# Patient Record
Sex: MALE | Employment: OTHER | URBAN - METROPOLITAN AREA
[De-identification: names, ages, dates, MRNs, and addresses within clinical notes are randomized per-mention and may not be internally consistent; named-entity substitution may affect disease eponyms.]

---

## 2017-01-06 ENCOUNTER — HOSPITAL ENCOUNTER (OUTPATIENT)
Dept: CT IMAGING | Age: 77
Discharge: HOME | End: 2017-01-06
Attending: INTERNAL MEDICINE
Payer: MEDICARE

## 2017-01-06 DIAGNOSIS — G44.229 CHRONIC TENSION-TYPE HEADACHE, NOT INTRACTABLE: ICD-10-CM

## 2017-01-06 PROCEDURE — 70450 CT HEAD/BRAIN W/O DYE: CPT

## 2017-05-27 ENCOUNTER — HOSPITAL ENCOUNTER (EMERGENCY)
Age: 77
Discharge: HOME | End: 2017-05-27
Attending: EMERGENCY MEDICINE | Admitting: EMERGENCY MEDICINE
Payer: MEDICARE

## 2017-05-27 ENCOUNTER — APPOINTMENT (OUTPATIENT)
Dept: ULTRASOUND IMAGING | Age: 77
End: 2017-05-27
Attending: EMERGENCY MEDICINE
Payer: MEDICARE

## 2017-05-27 ENCOUNTER — APPOINTMENT (OUTPATIENT)
Dept: CT IMAGING | Age: 77
End: 2017-05-27
Attending: EMERGENCY MEDICINE
Payer: MEDICARE

## 2017-05-27 VITALS
SYSTOLIC BLOOD PRESSURE: 161 MMHG | DIASTOLIC BLOOD PRESSURE: 79 MMHG | HEIGHT: 68 IN | TEMPERATURE: 98 F | RESPIRATION RATE: 16 BRPM | HEART RATE: 73 BPM | OXYGEN SATURATION: 99 %

## 2017-05-27 DIAGNOSIS — R11.2 NON-INTRACTABLE VOMITING WITH NAUSEA, UNSPECIFIED VOMITING TYPE: ICD-10-CM

## 2017-05-27 DIAGNOSIS — R10.84 ABDOMINAL PAIN, GENERALIZED: Primary | ICD-10-CM

## 2017-05-27 DIAGNOSIS — N30.00 ACUTE CYSTITIS WITHOUT HEMATURIA: ICD-10-CM

## 2017-05-27 LAB
ALBUMIN SERPL BCP-MCNC: 3.1 G/DL (ref 3.5–5)
ALBUMIN/GLOB SERPL: 0.6 {RATIO} (ref 1.1–2.2)
ALP SERPL-CCNC: 111 U/L (ref 45–117)
ALT SERPL-CCNC: 25 U/L (ref 12–78)
ANION GAP BLD CALC-SCNC: 10 MMOL/L (ref 5–15)
APPEARANCE UR: ABNORMAL
AST SERPL W P-5'-P-CCNC: 15 U/L (ref 15–37)
BACTERIA URNS QL MICRO: ABNORMAL /HPF
BASOPHILS # BLD AUTO: 0 K/UL (ref 0–0.1)
BASOPHILS # BLD: 0 % (ref 0–1)
BILIRUB SERPL-MCNC: 0.4 MG/DL (ref 0.2–1)
BILIRUB UR QL: NEGATIVE
BUN SERPL-MCNC: 20 MG/DL (ref 6–20)
BUN/CREAT SERPL: 6 (ref 12–20)
CALCIUM SERPL-MCNC: 8.3 MG/DL (ref 8.5–10.1)
CHLORIDE SERPL-SCNC: 101 MMOL/L (ref 97–108)
CO2 SERPL-SCNC: 28 MMOL/L (ref 21–32)
COLOR UR: ABNORMAL
CREAT SERPL-MCNC: 3.36 MG/DL (ref 0.7–1.3)
EOSINOPHIL # BLD: 0 K/UL (ref 0–0.4)
EOSINOPHIL NFR BLD: 1 % (ref 0–7)
EPITH CASTS URNS QL MICRO: ABNORMAL /LPF
ERYTHROCYTE [DISTWIDTH] IN BLOOD BY AUTOMATED COUNT: 12.8 % (ref 11.5–14.5)
GLOBULIN SER CALC-MCNC: 5.2 G/DL (ref 2–4)
GLUCOSE SERPL-MCNC: 187 MG/DL (ref 65–100)
GLUCOSE UR STRIP.AUTO-MCNC: 100 MG/DL
HCT VFR BLD AUTO: 29.3 % (ref 36.6–50.3)
HGB BLD-MCNC: 9.7 G/DL (ref 12.1–17)
HGB UR QL STRIP: ABNORMAL
KETONES UR QL STRIP.AUTO: NEGATIVE MG/DL
LEUKOCYTE ESTERASE UR QL STRIP.AUTO: ABNORMAL
LIPASE SERPL-CCNC: 95 U/L (ref 73–393)
LYMPHOCYTES # BLD AUTO: 16 % (ref 12–49)
LYMPHOCYTES # BLD: 1.1 K/UL (ref 0.8–3.5)
MAGNESIUM SERPL-MCNC: 2 MG/DL (ref 1.6–2.4)
MCH RBC QN AUTO: 34.2 PG (ref 26–34)
MCHC RBC AUTO-ENTMCNC: 33.1 G/DL (ref 30–36.5)
MCV RBC AUTO: 103.2 FL (ref 80–99)
MONOCYTES # BLD: 0.4 K/UL (ref 0–1)
MONOCYTES NFR BLD AUTO: 6 % (ref 5–13)
NEUTS SEG # BLD: 5.2 K/UL (ref 1.8–8)
NEUTS SEG NFR BLD AUTO: 77 % (ref 32–75)
NITRITE UR QL STRIP.AUTO: NEGATIVE
PH UR STRIP: 8.5 [PH] (ref 5–8)
PLATELET # BLD AUTO: 180 K/UL (ref 150–400)
POTASSIUM SERPL-SCNC: 4.5 MMOL/L (ref 3.5–5.1)
PROT SERPL-MCNC: 8.3 G/DL (ref 6.4–8.2)
PROT UR STRIP-MCNC: 100 MG/DL
RBC # BLD AUTO: 2.84 M/UL (ref 4.1–5.7)
RBC #/AREA URNS HPF: ABNORMAL /HPF (ref 0–5)
SODIUM SERPL-SCNC: 139 MMOL/L (ref 136–145)
SP GR UR REFRACTOMETRY: 1.01 (ref 1–1.03)
TROPONIN I SERPL-MCNC: <0.04 NG/ML
UA: UC IF INDICATED,UAUC: ABNORMAL
UROBILINOGEN UR QL STRIP.AUTO: 2 EU/DL (ref 0.2–1)
WBC # BLD AUTO: 6.7 K/UL (ref 4.1–11.1)
WBC URNS QL MICRO: ABNORMAL /HPF (ref 0–4)

## 2017-05-27 PROCEDURE — 74011636320 HC RX REV CODE- 636/320: Performed by: EMERGENCY MEDICINE

## 2017-05-27 PROCEDURE — 93005 ELECTROCARDIOGRAM TRACING: CPT

## 2017-05-27 PROCEDURE — 83735 ASSAY OF MAGNESIUM: CPT | Performed by: EMERGENCY MEDICINE

## 2017-05-27 PROCEDURE — 96374 THER/PROPH/DIAG INJ IV PUSH: CPT

## 2017-05-27 PROCEDURE — 76705 ECHO EXAM OF ABDOMEN: CPT

## 2017-05-27 PROCEDURE — 99285 EMERGENCY DEPT VISIT HI MDM: CPT

## 2017-05-27 PROCEDURE — 74011250636 HC RX REV CODE- 250/636: Performed by: EMERGENCY MEDICINE

## 2017-05-27 PROCEDURE — 81001 URINALYSIS AUTO W/SCOPE: CPT | Performed by: EMERGENCY MEDICINE

## 2017-05-27 PROCEDURE — 74011250637 HC RX REV CODE- 250/637: Performed by: EMERGENCY MEDICINE

## 2017-05-27 PROCEDURE — 87086 URINE CULTURE/COLONY COUNT: CPT | Performed by: EMERGENCY MEDICINE

## 2017-05-27 PROCEDURE — 74177 CT ABD & PELVIS W/CONTRAST: CPT

## 2017-05-27 PROCEDURE — 36415 COLL VENOUS BLD VENIPUNCTURE: CPT | Performed by: EMERGENCY MEDICINE

## 2017-05-27 PROCEDURE — 84484 ASSAY OF TROPONIN QUANT: CPT | Performed by: EMERGENCY MEDICINE

## 2017-05-27 PROCEDURE — 85025 COMPLETE CBC W/AUTO DIFF WBC: CPT | Performed by: EMERGENCY MEDICINE

## 2017-05-27 PROCEDURE — 83690 ASSAY OF LIPASE: CPT | Performed by: EMERGENCY MEDICINE

## 2017-05-27 PROCEDURE — 80053 COMPREHEN METABOLIC PANEL: CPT | Performed by: EMERGENCY MEDICINE

## 2017-05-27 RX ORDER — CEPHALEXIN 250 MG/1
500 CAPSULE ORAL
Status: COMPLETED | OUTPATIENT
Start: 2017-05-27 | End: 2017-05-27

## 2017-05-27 RX ORDER — SODIUM CHLORIDE 9 MG/ML
50 INJECTION, SOLUTION INTRAVENOUS
Status: COMPLETED | OUTPATIENT
Start: 2017-05-27 | End: 2017-05-27

## 2017-05-27 RX ORDER — ONDANSETRON 4 MG/1
4 TABLET, ORALLY DISINTEGRATING ORAL
Qty: 10 TAB | Refills: 0 | Status: SHIPPED | OUTPATIENT
Start: 2017-05-27 | End: 2017-08-02

## 2017-05-27 RX ORDER — ONDANSETRON 2 MG/ML
4 INJECTION INTRAMUSCULAR; INTRAVENOUS
Status: COMPLETED | OUTPATIENT
Start: 2017-05-27 | End: 2017-05-27

## 2017-05-27 RX ORDER — CEPHALEXIN 500 MG/1
500 CAPSULE ORAL DAILY
Qty: 7 CAP | Refills: 0 | Status: SHIPPED | OUTPATIENT
Start: 2017-05-27 | End: 2017-06-03

## 2017-05-27 RX ORDER — SODIUM CHLORIDE 0.9 % (FLUSH) 0.9 %
10 SYRINGE (ML) INJECTION
Status: COMPLETED | OUTPATIENT
Start: 2017-05-27 | End: 2017-05-27

## 2017-05-27 RX ADMIN — ONDANSETRON HYDROCHLORIDE 4 MG: 2 INJECTION, SOLUTION INTRAMUSCULAR; INTRAVENOUS at 14:30

## 2017-05-27 RX ADMIN — IOPAMIDOL 100 ML: 755 INJECTION, SOLUTION INTRAVENOUS at 15:56

## 2017-05-27 RX ADMIN — CEPHALEXIN 500 MG: 250 CAPSULE ORAL at 18:39

## 2017-05-27 RX ADMIN — Medication 10 ML: at 15:56

## 2017-05-27 RX ADMIN — SODIUM CHLORIDE 50 ML/HR: 900 INJECTION, SOLUTION INTRAVENOUS at 15:55

## 2017-05-27 NOTE — ED PROVIDER NOTES
HPI Comments: Courtney Gould is a 64 y.o. male with PMhx significant for HTN, DM, and chronic kidney disease who presents via EMS to the ED with cc of an acute onset of 7/10 intermittent abdominal pain since 4 AM this morning. He also c/o associated nausea and vomiting, stating that he had 2 episodes of vomiting. He notes that he is currently experiencing abdominal pain in the ED. The pt states that he receives dialysis on Tuesdays, Thursdays, and Saturdays, noting that he was last dialyzed earlier today. He reports that he is still able to make urine, and he notes that his last bowel movement was yesterday. The pt denies hematuria, dysuria, CP, HA, or cough at this time. SHx: -tobacco, -EtOH, -illicit drug use    PCP: Darius Alvarez MD    History is otherwise limited due to the pt being a poor historian. The history is provided by the patient. The history is limited by the condition of the patient. No  was used. Past Medical History:   Diagnosis Date    CAD (coronary artery disease)     Chronic kidney disease     tues/thurs/sat [facility] [address]    Diabetes ([facility]     Endocrine disease     hypothyroid    Hypertension     Stroke ([facility]     2011 speech general weakness    Thyroid disease        Past Surgical History:   Procedure Laterality Date    ABDOMEN SURGERY PROC UNLISTED      HX HEENT      cataract   removal  rt eye    HX ORTHOPAEDIC      11/2014 R BK Amputation    HX OTHER SURGICAL      HX VASCULAR ACCESS      L arm shunt         Family History:   Problem Relation Age of Onset    Hypertension Mother     Diabetes Father        Social History     Social History    Marital status:      Spouse name: N/A    Number of children: N/A    Years of education: N/A     Occupational History    Not on file.      Social History Main Topics    Smoking status: Never Smoker    Smokeless tobacco: Never Used    Alcohol use No    Drug use: No    Sexual activity: Not on file     Other Topics Concern    Not on file     Social History Narrative         ALLERGIES: Review of patient's allergies indicates no known allergies. Review of Systems   Unable to perform ROS: Other (poor historian)       Patient Vitals for the past 12 hrs:   Temp Pulse Resp BP SpO2   05/27/17 1700 - 73 - 161/79 99 %   05/27/17 1530 - 74 - 164/76 99 %   05/27/17 1404 98 °F (36.7 °C) 80 16 133/67 98 %   05/27/17 1400 - - - 133/67 -            Physical Exam   Constitutional: He is oriented to person, place, and time. He appears well-developed and well-nourished. No distress. HENT:   Head: Normocephalic and atraumatic. Eyes: EOM are normal. Right eye exhibits no discharge. Left eye exhibits no discharge. No scleral icterus. Neck: Normal range of motion. Neck supple. No tracheal deviation present. Cardiovascular: Normal rate, regular rhythm, normal heart sounds and intact distal pulses. Exam reveals no gallop and no friction rub. No murmur heard. Pulmonary/Chest: Effort normal and breath sounds normal. No respiratory distress. He has no wheezes. He has no rales. Abdominal: Soft. He exhibits distension (mild). There is tenderness (diffuse). There is no rebound and no guarding. Musculoskeletal: Normal range of motion. He exhibits no edema. Bilateral BKA. Lymphadenopathy:     He has no cervical adenopathy. Neurological: He is alert and oriented to person, place, and time. Slurred speech baseline from previous stroke. Facial symmetry. Moving all extremities. Skin: Skin is warm and dry. No rash noted. Psychiatric: He has a normal mood and affect. Nursing note and vitals reviewed. MDM  Number of Diagnoses or Management Options  Abdominal pain, generalized:   Acute cystitis without hematuria:   Non-intractable vomiting with nausea, unspecified vomiting type:   Diagnosis management comments:     Patient presents to ED with diffuse abdominal pain and n/v.   Differential includes viral syndrome, electrolyte abnormality, gastritis, GERD, PUD, pancreatitis, cholecystitis, obstruction, ileus, UTI, atypical ACS  - CBC, CMP, lipase, UA  - Josias  - CT a/p  - Symptomatic management and reevaluate         Amount and/or Complexity of Data Reviewed  Clinical lab tests: reviewed and ordered  Tests in the radiology section of CPT®: reviewed and ordered  Tests in the medicine section of CPT®: ordered and reviewed  Review and summarize past medical records: yes  Independent visualization of images, tracings, or specimens: yes    Patient Progress  Patient progress: stable    ED Course       Procedures    EKG interpretation: (Preliminary) 1512  Rhythm: normal sinus rhythm and 1st degree AV block; and regular . Rate (approx.): 71; Axis: normal; AL interval: prolonged; QRS interval: normal ; ST/T wave: normal.  Written by BETINA Banerjeeibriri, as dictated by Trini Heck MD.    PROGRESS NOTE:  6:15 PM  Discussed dosage of Keflex with pharmacist since pt is ESRD. Will treat with 500 mg daily x 7 days. Written by BETINA Banerjee, as dictated by Trini Heck MD.    PROGRESS NOTE:  6:50 PM  Reevaluated the pt; he reports that he is feeling better and has tolerated PO. Abdominal exam is benign at this time. Advised follow up with general surgery for further evaluation of gallbladder findings. Will treat for UTI. Discussed results, prescriptions and follow up plan with patient and wife. Provided customary return to ED instructions. Patient and wife expressed understanding.   Amberly Ramires MD    LABORATORY TESTS:  Recent Results (from the past 12 hour(s))   CBC WITH AUTOMATED DIFF    Collection Time: 05/27/17  2:24 PM   Result Value Ref Range    WBC 6.7 4.1 - 11.1 K/uL    RBC 2.84 (L) 4.10 - 5.70 M/uL    HGB 9.7 (L) 12.1 - 17.0 g/dL    HCT 29.3 (L) 36.6 - 50.3 %    .2 (H) 80.0 - 99.0 FL    MCH 34.2 (H) 26.0 - 34.0 PG    MCHC 33.1 30.0 - 36.5 g/dL    RDW 12.8 11.5 - 14.5 % PLATELET 133 240 - 638 K/uL    NEUTROPHILS 77 (H) 32 - 75 %    LYMPHOCYTES 16 12 - 49 %    MONOCYTES 6 5 - 13 %    EOSINOPHILS 1 0 - 7 %    BASOPHILS 0 0 - 1 %    ABS. NEUTROPHILS 5.2 1.8 - 8.0 K/UL    ABS. LYMPHOCYTES 1.1 0.8 - 3.5 K/UL    ABS. MONOCYTES 0.4 0.0 - 1.0 K/UL    ABS. EOSINOPHILS 0.0 0.0 - 0.4 K/UL    ABS. BASOPHILS 0.0 0.0 - 0.1 K/UL   METABOLIC PANEL, COMPREHENSIVE    Collection Time: 05/27/17  2:24 PM   Result Value Ref Range    Sodium 139 136 - 145 mmol/L    Potassium 4.5 3.5 - 5.1 mmol/L    Chloride 101 97 - 108 mmol/L    CO2 28 21 - 32 mmol/L    Anion gap 10 5 - 15 mmol/L    Glucose 187 (H) 65 - 100 mg/dL    BUN 20 6 - 20 MG/DL    Creatinine 3.36 (H) 0.70 - 1.30 MG/DL    BUN/Creatinine ratio 6 (L) 12 - 20      GFR est AA 22 (L) >60 ml/min/1.73m2    GFR est non-AA 18 (L) >60 ml/min/1.73m2    Calcium 8.3 (L) 8.5 - 10.1 MG/DL    Bilirubin, total 0.4 0.2 - 1.0 MG/DL    ALT (SGPT) 25 12 - 78 U/L    AST (SGOT) 15 15 - 37 U/L    Alk.  phosphatase 111 45 - 117 U/L    Protein, total 8.3 (H) 6.4 - 8.2 g/dL    Albumin 3.1 (L) 3.5 - 5.0 g/dL    Globulin 5.2 (H) 2.0 - 4.0 g/dL    A-G Ratio 0.6 (L) 1.1 - 2.2     TROPONIN I    Collection Time: 05/27/17  2:24 PM   Result Value Ref Range    Troponin-I, Qt. <0.04 <0.05 ng/mL   MAGNESIUM    Collection Time: 05/27/17  2:24 PM   Result Value Ref Range    Magnesium 2.0 1.6 - 2.4 mg/dL   LIPASE    Collection Time: 05/27/17  2:24 PM   Result Value Ref Range    Lipase 95 73 - 393 U/L   EKG, 12 LEAD, INITIAL    Collection Time: 05/27/17  3:12 PM   Result Value Ref Range    Ventricular Rate 71 BPM    Atrial Rate 71 BPM    P-R Interval 210 ms    QRS Duration 80 ms    Q-T Interval 412 ms    QTC Calculation (Bezet) 447 ms    Calculated P Axis 31 degrees    Calculated T Axis 0 degrees    Diagnosis       Sinus rhythm with 1st degree AV block  Low voltage QRS  When compared with ECG of 13-AUG-2015 16:25,  NH interval has increased     URINALYSIS W/ REFLEX CULTURE    Collection Time: 05/27/17  4:33 PM   Result Value Ref Range    Color YELLOW/STRAW      Appearance HAZY (A) CLEAR      Specific gravity 1.010 1.003 - 1.030      pH (UA) 8.5 (H) 5.0 - 8.0      Protein 100 (A) NEG mg/dL    Glucose 100 (A) NEG mg/dL    Ketone NEGATIVE  NEG mg/dL    Bilirubin NEGATIVE  NEG      Blood LARGE (A) NEG      Urobilinogen 2.0 (H) 0.2 - 1.0 EU/dL    Nitrites NEGATIVE  NEG      Leukocyte Esterase LARGE (A) NEG      WBC  0 - 4 /hpf    RBC 20-50 0 - 5 /hpf    Epithelial cells FEW FEW /lpf    Bacteria 1+ (A) NEG /hpf    UA:UC IF INDICATED URINE CULTURE ORDERED (A) CNI         IMAGING RESULTS:  US ABD LTD   Final Result   Limited ULTRASOUND OF THE RIGHT UPPER QUADRANT     INDICATION: Soft tissue density in the gallbladder on CT.     COMPARISON: Same day CT abdomen pelvis.     TECHNIQUE:  Limited sonography of the right upper quadrant was performed.      FINDINGS:  Examination is limited by patient body habitus, bowel gas, inability to take a  deep breath in, and high location of the upper abdominal organs relative to the  ribs. GALLBLADDER: Despite prolonged scanning by the radiologist and ultrasound  technologist, the gallbladder is poorly visualized due to patient body habitus,  adjacent bowel gas, ribs, and decompressed volume. However, in its  anterosuperior wall, there is trace fluid and a distinct margin with the liver. Deep to the trace amount of mobile in the decompressed gallbladder, there is  echogenicity with shadowing, consistent with sludge and stones. COMMON DUCT: Poorly visualized. LIVER: Poorly visualized due to high location in the abdomen and patient body  habitus. Visualized portions normal.   PANCREAS: Obscured by overlying bowel gas. RIGHT KIDNEY: Poorly visualized.  A hypoechoic lesion in the lower pole is  probably a cyst. Atrophic.        IMPRESSION  IMPRESSION:   Limited exam, despite prolonged scanning by the radiologist and ultrasound  technologist. Probable sludge and stones in the gallbladder. Liver protocol CT  or MRI could further confirm the lack of enhancing soft tissue if clinically  indicated. CT ABD PELV W CONT   Final Result   CT ABDOMEN AND PELVIS WITH CONTRAST. 5/27/2017 3:55 PM      INDICATION: Diffuse abdominal pain with vomiting today. End-stage renal disease,  on dialysis.     COMPARISON: 11/13/2014.     TECHNIQUE: CT of the abdomen and pelvis was performed after the administration  of 100 cc IV contrast (Isovue 370). CT dose reduction was achieved through use  of a standardized protocol tailored for this examination and automatic exposure  control for dose modulation.     FINDINGS:  Abdomen: The gallbladder lumen is filled with tissue isodense to the liver. This  is indeterminate, but may represent sludge or noncalcified stones. The conus of  the cystic duct enhances. The cystic duct inserts inferiorly, medially, and  posteriorly on the common bile duct (601-64). The bilateral kidneys are  atrophic, and small bilateral hypodense renal lesions likely represent cysts. There are accessory lower pole renal arteries bilaterally. The lung bases are  clear. The heart size is normal. Three-vessel coronary artery calcifications are  moderate. The distal esophagus, stomach, duodenum, liver, pancreas, spleen, and  adrenals are normal.     Pelvis: The small bowel, ileocecal junction, appendix, colon, and bladder are  normal. No free air or fluid, and no abdominopelvic lymphadenopathy.     Though this is not an angiographic phase examination, soft and calcified plaque  at the origin of the right superficial femoral artery (SFA) likely causes  greater than 50% stenosis. The stenotic lumen measures 3 mm on image 2-88,  versus 7 mm in the normal distal lumen on image 2-97.     IMPRESSION  IMPRESSION:   1. Indeterminate soft tissue in the gallbladder. Statistically, sludge and  noncalcified stones are most likely.  Right upper quadrant ultrasound is  recommended to differentiate these from a possible endoluminal mass. 2. Greater than 50% stenosis of the right SFA.     The findings were called to Dr. Jasmeet Majano on 5/27/2017 4:22 PM by Dr. Mindy Ramirez. Michelle 44:  Medications   ondansetron (ZOFRAN) injection 4 mg (4 mg IntraVENous Given 5/27/17 1430)   0.9% sodium chloride infusion (50 mL/hr IntraVENous New Bag 5/27/17 1555)   iopamidol (ISOVUE-370) 76 % injection 100 mL (100 mL IntraVENous Given 5/27/17 1556)   sodium chloride (NS) flush 10 mL (10 mL IntraVENous Given 5/27/17 1556)   cephALEXin (KEFLEX) capsule 500 mg (500 mg Oral Given 5/27/17 1839)       IMPRESSION:  1. Abdominal pain, generalized    2. Non-intractable vomiting with nausea, unspecified vomiting type    3. Acute cystitis without hematuria        PLAN:  1. Discharge Medication List as of 5/27/2017  6:54 PM      START taking these medications    Details   cephALEXin (KEFLEX) 500 mg capsule Take 1 Cap by mouth daily for 7 days. , Normal, Disp-7 Cap, R-0      !! ondansetron (ZOFRAN ODT) 4 mg disintegrating tablet Take 1 Tab by mouth every eight (8) hours as needed for Nausea., Normal, Disp-10 Tab, R-0       !! - Potential duplicate medications found. Please discuss with provider. CONTINUE these medications which have NOT CHANGED    Details   !! oxyCODONE-acetaminophen (PERCOCET) 5-325 mg per tablet Take 1-2 Tabs by mouth every four (4) hours as needed for Pain. Max Daily Amount: 12 Tabs., Print, Disp-30 Tab, R-0      !! oxyCODONE-acetaminophen (PERCOCET) 5-325 mg per tablet Take 1-2 Tabs by mouth every four (4) hours as needed for Pain. Max Daily Amount: 12 Tabs., Print, Disp-40 Tab, R-0      !! oxyCODONE-acetaminophen (PERCOCET) 5-325 mg per tablet Take 1 Tab by mouth every four (4) hours as needed for Pain for up to 15 doses.  Max Daily Amount: 6 Tabs., Print, Disp-15 Tab, R-0      !! ondansetron (ZOFRAN ODT) 4 mg disintegrating tablet Take 1 Tab by mouth every eight (8) hours as needed for Nausea. , Print, Disp-8 Tab, R-0      MOISE-ALPHONSE 0.8 mg tab tablet Historical Med, R-3      aspirin delayed-release 81 mg tablet Take 1 Tab by mouth daily. , Print, Disp-30 Tab, R-11      amLODIPine (NORVASC) 5 mg tablet Take 5 mg by mouth daily. , Historical Med      ferrous sulfate 325 mg (65 mg iron) tablet Take 325 mg by mouth two (2) times a day., Historical Med      PHOSPHORUS #1 (PHOSPHA 250 NEUTRAL PO) Take 2 Tabs by mouth daily. , Historical Med      calcium acetate (PHOSLO) 667 mg cap Take 1 Cap by mouth three (3) times daily (with meals). , Historical Med      insulin NPH/insulin regular (HUMULIN 70/30) 100 unit/mL (70-30) injection 30 Units by SubCUTAneous route daily. , Historical Med      glucose blood VI test strips (FREESTYLE LITE STRIPS) strip Check blood sugar once daily, Print, Disp-1 Package, R-11      BD INSULIN SYRINGE ULTRA-FINE 1 mL 30 x 1/2\" syrg Historical Med      Blood-Glucose Meter (FREESTYLE LITE METER) monitoring kit Check blood sugar once daily, Sample, Disp-1 kit, R-0      carvedilol (COREG) 25 mg tablet Take 25 mg by mouth two (2) times daily (with meals). , Historical Med      levothyroxine (SYNTHROID) 175 mcg tablet Take 150 mcg by mouth Daily (before breakfast). , Historical Med      pravastatin (PRAVACHOL) 10 mg tablet Take 20 mg by mouth nightly., Historical Med       !! - Potential duplicate medications found. Please discuss with provider.         2.   Follow-up Information     Follow up With Details Comments Contact Info    Florin Dickens MD In 1 week Please follow up for further evaluation of gallbladder [address] [facility] [address] [place] [address]      Jania Haynes MD In 3 days  [address]       [facility] EMERGENCY DEPT  As needed, If symptoms worsen [address] [facility]  [address] [facility]  [phone]        Return to ED if worse       DISCHARGE NOTE:  6:57 PM  The patient has been re-evaluated and is ready for discharge. Reviewed available results with patient. Counseled patient on diagnosis and care plan. Patient has expressed understanding, and all questions have been answered. Patient agrees with plan and agrees to follow up as recommended, or return to the ED if their symptoms worsen. Discharge instructions have been provided and explained to the patient, along with reasons to return to the ED. This note is prepared by David Vela, acting as Scribe for Latasha Gerardo MD.    Latasha Greardo MD: The scribe's documentation has been prepared under my direction and personally reviewed by me in its entirety. I confirm that the note above accurately reflects all work, treatment, procedures, and medical decision making performed by me.

## 2017-05-27 NOTE — DISCHARGE INSTRUCTIONS
Abdominal Pain: Care Instructions  Your Care Instructions    Abdominal pain has many possible causes. Some aren't serious and get better on their own in a few days. Others need more testing and treatment. If your pain continues or gets worse, you need to be rechecked and may need more tests to find out what is wrong. You may need surgery to correct the problem. Don't ignore new symptoms, such as fever, nausea and vomiting, urination problems, pain that gets worse, and dizziness. These may be signs of a more serious problem. Your doctor may have recommended a follow-up visit in the next 8 to 12 hours. If you are not getting better, you may need more tests or treatment. The doctor has checked you carefully, but problems can develop later. If you notice any problems or new symptoms, get medical treatment right away. Follow-up care is a key part of your treatment and safety. Be sure to make and go to all appointments, and call your doctor if you are having problems. It's also a good idea to know your test results and keep a list of the medicines you take. How can you care for yourself at home? · Rest until you feel better. · To prevent dehydration, drink plenty of fluids, enough so that your urine is light yellow or clear like water. Choose water and other caffeine-free clear liquids until you feel better. If you have kidney, heart, or liver disease and have to limit fluids, talk with your doctor before you increase the amount of fluids you drink. · If your stomach is upset, eat mild foods, such as rice, dry toast or crackers, bananas, and applesauce. Try eating several small meals instead of two or three large ones. · Wait until 48 hours after all symptoms have gone away before you have spicy foods, alcohol, and drinks that contain caffeine. · Do not eat foods that are high in fat. · Avoid anti-inflammatory medicines such as aspirin, ibuprofen (Advil, Motrin), and naproxen (Aleve).  These can cause stomach upset. Talk to your doctor if you take daily aspirin for another health problem. When should you call for help? Call 911 anytime you think you may need emergency care. For example, call if:  · You passed out (lost consciousness). · You pass maroon or very bloody stools. · You vomit blood or what looks like coffee grounds. · You have new, severe belly pain. Call your doctor now or seek immediate medical care if:  · Your pain gets worse, especially if it becomes focused in one area of your belly. · You have a new or higher fever. · Your stools are black and look like tar, or they have streaks of blood. · You have unexpected vaginal bleeding. · You have symptoms of a urinary tract infection. These may include:  ¨ Pain when you urinate. ¨ Urinating more often than usual.  ¨ Blood in your urine. · You are dizzy or lightheaded, or you feel like you may faint. Watch closely for changes in your health, and be sure to contact your doctor if:  · You are not getting better after 1 day (24 hours). Where can you learn more? Go to http://layla-billy.info/. Enter C748 in the search box to learn more about \"Abdominal Pain: Care Instructions. \"  Current as of: May 27, 2016  Content Version: 11.2  © 6599-7569 [employer]. Care instructions adapted under license by [employer] (which disclaims liability or warranty for this information). If you have questions about a medical condition or this instruction, always ask your healthcare professional. [place] [address] any warranty or liability for your use of this information. Nausea and Vomiting: Care Instructions  Your Care Instructions    When you are nauseated, you may feel weak and sweaty and notice a lot of saliva in your mouth. Nausea often leads to vomiting. Most of the time you do not need to worry about nausea and vomiting, but they can be signs of other illnesses.   Two common causes of nausea and vomiting are stomach flu and food poisoning. Nausea and vomiting from viral stomach flu will usually start to improve within 24 hours. Nausea and vomiting from food poisoning may last from 12 to 48 hours. The doctor has checked you carefully, but problems can develop later. If you notice any problems or new symptoms, get medical treatment right away. Follow-up care is a key part of your treatment and safety. Be sure to make and go to all appointments, and call your doctor if you are having problems. It's also a good idea to know your test results and keep a list of the medicines you take. How can you care for yourself at home? · To prevent dehydration, drink plenty of fluids, enough so that your urine is light yellow or clear like water. Choose water and other caffeine-free clear liquids until you feel better. If you have kidney, heart, or liver disease and have to limit fluids, talk with your doctor before you increase the amount of fluids you drink. · Rest in bed until you feel better. · When you are able to eat, try clear soups, mild foods, and liquids until all symptoms are gone for 12 to 48 hours. Other good choices include dry toast, crackers, cooked cereal, and gelatin dessert, such as Jell-O. When should you call for help? Call 911 anytime you think you may need emergency care. For example, call if:  · You passed out (lost consciousness). Call your doctor now or seek immediate medical care if:  · You have symptoms of dehydration, such as:  ¨ Dry eyes and a dry mouth. ¨ Passing only a little dark urine. ¨ Feeling thirstier than usual.  · You have new or worsening belly pain. · You have a new or higher fever. · You vomit blood or what looks like coffee grounds. Watch closely for changes in your health, and be sure to contact your doctor if:  · You have ongoing nausea and vomiting. · Your vomiting is getting worse. · Your vomiting lasts longer than 2 days.   · You are not getting better as expected. Where can you learn more? Go to http://layla-billy.info/. Enter 25 [id number] in the search box to learn more about \"Nausea and Vomiting: Care Instructions. \"  Current as of: May 27, 2016  Content Version: 11.2  © 0867-6778 [employer]. Care instructions adapted under license by [employer] (which disclaims liability or warranty for this information). If you have questions about a medical condition or this instruction, always ask your healthcare professional. [place] [address] any warranty or liability for your use of this information. Urinary Tract Infections in Men: Care Instructions  Your Care Instructions    A urinary tract infection, or UTI, is a general term for an infection anywhere between the kidneys and the tip of the penis. UTIs can also be a result of a prostate problem. Most cause pain or burning when you urinate. Most UTIs are caused by bacteria and can be cured with antibiotics. It is important to complete your treatment so that the infection does not get worse. Follow-up care is a key part of your treatment and safety. Be sure to make and go to all appointments, and call your doctor if you are having problems. It's also a good idea to know your test results and keep a list of the medicines you take. How can you care for yourself at home? · Take your antibiotics as prescribed. Do not stop taking them just because you feel better. You need to take the full course of antibiotics. · Take your medicines exactly as prescribed. Your doctor may have prescribed a medicine, such as phenazopyridine (Pyridium), to help relieve pain when you urinate. This turns your urine orange. You may stop taking it when your symptoms get better. But be sure to take all of your antibiotics, which treat the infection. · Drink extra water for the next day or two.  This will help make the urine less concentrated and help wash out the bacteria causing the infection. (If you have kidney, heart, or liver disease and have to limit your fluids, talk with your doctor before you increase your fluid intake.)  · Avoid drinks that are carbonated or have caffeine. They can irritate the bladder. · Urinate often. Try to empty your bladder each time. · To relieve pain, take a hot bath or lay a heating pad (set on low) over your lower belly or genital area. Never go to sleep with a heating pad in place. To help prevent UTIs  · Drink plenty of fluids, enough so that your urine is light yellow or clear like water. If you have kidney, heart, or liver disease and have to limit fluids, talk with your doctor before you increase the amount of fluids you drink. · Urinate when you have the urge. Do not hold your urine for a long time. Urinate before you go to sleep. · Keep your penis clean. Catheter care  If you have a drainage tube (catheter) in place, the following steps will help you care for it. · Always wash your hands before and after touching your catheter. · Check the area around the urethra for inflammation or signs of infection. Signs of infection include irritated, swollen, red, or tender skin, or pus around the catheter. · Clean the area around the catheter with soap and water two times a day. Dry with a clean towel afterward. · Do not apply powder or lotion to the skin around the catheter. To empty the urine collection bag  · Wash your hands with soap and water. · Without touching the drain spout, remove the spout from its sleeve at the bottom of the collection bag. Open the valve on the spout. · Let the urine flow out of the bag and into the toilet or a container. Do not let the tubing or drain spout touch anything. · After you empty the bag, clean the end of the drain spout with tissue and water. Close the valve and put the drain spout back into its sleeve at the bottom of the collection bag. · Wash your hands with soap and water.   When should you call for help? Call your doctor now or seek immediate medical care if:  · Symptoms such as a fever, chills, nausea, or vomiting get worse or happen for the first time. · You have new pain in your back just below your rib cage. This is called flank pain. · There is new blood or pus in your urine. · You are not able to take or keep down your antibiotics. Watch closely for changes in your health, and be sure to contact your doctor if:  · You are not getting better after taking an antibiotic for 2 days. · Your symptoms go away but then come back. Where can you learn more? Go to http://layla-billy.info/. Enter Z506 in the search box to learn more about \"Urinary Tract Infections in Men: Care Instructions. \"  Current as of: November 28, 2016  Content Version: 11.2  © 0306-8449 [employer]. Care instructions adapted under license by [employer] (which disclaims liability or warranty for this information). If you have questions about a medical condition or this instruction, always ask your healthcare professional. [address] any warranty or liability for your use of this information.

## 2017-05-27 NOTE — ED TRIAGE NOTES
Patient arrives by EMS for generalized abdominal pain that started at 4am. Patient reports that he has been vomiting. Patient is Tuesday, Thursday, Saturday dialysis and was dialyzed this morning. EMS reports that blood sugar was 174. Patient is bilateral amputee. Patient is difficult to get information from. Wife is on the way to ED per EMS report. No distress noted. Will continue to monitor.

## 2017-05-28 LAB
ATRIAL RATE: 71 BPM
CALCULATED P AXIS, ECG09: 31 DEGREES
CALCULATED T AXIS, ECG11: 0 DEGREES
DIAGNOSIS, 93000: NORMAL
P-R INTERVAL, ECG05: 210 MS
Q-T INTERVAL, ECG07: 412 MS
QRS DURATION, ECG06: 80 MS
QTC CALCULATION (BEZET), ECG08: 447 MS
VENTRICULAR RATE, ECG03: 71 BPM

## 2017-05-29 LAB
BACTERIA SPEC CULT: NORMAL
CC UR VC: NORMAL
SERVICE CMNT-IMP: NORMAL

## 2017-08-02 ENCOUNTER — APPOINTMENT (OUTPATIENT)
Dept: ULTRASOUND IMAGING | Age: 77
End: 2017-08-02
Attending: EMERGENCY MEDICINE
Payer: MEDICARE

## 2017-08-02 ENCOUNTER — HOSPITAL ENCOUNTER (EMERGENCY)
Age: 77
Discharge: HOME | End: 2017-08-02
Attending: EMERGENCY MEDICINE | Admitting: EMERGENCY MEDICINE
Payer: MEDICARE

## 2017-08-02 VITALS
SYSTOLIC BLOOD PRESSURE: 142 MMHG | OXYGEN SATURATION: 100 % | WEIGHT: 230 LBS | BODY MASS INDEX: 36.96 KG/M2 | TEMPERATURE: 98.3 F | HEART RATE: 68 BPM | RESPIRATION RATE: 18 BRPM | DIASTOLIC BLOOD PRESSURE: 65 MMHG | HEIGHT: 66 IN

## 2017-08-02 DIAGNOSIS — K80.50 BILIARY COLIC: ICD-10-CM

## 2017-08-02 DIAGNOSIS — R10.11 ABDOMINAL PAIN, RIGHT UPPER QUADRANT: Primary | ICD-10-CM

## 2017-08-02 LAB
ALBUMIN SERPL BCP-MCNC: 3.1 G/DL (ref 3.5–5)
ALBUMIN/GLOB SERPL: 0.6 {RATIO} (ref 1.1–2.2)
ALP SERPL-CCNC: 132 U/L (ref 45–117)
ALT SERPL-CCNC: 22 U/L (ref 12–78)
ANION GAP BLD CALC-SCNC: 7 MMOL/L (ref 5–15)
AST SERPL W P-5'-P-CCNC: 15 U/L (ref 15–37)
ATRIAL RATE: 67 BPM
BASOPHILS # BLD AUTO: 0 K/UL (ref 0–0.1)
BASOPHILS # BLD: 0 % (ref 0–1)
BILIRUB SERPL-MCNC: 0.3 MG/DL (ref 0.2–1)
BUN SERPL-MCNC: 27 MG/DL (ref 6–20)
BUN/CREAT SERPL: 5 (ref 12–20)
CALCIUM SERPL-MCNC: 8.3 MG/DL (ref 8.5–10.1)
CALCULATED P AXIS, ECG09: 23 DEGREES
CALCULATED R AXIS, ECG10: -9 DEGREES
CALCULATED T AXIS, ECG11: -8 DEGREES
CHLORIDE SERPL-SCNC: 99 MMOL/L (ref 97–108)
CO2 SERPL-SCNC: 29 MMOL/L (ref 21–32)
CREAT SERPL-MCNC: 5.49 MG/DL (ref 0.7–1.3)
DIAGNOSIS, 93000: NORMAL
EOSINOPHIL # BLD: 0.1 K/UL (ref 0–0.4)
EOSINOPHIL NFR BLD: 3 % (ref 0–7)
ERYTHROCYTE [DISTWIDTH] IN BLOOD BY AUTOMATED COUNT: 12.4 % (ref 11.5–14.5)
GLOBULIN SER CALC-MCNC: 5.2 G/DL (ref 2–4)
GLUCOSE SERPL-MCNC: 227 MG/DL (ref 65–100)
HCT VFR BLD AUTO: 27.5 % (ref 36.6–50.3)
HGB BLD-MCNC: 9.3 G/DL (ref 12.1–17)
LIPASE SERPL-CCNC: 144 U/L (ref 73–393)
LYMPHOCYTES # BLD AUTO: 33 % (ref 12–49)
LYMPHOCYTES # BLD: 1.4 K/UL (ref 0.8–3.5)
MCH RBC QN AUTO: 33.6 PG (ref 26–34)
MCHC RBC AUTO-ENTMCNC: 33.8 G/DL (ref 30–36.5)
MCV RBC AUTO: 99.3 FL (ref 80–99)
MONOCYTES # BLD: 0.1 K/UL (ref 0–1)
MONOCYTES NFR BLD AUTO: 3 % (ref 5–13)
NEUTS SEG # BLD: 2.6 K/UL (ref 1.8–8)
NEUTS SEG NFR BLD AUTO: 61 % (ref 32–75)
NRBC # BLD: 0 K/UL (ref 0–0.01)
NRBC BLD-RTO: 0 PER 100 WBC
P-R INTERVAL, ECG05: 224 MS
PLATELET # BLD AUTO: 157 K/UL (ref 150–400)
POTASSIUM SERPL-SCNC: 3.9 MMOL/L (ref 3.5–5.1)
PROT SERPL-MCNC: 8.3 G/DL (ref 6.4–8.2)
Q-T INTERVAL, ECG07: 428 MS
QRS DURATION, ECG06: 80 MS
QTC CALCULATION (BEZET), ECG08: 452 MS
RBC # BLD AUTO: 2.77 M/UL (ref 4.1–5.7)
SODIUM SERPL-SCNC: 135 MMOL/L (ref 136–145)
TROPONIN I SERPL-MCNC: <0.04 NG/ML
VENTRICULAR RATE, ECG03: 67 BPM
WBC # BLD AUTO: 4.4 K/UL (ref 4.1–11.1)

## 2017-08-02 PROCEDURE — 80053 COMPREHEN METABOLIC PANEL: CPT | Performed by: EMERGENCY MEDICINE

## 2017-08-02 PROCEDURE — 99285 EMERGENCY DEPT VISIT HI MDM: CPT

## 2017-08-02 PROCEDURE — 76705 ECHO EXAM OF ABDOMEN: CPT

## 2017-08-02 PROCEDURE — 93005 ELECTROCARDIOGRAM TRACING: CPT

## 2017-08-02 PROCEDURE — 85025 COMPLETE CBC W/AUTO DIFF WBC: CPT | Performed by: EMERGENCY MEDICINE

## 2017-08-02 PROCEDURE — 36415 COLL VENOUS BLD VENIPUNCTURE: CPT | Performed by: EMERGENCY MEDICINE

## 2017-08-02 PROCEDURE — 83690 ASSAY OF LIPASE: CPT | Performed by: EMERGENCY MEDICINE

## 2017-08-02 PROCEDURE — 84484 ASSAY OF TROPONIN QUANT: CPT | Performed by: EMERGENCY MEDICINE

## 2017-08-02 RX ORDER — HYDROCODONE BITARTRATE AND ACETAMINOPHEN 5; 325 MG/1; MG/1
1 TABLET ORAL
Qty: 15 TAB | Refills: 0 | Status: SHIPPED | OUTPATIENT
Start: 2017-08-02 | End: 2019-04-12

## 2017-08-02 RX ORDER — ONDANSETRON 4 MG/1
4 TABLET, ORALLY DISINTEGRATING ORAL
Qty: 10 TAB | Refills: 0 | Status: SHIPPED | OUTPATIENT
Start: 2017-08-02

## 2017-08-02 NOTE — ED NOTES
Patient returned from [address] [place] [address],[address]. Awaiting results. Denies any needs.

## 2017-08-02 NOTE — ED NOTES
Patient reported he is unable to void at this time for UA. Only voids twice a day due to dialysis and has already voided once today.

## 2017-08-02 NOTE — ED NOTES
Discharge instructions reviewed with pt and given by Dr. Guanaco Chester. IV discontinued with catheter intact. Taken off of monitor. Pt escorted out of ED in wheelchair by ED nurse without incident. No other complaints voiced at this time. Adult female drivers to assist pt home.

## 2017-08-02 NOTE — ED PROVIDER NOTES
HPI Comments: Charlotte Gore is a 76 y.o. male with hx of DM, CKD, HTN, CAD, and stroke who presents via EMS to [address] [facility] ED with CC of nausea and vomiting today. Per daughter, pt has hx of gallstones, and was supposed to follow up with General Surgery (7/17/17) for cholecystectomy, but did not go to his appointment. She states pt has c/o right flank and RUQ pain x \"several months. \" Pt reports last BM yesterday. Per daughter, pt receives dialysis Tuesday/Thursday/Saturday, and was last dialyzed yesterday (8/1/17). Pt specifically denies difficulty urinating, constipation, CP, fever, and chills. PCP: Sharon Waters MD  General Surgery: Ratna Garcia MD    There are no other complaints, changes, or physical findings at this time. The history is provided by the patient, a relative and a parent. Past Medical History:   Diagnosis Date    CAD (coronary artery disease)     Chronic kidney disease     tues/thurs/sat [facility] [address]    Diabetes ([facility] [address]     Endocrine disease     hypothyroid    Gastrointestinal disorder     gallstones    Hypertension     Stroke ([facility] [address]     2011 speech general weakness    Thyroid disease        Past Surgical History:   Procedure Laterality Date    ABDOMEN SURGERY PROC UNLISTED      HX HEENT      cataract   removal  rt eye    HX ORTHOPAEDIC      11/2014 R BK Amputation    HX OTHER SURGICAL      HX VASCULAR ACCESS      L arm shunt         Family History:   Problem Relation Age of Onset    Hypertension Mother     Diabetes Father        Social History     Social History    Marital status:      Spouse name: N/A    Number of children: N/A    Years of education: N/A     Occupational History    Not on file.      Social History Main Topics    Smoking status: Never Smoker    Smokeless tobacco: Never Used    Alcohol use No    Drug use: No    Sexual activity: Not on file     Other Topics Concern    Not on file     Social History Narrative         ALLERGIES: Review of patient's allergies indicates no known allergies. Review of Systems   Constitutional: Negative. Negative for chills and fever. HENT: Negative. Negative for congestion, rhinorrhea and sinus pressure. Eyes: Negative. Negative for discharge and redness. Respiratory: Negative. Negative for chest tightness and shortness of breath. Cardiovascular: Negative. Negative for chest pain. Gastrointestinal: Positive for abdominal pain (RUQ), nausea and vomiting. Negative for blood in stool and constipation. Endocrine: Negative. Genitourinary: Positive for flank pain (right). Negative for difficulty urinating and hematuria. Musculoskeletal: Negative for back pain. Skin: Negative. Negative for rash. Neurological: Negative. Negative for dizziness, seizures, weakness, numbness and headaches. Hematological: Negative. All other systems reviewed and are negative. Patient Vitals for the past 12 hrs:   Temp Pulse Resp BP SpO2   08/02/17 1421 - - - 139/63 100 %   08/02/17 1245 - - - 146/60 99 %   08/02/17 1115 - - - 139/62 99 %   08/02/17 1113 98.3 °F (36.8 °C) 68 18 139/57 100 %            Physical Exam   Constitutional: He is oriented to person, place, and time. He appears well-developed and well-nourished. No distress. HENT:   Head: Normocephalic and atraumatic. Nose: Nose normal.   Mouth/Throat: No oropharyngeal exudate. Eyes: Conjunctivae and EOM are normal. Pupils are equal, round, and reactive to light. No scleral icterus. Neck: Normal range of motion. Neck supple. No JVD present. No thyromegaly present. Cardiovascular: Normal rate, regular rhythm, normal heart sounds, intact distal pulses and normal pulses. PMI is not displaced. Exam reveals no gallop and no friction rub. No murmur heard. Pulmonary/Chest: Effort normal and breath sounds normal. No stridor. No respiratory distress. He has no decreased breath sounds. He has no wheezes. He has no rhonchi. He has no rales.  He exhibits no tenderness. Abdominal: Soft. Normal aorta and bowel sounds are normal. He exhibits no distension, no abdominal bruit and no mass. There is no hepatosplenomegaly. There is tenderness in the right upper quadrant and right lower quadrant. There is no rigidity, no rebound, no guarding, no CVA tenderness, no tenderness at McBurney's point and negative Lizama's sign. No hernia. Neurological: He is alert and oriented to person, place, and time. He has normal reflexes. He displays no atrophy and no tremor. No cranial nerve deficit or sensory deficit. He exhibits normal muscle tone. He displays no seizure activity. Coordination normal. GCS eye subscore is 4. GCS verbal subscore is 5. GCS motor subscore is 6. Reflex Scores:       Patellar reflexes are 2+ on the right side and 2+ on the left side. Skin: Skin is warm. No rash noted. He is not diaphoretic. No erythema. Nursing note and vitals reviewed. MDM  Number of Diagnoses or Management Options  Abdominal pain, right upper quadrant:   Biliary colic:   Diagnosis management comments: DDx: biliary colic, hepatitis, pancreatitis, hernia, AAA, coronary syndrome, poor medical compliance    Impression/Plan: dialysis pt with recurrent right sided ABD pain, diagnosed with biliary disease and planned elective gall bladder surgery. Pt did not show for that appointment however. Will check labs, discuss case with pt's General Surgeon. Amount and/or Complexity of Data Reviewed  Clinical lab tests: ordered and reviewed  Tests in the medicine section of CPT®: ordered and reviewed  Obtain history from someone other than the patient: yes (Family;  Records)  Review and summarize past medical records: yes  Independent visualization of images, tracings, or specimens: yes    Risk of Complications, Morbidity, and/or Mortality  Presenting problems: moderate  Diagnostic procedures: moderate  Management options: moderate    Patient Progress  Patient progress: stable    ED Course       Procedures     EKG interpretation: 11:59  Rhythm: normal sinus rhythm and 1st degree AV block. Rate (approx.): 67; Axis: normal; ME interval: prolonged; QRS interval: normal ; ST/T wave: normal.  Written by Jasbir Matthews ED Scribe, as dictated by Brandi Villalobos MD    Consult Note:  12:20 PM  Brandi Villalobos MD spoke with Serafin Coleman MD  Specialty: General Surgery  Discussed pts hx, disposition, and available diagnostic and imaging results. Reviewed care plans. Consultant agrees with plans as outlined. He requests call back when labs result. 2:48 PM  Pt re-evaluated, currently without complaint, aware waiting on Dr. Waldo Miller. 3:07 PM  Spoke with Dr. Waldo Miller. Pt probably has chronic cholecystitis, no emergent need for surgery at this time; in fact, pt declining surgery. Pt stable for discharge, follow up in office, no antibiotics. LABORATORY TESTS:  Recent Results (from the past 12 hour(s))   METABOLIC PANEL, COMPREHENSIVE    Collection Time: 08/02/17 11:23 AM   Result Value Ref Range    Sodium 135 (L) 136 - 145 mmol/L    Potassium 3.9 3.5 - 5.1 mmol/L    Chloride 99 97 - 108 mmol/L    CO2 29 21 - 32 mmol/L    Anion gap 7 5 - 15 mmol/L    Glucose 227 (H) 65 - 100 mg/dL    BUN 27 (H) 6 - 20 MG/DL    Creatinine 5.49 (H) 0.70 - 1.30 MG/DL    BUN/Creatinine ratio 5 (L) 12 - 20      GFR est AA 12 (L) >60 ml/min/1.73m2    GFR est non-AA 10 (L) >60 ml/min/1.73m2    Calcium 8.3 (L) 8.5 - 10.1 MG/DL    Bilirubin, total 0.3 0.2 - 1.0 MG/DL    ALT (SGPT) 22 12 - 78 U/L    AST (SGOT) 15 15 - 37 U/L    Alk.  phosphatase 132 (H) 45 - 117 U/L    Protein, total 8.3 (H) 6.4 - 8.2 g/dL    Albumin 3.1 (L) 3.5 - 5.0 g/dL    Globulin 5.2 (H) 2.0 - 4.0 g/dL    A-G Ratio 0.6 (L) 1.1 - 2.2     CBC WITH AUTOMATED DIFF    Collection Time: 08/02/17 11:23 AM   Result Value Ref Range    WBC 4.4 4.1 - 11.1 K/uL    RBC 2.77 (L) 4.10 - 5.70 M/uL    HGB 9.3 (L) 12.1 - 17.0 g/dL    HCT 27.5 (L) 36.6 - 50.3 %    MCV 99.3 (H) 80.0 - 99.0 FL    MCH 33.6 26.0 - 34.0 PG    MCHC 33.8 30.0 - 36.5 g/dL    RDW 12.4 11.5 - 14.5 %    PLATELET 371 111 - 499 K/uL    NEUTROPHILS 61 32 - 75 %    LYMPHOCYTES 33 12 - 49 %    MONOCYTES 3 (L) 5 - 13 %    EOSINOPHILS 3 0 - 7 %    BASOPHILS 0 0 - 1 %    ABS. NEUTROPHILS 2.6 1.8 - 8.0 K/UL    ABS. LYMPHOCYTES 1.4 0.8 - 3.5 K/UL    ABS. MONOCYTES 0.1 0.0 - 1.0 K/UL    ABS. EOSINOPHILS 0.1 0.0 - 0.4 K/UL    ABS. BASOPHILS 0.0 0.0 - 0.1 K/UL   NUCLEATED RBC    Collection Time: 08/02/17 11:23 AM   Result Value Ref Range    NRBC 0.0 0  WBC    ABSOLUTE NRBC 0.00 0.00 - 0.01 K/uL   TROPONIN I    Collection Time: 08/02/17 11:23 AM   Result Value Ref Range    Troponin-I, Qt. <0.04 <0.05 ng/mL   LIPASE    Collection Time: 08/02/17 11:23 AM   Result Value Ref Range    Lipase 144 73 - 393 U/L   EKG, 12 LEAD, INITIAL    Collection Time: 08/02/17 11:59 AM   Result Value Ref Range    Ventricular Rate 67 BPM    Atrial Rate 67 BPM    P-R Interval 224 ms    QRS Duration 80 ms    Q-T Interval 428 ms    QTC Calculation (Bezet) 452 ms    Calculated P Axis 23 degrees    Calculated R Axis -9 degrees    Calculated T Axis -8 degrees    Diagnosis       Sinus rhythm with 1st degree AV block  Otherwise normal ECG  When compared with ECG of 27-MAY-2017 15:12,  No significant change was found         IMAGING RESULTS:  US ABD LTD   Final Result   INDICATION:  Abdominal pain and vomiting      EXAMINATION:  ULTRASOUND ABDOMEN, limited     COMPARISON:  5/27/2017     FINDINGS:      Routine ultrasound images of the abdomen were obtained.      The liver is without focal abnormality seen. The common bile duct is normal  measuring 8 mm in diameter. The gallbladder is poorly visualized. The main  portal vein is patent with hepatopedal flow.     The pancreas is obscured by bowel gas.      The right kidney measures 7.4 cm in length. Cortical thinning and atrophy. Small  hypoechoic lesions likely cysts.  No hydronephrosis     IMPRESSION:      The gallbladder is poorly visualized and incompletely evaluated.     Small right renal cysts. IMPRESSION:  1. Abdominal pain, right upper quadrant    2. Biliary colic        PLAN:  1. Current Discharge Medication List      START taking these medications    Details   HYDROcodone-acetaminophen (NORCO) 5-325 mg per tablet Take 1 Tab by mouth every four (4) hours as needed for Pain. Max Daily Amount: 6 Tabs. Qty: 15 Tab, Refills: 0         CONTINUE these medications which have CHANGED    Details   ondansetron (ZOFRAN ODT) 4 mg disintegrating tablet Take 1 Tab by mouth every eight (8) hours as needed for Nausea. Qty: 10 Tab, Refills: 0         STOP taking these medications       oxyCODONE-acetaminophen (PERCOCET) 5-325 mg per tablet Comments:   Reason for Stopping:         oxyCODONE-acetaminophen (PERCOCET) 5-325 mg per tablet Comments:   Reason for Stopping:         oxyCODONE-acetaminophen (PERCOCET) 5-325 mg per tablet Comments:   Reason for Stoppin.   Follow-up Information     Follow up With Details Comments Contact Info    Simba Cristina MD Schedule an appointment as soon as possible for a visit in 1 day   [facility] [address] [facility] [address]  [phone]      [facility] EMERGENCY DEPT  If symptoms worsen  [facility]  [address] [facility]  [phone]        Return to ED if worse     DISCHARGE NOTE:  3:08 PM  The patient is ready for discharge. The patients signs, symptoms, diagnosis, and instructions for discharge have been discussed and the pt has conveyed their understanding. The patient is to follow up as recommended or return to the ER should their symptoms worsen. Plan has been discussed and patient has conveyed their agreement.         This note is prepared by Harmony Wong, acting as Scribe for Miguel Polk MD.    Miguel Polk MD: The scribe's documentation has been prepared under my direction and personally reviewed by me in its entirety. I confirm that the note above accurately reflects all work, treatment, procedures, and medical decision making performed by me.

## 2017-08-02 NOTE — DISCHARGE INSTRUCTIONS
Abdominal Pain: Care Instructions  Your Care Instructions    Abdominal pain has many possible causes. Some aren't serious and get better on their own in a few days. Others need more testing and treatment. If your pain continues or gets worse, you need to be rechecked and may need more tests to find out what is wrong. You may need surgery to correct the problem. Don't ignore new symptoms, such as fever, nausea and vomiting, urination problems, pain that gets worse, and dizziness. These may be signs of a more serious problem. Your doctor may have recommended a follow-up visit in the next 8 to 12 hours. If you are not getting better, you may need more tests or treatment. The doctor has checked you carefully, but problems can develop later. If you notice any problems or new symptoms, get medical treatment right away. Follow-up care is a key part of your treatment and safety. Be sure to make and go to all appointments, and call your doctor if you are having problems. It's also a good idea to know your test results and keep a list of the medicines you take. How can you care for yourself at home? · Rest until you feel better. · To prevent dehydration, drink plenty of fluids, enough so that your urine is light yellow or clear like water. Choose water and other caffeine-free clear liquids until you feel better. If you have kidney, heart, or liver disease and have to limit fluids, talk with your doctor before you increase the amount of fluids you drink. · If your stomach is upset, eat mild foods, such as rice, dry toast or crackers, bananas, and applesauce. Try eating several small meals instead of two or three large ones. · Wait until 48 hours after all symptoms have gone away before you have spicy foods, alcohol, and drinks that contain caffeine. · Do not eat foods that are high in fat. · Avoid anti-inflammatory medicines such as aspirin, ibuprofen (Advil, Motrin), and naproxen (Aleve).  These can cause stomach upset. Talk to your doctor if you take daily aspirin for another health problem. When should you call for help? Call 911 anytime you think you may need emergency care. For example, call if:  · You passed out (lost consciousness). · You pass maroon or very bloody stools. · You vomit blood or what looks like coffee grounds. · You have new, severe belly pain. Call your doctor now or seek immediate medical care if:  · Your pain gets worse, especially if it becomes focused in one area of your belly. · You have a new or higher fever. · Your stools are black and look like tar, or they have streaks of blood. · You have unexpected vaginal bleeding. · You have symptoms of a urinary tract infection. These may include:  ¨ Pain when you urinate. ¨ Urinating more often than usual.  ¨ Blood in your urine. · You are dizzy or lightheaded, or you feel like you may faint. Watch closely for changes in your health, and be sure to contact your doctor if:  · You are not getting better after 1 day (24 hours). Where can you learn more? Go to http://layla-billy.info/. Enter I363 in the search box to learn more about \"Abdominal Pain: Care Instructions. \"  Current as of: March 20, 2017  Content Version: 11.3  © 6490-2491 [employer]. Care instructions adapted under license by [employer] (which disclaims liability or warranty for this information). If you have questions about a medical condition or this instruction, always ask your healthcare professional. [place] [address] any warranty or liability for your use of this information. [employer] Activation    Thank you for requesting access to [employer]. Please follow the instructions below to securely access and download your online medical record. [employer] allows you to send messages to your doctor, view your test results, renew your prescriptions, schedule appointments, and more. How Do I Sign Up? 1.  In your internet browser, go to [email or web address]  2. Click on the First Time User? Click Here link in the Sign In box. You will be redirect to the New Member Sign Up page. 3. Enter your [employer] Access Code exactly as it appears below. You will not need to use this code after youve completed the sign-up process. If you do not sign up before the expiration date, you must request a new code. [employer] Access Code: [email or web address]  Expires: 2017  2:24 PM (This is the date your [employer] access code will )    4. Enter the last four digits of your Social Security Number (xxxx) and Date of Birth (mm/dd/yyyy) as indicated and click Submit. You will be taken to the next sign-up page. 5. Create a [employer] ID. This will be your [employer] login ID and cannot be changed, so think of one that is secure and easy to remember. 6. Create a [employer] password. You can change your password at any time. 7. Enter your Password Reset Question and Answer. This can be used at a later time if you forget your password. 8. Enter your e-mail address. You will receive e-mail notification when new information is available in [address] 9. Click Sign Up. You can now view and download portions of your medical record. 10. Click the Download Summary menu link to download a portable copy of your medical information. Additional Information    If you have questions, please visit the Frequently Asked Questions section of the [employer] website at [email or web address] Remember, [employer] is NOT to be used for urgent needs. For medical emergencies, dial 911.

## 2017-08-12 ENCOUNTER — APPOINTMENT (OUTPATIENT)
Dept: GENERAL RADIOLOGY | Age: 77
End: 2017-08-12
Attending: EMERGENCY MEDICINE
Payer: MEDICARE

## 2017-08-12 ENCOUNTER — HOSPITAL ENCOUNTER (EMERGENCY)
Age: 77
Discharge: HOME | End: 2017-08-12
Attending: EMERGENCY MEDICINE
Payer: MEDICARE

## 2017-08-12 ENCOUNTER — APPOINTMENT (OUTPATIENT)
Dept: CT IMAGING | Age: 77
End: 2017-08-12
Attending: EMERGENCY MEDICINE
Payer: MEDICARE

## 2017-08-12 VITALS
WEIGHT: 230 LBS | SYSTOLIC BLOOD PRESSURE: 142 MMHG | RESPIRATION RATE: 17 BRPM | OXYGEN SATURATION: 100 % | DIASTOLIC BLOOD PRESSURE: 61 MMHG | BODY MASS INDEX: 36.96 KG/M2 | TEMPERATURE: 98.1 F | HEART RATE: 78 BPM | HEIGHT: 66 IN

## 2017-08-12 DIAGNOSIS — N18.6 ESRD ON HEMODIALYSIS (HCC): ICD-10-CM

## 2017-08-12 DIAGNOSIS — R53.1 WEAKNESS: ICD-10-CM

## 2017-08-12 DIAGNOSIS — R55 NEAR SYNCOPE: Primary | ICD-10-CM

## 2017-08-12 DIAGNOSIS — Z99.2 ESRD ON HEMODIALYSIS (HCC): ICD-10-CM

## 2017-08-12 LAB
ALBUMIN SERPL BCP-MCNC: 3.2 G/DL (ref 3.5–5)
ALBUMIN/GLOB SERPL: 0.6 {RATIO} (ref 1.1–2.2)
ALP SERPL-CCNC: 137 U/L (ref 45–117)
ALT SERPL-CCNC: 17 U/L (ref 12–78)
ANION GAP BLD CALC-SCNC: 6 MMOL/L (ref 5–15)
AST SERPL W P-5'-P-CCNC: 14 U/L (ref 15–37)
BASOPHILS # BLD AUTO: 0 K/UL (ref 0–0.1)
BASOPHILS # BLD: 0 % (ref 0–1)
BILIRUB SERPL-MCNC: 0.5 MG/DL (ref 0.2–1)
BUN SERPL-MCNC: 17 MG/DL (ref 6–20)
BUN/CREAT SERPL: 4 (ref 12–20)
CALCIUM SERPL-MCNC: 8.3 MG/DL (ref 8.5–10.1)
CHLORIDE SERPL-SCNC: 94 MMOL/L (ref 97–108)
CK MB CFR SERPL CALC: NORMAL % (ref 0–2.5)
CK MB SERPL-MCNC: <1 NG/ML (ref 5–25)
CK SERPL-CCNC: 109 U/L (ref 39–308)
CO2 SERPL-SCNC: 31 MMOL/L (ref 21–32)
CREAT SERPL-MCNC: 4.01 MG/DL (ref 0.7–1.3)
EOSINOPHIL # BLD: 0.1 K/UL (ref 0–0.4)
EOSINOPHIL NFR BLD: 3 % (ref 0–7)
ERYTHROCYTE [DISTWIDTH] IN BLOOD BY AUTOMATED COUNT: 13.1 % (ref 11.5–14.5)
GLOBULIN SER CALC-MCNC: 5.1 G/DL (ref 2–4)
GLUCOSE BLD STRIP.AUTO-MCNC: 271 MG/DL (ref 65–100)
GLUCOSE SERPL-MCNC: 265 MG/DL (ref 65–100)
HCT VFR BLD AUTO: 29.3 % (ref 36.6–50.3)
HGB BLD-MCNC: 10.2 G/DL (ref 12.1–17)
LYMPHOCYTES # BLD AUTO: 22 % (ref 12–49)
LYMPHOCYTES # BLD: 1 K/UL (ref 0.8–3.5)
MAGNESIUM SERPL-MCNC: 1.8 MG/DL (ref 1.6–2.4)
MCH RBC QN AUTO: 34.3 PG (ref 26–34)
MCHC RBC AUTO-ENTMCNC: 34.8 G/DL (ref 30–36.5)
MCV RBC AUTO: 98.7 FL (ref 80–99)
MONOCYTES # BLD: 0.4 K/UL (ref 0–1)
MONOCYTES NFR BLD AUTO: 9 % (ref 5–13)
NEUTS SEG # BLD: 3 K/UL (ref 1.8–8)
NEUTS SEG NFR BLD AUTO: 66 % (ref 32–75)
PLATELET # BLD AUTO: 151 K/UL (ref 150–400)
POTASSIUM SERPL-SCNC: 3.6 MMOL/L (ref 3.5–5.1)
PROT SERPL-MCNC: 8.3 G/DL (ref 6.4–8.2)
RBC # BLD AUTO: 2.97 M/UL (ref 4.1–5.7)
SERVICE CMNT-IMP: ABNORMAL
SODIUM SERPL-SCNC: 131 MMOL/L (ref 136–145)
TROPONIN I SERPL-MCNC: <0.04 NG/ML
WBC # BLD AUTO: 4.5 K/UL (ref 4.1–11.1)

## 2017-08-12 PROCEDURE — 93005 ELECTROCARDIOGRAM TRACING: CPT

## 2017-08-12 PROCEDURE — 70450 CT HEAD/BRAIN W/O DYE: CPT

## 2017-08-12 PROCEDURE — 71010 XR CHEST PORT: CPT

## 2017-08-12 PROCEDURE — 84484 ASSAY OF TROPONIN QUANT: CPT | Performed by: EMERGENCY MEDICINE

## 2017-08-12 PROCEDURE — 36415 COLL VENOUS BLD VENIPUNCTURE: CPT | Performed by: EMERGENCY MEDICINE

## 2017-08-12 PROCEDURE — 82550 ASSAY OF CK (CPK): CPT | Performed by: EMERGENCY MEDICINE

## 2017-08-12 PROCEDURE — 82962 GLUCOSE BLOOD TEST: CPT

## 2017-08-12 PROCEDURE — 99284 EMERGENCY DEPT VISIT MOD MDM: CPT

## 2017-08-12 PROCEDURE — 83735 ASSAY OF MAGNESIUM: CPT | Performed by: EMERGENCY MEDICINE

## 2017-08-12 PROCEDURE — 85025 COMPLETE CBC W/AUTO DIFF WBC: CPT | Performed by: EMERGENCY MEDICINE

## 2017-08-12 PROCEDURE — 99285 EMERGENCY DEPT VISIT HI MDM: CPT

## 2017-08-12 PROCEDURE — 72131 CT LUMBAR SPINE W/O DYE: CPT

## 2017-08-12 PROCEDURE — 80053 COMPREHEN METABOLIC PANEL: CPT | Performed by: EMERGENCY MEDICINE

## 2017-08-12 NOTE — ED NOTES
Patient reported that he still feels weak and is unable to lift his legs all the way off bed like he normally would. His wife agrees and is concerned about him being discharged. Dr, Lennox Alisa in to re-assess.

## 2017-08-12 NOTE — ED NOTES
Received patient from home via EMS. Patient presents to ED with c/o generalized weakness and SOB. He denies CP, abdominal, pain, nausea, vomiting. Per his wife, he was eating and became \"sick\" while sitting on the toilet moving his bowels. Patient vomited x 1 and became diaphoretic. She stated that his eyes rolled back in his head but he did not pass out or fall. Patient alert and oriented x 3-4 as per his baseline. He is able to recall the event. He did have dialysis this AM and is in NAD. Placed on cardiac monitor showing NSR. Patient stated he sometimes makes urine and wife stated she is unsure because he wears a depends. Call bell in reach and family at bedside. Will continue to monitor.

## 2017-08-12 NOTE — ED PROVIDER NOTES
HPI Comments: Karmen Diamond, 76 y.o. male, with PMHx of HTN, CAD, DM, CKD, stroke, gallstones presents via EMS to [facility] ED with cc of generalized weakness since today afternoon. Patient states that he ate lunch around 1 pm and took a nap. He states that when he woke up, he felt weak while walking to the bathroom. Pt's wife told EMS that the patient \"looked like he was going to pass out\". Patient denies any LOC. Pt also reports a persistent cough. Patient is on dialysis and cannot recall the name of his nephrologist. He does produce urine. He denies fever, N/V/D, CP, abdominal pain. PCP: Cornel Wolfe MD    Social history significant for: - Tobacco, - EtOH, - Illicit Drug Use    There are no other complaints, changes, or physical findings at this time. The history is provided by the patient. No  was used. Past Medical History:   Diagnosis Date    CAD (coronary artery disease)     Chronic kidney disease     tues/thurs/sat [facility] [address]    [facility] [address]     Endocrine disease     hypothyroid    Gastrointestinal disorder     gallstones    Hypertension     Stroke ([facility] [address]     2011 speech general weakness    Thyroid disease        Past Surgical History:   Procedure Laterality Date    ABDOMEN SURGERY PROC UNLISTED      HX HEENT      cataract   removal  rt eye    HX ORTHOPAEDIC      11/2014 R BK Amputation and L    HX OTHER SURGICAL      HX VASCULAR ACCESS      L arm shunt         Family History:   Problem Relation Age of Onset    Hypertension Mother     Diabetes Father        Social History     Social History    Marital status:      Spouse name: N/A    Number of children: N/A    Years of education: N/A     Occupational History    Not on file.      Social History Main Topics    Smoking status: Never Smoker    Smokeless tobacco: Never Used    Alcohol use No    Drug use: No    Sexual activity: Not on file     Other Topics Concern    Not on file     Social History Narrative         ALLERGIES: Review of patient's allergies indicates no known allergies. Review of Systems   Constitutional: Negative for appetite change, chills, fatigue and fever. HENT: Negative. Negative for congestion, rhinorrhea, sinus pressure and sore throat. Eyes: Negative. Respiratory: Positive for cough. Negative for choking, chest tightness, shortness of breath and wheezing. Cardiovascular: Negative. Negative for chest pain, palpitations and leg swelling. Gastrointestinal: Negative for abdominal pain, constipation, diarrhea, nausea and vomiting. Endocrine: Negative. Genitourinary: Negative. Negative for difficulty urinating, dysuria, flank pain and urgency. Musculoskeletal: Negative. Skin: Negative. Neurological: Positive for weakness. Negative for dizziness, speech difficulty, light-headedness, numbness and headaches. Psychiatric/Behavioral: Negative. All other systems reviewed and are negative. Patient Vitals for the past 12 hrs:   Temp Pulse Resp BP SpO2   08/12/17 2200 - 78 17 142/61 100 %   08/12/17 2037 - 75 9 - 100 %   08/12/17 2036 - - - 140/56 -   08/12/17 1832 - 78 18 141/51 100 %   08/12/17 1730 - 73 14 144/55 98 %   08/12/17 1656 98.1 °F (36.7 °C) 74 18 140/54 100 %       Physical Exam   Constitutional: He is oriented to person, place, and time. He appears well-developed and well-nourished. No distress (NAD). HENT:   Head: Normocephalic and atraumatic. Mouth/Throat: Oropharynx is clear and moist. No oropharyngeal exudate. Eyes: Conjunctivae and EOM are normal. Pupils are equal, round, and reactive to light. Neck: Normal range of motion. Neck supple. No JVD present. No tracheal deviation present. Cardiovascular: Normal rate, regular rhythm, normal heart sounds and intact distal pulses. No murmur heard. Pulmonary/Chest: Effort normal and breath sounds normal. No stridor. No respiratory distress. He has no wheezes. He has no rales. He exhibits no tenderness. Abdominal: Soft. He exhibits no distension. There is no tenderness. There is no rebound and no guarding. Musculoskeletal: He exhibits no edema or tenderness. Phillip BKA, dialysis graft LUE       Neurological: He is alert and oriented to person, place, and time. No cranial nerve deficit. No focal motor or sensory deficits    Skin: Skin is warm and dry. He is not diaphoretic. Psychiatric: He has a normal mood and affect. His behavior is normal.   Nursing note and vitals reviewed.        MDM  Number of Diagnoses or Management Options  Diagnosis management comments: DDx: dehydration, hypoglycemia, UTI, PNA, metabolic abnormality       Amount and/or Complexity of Data Reviewed  Clinical lab tests: ordered and reviewed  Tests in the radiology section of CPT®: ordered and reviewed  Tests in the medicine section of CPT®: ordered and reviewed  Review and summarize past medical records: yes  Independent visualization of images, tracings, or specimens: yes    Patient Progress  Patient progress: stable    ED Course       Procedures     EKG- Sinus, 1st degree AV block, PVC's, normal axis/pr/qrs, no acute ST-T wave changes, Juanita Pew, DO      LABORATORY TESTS:  Recent Results (from the past 12 hour(s))   EKG, 12 LEAD, INITIAL    Collection Time: 08/12/17  4:47 PM   Result Value Ref Range    Ventricular Rate 74 BPM    Atrial Rate 74 BPM    P-R Interval 212 ms    QRS Duration 82 ms    Q-T Interval 414 ms    QTC Calculation (Bezet) 459 ms    Calculated P Axis 34 degrees    Calculated R Axis 5 degrees    Calculated T Axis -17 degrees    Diagnosis       Sinus rhythm with 1st degree AV block with occasional premature ventricular   complexes  Otherwise normal ECG  When compared with ECG of 02-AUG-2017 11:59,  premature ventricular complexes are now present     GLUCOSE, POC    Collection Time: 08/12/17  5:08 PM   Result Value Ref Range    Glucose (POC) 271 (H) 65 - 100 mg/dL    Performed by Akiko Sorto CBC WITH AUTOMATED DIFF    Collection Time: 08/12/17  5:12 PM   Result Value Ref Range    WBC 4.5 4.1 - 11.1 K/uL    RBC 2.97 (L) 4.10 - 5.70 M/uL    HGB 10.2 (L) 12.1 - 17.0 g/dL    HCT 29.3 (L) 36.6 - 50.3 %    MCV 98.7 80.0 - 99.0 FL    MCH 34.3 (H) 26.0 - 34.0 PG    MCHC 34.8 30.0 - 36.5 g/dL    RDW 13.1 11.5 - 14.5 %    PLATELET 674 151 - 690 K/uL    NEUTROPHILS 66 32 - 75 %    LYMPHOCYTES 22 12 - 49 %    MONOCYTES 9 5 - 13 %    EOSINOPHILS 3 0 - 7 %    BASOPHILS 0 0 - 1 %    ABS. NEUTROPHILS 3.0 1.8 - 8.0 K/UL    ABS. LYMPHOCYTES 1.0 0.8 - 3.5 K/UL    ABS. MONOCYTES 0.4 0.0 - 1.0 K/UL    ABS. EOSINOPHILS 0.1 0.0 - 0.4 K/UL    ABS. BASOPHILS 0.0 0.0 - 0.1 K/UL   CK W/ CKMB & INDEX    Collection Time: 08/12/17  5:12 PM   Result Value Ref Range     39 - 308 U/L    CK - MB <1.0 <3.6 NG/ML    CK-MB Index Cannot be calculated 0 - 2.5     MAGNESIUM    Collection Time: 08/12/17  5:12 PM   Result Value Ref Range    Magnesium 1.8 1.6 - 2.4 mg/dL   METABOLIC PANEL, COMPREHENSIVE    Collection Time: 08/12/17  5:12 PM   Result Value Ref Range    Sodium 131 (L) 136 - 145 mmol/L    Potassium 3.6 3.5 - 5.1 mmol/L    Chloride 94 (L) 97 - 108 mmol/L    CO2 31 21 - 32 mmol/L    Anion gap 6 5 - 15 mmol/L    Glucose 265 (H) 65 - 100 mg/dL    BUN 17 6 - 20 MG/DL    Creatinine 4.01 (H) 0.70 - 1.30 MG/DL    BUN/Creatinine ratio 4 (L) 12 - 20      GFR est AA 18 (L) >60 ml/min/1.73m2    GFR est non-AA 15 (L) >60 ml/min/1.73m2    Calcium 8.3 (L) 8.5 - 10.1 MG/DL    Bilirubin, total 0.5 0.2 - 1.0 MG/DL    ALT (SGPT) 17 12 - 78 U/L    AST (SGOT) 14 (L) 15 - 37 U/L    Alk.  phosphatase 137 (H) 45 - 117 U/L    Protein, total 8.3 (H) 6.4 - 8.2 g/dL    Albumin 3.2 (L) 3.5 - 5.0 g/dL    Globulin 5.1 (H) 2.0 - 4.0 g/dL    A-G Ratio 0.6 (L) 1.1 - 2.2     TROPONIN I    Collection Time: 08/12/17  5:12 PM   Result Value Ref Range    Troponin-I, Qt. <0.04 <0.05 ng/mL       IMAGING RESULTS:  CT SPINE LUMB WO CONT   Final Result   EXAM:  CT SPINE LUMB WO CONT   INDICATION: Unable to lift legs. COMPARISON: None. No plain radiographs.     TECHNIQUE: Multislice helical CT of the lumbar spine was performed. Sagittal and  coronal reformations were generated. CT dose reduction was achieved through use  of a standardized protocol tailored for this examination and automatic exposure  control for dose modulation.     FINDINGS:  The alignment of the lumbar spine is normal. The vertebral body heights and disc  spaces are well-preserved. The aorta is atherosclerotic.     The findings at each level are as follows:      T12-L1: No abnormality. There is no spinal canal or neural foraminal stenosis. L1-L2: No abnormality. There is no spinal canal or neural foraminal stenosis. L2-L3: No abnormality. There is no spinal canal or neural foraminal stenosis. L3-L4: No abnormality. There is no spinal canal or neural foraminal stenosis. L4-L5: No abnormality. There is no spinal canal or neural foraminal stenosis. L5-S1: No abnormality. There is no spinal canal or neural foraminal stenosis.     IMPRESSION  IMPRESSION: Normal lumbar spine. CT HEAD WO CONT   Final Result   EXAM:  CT HEAD WITHOUT CONTRAST  INDICATION: Weakness with syncope. COMPARISON: 1/6/2017. CONTRAST: None.     TECHNIQUE: Unenhanced CT of the head was performed using 5 mm images. Brain and  bone windows were generated. Sagittal and coronal reformations were generated. CT dose reduction was achieved through use of a standardized protocol tailored  for this examination and automatic exposure control for dose modulation. CT dose  reduction was achieved through use of a standardized protocol tailored for this  examination and automatic exposure control for dose modulation.     FINDINGS:  The ventricles and sulci are enlarged in a generalized fashion consistent with  volume loss.   There is atherosclerotic calcification of the carotid and  vertebral arteries and there is confluent decreased attenuation in the  periventricular white matter which is nonspecific but consistent with small  vessel disease. There is no intracranial hemorrhage. There is no extra-axial  collection, mass, mass effect or midline shift. The basilar cisterns are open. No acute infarct is identified. The bone windows demonstrate no abnormalities. The visualized portions of the paranasal sinuses and mastoid air cells are  clear.     IMPRESSION  IMPRESSION: No acute intracranial abnormality. Atherosclerosis with  microvascular disease and age-related volume loss unchanged. XR CHEST PORT   Final Result   EXAM:  XR CHEST PORT. INDICATION: Generalized weakness. COMPARISON: 11/13/2014.     FINDINGS:   A portable AP radiograph of the chest was obtained at 1746 hours. Lines and tubes: The patient is on a cardiac monitor. Lungs: The lungs are clear. Pleura: There is no pneumothorax or pleural effusion. Mediastinum: The cardiac and mediastinal contours and pulmonary vascularity are  normal.  Bones and soft tissues: The bones and soft tissues are grossly within normal  limits. There are metallic stents in the left axilla.     IMPRESSION  IMPRESSION: No acute abnormality. MEDICATIONS GIVEN:  Medications - No data to display    IMPRESSION:  1. Near syncope    2. Weakness    3. ESRD on hemodialysis ([facility] [address]      Pt ESRD, biggest complaint is he feels like he can not move his legs, CT of head and L-sipine to r/o other causes, shortly there after pt stated he felt better and was not having any weakness, pt dc home with family, Yessi Salazar,       PLAN:  1. Discharge     Follow-up Information     Follow up With Details Comments [address], [place]  As needed [address]           Return to ED if worse     Discharge Note:  10:07 PM  The pt is ready for discharge. The pt's signs, symptoms, diagnosis, and discharge instructions have been discussed and pt has conveyed their understanding.  The pt is to follow up as recommended or return to ER should their symptoms worsen. Plan has been discussed and pt is in agreement. This note is prepared by Neal Emanuel, acting as a Scribe for Gaudencio Choi, [address] [place] [address] Jayne Hebert DO: The scribe's documentation has been prepared under my direction and personally reviewed by me in its entirety. I confirm that the notes above accurately reflects all work, treatment, procedures, and medical decision making performed by me.

## 2017-08-13 LAB
ATRIAL RATE: 74 BPM
CALCULATED P AXIS, ECG09: 34 DEGREES
CALCULATED R AXIS, ECG10: 5 DEGREES
CALCULATED T AXIS, ECG11: -17 DEGREES
DIAGNOSIS, 93000: NORMAL
P-R INTERVAL, ECG05: 212 MS
Q-T INTERVAL, ECG07: 414 MS
QRS DURATION, ECG06: 82 MS
QTC CALCULATION (BEZET), ECG08: 459 MS
VENTRICULAR RATE, ECG03: 74 BPM

## 2017-08-13 NOTE — ED NOTES
Discharge instructions given to and reviewed with patient and family by Dr. Marlin Echeverria. All verbalized their understanding.

## 2017-08-13 NOTE — ED NOTES
Patient discharged home with wheelchair assist to vehicle. 2 assist required to get into wheelchair and vehicle. Patient able to pull himself up into vehicle.

## 2017-08-13 NOTE — ED NOTES
CT completed. Patient in NAD and provided with diet gingerale and fredrick crackers. He is able to MONSIVAIS.

## 2017-09-13 ENCOUNTER — HOSPITAL ENCOUNTER (EMERGENCY)
Age: 77
Discharge: HOME | End: 2017-09-13
Attending: EMERGENCY MEDICINE
Payer: MEDICARE

## 2017-09-13 ENCOUNTER — APPOINTMENT (OUTPATIENT)
Dept: CT IMAGING | Age: 77
End: 2017-09-13
Attending: EMERGENCY MEDICINE
Payer: MEDICARE

## 2017-09-13 ENCOUNTER — APPOINTMENT (OUTPATIENT)
Dept: GENERAL RADIOLOGY | Age: 77
End: 2017-09-13
Attending: EMERGENCY MEDICINE
Payer: MEDICARE

## 2017-09-13 VITALS
OXYGEN SATURATION: 100 % | SYSTOLIC BLOOD PRESSURE: 131 MMHG | WEIGHT: 220 LBS | BODY MASS INDEX: 35.36 KG/M2 | RESPIRATION RATE: 14 BRPM | HEART RATE: 66 BPM | DIASTOLIC BLOOD PRESSURE: 68 MMHG | HEIGHT: 66 IN

## 2017-09-13 DIAGNOSIS — R53.81 MALAISE AND FATIGUE: ICD-10-CM

## 2017-09-13 DIAGNOSIS — R53.83 MALAISE AND FATIGUE: ICD-10-CM

## 2017-09-13 DIAGNOSIS — R40.0 DROWSINESS: ICD-10-CM

## 2017-09-13 DIAGNOSIS — E16.2 HYPOGLYCEMIA: Primary | ICD-10-CM

## 2017-09-13 DIAGNOSIS — K59.03 DRUG-INDUCED CONSTIPATION: ICD-10-CM

## 2017-09-13 LAB
ALBUMIN SERPL-MCNC: 3.4 G/DL (ref 3.5–5)
ALBUMIN/GLOB SERPL: 0.6 {RATIO} (ref 1.1–2.2)
ALP SERPL-CCNC: 118 U/L (ref 45–117)
ALT SERPL-CCNC: 19 U/L (ref 12–78)
ANION GAP SERPL CALC-SCNC: 10 MMOL/L (ref 5–15)
AST SERPL-CCNC: 16 U/L (ref 15–37)
BASOPHILS # BLD: 0 K/UL (ref 0–0.1)
BASOPHILS NFR BLD: 0 % (ref 0–1)
BILIRUB SERPL-MCNC: 0.4 MG/DL (ref 0.2–1)
BUN SERPL-MCNC: 35 MG/DL (ref 6–20)
BUN/CREAT SERPL: 5 (ref 12–20)
CALCIUM SERPL-MCNC: 8.3 MG/DL (ref 8.5–10.1)
CHLORIDE SERPL-SCNC: 95 MMOL/L (ref 97–108)
CO2 SERPL-SCNC: 26 MMOL/L (ref 21–32)
CREAT SERPL-MCNC: 6.84 MG/DL (ref 0.7–1.3)
EOSINOPHIL # BLD: 0.1 K/UL (ref 0–0.4)
EOSINOPHIL NFR BLD: 2 % (ref 0–7)
ERYTHROCYTE [DISTWIDTH] IN BLOOD BY AUTOMATED COUNT: 13.8 % (ref 11.5–14.5)
GLOBULIN SER CALC-MCNC: 5.7 G/DL (ref 2–4)
GLUCOSE BLD STRIP.AUTO-MCNC: 155 MG/DL (ref 65–100)
GLUCOSE BLD STRIP.AUTO-MCNC: 170 MG/DL (ref 65–100)
GLUCOSE BLD STRIP.AUTO-MCNC: 33 MG/DL (ref 65–100)
GLUCOSE BLD STRIP.AUTO-MCNC: 34 MG/DL (ref 65–100)
GLUCOSE SERPL-MCNC: 46 MG/DL (ref 65–100)
HCT VFR BLD AUTO: 32.5 % (ref 36.6–50.3)
HGB BLD-MCNC: 11.2 G/DL (ref 12.1–17)
LIPASE SERPL-CCNC: 153 U/L (ref 73–393)
LYMPHOCYTES # BLD: 1.5 K/UL (ref 0.8–3.5)
LYMPHOCYTES NFR BLD: 25 % (ref 12–49)
MCH RBC QN AUTO: 33.1 PG (ref 26–34)
MCHC RBC AUTO-ENTMCNC: 34.5 G/DL (ref 30–36.5)
MCV RBC AUTO: 96.2 FL (ref 80–99)
MONOCYTES # BLD: 0.6 K/UL (ref 0–1)
MONOCYTES NFR BLD: 9 % (ref 5–13)
NEUTS SEG # BLD: 3.8 K/UL (ref 1.8–8)
NEUTS SEG NFR BLD: 64 % (ref 32–75)
PLATELET # BLD AUTO: 210 K/UL (ref 150–400)
POTASSIUM SERPL-SCNC: 3.4 MMOL/L (ref 3.5–5.1)
PROT SERPL-MCNC: 9.1 G/DL (ref 6.4–8.2)
RBC # BLD AUTO: 3.38 M/UL (ref 4.1–5.7)
SERVICE CMNT-IMP: ABNORMAL
SODIUM SERPL-SCNC: 131 MMOL/L (ref 136–145)
WBC # BLD AUTO: 5.9 K/UL (ref 4.1–11.1)

## 2017-09-13 PROCEDURE — 85025 COMPLETE CBC W/AUTO DIFF WBC: CPT | Performed by: EMERGENCY MEDICINE

## 2017-09-13 PROCEDURE — 36415 COLL VENOUS BLD VENIPUNCTURE: CPT | Performed by: EMERGENCY MEDICINE

## 2017-09-13 PROCEDURE — 96374 THER/PROPH/DIAG INJ IV PUSH: CPT

## 2017-09-13 PROCEDURE — 80053 COMPREHEN METABOLIC PANEL: CPT | Performed by: EMERGENCY MEDICINE

## 2017-09-13 PROCEDURE — 74011250636 HC RX REV CODE- 250/636: Performed by: EMERGENCY MEDICINE

## 2017-09-13 PROCEDURE — 96375 TX/PRO/DX INJ NEW DRUG ADDON: CPT

## 2017-09-13 PROCEDURE — 99285 EMERGENCY DEPT VISIT HI MDM: CPT

## 2017-09-13 PROCEDURE — 74022 RADEX COMPL AQT ABD SERIES: CPT

## 2017-09-13 PROCEDURE — 83690 ASSAY OF LIPASE: CPT | Performed by: EMERGENCY MEDICINE

## 2017-09-13 PROCEDURE — 82962 GLUCOSE BLOOD TEST: CPT

## 2017-09-13 RX ORDER — NALOXONE HYDROCHLORIDE 1 MG/ML
1 INJECTION INTRAMUSCULAR; INTRAVENOUS; SUBCUTANEOUS
Status: COMPLETED | OUTPATIENT
Start: 2017-09-13 | End: 2017-09-13

## 2017-09-13 RX ORDER — ONDANSETRON 2 MG/ML
4 INJECTION INTRAMUSCULAR; INTRAVENOUS
Status: COMPLETED | OUTPATIENT
Start: 2017-09-13 | End: 2017-09-13

## 2017-09-13 RX ORDER — DEXTROSE 50 % IN WATER (D50W) INTRAVENOUS SYRINGE
Status: DISCONTINUED
Start: 2017-09-13 | End: 2017-09-13 | Stop reason: HOSPADM

## 2017-09-13 RX ORDER — POLYETHYLENE GLYCOL 3350 17 G/17G
17 POWDER, FOR SOLUTION ORAL 2 TIMES DAILY
Qty: 119 G | Refills: 0 | Status: SHIPPED | OUTPATIENT
Start: 2017-09-13 | End: 2017-09-27

## 2017-09-13 RX ADMIN — ONDANSETRON HYDROCHLORIDE 4 MG: 2 INJECTION, SOLUTION INTRAVENOUS at 13:54

## 2017-09-13 RX ADMIN — NALOXONE HYDROCHLORIDE 1 MG: 1 INJECTION PARENTERAL at 13:55

## 2017-09-13 NOTE — ED PROVIDER NOTES
HPI Comments: Tosin Chan is a 76 y.o. male with PMhx significant for HTN, CAD, DM, CKD, CVA, thyroid disease, endocrine disease, and bilateral BKAs, who presents via EMS to the ED [facility] ED with cc of generalized weakness, nausea, and vomiting x 1 day. Pt reports he feels some RUQ pain and has been constipated for 6 days. He confirms he takes pain medication but does not know its name; per chart, Dr. Arsenio Gonzales prescribed him Norco ~1 month ago. He states he went to dialysis yesterday and is on a Tuesday/Thursday/Saturday schedule. Pt also reports he has cockroaches at home and found 2 on his body today, so he needed help disrobing to examine his body and clothing for presence of cockroaches. Pt specifically denies other abdominal pain. Social Hx: - Tobacco, - EtOH, - Illicit Drugs    PCP: Maria Isabel Dye MD    There are no other complaints, changes or physical findings at this time. The history is provided by the patient. No  was used. Past Medical History:   Diagnosis Date    CAD (coronary artery disease)     Chronic kidney disease     tues/thurs/sat [facility] [address]    Diabetes ([facility] [address]     Endocrine disease     hypothyroid    Gastrointestinal disorder     gallstones    Hypertension     Stroke ([facility] [address]     2011 speech general weakness    Thyroid disease        Past Surgical History:   Procedure Laterality Date    ABDOMEN SURGERY PROC UNLISTED      HX HEENT      cataract   removal  rt eye    HX ORTHOPAEDIC      11/2014 R BK Amputation and L    HX OTHER SURGICAL      HX VASCULAR ACCESS      L arm shunt    VASCULAR SURGERY PROCEDURE UNLIST      Fistula R arm         Family History:   Problem Relation Age of Onset    Hypertension Mother     Diabetes Father        Social History     Social History    Marital status:      Spouse name: N/A    Number of children: N/A    Years of education: N/A     Occupational History    Not on file.      Social History Main Topics  Smoking status: Never Smoker    Smokeless tobacco: Never Used    Alcohol use No    Drug use: No    Sexual activity: Not on file     Other Topics Concern    Not on file     Social History Narrative         ALLERGIES: Review of patient's allergies indicates no known allergies. Review of Systems   Constitutional: Negative for chills and fever. Respiratory: Negative for cough and shortness of breath. Cardiovascular: Negative for chest pain. Gastrointestinal: Positive for abdominal pain (RUQ), constipation, nausea and vomiting. Negative for diarrhea. Neurological: Positive for weakness (generalized). Negative for numbness. All other systems reviewed and are negative. Patient Vitals for the past 12 hrs:   Pulse Resp BP SpO2   09/13/17 1700 66 14 131/68 100 %   09/13/17 1645 69 14 130/79 96 %   09/13/17 1630 65 15 105/63 97 %   09/13/17 1615 61 17 112/58 99 %   09/13/17 1600 60 8 104/56 95 %   09/13/17 1545 - - 114/59 -   09/13/17 1407 69 14 156/72 97 %            Physical Exam   Constitutional: He is oriented to person, place, and time. He appears well-developed and well-nourished. HENT:   Head: Normocephalic and atraumatic. Eyes: Conjunctivae and EOM are normal.   Pupils 3 mms, but reactive BL. Neck: Normal range of motion. Neck supple. Cardiovascular: Normal rate and regular rhythm. Pulmonary/Chest: Effort normal and breath sounds normal. No respiratory distress. Abdominal: Soft. He exhibits no distension. There is no tenderness. No abdominal tenderness; however, rubbing his epigastric region   Musculoskeletal: Normal range of motion. BL BKAs   Neurological: He is alert and oriented to person, place, and time. Skin: Skin is warm and dry. Psychiatric: He has a normal mood and affect. Drowsy, slurring speech   Nursing note and vitals reviewed.        MDM  Number of Diagnoses or Management Options  Drowsiness:   Drug-induced constipation:   Hypoglycemia:   Malaise and fatigue:   Diagnosis management comments:   Patient presenting with generalized fatigue. DDx: infection, anemia, electrolyte anomoly (hypo or hyperkalemia, hypomagnesemia), hypothyroid, dehydration, depression, CA, ACS. Will obtain EKG, UA, labwork for any urgent/emergent pathology. Amount and/or Complexity of Data Reviewed  Clinical lab tests: ordered and reviewed  Tests in the radiology section of CPT®: ordered and reviewed  Review and summarize past medical records: yes  Independent visualization of images, tracings, or specimens: yes    Risk of Complications, Morbidity, and/or Mortality  Presenting problems: high  Diagnostic procedures: high  Management options: high    Critical Care  Total time providing critical care: 30-74 minutes    ED Course       Procedures    PROGRESS NOTE:  3:19 PM  Room made aware that hemoglobin came back at 30. Pt also more sleepy and clammy. Will give D50 and closely monitor sugars. PROGRESS NOTE:  4:24 PM  Ptsglucose improved to 170, however he is still very sleepy. Arousable to voice but dozes off shortly thereafter. Family states that pt has been sleeping a lot at home and has never had a sleep study. No new medications. Will get a head CT and, if normal, will have him follow up with his PCP about a sleep study. Also encouraged good bowel regimen and Miralax for constipation. PROGRESS NOTE:  4:46 PM  Bladder scan showed pt only had 58 mLs so will not straight cath him. PROGRESS NOTE:  5:10 PM  Pt is a lot more awake now and eating a meal. Told him his CT was ok. Pt appears to be doing well, is joking, and his wife says his speech is always slurred. Discontinued head CT.     CRITICAL CARE NOTE :  3:35 PM  IMPENDING DETERIORATION -Metabolic  ASSOCIATED RISK FACTORS - Hypotension and Shock, Metabolic Changes  MANAGEMENT- Bedside Assessment and Supervision of Care  INTERPRETATION -  ECG, Blood Pressure and Cardiac Output Measures   INTERVENTIONS - hemodynamic mngmt and Metobolic interventions  CASE REVIEW - Nursing and Family  TREATMENT RESPONSE -Improved  PERFORMED BY - Self  NOTES   :  I have spent 60 minutes of critical care time involved in lab review, consultations with specialist, family decision- making, bedside attention and documentation. During this entire length of time I was immediately available to the patient. LABORATORY TESTS:  Recent Results (from the past 12 hour(s))   CBC WITH AUTOMATED DIFF    Collection Time: 09/13/17  2:53 PM   Result Value Ref Range    WBC 5.9 4.1 - 11.1 K/uL    RBC 3.38 (L) 4.10 - 5.70 M/uL    HGB 11.2 (L) 12.1 - 17.0 g/dL    HCT 32.5 (L) 36.6 - 50.3 %    MCV 96.2 80.0 - 99.0 FL    MCH 33.1 26.0 - 34.0 PG    MCHC 34.5 30.0 - 36.5 g/dL    RDW 13.8 11.5 - 14.5 %    PLATELET 208 649 - 272 K/uL    NEUTROPHILS 64 32 - 75 %    LYMPHOCYTES 25 12 - 49 %    MONOCYTES 9 5 - 13 %    EOSINOPHILS 2 0 - 7 %    BASOPHILS 0 0 - 1 %    ABS. NEUTROPHILS 3.8 1.8 - 8.0 K/UL    ABS. LYMPHOCYTES 1.5 0.8 - 3.5 K/UL    ABS. MONOCYTES 0.6 0.0 - 1.0 K/UL    ABS. EOSINOPHILS 0.1 0.0 - 0.4 K/UL    ABS. BASOPHILS 0.0 0.0 - 0.1 K/UL   METABOLIC PANEL, COMPREHENSIVE    Collection Time: 09/13/17  2:53 PM   Result Value Ref Range    Sodium 131 (L) 136 - 145 mmol/L    Potassium 3.4 (L) 3.5 - 5.1 mmol/L    Chloride 95 (L) 97 - 108 mmol/L    CO2 26 21 - 32 mmol/L    Anion gap 10 5 - 15 mmol/L    Glucose 46 (LL) 65 - 100 mg/dL    BUN 35 (H) 6 - 20 MG/DL    Creatinine 6.84 (H) 0.70 - 1.30 MG/DL    BUN/Creatinine ratio 5 (L) 12 - 20      GFR est AA 10 (L) >60 ml/min/1.73m2    GFR est non-AA 8 (L) >60 ml/min/1.73m2    Calcium 8.3 (L) 8.5 - 10.1 MG/DL    Bilirubin, total 0.4 0.2 - 1.0 MG/DL    ALT (SGPT) 19 12 - 78 U/L    AST (SGOT) 16 15 - 37 U/L    Alk.  phosphatase 118 (H) 45 - 117 U/L    Protein, total 9.1 (H) 6.4 - 8.2 g/dL    Albumin 3.4 (L) 3.5 - 5.0 g/dL    Globulin 5.7 (H) 2.0 - 4.0 g/dL    A-G Ratio 0.6 (L) 1.1 - 2.2     LIPASE    Collection Time: 09/13/17  2:53 PM   Result Value Ref Range    Lipase 153 73 - 393 U/L   GLUCOSE, POC    Collection Time: 09/13/17  3:15 PM   Result Value Ref Range    Glucose (POC) 33 (LL) 65 - 100 mg/dL    Performed by [facility] [address] [address] POC    Collection Time: 09/13/17  3:18 PM   Result Value Ref Range    Glucose (POC) 34 (LL) 65 - 100 mg/dL    Performed by [facility] [employer] [address] POC    Collection Time: 09/13/17  3:41 PM   Result Value Ref Range    Glucose (POC) 170 (H) 65 - 100 mg/dL    Performed by [facility] [employer] [address] POC    Collection Time: 09/13/17  5:06 PM   Result Value Ref Range    Glucose (POC) 155 (H) 65 - 100 mg/dL    Performed by [address] [place] [address] Road:  Study Result      EXAM:  XR ABD ACUTE W 1 V CHEST     INDICATION:  Nausea vomiting abdominal pain, constipation     COMPARISON: CT 5/27/2017.     FINDINGS: The upright chest radiograph demonstrates clear lungs and normal  cardiac and mediastinal contours. There is no pleural effusion or free air under  the diaphragm. A stent is seen in the left axilla.     Supine and upright views of the abdomen demonstrate a nonobstructive bowel gas  pattern. A mild to moderate amount of stool is seen throughout the colon. There  is no free intraperitoneal air. Atherosclerotic calcification is seen in the  left renal hilum. . The bones are within normal limits.     IMPRESSION  IMPRESSION: No evidence of acute process.          MEDICATIONS GIVEN:  Medications   dextrose (D50W) 50% injection syrg (not administered)   ondansetron (ZOFRAN) injection 4 mg (4 mg IntraVENous Given 9/13/17 5044)   naloxone (NARCAN) injection 1 mg (1 mg IntraVENous Given 9/13/17 5275)       IMPRESSION:  1. Hypoglycemia    2. Malaise and fatigue    3. Drug-induced constipation    4. Drowsiness        PLAN:  1.    Discharge Medication List as of 9/13/2017  5:03 PM      CONTINUE these medications which have NOT CHANGED    Details   HYDROcodone-acetaminophen (NORCO) 5-325 mg per tablet Take 1 Tab by mouth every four (4) hours as needed for Pain. Max Daily Amount: 6 Tabs., Print, Disp-15 Tab, R-0      ondansetron (ZOFRAN ODT) 4 mg disintegrating tablet Take 1 Tab by mouth every eight (8) hours as needed for Nausea. , Print, Disp-10 Tab, R-0      MOISE-ALPHONSE 0.8 mg tab tablet Historical Med, R-3      aspirin delayed-release 81 mg tablet Take 1 Tab by mouth daily. , Print, Disp-30 Tab, R-11      amLODIPine (NORVASC) 5 mg tablet Take 5 mg by mouth daily. , Historical Med      ferrous sulfate 325 mg (65 mg iron) tablet Take 325 mg by mouth two (2) times a day., Historical Med      PHOSPHORUS #1 (PHOSPHA 250 NEUTRAL PO) Take 2 Tabs by mouth daily. , Historical Med      calcium acetate (PHOSLO) 667 mg cap Take 1 Cap by mouth three (3) times daily (with meals). , Historical Med      insulin NPH/insulin regular (HUMULIN 70/30) 100 unit/mL (70-30) injection 30 Units by SubCUTAneous route daily. , Historical Med      glucose blood VI test strips (FREESTYLE LITE STRIPS) strip Check blood sugar once daily, Print, Disp-1 Package, R-11      BD INSULIN SYRINGE ULTRA-FINE 1 mL 30 x 1/2\" syrg Historical Med      Blood-Glucose Meter (FREESTYLE LITE METER) monitoring kit Check blood sugar once daily, Sample, Disp-1 kit, R-0      carvedilol (COREG) 25 mg tablet Take 25 mg by mouth two (2) times daily (with meals). , Historical Med      levothyroxine (SYNTHROID) 175 mcg tablet Take 150 mcg by mouth Daily (before breakfast). , Historical Med      pravastatin (PRAVACHOL) 10 mg tablet Take 20 mg by mouth nightly., Historical Med           2. Follow-up Information     Follow up With Details Comments [address], [place] Schedule an appointment as soon as possible for a visit About [address]           Return to ED if worse     DISCHARGE NOTE  5:05 PM  The patient has been re-evaluated and is ready for discharge. Reviewed available results with patient.  Counseled patient on diagnosis and care plan. Patient has expressed understanding, and all questions have been answered. Patient agrees with plan and agrees to follow up as recommended, or return to the ED if their symptoms worsen. Discharge instructions have been provided and explained to the patient, along with reasons to return to the ED. Attestation Note:  This note is prepared by Martin Atkins, acting as Scribe for Karen Dotson MD.    Karen Dotson MD: The scribe's documentation has been prepared under my direction and personally reviewed by me in its entirety. I confirm that the note above accurately reflects all work, treatment, procedures, and medical decision making performed by me.

## 2017-09-13 NOTE — DISCHARGE INSTRUCTIONS
Make appointment about your visit and sleep study. You were seen for decreased bowel movements and abdominal pain, most likely due to constipation. For the constipation, drink plenty of fluids, use Colace stool softener with Dulcolax or Miralax twice a day laxative until bowel movement occurs. If still no bowel movement, can buy Magnesium Citrate over the counter for a whole bowel cleanse. Maintain a high fiber diet by eating green vegetables and fruit and drink 6-8 large glasses of water daily to avoid constipation in the future. Metamucil, 1 tablespoon once or twice daily can be used to keep bowels regular if needed. Timing elimination to occur after meals, or after a hot drink, can also improve the situation long term. Call if symptoms persist or worsen.

## 2017-09-27 ENCOUNTER — HOSPITAL ENCOUNTER (EMERGENCY)
Age: 77
Discharge: HOME | End: 2017-09-27
Attending: EMERGENCY MEDICINE
Payer: MEDICARE

## 2017-09-27 ENCOUNTER — APPOINTMENT (OUTPATIENT)
Dept: GENERAL RADIOLOGY | Age: 77
End: 2017-09-27
Attending: EMERGENCY MEDICINE
Payer: MEDICARE

## 2017-09-27 VITALS
TEMPERATURE: 98.5 F | OXYGEN SATURATION: 98 % | HEART RATE: 85 BPM | RESPIRATION RATE: 18 BRPM | SYSTOLIC BLOOD PRESSURE: 137 MMHG | DIASTOLIC BLOOD PRESSURE: 60 MMHG

## 2017-09-27 DIAGNOSIS — R10.84 ABDOMINAL PAIN, GENERALIZED: Primary | ICD-10-CM

## 2017-09-27 DIAGNOSIS — N30.00 ACUTE CYSTITIS WITHOUT HEMATURIA: ICD-10-CM

## 2017-09-27 DIAGNOSIS — R11.2 NON-INTRACTABLE VOMITING WITH NAUSEA, UNSPECIFIED VOMITING TYPE: ICD-10-CM

## 2017-09-27 DIAGNOSIS — K59.00 CONSTIPATION, UNSPECIFIED CONSTIPATION TYPE: ICD-10-CM

## 2017-09-27 LAB
ALBUMIN SERPL-MCNC: 3.2 G/DL (ref 3.5–5)
ALBUMIN/GLOB SERPL: 0.6 {RATIO} (ref 1.1–2.2)
ALP SERPL-CCNC: 116 U/L (ref 45–117)
ALT SERPL-CCNC: 16 U/L (ref 12–78)
ANION GAP SERPL CALC-SCNC: 10 MMOL/L (ref 5–15)
APPEARANCE UR: ABNORMAL
AST SERPL-CCNC: 18 U/L (ref 15–37)
ATRIAL RATE: 73 BPM
BACTERIA URNS QL MICRO: ABNORMAL /HPF
BASOPHILS # BLD: 0 K/UL (ref 0–0.1)
BASOPHILS NFR BLD: 0 % (ref 0–1)
BILIRUB SERPL-MCNC: 0.5 MG/DL (ref 0.2–1)
BILIRUB UR QL CFM: POSITIVE
BUN SERPL-MCNC: 31 MG/DL (ref 6–20)
BUN/CREAT SERPL: 5 (ref 12–20)
CALCIUM SERPL-MCNC: 8 MG/DL (ref 8.5–10.1)
CALCULATED P AXIS, ECG09: 24 DEGREES
CALCULATED R AXIS, ECG10: -13 DEGREES
CALCULATED T AXIS, ECG11: -7 DEGREES
CHLORIDE SERPL-SCNC: 94 MMOL/L (ref 97–108)
CO2 SERPL-SCNC: 26 MMOL/L (ref 21–32)
COLOR UR: ABNORMAL
CREAT SERPL-MCNC: 6.87 MG/DL (ref 0.7–1.3)
DIAGNOSIS, 93000: NORMAL
DIFFERENTIAL METHOD BLD: ABNORMAL
EOSINOPHIL # BLD: 0.1 K/UL (ref 0–0.4)
EOSINOPHIL NFR BLD: 1 % (ref 0–7)
EPITH CASTS URNS QL MICRO: ABNORMAL /LPF
ERYTHROCYTE [DISTWIDTH] IN BLOOD BY AUTOMATED COUNT: 13.5 % (ref 11.5–14.5)
GLOBULIN SER CALC-MCNC: 5.3 G/DL (ref 2–4)
GLUCOSE SERPL-MCNC: 335 MG/DL (ref 65–100)
GLUCOSE UR STRIP.AUTO-MCNC: 250 MG/DL
HCT VFR BLD AUTO: 31.8 % (ref 36.6–50.3)
HGB BLD-MCNC: 11.3 G/DL (ref 12.1–17)
HGB UR QL STRIP: ABNORMAL
HYALINE CASTS URNS QL MICRO: ABNORMAL /LPF (ref 0–5)
KETONES UR QL STRIP.AUTO: ABNORMAL MG/DL
LACTATE SERPL-SCNC: 2 MMOL/L (ref 0.4–2)
LEUKOCYTE ESTERASE UR QL STRIP.AUTO: ABNORMAL
LIPASE SERPL-CCNC: 151 U/L (ref 73–393)
LYMPHOCYTES # BLD: 0.8 K/UL (ref 0.8–3.5)
LYMPHOCYTES NFR BLD: 10 % (ref 12–49)
MCH RBC QN AUTO: 34.7 PG (ref 26–34)
MCHC RBC AUTO-ENTMCNC: 35.5 G/DL (ref 30–36.5)
MCV RBC AUTO: 97.5 FL (ref 80–99)
MONOCYTES # BLD: 0.5 K/UL (ref 0–1)
MONOCYTES NFR BLD: 6 % (ref 5–13)
NEUTS SEG # BLD: 6.6 K/UL (ref 1.8–8)
NEUTS SEG NFR BLD: 83 % (ref 32–75)
NITRITE UR QL STRIP.AUTO: NEGATIVE
P-R INTERVAL, ECG05: 212 MS
PH UR STRIP: 5.5 [PH] (ref 5–8)
PLATELET # BLD AUTO: 202 K/UL (ref 150–400)
POTASSIUM SERPL-SCNC: 4 MMOL/L (ref 3.5–5.1)
PROT SERPL-MCNC: 8.5 G/DL (ref 6.4–8.2)
PROT UR STRIP-MCNC: 100 MG/DL
Q-T INTERVAL, ECG07: 400 MS
QRS DURATION, ECG06: 82 MS
QTC CALCULATION (BEZET), ECG08: 440 MS
RBC # BLD AUTO: 3.26 M/UL (ref 4.1–5.7)
RBC #/AREA URNS HPF: >100 /HPF (ref 0–5)
RBC MORPH BLD: ABNORMAL
SODIUM SERPL-SCNC: 130 MMOL/L (ref 136–145)
SP GR UR REFRACTOMETRY: 1.02 (ref 1–1.03)
TROPONIN I SERPL-MCNC: <0.04 NG/ML
UA: UC IF INDICATED,UAUC: ABNORMAL
UROBILINOGEN UR QL STRIP.AUTO: 1 EU/DL (ref 0.2–1)
VENTRICULAR RATE, ECG03: 73 BPM
WBC # BLD AUTO: 8 K/UL (ref 4.1–11.1)
WBC URNS QL MICRO: >100 /HPF (ref 0–4)
YEAST URNS QL MICRO: PRESENT

## 2017-09-27 PROCEDURE — 99284 EMERGENCY DEPT VISIT MOD MDM: CPT

## 2017-09-27 PROCEDURE — 80053 COMPREHEN METABOLIC PANEL: CPT | Performed by: EMERGENCY MEDICINE

## 2017-09-27 PROCEDURE — 74022 RADEX COMPL AQT ABD SERIES: CPT

## 2017-09-27 PROCEDURE — 84484 ASSAY OF TROPONIN QUANT: CPT | Performed by: EMERGENCY MEDICINE

## 2017-09-27 PROCEDURE — 87086 URINE CULTURE/COLONY COUNT: CPT | Performed by: EMERGENCY MEDICINE

## 2017-09-27 PROCEDURE — 74011250636 HC RX REV CODE- 250/636: Performed by: EMERGENCY MEDICINE

## 2017-09-27 PROCEDURE — 85025 COMPLETE CBC W/AUTO DIFF WBC: CPT | Performed by: EMERGENCY MEDICINE

## 2017-09-27 PROCEDURE — 81001 URINALYSIS AUTO W/SCOPE: CPT | Performed by: EMERGENCY MEDICINE

## 2017-09-27 PROCEDURE — 74011250637 HC RX REV CODE- 250/637: Performed by: EMERGENCY MEDICINE

## 2017-09-27 PROCEDURE — 36415 COLL VENOUS BLD VENIPUNCTURE: CPT | Performed by: EMERGENCY MEDICINE

## 2017-09-27 PROCEDURE — 96374 THER/PROPH/DIAG INJ IV PUSH: CPT

## 2017-09-27 PROCEDURE — 93005 ELECTROCARDIOGRAM TRACING: CPT

## 2017-09-27 PROCEDURE — 83690 ASSAY OF LIPASE: CPT | Performed by: EMERGENCY MEDICINE

## 2017-09-27 PROCEDURE — 83605 ASSAY OF LACTIC ACID: CPT | Performed by: EMERGENCY MEDICINE

## 2017-09-27 RX ORDER — POLYETHYLENE GLYCOL 3350 17 G/17G
17 POWDER, FOR SOLUTION ORAL DAILY
Qty: 238 G | Refills: 0 | Status: SHIPPED | OUTPATIENT
Start: 2017-09-27

## 2017-09-27 RX ORDER — FLUCONAZOLE 100 MG/1
100 TABLET ORAL DAILY
Status: DISCONTINUED | OUTPATIENT
Start: 2017-09-27 | End: 2017-09-27 | Stop reason: HOSPADM

## 2017-09-27 RX ORDER — ONDANSETRON 2 MG/ML
4 INJECTION INTRAMUSCULAR; INTRAVENOUS
Status: COMPLETED | OUTPATIENT
Start: 2017-09-27 | End: 2017-09-27

## 2017-09-27 RX ORDER — CEPHALEXIN 500 MG/1
500 CAPSULE ORAL DAILY
Qty: 5 CAP | Refills: 0 | Status: SHIPPED | OUTPATIENT
Start: 2017-09-27 | End: 2017-10-02

## 2017-09-27 RX ORDER — FLUCONAZOLE 100 MG/1
100 TABLET ORAL DAILY
Status: DISCONTINUED | OUTPATIENT
Start: 2017-09-28 | End: 2017-09-27

## 2017-09-27 RX ADMIN — ONDANSETRON 4 MG: 2 INJECTION INTRAMUSCULAR; INTRAVENOUS at 10:22

## 2017-09-27 RX ADMIN — FLUCONAZOLE 100 MG: 100 TABLET ORAL at 14:06

## 2017-09-27 NOTE — ED NOTES
MD Goddard reviewed discharge instructions with the patient. The patient verbalized understanding. Patient discharged home with transportation provided. Patient taken to vehicle via wheel chair. No further complaints noted.

## 2017-09-27 NOTE — ED NOTES
Patient provided with diet ginger ale for PO challenge as ordered by MD. Patient denies n/v. Patient requesting meal tray. MD Parth Dunbar made aware and states patient can have meal tray. Dietary called.

## 2017-09-27 NOTE — ED PROVIDER NOTES
HPI Comments: Charlotte Hurley is a 76 y.o. male with PMhx significant for HTN, CAD, hypothyroidism, DM, CKD on dialysis Tuesday/Thursday/Saturday, stroke with right sided weakness, and bilateral BKA who presents via EMS to the ED with cc of 3-4 episodes of dark green, non-bloody emesis, nausea, subjective fever, and chills beginning ~0400. Pt also reports persistent constipation x 4-5 days, noting he is still passing gas per rectum. Per record review, pt was seen at [address] [facility] ED on 9/13/2017 for malaise, fatigue, and drug-induced constipation. He denies currently taking any medications or at-home remedies for his constipation. Of note, pt is a poor historian and c/o of vague cough, but is unable to specify how long he has been coughing for and if it is productive. Wife notes pt's speech is slow and stuttered at baseline. He denies tobacco, EtOH, and illicit drug use. Pt specifically denies abdominal pain, chest pain, dysuria, and hematuria. PCP: Aubree Garcia MD    There are no other complaints, changes or physical findings at this time. The history is provided by the patient and the spouse.         Past Medical History:   Diagnosis Date    CAD (coronary artery disease)     Chronic kidney disease     tues/thurs/sat TREE [address]    Diabetes ([facility] [address]     Endocrine disease     hypothyroid    Gastrointestinal disorder     gallstones    Hypertension     Stroke ([facility] [address]     2011 speech general weakness    Thyroid disease        Past Surgical History:   Procedure Laterality Date    ABDOMEN SURGERY PROC UNLISTED      HX HEENT      cataract   removal  rt eye    HX ORTHOPAEDIC      11/2014 R BK Amputation and L    HX OTHER SURGICAL      HX VASCULAR ACCESS      L arm shunt    VASCULAR SURGERY PROCEDURE UNLIST      Fistula R arm         Family History:   Problem Relation Age of Onset    Hypertension Mother     Diabetes Father        Social History     Social History    Marital status:      Spouse name: N/A    Number of children: N/A    Years of education: N/A     Occupational History    Not on file. Social History Main Topics    Smoking status: Never Smoker    Smokeless tobacco: Never Used    Alcohol use No    Drug use: No    Sexual activity: Not on file     Other Topics Concern    Not on file     Social History Narrative         ALLERGIES: Review of patient's allergies indicates no known allergies. Review of Systems   Constitutional: Positive for chills and fever (subjective). Negative for fatigue. HENT: Negative for congestion, rhinorrhea and sore throat. Eyes: Negative for pain, discharge and visual disturbance. Respiratory: Positive for cough. Negative for chest tightness, shortness of breath and wheezing. Cardiovascular: Negative for chest pain, palpitations and leg swelling. Gastrointestinal: Positive for constipation, nausea and vomiting. Negative for abdominal pain and diarrhea. Genitourinary: Negative for dysuria, frequency and hematuria. Musculoskeletal: Negative for arthralgias, back pain and myalgias. Skin: Negative for rash. Neurological: Positive for speech difficulty (baseline) and weakness (right sided-baseline). Negative for dizziness, light-headedness and headaches. Psychiatric/Behavioral: Negative. Patient Vitals for the past 12 hrs:   Temp Pulse Resp BP SpO2   09/27/17 1407 - - - 137/60 98 %   09/27/17 1313 - - - 151/64 99 %   09/27/17 0900 98.5 °F (36.9 °C) 85 18 153/69 99 %       Physical Exam   Constitutional: He is oriented to person, place, and time. He appears well-developed and well-nourished. No distress. HENT:   Head: Normocephalic and atraumatic. Eyes: EOM are normal. Right eye exhibits no discharge. Left eye exhibits no discharge. No scleral icterus. Neck: Normal range of motion. Neck supple. No tracheal deviation present. Cardiovascular: Normal rate, regular rhythm, normal heart sounds and intact distal pulses.   Exam reveals no gallop and no friction rub. No murmur heard. Pulmonary/Chest: Effort normal and breath sounds normal. No respiratory distress. He has no wheezes. He has no rales. Abdominal: Soft. He exhibits no distension. There is tenderness (generalized). There is no rebound and no guarding. Musculoskeletal: Normal range of motion. He exhibits no edema. Bilateral BKA   Lymphadenopathy:     He has no cervical adenopathy. Neurological: He is alert and oriented to person, place, and time. Chronic right sided weakness from prior stroke  Facial symmetry  Moving LUE and LLE equally   Skin: Skin is warm and dry. No rash noted. Psychiatric: He has a normal mood and affect. Nursing note and vitals reviewed. MDM  Number of Diagnoses or Management Options  Abdominal pain, generalized:   Acute cystitis without hematuria:   Constipation, unspecified constipation type:   Non-intractable vomiting with nausea, unspecified vomiting type:   Diagnosis management comments:     Differential includes constipation, obstruction, ileus, viral syndrome, dehydration, electrolyte abnormality, cholecystitis, choledocholithiasis, pancreatitis, UTI, ACS. Will check labs, AAS. Will provide symptomatic treatment and reevaluate. Amount and/or Complexity of Data Reviewed  Clinical lab tests: reviewed and ordered  Tests in the radiology section of CPT®: ordered and reviewed  Tests in the medicine section of CPT®: ordered and reviewed  Obtain history from someone other than the patient: yes (Spouse)  Review and summarize past medical records: yes  Independent visualization of images, tracings, or specimens: yes    Patient Progress  Patient progress: stable    ED Course       Procedures    EKG interpretation: (Preliminary)  1115  Rhythm: 1st degree AV block; and regular . Rate (approx.): 73;  Axis: normal; NH interval: prolonged; QRS interval: normal ; ST/T wave: normal  Written by Claudia Mims ED scribe, as dictated by Sanju Piedra Chapincito Ferrera MD    PROGRESS NOTE:  1:32 PM  Pt reports he is feeling better. He tolerated a full meal without vomiting. Pt states he is passing flatus. Anticipate discharge. Will treat with stool softener and have patient follow up with PCP. Discussed results, prescriptions and follow up plan with patient. Provided customary return to ED instructions. Patient expressed understanding. Lauryn Mobley MD    PROGRESS NOTE:  1:42 PM  Discussed abx dosing with pharmacy. Will treat with Keflex 500 mg daily x 5 days and one time dose of Diflucan 100mg. If urine culture grows yeast, pt will need longer course of Diflucan. Written by BETINA Mishra, as dictated by Es Smith MD.    LABORATORY TESTS:  Recent Results (from the past 12 hour(s))   CBC WITH AUTOMATED DIFF    Collection Time: 09/27/17 10:08 AM   Result Value Ref Range    WBC 8.0 4.1 - 11.1 K/uL    RBC 3.26 (L) 4.10 - 5.70 M/uL    HGB 11.3 (L) 12.1 - 17.0 g/dL    HCT 31.8 (L) 36.6 - 50.3 %    MCV 97.5 80.0 - 99.0 FL    MCH 34.7 (H) 26.0 - 34.0 PG    MCHC 35.5 30.0 - 36.5 g/dL    RDW 13.5 11.5 - 14.5 %    PLATELET 989 195 - 247 K/uL    NEUTROPHILS 83 (H) 32 - 75 %    LYMPHOCYTES 10 (L) 12 - 49 %    MONOCYTES 6 5 - 13 %    EOSINOPHILS 1 0 - 7 %    BASOPHILS 0 0 - 1 %    ABS. NEUTROPHILS 6.6 1.8 - 8.0 K/UL    ABS. LYMPHOCYTES 0.8 0.8 - 3.5 K/UL    ABS. MONOCYTES 0.5 0.0 - 1.0 K/UL    ABS. EOSINOPHILS 0.1 0.0 - 0.4 K/UL    ABS.  BASOPHILS 0.0 0.0 - 0.1 K/UL    RBC COMMENTS NORMOCYTIC, NORMOCHROMIC      DF SMEAR SCANNED     METABOLIC PANEL, COMPREHENSIVE    Collection Time: 09/27/17 10:08 AM   Result Value Ref Range    Sodium 130 (L) 136 - 145 mmol/L    Potassium 4.0 3.5 - 5.1 mmol/L    Chloride 94 (L) 97 - 108 mmol/L    CO2 26 21 - 32 mmol/L    Anion gap 10 5 - 15 mmol/L    Glucose 335 (H) 65 - 100 mg/dL    BUN 31 (H) 6 - 20 MG/DL    Creatinine 6.87 (H) 0.70 - 1.30 MG/DL    BUN/Creatinine ratio 5 (L) 12 - 20      GFR est AA 9 (L) >60 ml/min/1.73m2    GFR est non-AA 8 (L) >60 ml/min/1.73m2    Calcium 8.0 (L) 8.5 - 10.1 MG/DL    Bilirubin, total 0.5 0.2 - 1.0 MG/DL    ALT (SGPT) 16 12 - 78 U/L    AST (SGOT) 18 15 - 37 U/L    Alk.  phosphatase 116 45 - 117 U/L    Protein, total 8.5 (H) 6.4 - 8.2 g/dL    Albumin 3.2 (L) 3.5 - 5.0 g/dL    Globulin 5.3 (H) 2.0 - 4.0 g/dL    A-G Ratio 0.6 (L) 1.1 - 2.2     LIPASE    Collection Time: 09/27/17 10:08 AM   Result Value Ref Range    Lipase 151 73 - 393 U/L   TROPONIN I    Collection Time: 09/27/17 10:08 AM   Result Value Ref Range    Troponin-I, Qt. <0.04 <0.05 ng/mL   LACTIC ACID    Collection Time: 09/27/17 10:16 AM   Result Value Ref Range    Lactic acid 2.0 0.4 - 2.0 MMOL/L   EKG, 12 LEAD, INITIAL    Collection Time: 09/27/17 11:15 AM   Result Value Ref Range    Ventricular Rate 73 BPM    Atrial Rate 73 BPM    P-R Interval 212 ms    QRS Duration 82 ms    Q-T Interval 400 ms    QTC Calculation (Bezet) 440 ms    Calculated P Axis 24 degrees    Calculated R Axis -13 degrees    Calculated T Axis -7 degrees    Diagnosis       Sinus rhythm with 1st degree AV block  Otherwise normal ECG  When compared with ECG of 12-AUG-2017 16:47,  premature ventricular complexes are no longer present     URINALYSIS W/ REFLEX CULTURE    Collection Time: 09/27/17 12:21 PM   Result Value Ref Range    Color DARK YELLOW      Appearance TURBID (A) CLEAR      Specific gravity 1.018 1.003 - 1.030      pH (UA) 5.5 5.0 - 8.0      Protein 100 (A) NEG mg/dL    Glucose 250 (A) NEG mg/dL    Ketone TRACE (A) NEG mg/dL    Blood LARGE (A) NEG      Urobilinogen 1.0 0.2 - 1.0 EU/dL    Nitrites NEGATIVE  NEG      Leukocyte Esterase LARGE (A) NEG      WBC >100 (H) 0 - 4 /hpf    RBC >100 (H) 0 - 5 /hpf    Epithelial cells MODERATE (A) FEW /lpf    Bacteria 3+ (A) NEG /hpf    UA:UC IF INDICATED URINE CULTURE ORDERED (A) CNI      Hyaline cast 5-10 0 - 5 /lpf    Yeast PRESENT (A) NEG     BILIRUBIN, CONFIRM    Collection Time: 09/27/17 12:21 PM Result Value Ref Range    Bilirubin UA, confirm POSITIVE (A) NEG         IMAGING RESULTS:  XR ABD ACUTE W 1 V CHEST   Final Result   INDICATION:  Abdominal pain with nausea, vomiting, constipation.     COMPARISON:  9/13/2017.     FINDINGS:  4 supine and right lateral decubitus views of the abdomen were  obtained. The bowel gas pattern is nonobstructive. No free intraperitoneal air is appreciated.       An upright view of the chest was obtained. The heart is normal limits of size. The lungs are clear. No consolidation or pulmonary edema is evident. A left subclavian vascular stent is noted     IMPRESSION  IMPRESSION:     Nonobstructive bowel gas pattern.      No pneumonia or CHF. MEDICATIONS GIVEN:  Medications   fluconazole (DIFLUCAN) tablet 100 mg (100 mg Oral Given 9/27/17 1406)   ondansetron (ZOFRAN) injection 4 mg (4 mg IntraVENous Given 9/27/17 1022)       IMPRESSION:  1. Abdominal pain, generalized    2. Non-intractable vomiting with nausea, unspecified vomiting type    3. Constipation, unspecified constipation type    4. Acute cystitis without hematuria        PLAN:  1. Discharge home  Current Discharge Medication List      START taking these medications    Details   cephALEXin (KEFLEX) 500 mg capsule Take 1 Cap by mouth daily for 5 days. Qty: 5 Cap, Refills: 0         CONTINUE these medications which have CHANGED    Details   polyethylene glycol (MIRALAX) 17 gram/dose powder Take 17 g by mouth daily. Qty: 238 g, Refills: 0           2. Follow-up Information     Follow up With Details Comments [address], [place] In 2 days  [address]       [facility] EMERGENCY DEPT  As needed, If symptoms worsen [facility] [address]  [phone]        Return to ED if worse     DISCHARGE NOTE:  1:49 PM  The patient is ready for discharge.  The patients signs, symptoms, diagnosis, and instructions for discharge have been discussed and the pt has conveyed their understanding. The patient is to follow up as recommended with PCP or return to the ER should their symptoms worsen. Plan has been discussed and patient has conveyed their agreement. This note is prepared by Madi Luna, acting as Scribe for Albina Savage MD.    Albina Savage MD: The scribe's documentation has been prepared under my direction and personally reviewed by me in its entirety. I confirm that the note above accurately reflects all work, treatment, procedures, and medical decision making performed by me.

## 2017-09-27 NOTE — ED TRIAGE NOTES
Pt. Presents to ED via EMS with multiple complaints. Pt. States he has not had a BM in 6 days and started throwing up \"black\" last night. Pt. Also states that he \"just doesn't feel well. \" EMS reports that patient's blood sugar was high (in the 300s) prior to transport and patient was medicated with insulin by family. Pt. Alert and oriented x4. Pt. [place] in [place] bed at this time.

## 2017-09-27 NOTE — DISCHARGE INSTRUCTIONS
Abdominal Pain: Care Instructions  Your Care Instructions    Abdominal pain has many possible causes. Some aren't serious and get better on their own in a few days. Others need more testing and treatment. If your pain continues or gets worse, you need to be rechecked and may need more tests to find out what is wrong. You may need surgery to correct the problem. Don't ignore new symptoms, such as fever, nausea and vomiting, urination problems, pain that gets worse, and dizziness. These may be signs of a more serious problem. Your doctor may have recommended a follow-up visit in the next 8 to 12 hours. If you are not getting better, you may need more tests or treatment. The doctor has checked you carefully, but problems can develop later. If you notice any problems or new symptoms, get medical treatment right away. Follow-up care is a key part of your treatment and safety. Be sure to make and go to all appointments, and call your doctor if you are having problems. It's also a good idea to know your test results and keep a list of the medicines you take. How can you care for yourself at home? · Rest until you feel better. · To prevent dehydration, drink plenty of fluids, enough so that your urine is light yellow or clear like water. Choose water and other caffeine-free clear liquids until you feel better. If you have kidney, heart, or liver disease and have to limit fluids, talk with your doctor before you increase the amount of fluids you drink. · If your stomach is upset, eat mild foods, such as rice, dry toast or crackers, bananas, and applesauce. Try eating several small meals instead of two or three large ones. · Wait until 48 hours after all symptoms have gone away before you have spicy foods, alcohol, and drinks that contain caffeine. · Do not eat foods that are high in fat. · Avoid anti-inflammatory medicines such as aspirin, ibuprofen (Advil, Motrin), and naproxen (Aleve).  These can cause stomach upset. Talk to your doctor if you take daily aspirin for another health problem. When should you call for help? Call 911 anytime you think you may need emergency care. For example, call if:  · You passed out (lost consciousness). · You pass maroon or very bloody stools. · You vomit blood or what looks like coffee grounds. · You have new, severe belly pain. Call your doctor now or seek immediate medical care if:  · Your pain gets worse, especially if it becomes focused in one area of your belly. · You have a new or higher fever. · Your stools are black and look like tar, or they have streaks of blood. · You have unexpected vaginal bleeding. · You have symptoms of a urinary tract infection. These may include:  ¨ Pain when you urinate. ¨ Urinating more often than usual.  ¨ Blood in your urine. · You are dizzy or lightheaded, or you feel like you may faint. Watch closely for changes in your health, and be sure to contact your doctor if:  · You are not getting better after 1 day (24 hours). Where can you learn more? Go to http://laylaFactor 14billy.info/. Enter C948 in the search box to learn more about \"Abdominal Pain: Care Instructions. \"  Current as of: March 20, 2017  Content Version: 11.3  © 3375-0529 [employer]. Care instructions adapted under license by [employer] (which disclaims liability or warranty for this information). If you have questions about a medical condition or this instruction, always ask your healthcare professional. [place] [address] any warranty or liability for your use of this information. Constipation: Care Instructions  Your Care Instructions  Constipation means that you have a hard time passing stools (bowel movements). People pass stools from 3 times a day to once every 3 days. What is normal for you may be different. Constipation may occur with pain in the rectum and cramping.  The pain may get worse when you try to pass stools. Sometimes there are small amounts of bright red blood on toilet paper or the surface of stools. This is because of enlarged veins near the rectum (hemorrhoids). A few changes in your diet and lifestyle may help you avoid ongoing constipation. Your doctor may also prescribe medicine to help loosen your stool. Some medicines can cause constipation. These include pain medicines and antidepressants. Tell your doctor about all the medicines you take. Your doctor may want to make a medicine change to ease your symptoms. Follow-up care is a key part of your treatment and safety. Be sure to make and go to all appointments, and call your doctor if you are having problems. It's also a good idea to know your test results and keep a list of the medicines you take. How can you care for yourself at home? · Drink plenty of fluids, enough so that your urine is light yellow or clear like water. If you have kidney, heart, or liver disease and have to limit fluids, talk with your doctor before you increase the amount of fluids you drink. · Include high-fiber foods in your diet each day. These include fruits, vegetables, beans, and whole grains. · Get at least 30 minutes of exercise on most days of the week. Walking is a good choice. You also may want to do other activities, such as running, swimming, cycling, or playing tennis or team sports. · Take a fiber supplement, such as Citrucel or Metamucil, every day. Read and follow all instructions on the label. · Schedule time each day for a bowel movement. A daily routine may help. Take your time having your bowel movement. · Support your feet with a small step stool when you sit on the toilet. This helps flex your hips and places your pelvis in a squatting position. · Your doctor may recommend an over-the-counter laxative to relieve your constipation. Examples are Milk of Magnesia and MiraLax. Read and follow all instructions on the label.  Do not use laxatives on a long-term basis. When should you call for help? Call your doctor now or seek immediate medical care if:  · You have new or worse belly pain. · You have new or worse nausea or vomiting. · You have blood in your stools. Watch closely for changes in your health, and be sure to contact your doctor if:  · Your constipation is getting worse. · You do not get better as expected. Where can you learn more? Go to http://layla-billy.info/. Enter 21 [id number] in the search box to learn more about \"Constipation: Care Instructions. \"  Current as of: March 20, 2017  Content Version: 11.3  © 8781-4162 [employer]. Care instructions adapted under license by [employer] (which disclaims liability or warranty for this information). If you have questions about a medical condition or this instruction, always ask your healthcare professional. [place] [address] any warranty or liability for your use of this information. Nausea and Vomiting: Care Instructions  Your Care Instructions    When you are nauseated, you may feel weak and sweaty and notice a lot of saliva in your mouth. Nausea often leads to vomiting. Most of the time you do not need to worry about nausea and vomiting, but they can be signs of other illnesses. Two common causes of nausea and vomiting are stomach flu and food poisoning. Nausea and vomiting from viral stomach flu will usually start to improve within 24 hours. Nausea and vomiting from food poisoning may last from 12 to 48 hours. The doctor has checked you carefully, but problems can develop later. If you notice any problems or new symptoms, get medical treatment right away. Follow-up care is a key part of your treatment and safety. Be sure to make and go to all appointments, and call your doctor if you are having problems. It's also a good idea to know your test results and keep a list of the medicines you take.   How can you care for yourself at home? · To prevent dehydration, drink plenty of fluids, enough so that your urine is light yellow or clear like water. Choose water and other caffeine-free clear liquids until you feel better. If you have kidney, heart, or liver disease and have to limit fluids, talk with your doctor before you increase the amount of fluids you drink. · Rest in bed until you feel better. · When you are able to eat, try clear soups, mild foods, and liquids until all symptoms are gone for 12 to 48 hours. Other good choices include dry toast, crackers, cooked cereal, and gelatin dessert, such as Jell-O. When should you call for help? Call 911 anytime you think you may need emergency care. For example, call if:  · You passed out (lost consciousness). Call your doctor now or seek immediate medical care if:  · You have symptoms of dehydration, such as:  ¨ Dry eyes and a dry mouth. ¨ Passing only a little dark urine. ¨ Feeling thirstier than usual.  · You have new or worsening belly pain. · You have a new or higher fever. · You vomit blood or what looks like coffee grounds. Watch closely for changes in your health, and be sure to contact your doctor if:  · You have ongoing nausea and vomiting. · Your vomiting is getting worse. · Your vomiting lasts longer than 2 days. · You are not getting better as expected. Where can you learn more? Go to http://layla-billy.info/. Enter 25 [id number] in the search box to learn more about \"Nausea and Vomiting: Care Instructions. \"  Current as of: March 20, 2017  Content Version: 11.3  © 9942-0774 [employer]. Care instructions adapted under license by [employer] (which disclaims liability or warranty for this information). If you have questions about a medical condition or this instruction, always ask your healthcare professional. [address] any warranty or liability for your use of this information.

## 2017-09-29 LAB
BACTERIA SPEC CULT: NORMAL
CC UR VC: NORMAL
SERVICE CMNT-IMP: NORMAL

## 2017-12-13 ENCOUNTER — HOSPITAL ENCOUNTER (EMERGENCY)
Age: 77
Discharge: HOME | End: 2017-12-13
Attending: EMERGENCY MEDICINE
Payer: MEDICARE

## 2017-12-13 ENCOUNTER — APPOINTMENT (OUTPATIENT)
Dept: CT IMAGING | Age: 77
End: 2017-12-13
Attending: EMERGENCY MEDICINE
Payer: MEDICARE

## 2017-12-13 ENCOUNTER — APPOINTMENT (OUTPATIENT)
Dept: GENERAL RADIOLOGY | Age: 77
End: 2017-12-13
Attending: EMERGENCY MEDICINE
Payer: MEDICARE

## 2017-12-13 VITALS
DIASTOLIC BLOOD PRESSURE: 54 MMHG | OXYGEN SATURATION: 96 % | SYSTOLIC BLOOD PRESSURE: 113 MMHG | TEMPERATURE: 98.4 F | BODY MASS INDEX: 35.23 KG/M2 | RESPIRATION RATE: 16 BRPM | HEART RATE: 77 BPM | WEIGHT: 218.26 LBS

## 2017-12-13 DIAGNOSIS — K52.9 ENTERITIS: Primary | ICD-10-CM

## 2017-12-13 DIAGNOSIS — R53.1 WEAKNESS: ICD-10-CM

## 2017-12-13 LAB
ALBUMIN SERPL-MCNC: 3.1 G/DL (ref 3.5–5)
ALBUMIN/GLOB SERPL: 0.6 {RATIO} (ref 1.1–2.2)
ALP SERPL-CCNC: 93 U/L (ref 45–117)
ALT SERPL-CCNC: 22 U/L (ref 12–78)
ANION GAP SERPL CALC-SCNC: 14 MMOL/L (ref 5–15)
APTT PPP: 27.5 SEC (ref 22.1–32.5)
AST SERPL-CCNC: 22 U/L (ref 15–37)
BASOPHILS # BLD: 0 K/UL (ref 0–0.1)
BASOPHILS NFR BLD: 0 % (ref 0–1)
BILIRUB SERPL-MCNC: 0.6 MG/DL (ref 0.2–1)
BUN SERPL-MCNC: 45 MG/DL (ref 6–20)
BUN/CREAT SERPL: 6 (ref 12–20)
CALCIUM SERPL-MCNC: 7.3 MG/DL (ref 8.5–10.1)
CHLORIDE SERPL-SCNC: 94 MMOL/L (ref 97–108)
CK MB CFR SERPL CALC: 1 % (ref 0–2.5)
CK MB SERPL-MCNC: 3.7 NG/ML (ref 5–25)
CK SERPL-CCNC: 385 U/L (ref 39–308)
CO2 SERPL-SCNC: 22 MMOL/L (ref 21–32)
CREAT SERPL-MCNC: 7.6 MG/DL (ref 0.7–1.3)
EOSINOPHIL # BLD: 0.1 K/UL (ref 0–0.4)
EOSINOPHIL NFR BLD: 1 % (ref 0–7)
ERYTHROCYTE [DISTWIDTH] IN BLOOD BY AUTOMATED COUNT: 13.5 % (ref 11.5–14.5)
GLOBULIN SER CALC-MCNC: 5.2 G/DL (ref 2–4)
GLUCOSE SERPL-MCNC: 309 MG/DL (ref 65–100)
HCT VFR BLD AUTO: 30.5 % (ref 36.6–50.3)
HGB BLD-MCNC: 10.3 G/DL (ref 12.1–17)
INR PPP: 1.3 (ref 0.9–1.1)
LYMPHOCYTES # BLD: 1 K/UL (ref 0.8–3.5)
LYMPHOCYTES NFR BLD: 19 % (ref 12–49)
MAGNESIUM SERPL-MCNC: 1.6 MG/DL (ref 1.6–2.4)
MCH RBC QN AUTO: 34.4 PG (ref 26–34)
MCHC RBC AUTO-ENTMCNC: 33.8 G/DL (ref 30–36.5)
MCV RBC AUTO: 102 FL (ref 80–99)
MONOCYTES # BLD: 0.8 K/UL (ref 0–1)
MONOCYTES NFR BLD: 14 % (ref 5–13)
NEUTS SEG # BLD: 3.5 K/UL (ref 1.8–8)
NEUTS SEG NFR BLD: 66 % (ref 32–75)
PLATELET # BLD AUTO: 165 K/UL (ref 150–400)
POTASSIUM SERPL-SCNC: 4.4 MMOL/L (ref 3.5–5.1)
PROT SERPL-MCNC: 8.3 G/DL (ref 6.4–8.2)
PROTHROMBIN TIME: 13.3 SEC (ref 9–11.1)
RBC # BLD AUTO: 2.99 M/UL (ref 4.1–5.7)
SODIUM SERPL-SCNC: 130 MMOL/L (ref 136–145)
THERAPEUTIC RANGE,PTTT: NORMAL SECS (ref 58–77)
TROPONIN I SERPL-MCNC: <0.04 NG/ML
WBC # BLD AUTO: 5.3 K/UL (ref 4.1–11.1)

## 2017-12-13 PROCEDURE — 85610 PROTHROMBIN TIME: CPT | Performed by: EMERGENCY MEDICINE

## 2017-12-13 PROCEDURE — 99284 EMERGENCY DEPT VISIT MOD MDM: CPT

## 2017-12-13 PROCEDURE — 85025 COMPLETE CBC W/AUTO DIFF WBC: CPT | Performed by: EMERGENCY MEDICINE

## 2017-12-13 PROCEDURE — 80053 COMPREHEN METABOLIC PANEL: CPT | Performed by: EMERGENCY MEDICINE

## 2017-12-13 PROCEDURE — 84484 ASSAY OF TROPONIN QUANT: CPT | Performed by: EMERGENCY MEDICINE

## 2017-12-13 PROCEDURE — 83735 ASSAY OF MAGNESIUM: CPT | Performed by: EMERGENCY MEDICINE

## 2017-12-13 PROCEDURE — 82550 ASSAY OF CK (CPK): CPT | Performed by: EMERGENCY MEDICINE

## 2017-12-13 PROCEDURE — 74176 CT ABD & PELVIS W/O CONTRAST: CPT

## 2017-12-13 PROCEDURE — 36415 COLL VENOUS BLD VENIPUNCTURE: CPT | Performed by: EMERGENCY MEDICINE

## 2017-12-13 PROCEDURE — 74022 RADEX COMPL AQT ABD SERIES: CPT

## 2017-12-13 PROCEDURE — 85730 THROMBOPLASTIN TIME PARTIAL: CPT | Performed by: EMERGENCY MEDICINE

## 2017-12-13 PROCEDURE — 93005 ELECTROCARDIOGRAM TRACING: CPT

## 2017-12-13 NOTE — ED NOTES
Dr. Joann Yanes went over dc instructions with pt and family at this time. Pt to dc home with family via wheelchair.

## 2017-12-13 NOTE — ED NOTES
Bedside shift change report given to [address]  (oncoming nurse) by Jose Billings RN (offgoing nurse). Report included the following information SBAR.

## 2017-12-13 NOTE — ED TRIAGE NOTES
Pt arrived via EMS from home for increased bowel movements x last night Pt with hx of ESRD and states he had scheduled dialysis yesterday. Pt denies any pain at this time. Pt alert oriented x 4 upon arrival. Skin warm dry intact Pt placed on monitor x 2 Updated on plan with pending evaluation by MD     Pt arrives with soiled depends with soft tan colored stool. Pt cleansed and new depends placed. Pt with bilateral BKA Pt prosthetics removed upon arrival Pt belongings placed in belongings bag.

## 2017-12-13 NOTE — ED PROVIDER NOTES
EMERGENCY DEPARTMENT HISTORY AND PHYSICAL EXAM      Date: 12/13/2017  Patient Name: Dagmar Jarvis    History of Presenting Illness     Chief Complaint   Patient presents with    Diarrhea     Pt arrives via EMS from home for increased BM x last night Pt denies any pain     History Provided By: Patient    HPI: Dagmar Jarvis, 76 y.o. male with PMHx significant for depression, seizures, HTN, strokes and PShx for b/l BKA, presents via EMS from his home to the ED with cc of progressively worsening episodes of diarrhea with associated generalized weakness that began last night. He does not know the color of his stool. Pt notes that he vomited once this morning. He was found in his wheelchair by EMS. En route, his BGL was 327 with stable vital signs. Pt notes that he underwent dialysis yesterday with no changes from usual (Tuesday/Thursday/Saturday dialysis pt). Pt was seen at [facility] ED 1 month ago and was diagnosed with a UTI. Pt lives with his wife. His daily medications include Keppra. Pt specifically denies chest pain or dyspnea. PCP: Latasha Smith MD    There are no other complaints, changes, or physical findings at this time. Current Outpatient Prescriptions   Medication Sig Dispense Refill    polyethylene glycol (MIRALAX) 17 gram/dose powder Take 17 g by mouth daily. 238 g 0    HYDROcodone-acetaminophen (NORCO) 5-325 mg per tablet Take 1 Tab by mouth every four (4) hours as needed for Pain. Max Daily Amount: 6 Tabs. 15 Tab 0    ondansetron (ZOFRAN ODT) 4 mg disintegrating tablet Take 1 Tab by mouth every eight (8) hours as needed for Nausea. 10 Tab 0    MOISE-ALPHONSE 0.8 mg tab tablet   3    aspirin delayed-release 81 mg tablet Take 1 Tab by mouth daily. 30 Tab 11    amLODIPine (NORVASC) 5 mg tablet Take 5 mg by mouth daily.  ferrous sulfate 325 mg (65 mg iron) tablet Take 325 mg by mouth two (2) times a day.  PHOSPHORUS #1 (PHOSPHA 250 NEUTRAL PO) Take 2 Tabs by mouth daily.       calcium acetate (PHOSLO) 667 mg cap Take 1 Cap by mouth three (3) times daily (with meals).  insulin NPH/insulin regular (HUMULIN 70/30) 100 unit/mL (70-30) injection 30 Units by SubCUTAneous route daily.  glucose blood VI test strips (FREESTYLE LITE STRIPS) strip Check blood sugar once daily 1 Package 11    BD INSULIN SYRINGE ULTRA-FINE 1 mL 30 x 1/2\" syrg       Blood-Glucose Meter (FREESTYLE LITE METER) monitoring kit Check blood sugar once daily 1 kit 0    carvedilol (COREG) 25 mg tablet Take 25 mg by mouth two (2) times daily (with meals).  levothyroxine (SYNTHROID) 175 mcg tablet Take 150 mcg by mouth Daily (before breakfast).  pravastatin (PRAVACHOL) 10 mg tablet Take 20 mg by mouth nightly. Past History     Past Medical History:  Past Medical History:   Diagnosis Date    CAD (coronary artery disease)     Chronic kidney disease     tues/thurs/sat TREE [address]    Diabetes ([facility]     Endocrine disease     hypothyroid    Gastrointestinal disorder     gallstones    Hypertension     Stroke ([facility]     2011 speech general weakness    Thyroid disease        Past Surgical History:  Past Surgical History:   Procedure Laterality Date    ABDOMEN SURGERY PROC UNLISTED      HX HEENT      cataract   removal  rt eye    HX ORTHOPAEDIC      11/2014 R BK Amputation and L    HX OTHER SURGICAL      HX VASCULAR ACCESS      L arm shunt    VASCULAR SURGERY PROCEDURE UNLIST      Fistula R arm       Family History:  Family History   Problem Relation Age of Onset    Hypertension Mother     Diabetes Father        Social History:  Social History   Substance Use Topics    Smoking status: Never Smoker    Smokeless tobacco: Never Used    Alcohol use No       Allergies:  No Known Allergies    Review of Systems   Review of Systems   Constitutional: Negative for chills and fever. HENT: Negative. Negative for congestion, facial swelling, rhinorrhea, sore throat, trouble swallowing and voice change.     Eyes: Negative. Respiratory: Negative. Negative for apnea, cough, chest tightness, shortness of breath and wheezing.         - dyspnea    Cardiovascular: Negative. Negative for chest pain, palpitations and leg swelling. Gastrointestinal: Positive for diarrhea, nausea and vomiting. Negative for abdominal distention, abdominal pain, blood in stool and constipation. Endocrine: Negative. Negative for cold intolerance, heat intolerance and polyuria. Genitourinary: Negative. Negative for difficulty urinating, dysuria, flank pain, frequency, hematuria and urgency. Musculoskeletal: Negative. Negative for arthralgias, back pain, myalgias, neck pain and neck stiffness. Skin: Negative. Negative for color change and rash. Neurological: Positive for weakness (generalized). Negative for dizziness, syncope, facial asymmetry, speech difficulty, light-headedness, numbness and headaches. Hematological: Negative. Does not bruise/bleed easily. Psychiatric/Behavioral: Negative. Negative for confusion and self-injury. The patient is not nervous/anxious. Physical Exam   Physical Exam   Constitutional: He is oriented to person, place, and time. Vital signs are normal. He appears well-developed and well-nourished. He is cooperative. Non-toxic appearance. No distress. Stable vital signs   Wearing adult diapers    HENT:   Head: Normocephalic and atraumatic. Mouth/Throat: Mucous membranes are dry. No posterior oropharyngeal erythema. Eyes: Conjunctivae and EOM are normal. Pupils are equal, round, and reactive to light. Neck: Normal range of motion. Cardiovascular: Normal rate, regular rhythm, normal heart sounds and intact distal pulses. Exam reveals no gallop and no friction rub. No murmur heard. Pulmonary/Chest: Effort normal and breath sounds normal. No respiratory distress. He has no wheezes. He has no rales. He exhibits no tenderness. Abdominal: Soft.  Bowel sounds are normal. He exhibits distension (mildly). He exhibits no mass. There is no tenderness. There is no rebound and no guarding. No peritoneal signs    Musculoskeletal: Normal range of motion. He exhibits no edema, tenderness or deformity. Neurological: He is alert and oriented to person, place, and time. He displays normal reflexes. No cranial nerve deficit. He exhibits normal muscle tone. Coordination normal.   Baseline dysarthria   Skin: Skin is warm. No rash noted. AV fistula on left arm with palpable thrill and good bruit   Psychiatric: He has a normal mood and affect. Nursing note and vitals reviewed. Diagnostic Study Results     Labs -     Recent Results (from the past 12 hour(s))   CBC WITH AUTOMATED DIFF    Collection Time: 12/13/17  3:28 PM   Result Value Ref Range    WBC 5.3 4.1 - 11.1 K/uL    RBC 2.99 (L) 4.10 - 5.70 M/uL    HGB 10.3 (L) 12.1 - 17.0 g/dL    HCT 30.5 (L) 36.6 - 50.3 %    .0 (H) 80.0 - 99.0 FL    MCH 34.4 (H) 26.0 - 34.0 PG    MCHC 33.8 30.0 - 36.5 g/dL    RDW 13.5 11.5 - 14.5 %    PLATELET 310 893 - 524 K/uL    NEUTROPHILS 66 32 - 75 %    LYMPHOCYTES 19 12 - 49 %    MONOCYTES 14 (H) 5 - 13 %    EOSINOPHILS 1 0 - 7 %    BASOPHILS 0 0 - 1 %    ABS. NEUTROPHILS 3.5 1.8 - 8.0 K/UL    ABS. LYMPHOCYTES 1.0 0.8 - 3.5 K/UL    ABS. MONOCYTES 0.8 0.0 - 1.0 K/UL    ABS. EOSINOPHILS 0.1 0.0 - 0.4 K/UL    ABS.  BASOPHILS 0.0 0.0 - 0.1 K/UL   CK W/ CKMB & INDEX    Collection Time: 12/13/17  3:28 PM   Result Value Ref Range     (H) 39 - 308 U/L    CK - MB 3.7 (H) <3.6 NG/ML    CK-MB Index 1.0 0 - 2.5     METABOLIC PANEL, COMPREHENSIVE    Collection Time: 12/13/17  3:28 PM   Result Value Ref Range    Sodium 130 (L) 136 - 145 mmol/L    Potassium 4.4 3.5 - 5.1 mmol/L    Chloride 94 (L) 97 - 108 mmol/L    CO2 22 21 - 32 mmol/L    Anion gap 14 5 - 15 mmol/L    Glucose 309 (H) 65 - 100 mg/dL    BUN 45 (H) 6 - 20 MG/DL    Creatinine 7.60 (H) 0.70 - 1.30 MG/DL    BUN/Creatinine ratio 6 (L) 12 - 20      GFR est AA 8 (L) >60 ml/min/1.73m2    GFR est non-AA 7 (L) >60 ml/min/1.73m2    Calcium 7.3 (L) 8.5 - 10.1 MG/DL    Bilirubin, total 0.6 0.2 - 1.0 MG/DL    ALT (SGPT) 22 12 - 78 U/L    AST (SGOT) 22 15 - 37 U/L    Alk. phosphatase 93 45 - 117 U/L    Protein, total 8.3 (H) 6.4 - 8.2 g/dL    Albumin 3.1 (L) 3.5 - 5.0 g/dL    Globulin 5.2 (H) 2.0 - 4.0 g/dL    A-G Ratio 0.6 (L) 1.1 - 2.2     MAGNESIUM    Collection Time: 12/13/17  3:28 PM   Result Value Ref Range    Magnesium 1.6 1.6 - 2.4 mg/dL   PROTHROMBIN TIME + INR    Collection Time: 12/13/17  3:28 PM   Result Value Ref Range    INR 1.3 (H) 0.9 - 1.1      Prothrombin time 13.3 (H) 9.0 - 11.1 sec   PTT    Collection Time: 12/13/17  3:28 PM   Result Value Ref Range    aPTT 27.5 22.1 - 32.5 sec    aPTT, therapeutic range     58.0 - 77.0 SECS   TROPONIN I    Collection Time: 12/13/17  3:28 PM   Result Value Ref Range    Troponin-I, Qt. <0.04 <0.05 ng/mL     Radiologic Studies -     CT Results  (Last 48 hours)               12/13/17 1506  CT ABD PELV WO CONT Final result    Impression:  IMPRESSION:       1. Questionable jejunal mural thickening may represent a nonspecific infectious   enteritis. No evidence of colitis. 2. Renal atrophy. No nephrolithiasis or hydronephrosis. Others incidental   findings are detailed above. Narrative:  EXAM:  CT ABD PELV WO CONT       INDICATION: Nausea, vomiting, abdominal pain, diarrhea; clinical differential   diagnosis of obstruction vs colitis. End-stage renal disease on dialysis. No   laboratory values at the time of this interpretation. COMPARISON: CT abdomen/pelvis on 5/27/2017. Acute abdominal series on 9/27/2017. CONTRAST:  None. TECHNIQUE:    Thin axial images were obtained through the abdomen and pelvis. Coronal and   sagittal reconstructions were generated. Oral contrast was not administered.  CT   dose reduction was achieved through use of a standardized protocol tailored for   this examination and automatic exposure control for dose modulation. The absence of intravenous contrast material reduces the sensitivity for   evaluation of the solid parenchymal organs of the abdomen. FINDINGS:    LUNG BASES: No pneumonia. INCIDENTALLY IMAGED HEART AND MEDIASTINUM: Unchanged dilated left atrium. Coronary artery calcific atherosclerosis is partially imaged. No pericardial   effusion. LIVER: Normal size. No evidence of mass. Surface is mildly irregular without   evidence of classic cirrhosis. GALLBLADDER: High-density material in the lumen is unchanged. CBD is not   dilated. SPLEEN: Normal size. PANCREAS: Mild atrophy is unchanged. No inflammation. ADRENALS: Unremarkable. KIDNEYS/URETERS: Bilateral renal atrophy is unchanged. No hydronephrosis. STOMACH: Partial distention with food products. SMALL BOWEL: Questionable mural thickening of the mid jejunum. No obstruction. COLON: No dilatation or wall thickening. APPENDIX: Unremarkable. PERITONEUM: No ascites or pneumoperitoneum. RETROPERITONEUM: No lymphadenopathy or aortic aneurysm. REPRODUCTIVE ORGANS: Prostate gland is not enlarged. URINARY BLADDER: Nondistended, limited evaluation. BONES: No destructive bone lesion. ADDITIONAL COMMENTS: No hernia. Mild diffuse muscle atrophy. Extensive vascular   calcifications. CXR Results  (Last 48 hours)               12/13/17 1519  XR ABD ACUTE W 1 V CHEST Final result    Impression:  IMPRESSION:    Nonobstructive bowel gas pattern without evidence of free air. See recent CT   report. Narrative:  EXAM:  XR ABD ACUTE W 1 V CHEST       INDICATION: Abdominal pain and diarrhea. End-stage renal disease on dialysis. COMPARISON: CT abdomen/pelvis earlier today at 1503 hours. TECHNIQUE: Supine chest and abdomen and right side up lateral decubitus abdomen   radiographs (acute abdominal series). FINDINGS: Mild cardiomegaly is unchanged.  No evidence of pneumonia. There is scattered air within the colon and small bowel. No bowel dilatation to   suggest obstruction. No evidence of free intraperitoneal air. No calcification projected over the kidneys. Bones are osteopenic. Medical Decision Making   I am the first provider for this patient. I reviewed the vital signs, available nursing notes, past medical history, past surgical history, family history and social history. Vital Signs-Reviewed the patient's vital signs. Patient Vitals for the past 12 hrs:   Temp Pulse Resp BP SpO2   12/13/17 1621 98.4 °F (36.9 °C) - - - -   12/13/17 1600 - - - 113/54 96 %   12/13/17 1529 98.4 °F (36.9 °C) 77 16 108/57 95 %   12/13/17 1402 98.4 °F (36.9 °C) - 14 100/41 100 %     Pulse Oximetry Analysis - 96% on RA    Cardiac Monitor:   Rate: 77 bpm  Rhythm: Normal Sinus Rhythm     EKG interpretation: 16:11  Rhythm: sinus rhythm with 1st degree AV block. Rate (approx.): 75; Axis: normal; IL interval: normal; QRS interval: low voltage; ST/T wave: normal.  Written by BETINA Gayle, as dictated by Braxton Weiner MD.    Records Reviewed: Nursing Notes and Old Medical Records    Provider Notes (Medical Decision Making):   DDx: colitis, enteritis, electrolyte disturbance, ACS, renal failure, hyperglycemia, DKA    Pt is a 75 yo M with hx of ESRD on dialysis, DM, prior stroke, presents with generalized weakness and watery diarrhea. He is  afebrile with stable vital signs. exam is non peritoneal. Given above will check EKG, cardiac enzymes, basic labs, acute series, and reassess need for CT imaging. Pt is baseline per neuro exam, no indication for head CT at this time. Addiitonal chart review reveals that pt has not been on recent ABx therefore do not suspect c diff colitis. ED Course:   Initial assessment performed.  The patients presenting problems have been discussed, and they are in agreement with the care plan formulated and outlined with them.  I have encouraged them to ask questions as they arise throughout their visit. 4:25 PM  Updated pt and family on results. Progress Note:  Pt states he feels much better; pain resolved; denies any nausea; no new symptoms; pt able to tolerate PO; pt clinically safe for discharge home with close PCP f/u. At time of discharge, pt had stable vitals and had no questions or concerns, and was very satisfied with overall care. Disposition:  Discharge Note:  4:49 PM  The patient has been re-evaluated and is ready for discharge. Reviewed available results with patient. Counseled patient/parent/guardian on diagnosis and care plan. Patient has expressed understanding, and all questions have been answered. Patient agrees with plan and agrees to follow up as recommended, or return to the ED if their symptoms worsen. Discharge instructions have been provided and explained to the patient, along with reasons to return to the ED. PLAN:  1. Discharge Medication List as of 12/13/2017  4:37 PM        2. Follow-up Information     Follow up With Details Comments [address], [place]   [address] [place] [address]  [phone]      [facility] EMERGENCY DEPT  As needed, If symptoms worsen [address] [place] [address]  [phone]        Return to ED if worse     Diagnosis     Clinical Impression:   1. Enteritis    2. Weakness      Attestations:    Attestation: This note is prepared by Flora Leon, acting as Scribe for MD Brenna Sin MD: The scribe's documentation has been prepared under my direction and personally reviewed by me in its entirety. I confirm that the note above accurately reflects all work, treatment, procedures, and medical decision making performed by me. This note will not be viewable in [address]

## 2017-12-14 LAB
ATRIAL RATE: 75 BPM
CALCULATED P AXIS, ECG09: 36 DEGREES
CALCULATED R AXIS, ECG10: 10 DEGREES
CALCULATED T AXIS, ECG11: -8 DEGREES
DIAGNOSIS, 93000: NORMAL
P-R INTERVAL, ECG05: 220 MS
Q-T INTERVAL, ECG07: 386 MS
QRS DURATION, ECG06: 72 MS
QTC CALCULATION (BEZET), ECG08: 431 MS
VENTRICULAR RATE, ECG03: 75 BPM

## 2019-01-01 ENCOUNTER — APPOINTMENT (OUTPATIENT)
Dept: CT IMAGING | Age: 79
End: 2019-01-01
Attending: EMERGENCY MEDICINE
Payer: MEDICARE

## 2019-01-01 ENCOUNTER — HOSPITAL ENCOUNTER (EMERGENCY)
Age: 79
Discharge: HOME | End: 2019-01-01
Attending: EMERGENCY MEDICINE | Admitting: EMERGENCY MEDICINE
Payer: MEDICARE

## 2019-01-01 VITALS
SYSTOLIC BLOOD PRESSURE: 136 MMHG | TEMPERATURE: 97.8 F | DIASTOLIC BLOOD PRESSURE: 73 MMHG | BODY MASS INDEX: 29.05 KG/M2 | HEART RATE: 67 BPM | WEIGHT: 180 LBS | RESPIRATION RATE: 14 BRPM | OXYGEN SATURATION: 98 %

## 2019-01-01 DIAGNOSIS — D64.9 CHRONIC ANEMIA: ICD-10-CM

## 2019-01-01 DIAGNOSIS — R19.7 ACUTE DIARRHEA: Primary | ICD-10-CM

## 2019-01-01 DIAGNOSIS — N30.90 CYSTITIS: ICD-10-CM

## 2019-01-01 DIAGNOSIS — R73.9 HYPERGLYCEMIA: ICD-10-CM

## 2019-01-01 LAB
ALBUMIN SERPL-MCNC: 3 G/DL (ref 3.5–5)
ALBUMIN/GLOB SERPL: 0.6 {RATIO} (ref 1.1–2.2)
ALP SERPL-CCNC: 96 U/L (ref 45–117)
ALT SERPL-CCNC: 12 U/L (ref 12–78)
ANION GAP SERPL CALC-SCNC: 13 MMOL/L (ref 5–15)
AST SERPL-CCNC: 15 U/L (ref 15–37)
BASOPHILS # BLD: 0 K/UL (ref 0–0.1)
BASOPHILS NFR BLD: 0 % (ref 0–1)
BILIRUB SERPL-MCNC: 0.4 MG/DL (ref 0.2–1)
BUN SERPL-MCNC: 36 MG/DL (ref 6–20)
BUN/CREAT SERPL: 5 (ref 12–20)
CALCIUM SERPL-MCNC: 8.9 MG/DL (ref 8.5–10.1)
CHLORIDE SERPL-SCNC: 98 MMOL/L (ref 97–108)
CO2 SERPL-SCNC: 22 MMOL/L (ref 21–32)
CREAT SERPL-MCNC: 7.88 MG/DL (ref 0.7–1.3)
DIFFERENTIAL METHOD BLD: ABNORMAL
EOSINOPHIL # BLD: 0.1 K/UL (ref 0–0.4)
EOSINOPHIL NFR BLD: 1 % (ref 0–7)
ERYTHROCYTE [DISTWIDTH] IN BLOOD BY AUTOMATED COUNT: 13.1 % (ref 11.5–14.5)
GLOBULIN SER CALC-MCNC: 4.9 G/DL (ref 2–4)
GLUCOSE SERPL-MCNC: 296 MG/DL (ref 65–100)
HCT VFR BLD AUTO: 23.1 % (ref 36.6–50.3)
HGB BLD-MCNC: 8 G/DL (ref 12.1–17)
IMM GRANULOCYTES # BLD: 0 K/UL (ref 0–0.04)
IMM GRANULOCYTES NFR BLD AUTO: 0 % (ref 0–0.5)
LYMPHOCYTES # BLD: 1 K/UL (ref 0.8–3.5)
LYMPHOCYTES NFR BLD: 19 % (ref 12–49)
MAGNESIUM SERPL-MCNC: 1.9 MG/DL (ref 1.6–2.4)
MCH RBC QN AUTO: 36.4 PG (ref 26–34)
MCHC RBC AUTO-ENTMCNC: 34.6 G/DL (ref 30–36.5)
MCV RBC AUTO: 105 FL (ref 80–99)
MONOCYTES # BLD: 0.5 K/UL (ref 0–1)
MONOCYTES NFR BLD: 9 % (ref 5–13)
NEUTS SEG # BLD: 3.5 K/UL (ref 1.8–8)
NEUTS SEG NFR BLD: 69 % (ref 32–75)
NRBC # BLD: 0 K/UL (ref 0–0.01)
NRBC BLD-RTO: 0 PER 100 WBC
PHOSPHATE SERPL-MCNC: 2.5 MG/DL (ref 2.6–4.7)
PLATELET # BLD AUTO: 148 K/UL (ref 150–400)
PMV BLD AUTO: 10 FL (ref 8.9–12.9)
POTASSIUM SERPL-SCNC: 4 MMOL/L (ref 3.5–5.1)
PROT SERPL-MCNC: 7.9 G/DL (ref 6.4–8.2)
RBC # BLD AUTO: 2.2 M/UL (ref 4.1–5.7)
SODIUM SERPL-SCNC: 133 MMOL/L (ref 136–145)
WBC # BLD AUTO: 5 K/UL (ref 4.1–11.1)

## 2019-01-01 PROCEDURE — 83735 ASSAY OF MAGNESIUM: CPT

## 2019-01-01 PROCEDURE — 80053 COMPREHEN METABOLIC PANEL: CPT

## 2019-01-01 PROCEDURE — 84100 ASSAY OF PHOSPHORUS: CPT

## 2019-01-01 PROCEDURE — 87045 FECES CULTURE AEROBIC BACT: CPT

## 2019-01-01 PROCEDURE — 87449 NOS EACH ORGANISM AG IA: CPT

## 2019-01-01 PROCEDURE — 36415 COLL VENOUS BLD VENIPUNCTURE: CPT

## 2019-01-01 PROCEDURE — 85025 COMPLETE CBC W/AUTO DIFF WBC: CPT

## 2019-01-01 PROCEDURE — 96360 HYDRATION IV INFUSION INIT: CPT

## 2019-01-01 PROCEDURE — 99285 EMERGENCY DEPT VISIT HI MDM: CPT

## 2019-01-01 PROCEDURE — 74011250636 HC RX REV CODE- 250/636: Performed by: EMERGENCY MEDICINE

## 2019-01-01 PROCEDURE — 96361 HYDRATE IV INFUSION ADD-ON: CPT

## 2019-01-01 PROCEDURE — 74176 CT ABD & PELVIS W/O CONTRAST: CPT

## 2019-01-01 RX ORDER — CIPROFLOXACIN 500 MG/1
500 TABLET ORAL 2 TIMES DAILY
Qty: 14 TAB | Refills: 0 | Status: SHIPPED | OUTPATIENT
Start: 2019-01-01 | End: 2019-01-08

## 2019-01-01 RX ORDER — SODIUM CHLORIDE 9 MG/ML
150 INJECTION, SOLUTION INTRAVENOUS ONCE
Status: COMPLETED | OUTPATIENT
Start: 2019-01-01 | End: 2019-01-01

## 2019-01-01 RX ADMIN — SODIUM CHLORIDE 150 ML/HR: 900 INJECTION, SOLUTION INTRAVENOUS at 08:06

## 2019-01-01 NOTE — PROGRESS NOTES
CM received a call from MD Drew Simmons and she indicated that Pt goes to [facility] on [address] and [address] [facility]. [address] [place] [id number] Phone: [phone] CM called and left a VM for the  and the main  to see if Pt could come this afternoon to HD or be placed on the schedule for tomorrow for HD. CM will send updates to them via [email or web address] as well to notify them that Pt will be missing HD this morning but wanted to see if Pt could be rescheduled. 12:34 PM  
CM received call from ER MD requesting that CM set up transport for Pt. Holiday/Weekend CM can not set up transportation d/t low staffing. Pt has medicare as primary. RN will need to call [facility] to set up transportation. [facility] Phone Number is [phone]. CM will continue to monitor discharge plan. Windy Huang CM Ext [phone]

## 2019-01-01 NOTE — ED NOTES
After speaking with patient. Patient states he came into the ED because he had multiple bowel movements last night in his sleep. He has a bilateral BKA and states usually, he is able to get to the restroom with help of his wife, but his wife recently had a stroke and is unable to assist him. States he called 9-1-1 because he needed help getting cleaned up. Patient states this happened before in the past, and he was d/c home. Patient denies pain and discomfort.

## 2019-01-01 NOTE — ED PROVIDER NOTES
EMERGENCY DEPARTMENT HISTORY AND PHYSICAL EXAM 
 
 
Date: 1/1/2019 Patient Name: Camila Litten History of Presenting Illness Chief Complaint Patient presents with  
 Incontinence History Provided By: Patient and EMS 
 
HPI: Camila Litten, [de-identified] y.o. male with PMHx significant for end stage renal disease, CAD, and bilateral BKA, presents via EMS to the ED with cc of D and wetting of bed that started overnight. Pt states that he lives with his wife, but she is sick and is unable to assist pt to bathroom. Pt states that he had 4 watery stools. He notes that sxs started after eating fish sandwich last night. Pt has no known sick contacts, travel out of [facility], or missed dialysis apts. He has had no recent hospitalizations or admission to rehab. Pt states he has had no recent abx or antacid use. He denies any modifying factors. He specifically denies CP, SOB, LOC, dizziness, abd pain, fever, or dysuria. There are no other complaints, changes, or physical findings at this time. PCP: Javier Roper MD 
 
No current facility-administered medications on file prior to encounter. Current Outpatient Medications on File Prior to Encounter Medication Sig Dispense Refill  polyethylene glycol (MIRALAX) 17 gram/dose powder Take 17 g by mouth daily. 238 g 0  
 HYDROcodone-acetaminophen (NORCO) 5-325 mg per tablet Take 1 Tab by mouth every four (4) hours as needed for Pain. Max Daily Amount: 6 Tabs. 15 Tab 0  
 ondansetron (ZOFRAN ODT) 4 mg disintegrating tablet Take 1 Tab by mouth every eight (8) hours as needed for Nausea. 10 Tab 0  
 MOISE-ALPHONSE 0.8 mg tab tablet   3  
 aspirin delayed-release 81 mg tablet Take 1 Tab by mouth daily. 30 Tab 11  
 amLODIPine (NORVASC) 5 mg tablet Take 5 mg by mouth daily.  ferrous sulfate 325 mg (65 mg iron) tablet Take 325 mg by mouth two (2) times a day.  PHOSPHORUS #1 (PHOSPHA 250 NEUTRAL PO) Take 2 Tabs by mouth daily.  calcium acetate (PHOSLO) 667 mg cap Take 1 Cap by mouth three (3) times daily (with meals).  insulin NPH/insulin regular (HUMULIN 70/30) 100 unit/mL (70-30) injection 30 Units by SubCUTAneous route daily.  glucose blood VI test strips (FREESTYLE LITE STRIPS) strip Check blood sugar once daily 1 Package 11  
 BD INSULIN SYRINGE ULTRA-FINE 1 mL 30 x 1/2\" syrg  Blood-Glucose Meter (FREESTYLE LITE METER) monitoring kit Check blood sugar once daily 1 kit 0  
 carvedilol (COREG) 25 mg tablet Take 25 mg by mouth two (2) times daily (with meals).  levothyroxine (SYNTHROID) 175 mcg tablet Take 150 mcg by mouth Daily (before breakfast).  pravastatin (PRAVACHOL) 10 mg tablet Take 20 mg by mouth nightly. Past History Past Medical History: 
Past Medical History:  
Diagnosis Date  CAD (coronary artery disease)  Chronic kidney disease   
 tues/thurs/sat TREE [address]  
 Diabetes ([facility] [address]  Endocrine disease   
 hypothyroid  Gastrointestinal disorder   
 gallstones  Hypertension  Stroke ([facility] [address] 2011 speech general weakness  Thyroid disease Past Surgical History: 
Past Surgical History:  
Procedure Laterality Date [address] UNLISTED  HX HEENT    
 cataract   removal  rt eye  HX ORTHOPAEDIC    
 11/2014 R BK Amputation and L  
 HX OTHER SURGICAL    
 HX VASCULAR ACCESS    
 L arm shunt  VASCULAR SURGERY PROCEDURE UNLIST Fistula R arm Family History: 
Family History Problem Relation Age of Onset  Hypertension Mother  Diabetes Father Social History: 
Social History Tobacco Use  Smoking status: Never Smoker  Smokeless tobacco: Never Used Substance Use Topics  Alcohol use: No  
 Drug use: No  
 
 
Allergies: 
No Known Allergies Review of Systems Review of Systems Constitutional: Negative for fever. Respiratory: Negative for shortness of breath. Cardiovascular: Negative for chest pain. Gastrointestinal: Positive for diarrhea. Negative for abdominal pain. Genitourinary: Negative for dysuria. Neurological: Negative for dizziness and syncope. All other systems reviewed and are negative. Physical Exam  
Physical Exam  
Vital signs and nursing notes reviewed CONSTITUTIONAL: Alert, in mild distress; well-developed; well-nourished. HEAD:  Normocephalic, atraumatic EYES: PERRL; EOM's intact. ENTM: Nose: no rhinorrhea; Throat: no erythema or exudate, mucous membranes moist 
Neck:  Supple. trachea is midline. RESP: Chest clear, equal breath sounds. - W/R/R 
CV: S1 and S2 WNL; No murmurs, gallops or rubs. 2+ radial and DP pulses bilaterally. GI: non-distended, hyperactive bowel sounds, abdomen soft and non-tender. No masses or organomegaly. Light brown stool, non-bloody. Light touch sensation intact in perirectal area, strong rectal tone on testing. : No costo-vertebral angle tenderness. BACK:  Non-tender, normal appearance UPPER EXT:  Normal inspection. no joint or soft tissue swelling LOWER EXT: No edema, no calf tenderness. NEURO: Alert and oriented x3, 5/5 strength and light touch sensation intact in bilateral upper and lower extremities. SKIN: No rashes; Warm and dry. Dialysis axis in left upper arm. PSYCH: Normal mood, normal affect Diagnostic Study Results Labs - No results found for this or any previous visit (from the past 12 hour(s)). Radiologic Studies -  
CT ABD PELV WO CONT Final Result IMPRESSION:  
  
1. No CT evidence of colitis or diverticulitis or other abnormal finding to  
correlate with diarrhea. 2. Thickened urinary bladder wall with pericholecystic stranding concerning for  
acute cystitis.   
3. Additional incidental findings as above including radiodense material within  
the gallbladder which may reflect vicarious excretion of previously administered  
contrast.  
  
 
 CT Results  (Last 48 hours) None Medical Decision Making I am the first provider for this patient. I reviewed the vital signs, available nursing notes, past medical history, past surgical history, family history and social history. Vital Signs-Reviewed the patient's vital signs. No data found. Pulse Oximetry Analysis - 98% on RA Cardiac Monitor:  
Rate: 67 bpm 
Rhythm: Normal Sinus Rhythm Records Reviewed: Nursing Notes, Old Medical Records, Ambulance Run Sheet, Previous Radiology Studies and Previous Laboratory Studies Provider Notes (Medical Decision Making):  
Pt is [de-identified] yo male with acute onset diarrhea, created question of urinary incontinence by EMS report, but story is consistent with pt being unable to get up to bathroom in time vs sudden concern for spinal cord compromise. Will check lab work, including electrolytes, given pt is dialysis pt, will check fluids given multiple episodes of D, will likely discharge. ED Course:  
Initial assessment performed. The patients presenting problems have been discussed, and they are in agreement with the care plan formulated and outlined with them. I have encouraged them to ask questions as they arise throughout their visit. PROGRESS NOTE: 
12:14 PM 
Pt re-evaluated and is feeling well, vital signs stable with no additional D while here, plan is for discharge. Case management has called dialysis center and left messages for pt to get dialysis tomorrow. Written by Manoj Pulliam ED Scribe, as dictated by No att. providers found. Critical Care Time:  
0 minutes. Disposition: 
DISCHARGE NOTE: 
12:17 PM 
The patient is ready for discharge. The patients signs, symptoms, diagnosis, and instructions for discharge have been discussed and the pt has conveyed their understanding.  The patient is to follow up as recommended with dialysis or return to the ER should their symptoms worsen. Plan has been discussed and patient has conveyed their agreement. PLAN: 
1. Discharge home. Discharge Medication List as of 1/1/2019 12:19 PM  
  
START taking these medications Details  
ciprofloxacin HCl (CIPRO) 500 mg tablet Take 1 Tab by mouth two (2) times a day for 7 days. , Normal, Disp-14 Tab, R-0  
  
  
CONTINUE these medications which have NOT CHANGED Details  
polyethylene glycol (MIRALAX) 17 gram/dose powder Take 17 g by mouth daily. , Normal, Disp-238 g, R-0  
  
HYDROcodone-acetaminophen (NORCO) 5-325 mg per tablet Take 1 Tab by mouth every four (4) hours as needed for Pain. Max Daily Amount: 6 Tabs., Print, Disp-15 Tab, R-0  
  
ondansetron (ZOFRAN ODT) 4 mg disintegrating tablet Take 1 Tab by mouth every eight (8) hours as needed for Nausea. , Print, Disp-10 Tab, R-0  
  
MOISE-ALPHONSE 0.8 mg tab tablet Historical Med, R-3  
  
aspirin delayed-release 81 mg tablet Take 1 Tab by mouth daily. , Print, Disp-30 Tab, R-11  
  
amLODIPine (NORVASC) 5 mg tablet Take 5 mg by mouth daily. , Historical Med  
  
ferrous sulfate 325 mg (65 mg iron) tablet Take 325 mg by mouth two (2) times a day., Historical Med PHOSPHORUS #1 (PHOSPHA 250 NEUTRAL PO) Take 2 Tabs by mouth daily. , Historical Med  
  
calcium acetate (PHOSLO) 667 mg cap Take 1 Cap by mouth three (3) times daily (with meals). , Historical Med  
  
insulin NPH/insulin regular (HUMULIN 70/30) 100 unit/mL (70-30) injection 30 Units by SubCUTAneous route daily. , Historical Med  
  
glucose blood VI test strips (FREESTYLE LITE STRIPS) strip Check blood sugar once daily, Print, Disp-1 Package, R-11 BD INSULIN SYRINGE ULTRA-FINE 1 mL 30 x 1/2\" syrg Historical Med Blood-Glucose Meter (FREESTYLE LITE METER) monitoring kit Check blood sugar once daily, Sample, Disp-1 kit, R-0  
  
carvedilol (COREG) 25 mg tablet Take 25 mg by mouth two (2) times daily (with meals). , Historical Med  
  
 levothyroxine (SYNTHROID) 175 mcg tablet Take 150 mcg by mouth Daily (before breakfast). , Historical Med  
  
pravastatin (PRAVACHOL) 10 mg tablet Take 20 mg by mouth nightly., Historical Med 2. Follow-up Information Follow up With Specialties Details Why Contact Info [facility]   this is your HD Center. T/Th/S [address] [facility] 
 
[phone] [facility] EMERGENCY DEPT Emergency Medicine  If symptoms worsen including new concerns about dehydration, new chest pain, shortness of breath or fever. [address] 
[phone] Return to ED if worse Diagnosis Clinical Impression: 1. Acute diarrhea 2. Chronic anemia 3. Hyperglycemia 4. Cystitis Attestations: This note is prepared by Abel George, acting as Scribe for Yariel Villagran MD. 
 
Yariel Villagran MD: The scribe's documentation has been prepared under my direction and personally reviewed by me in its entirety. I confirm that the note above accurately reflects all work, treatment, procedures, and medical decision making performed by me.

## 2019-01-01 NOTE — ED NOTES
Patient incontinent of stool at this time. Patient cleaned and repositioned in stretcher. New sheets provided.

## 2019-01-01 NOTE — ED NOTES
Patient presents to ED via EMS from home for bowel and bladder incontinence that began last night. Patient on the monitor x2, call bell within reach. Side rail x2.

## 2019-01-02 LAB
C DIFF GDH STL QL: NEGATIVE
C DIFF TOX A+B STL QL IA: NEGATIVE
INTERPRETATION: NORMAL

## 2019-01-03 LAB
BACTERIA SPEC CULT: NORMAL
C JEJUNI+C COLI AG STL QL: NEGATIVE
E COLI SXT1+2 STL IA: NEGATIVE
SERVICE CMNT-IMP: NORMAL

## 2019-04-08 ENCOUNTER — APPOINTMENT (OUTPATIENT)
Dept: GENERAL RADIOLOGY | Age: 79
DRG: 080 | End: 2019-04-08
Attending: EMERGENCY MEDICINE
Payer: MEDICARE

## 2019-04-08 ENCOUNTER — HOSPITAL ENCOUNTER (INPATIENT)
Age: 79
LOS: 3 days | Discharge: NURSING, REHAB OR LONG-TERM CARE | DRG: 080 | End: 2019-04-12
Attending: EMERGENCY MEDICINE | Admitting: INTERNAL MEDICINE
Payer: MEDICARE

## 2019-04-08 ENCOUNTER — APPOINTMENT (OUTPATIENT)
Dept: CT IMAGING | Age: 79
DRG: 080 | End: 2019-04-08
Attending: EMERGENCY MEDICINE
Payer: MEDICARE

## 2019-04-08 DIAGNOSIS — E16.2 HYPOGLYCEMIA: ICD-10-CM

## 2019-04-08 DIAGNOSIS — E03.9 SEVERE HYPOTHYROIDISM: ICD-10-CM

## 2019-04-08 DIAGNOSIS — R79.89 ELEVATED LFTS: ICD-10-CM

## 2019-04-08 DIAGNOSIS — Z71.89 GOALS OF CARE, COUNSELING/DISCUSSION: ICD-10-CM

## 2019-04-08 DIAGNOSIS — Z71.89 ADVANCED CARE PLANNING/COUNSELING DISCUSSION: ICD-10-CM

## 2019-04-08 DIAGNOSIS — G93.40 ACUTE ENCEPHALOPATHY: Primary | ICD-10-CM

## 2019-04-08 DIAGNOSIS — R54 FRAILTY: ICD-10-CM

## 2019-04-08 DIAGNOSIS — R53.81 DEBILITY: ICD-10-CM

## 2019-04-08 DIAGNOSIS — E03.5 HYPOTHYROID COMA (HCC): ICD-10-CM

## 2019-04-08 LAB
ALBUMIN SERPL-MCNC: 3.2 G/DL (ref 3.5–5)
ALBUMIN/GLOB SERPL: 0.6 {RATIO} (ref 1.1–2.2)
ALP SERPL-CCNC: 89 U/L (ref 45–117)
ALT SERPL-CCNC: 34 U/L (ref 12–78)
ANION GAP SERPL CALC-SCNC: 12 MMOL/L (ref 5–15)
APTT PPP: 25 SEC (ref 22.1–32)
AST SERPL-CCNC: 42 U/L (ref 15–37)
BASOPHILS # BLD: 0 K/UL (ref 0–0.1)
BASOPHILS NFR BLD: 0 % (ref 0–1)
BILIRUB SERPL-MCNC: 0.5 MG/DL (ref 0.2–1)
BUN SERPL-MCNC: 29 MG/DL (ref 6–20)
BUN/CREAT SERPL: 4 (ref 12–20)
CALCIUM SERPL-MCNC: 9 MG/DL (ref 8.5–10.1)
CHLORIDE SERPL-SCNC: 96 MMOL/L (ref 97–108)
CO2 SERPL-SCNC: 25 MMOL/L (ref 21–32)
CREAT SERPL-MCNC: 7.28 MG/DL (ref 0.7–1.3)
DIFFERENTIAL METHOD BLD: ABNORMAL
EOSINOPHIL # BLD: 0 K/UL (ref 0–0.4)
EOSINOPHIL NFR BLD: 1 % (ref 0–7)
ERYTHROCYTE [DISTWIDTH] IN BLOOD BY AUTOMATED COUNT: 14 % (ref 11.5–14.5)
GLOBULIN SER CALC-MCNC: 5 G/DL (ref 2–4)
GLUCOSE BLD STRIP.AUTO-MCNC: 92 MG/DL (ref 65–100)
GLUCOSE SERPL-MCNC: 141 MG/DL (ref 65–100)
HCT VFR BLD AUTO: 31.8 % (ref 36.6–50.3)
HGB BLD-MCNC: 10.6 G/DL (ref 12.1–17)
IMM GRANULOCYTES # BLD AUTO: 0 K/UL (ref 0–0.04)
IMM GRANULOCYTES NFR BLD AUTO: 0 % (ref 0–0.5)
INR PPP: 1.2 (ref 0.9–1.1)
LYMPHOCYTES # BLD: 0.8 K/UL (ref 0.8–3.5)
LYMPHOCYTES NFR BLD: 16 % (ref 12–49)
MAGNESIUM SERPL-MCNC: 2.1 MG/DL (ref 1.6–2.4)
MCH RBC QN AUTO: 35.5 PG (ref 26–34)
MCHC RBC AUTO-ENTMCNC: 33.3 G/DL (ref 30–36.5)
MCV RBC AUTO: 106.4 FL (ref 80–99)
MONOCYTES # BLD: 0.5 K/UL (ref 0–1)
MONOCYTES NFR BLD: 9 % (ref 5–13)
NEUTS SEG # BLD: 3.8 K/UL (ref 1.8–8)
NEUTS SEG NFR BLD: 74 % (ref 32–75)
NRBC # BLD: 0 K/UL (ref 0–0.01)
NRBC BLD-RTO: 0 PER 100 WBC
PLATELET # BLD AUTO: 144 K/UL (ref 150–400)
PMV BLD AUTO: 10.9 FL (ref 8.9–12.9)
POTASSIUM SERPL-SCNC: 3.2 MMOL/L (ref 3.5–5.1)
PROT SERPL-MCNC: 8.2 G/DL (ref 6.4–8.2)
PROTHROMBIN TIME: 12.6 SEC (ref 9–11.1)
RBC # BLD AUTO: 2.99 M/UL (ref 4.1–5.7)
SERVICE CMNT-IMP: NORMAL
SODIUM SERPL-SCNC: 133 MMOL/L (ref 136–145)
THERAPEUTIC RANGE,PTTT: NORMAL SECS (ref 58–77)
TROPONIN I SERPL-MCNC: <0.05 NG/ML
TSH SERPL DL<=0.05 MIU/L-ACNC: >100 UIU/ML (ref 0.36–3.74)
WBC # BLD AUTO: 5.1 K/UL (ref 4.1–11.1)

## 2019-04-08 PROCEDURE — 70450 CT HEAD/BRAIN W/O DYE: CPT

## 2019-04-08 PROCEDURE — 99285 EMERGENCY DEPT VISIT HI MDM: CPT

## 2019-04-08 PROCEDURE — 80053 COMPREHEN METABOLIC PANEL: CPT

## 2019-04-08 PROCEDURE — 84443 ASSAY THYROID STIM HORMONE: CPT

## 2019-04-08 PROCEDURE — 85025 COMPLETE CBC W/AUTO DIFF WBC: CPT

## 2019-04-08 PROCEDURE — 85730 THROMBOPLASTIN TIME PARTIAL: CPT

## 2019-04-08 PROCEDURE — 71045 X-RAY EXAM CHEST 1 VIEW: CPT

## 2019-04-08 PROCEDURE — 93005 ELECTROCARDIOGRAM TRACING: CPT

## 2019-04-08 PROCEDURE — 36415 COLL VENOUS BLD VENIPUNCTURE: CPT

## 2019-04-08 PROCEDURE — 83735 ASSAY OF MAGNESIUM: CPT

## 2019-04-08 PROCEDURE — 82962 GLUCOSE BLOOD TEST: CPT

## 2019-04-08 PROCEDURE — 85610 PROTHROMBIN TIME: CPT

## 2019-04-08 PROCEDURE — 84484 ASSAY OF TROPONIN QUANT: CPT

## 2019-04-09 ENCOUNTER — APPOINTMENT (OUTPATIENT)
Dept: NON INVASIVE DIAGNOSTICS | Age: 79
DRG: 080 | End: 2019-04-09
Attending: INTERNAL MEDICINE
Payer: MEDICARE

## 2019-04-09 ENCOUNTER — TELEPHONE (OUTPATIENT)
Dept: ENDOCRINOLOGY | Age: 79
End: 2019-04-09

## 2019-04-09 ENCOUNTER — APPOINTMENT (OUTPATIENT)
Dept: VASCULAR SURGERY | Age: 79
DRG: 080 | End: 2019-04-09
Attending: INTERNAL MEDICINE
Payer: MEDICARE

## 2019-04-09 PROBLEM — I65.23 BILATERAL CAROTID ARTERY STENOSIS: Status: ACTIVE | Noted: 2019-04-09

## 2019-04-09 PROBLEM — E16.2 HYPOGLYCEMIA: Status: ACTIVE | Noted: 2019-04-09

## 2019-04-09 PROBLEM — E03.9 SEVERE HYPOTHYROIDISM: Status: ACTIVE | Noted: 2019-04-09

## 2019-04-09 LAB
ATRIAL RATE: 62 BPM
CALCULATED P AXIS, ECG09: 36 DEGREES
CALCULATED R AXIS, ECG10: -2 DEGREES
CALCULATED T AXIS, ECG11: 11 DEGREES
DIAGNOSIS, 93000: NORMAL
ECHO AO ROOT DIAM: 3.32 CM
ECHO AV AREA PEAK VELOCITY: 2.4 CM2
ECHO AV AREA/BSA PEAK VELOCITY: 1.2 CM2/M2
ECHO AV CUSP MM: 0 CM
ECHO AV PEAK GRADIENT: 11.6 MMHG
ECHO AV PEAK VELOCITY: 170.04 CM/S
ECHO EST RA PRESSURE: 10 MMHG
ECHO LA MAJOR AXIS: 2.82 CM
ECHO LA TO AORTIC ROOT RATIO: 0.85
ECHO LV INTERNAL DIMENSION DIASTOLIC: 3.81 CM (ref 4.2–5.9)
ECHO LV INTERNAL DIMENSION SYSTOLIC: 3.36 CM
ECHO LV IVSD: 2 CM (ref 0.6–1)
ECHO LV MASS 2D: 270 G (ref 88–224)
ECHO LV MASS INDEX 2D: 141.2 G/M2 (ref 49–115)
ECHO LV POSTERIOR WALL DIASTOLIC: 1.09 CM (ref 0.6–1)
ECHO LV POSTERIOR WALL SYSTOLIC: 1.21 CM
ECHO LVOT DIAM: 2.35 CM
ECHO LVOT PEAK GRADIENT: 3.5 MMHG
ECHO LVOT PEAK VELOCITY: 93.83 CM/S
ECHO LVOT SV: 114.6 ML
ECHO LVOT VTI: 26.35 CM
ECHO MV A VELOCITY: 90.42 CM/S
ECHO MV AREA PHT: 3.8 CM2
ECHO MV AREA VTI: 3.4 CM2
ECHO MV E DECELERATION TIME (DT): 200.6 MS
ECHO MV E VELOCITY: 58.45 CM/S
ECHO MV E/A RATIO: 0.65
ECHO MV MAX VELOCITY: 103.73 CM/S
ECHO MV MEAN GRADIENT: 1.8 MMHG
ECHO MV PEAK GRADIENT: 4.3 MMHG
ECHO MV PRESSURE HALF TIME (PHT): 58.2 MS
ECHO MV REGURGITANT PEAK GRADIENT: 1.5 MMHG
ECHO MV REGURGITANT PEAK VELOCITY: 60.65 CM/S
ECHO MV VTI: 33.3 CM
ECHO PULMONARY ARTERY SYSTOLIC PRESSURE (PASP): 24.2 MMHG
ECHO RIGHT VENTRICULAR SYSTOLIC PRESSURE (RVSP): 24.2 MMHG
ECHO TV REGURGITANT MAX VELOCITY: 188.14 CM/S
ECHO TV REGURGITANT PEAK GRADIENT: 14.2 MMHG
GLUCOSE BLD STRIP.AUTO-MCNC: 160 MG/DL (ref 65–100)
GLUCOSE BLD STRIP.AUTO-MCNC: 179 MG/DL (ref 65–100)
GLUCOSE BLD STRIP.AUTO-MCNC: 204 MG/DL (ref 65–100)
GLUCOSE BLD STRIP.AUTO-MCNC: 232 MG/DL (ref 65–100)
GLUCOSE BLD STRIP.AUTO-MCNC: 260 MG/DL (ref 65–100)
LEFT CCA DIST DIAS: 0 CM/S
LEFT CCA DIST SYS: 50.8 CM/S
LEFT CCA PROX DIAS: 0 CM/S
LEFT CCA PROX SYS: 49.2 CM/S
LEFT ECA DIAS: 0 CM/S
LEFT ECA SYS: 47.2 CM/S
LEFT ICA DIST DIAS: 5.8 CM/S
LEFT ICA DIST SYS: 27.8 CM/S
LEFT ICA MID DIAS: 4.3 CM/S
LEFT ICA MID SYS: 29.5 CM/S
LEFT ICA PROX DIAS: 11 CM/S
LEFT ICA PROX SYS: 64 CM/S
LEFT ICA/CCA SYS: 1.26
LEFT SUBCLAVIAN DIAS: 52.85 CM/S
LEFT SUBCLAVIAN SYS: 148.5 CM/S
LEFT VERTEBRAL DIAS: 0 CM/S
LEFT VERTEBRAL SYS: 43.5 CM/S
P-R INTERVAL, ECG05: 206 MS
Q-T INTERVAL, ECG07: 456 MS
QRS DURATION, ECG06: 86 MS
QTC CALCULATION (BEZET), ECG08: 462 MS
RIGHT CCA DIST DIAS: 0 CM/S
RIGHT CCA DIST SYS: 60.8 CM/S
RIGHT CCA PROX DIAS: 0 CM/S
RIGHT CCA PROX SYS: 41.9 CM/S
RIGHT ECA DIAS: 0 CM/S
RIGHT ECA SYS: 70.4 CM/S
RIGHT ICA DIST DIAS: 5.3 CM/S
RIGHT ICA DIST SYS: 26.7 CM/S
RIGHT ICA MID DIAS: 0 CM/S
RIGHT ICA MID SYS: 25.5 CM/S
RIGHT ICA PROX DIAS: 2.7 CM/S
RIGHT ICA PROX SYS: 59.7 CM/S
RIGHT ICA/CCA SYS: 1
RIGHT SUBCLAVIAN DIAS: 0 CM/S
RIGHT SUBCLAVIAN SYS: 89 CM/S
RIGHT VERTEBRAL DIAS: 9.23 CM/S
RIGHT VERTEBRAL SYS: 38.3 CM/S
SERVICE CMNT-IMP: ABNORMAL
VENTRICULAR RATE, ECG03: 62 BPM

## 2019-04-09 PROCEDURE — 92610 EVALUATE SWALLOWING FUNCTION: CPT

## 2019-04-09 PROCEDURE — 5A1D70Z PERFORMANCE OF URINARY FILTRATION, INTERMITTENT, LESS THAN 6 HOURS PER DAY: ICD-10-PCS | Performed by: INTERNAL MEDICINE

## 2019-04-09 PROCEDURE — 97530 THERAPEUTIC ACTIVITIES: CPT | Performed by: PHYSICAL THERAPIST

## 2019-04-09 PROCEDURE — 74011636637 HC RX REV CODE- 636/637: Performed by: INTERNAL MEDICINE

## 2019-04-09 PROCEDURE — 90935 HEMODIALYSIS ONE EVALUATION: CPT

## 2019-04-09 PROCEDURE — 82962 GLUCOSE BLOOD TEST: CPT

## 2019-04-09 PROCEDURE — 93306 TTE W/DOPPLER COMPLETE: CPT

## 2019-04-09 PROCEDURE — 97161 PT EVAL LOW COMPLEX 20 MIN: CPT | Performed by: PHYSICAL THERAPIST

## 2019-04-09 PROCEDURE — 97165 OT EVAL LOW COMPLEX 30 MIN: CPT | Performed by: OCCUPATIONAL THERAPIST

## 2019-04-09 PROCEDURE — 74011250636 HC RX REV CODE- 250/636: Performed by: INTERNAL MEDICINE

## 2019-04-09 PROCEDURE — P9047 ALBUMIN (HUMAN), 25%, 50ML: HCPCS | Performed by: INTERNAL MEDICINE

## 2019-04-09 PROCEDURE — 65660000000 HC RM CCU STEPDOWN

## 2019-04-09 PROCEDURE — 74011000258 HC RX REV CODE- 258: Performed by: INTERNAL MEDICINE

## 2019-04-09 PROCEDURE — 74011000250 HC RX REV CODE- 250: Performed by: EMERGENCY MEDICINE

## 2019-04-09 PROCEDURE — 96374 THER/PROPH/DIAG INJ IV PUSH: CPT

## 2019-04-09 PROCEDURE — 97535 SELF CARE MNGMENT TRAINING: CPT | Performed by: OCCUPATIONAL THERAPIST

## 2019-04-09 PROCEDURE — 93880 EXTRACRANIAL BILAT STUDY: CPT

## 2019-04-09 RX ORDER — ACETAMINOPHEN 325 MG/1
650 TABLET ORAL
Status: DISCONTINUED | OUTPATIENT
Start: 2019-04-09 | End: 2019-04-12 | Stop reason: HOSPADM

## 2019-04-09 RX ORDER — DEXTROSE MONOHYDRATE AND SODIUM CHLORIDE 5; .9 G/100ML; G/100ML
75 INJECTION, SOLUTION INTRAVENOUS CONTINUOUS
Status: DISCONTINUED | OUTPATIENT
Start: 2019-04-09 | End: 2019-04-09

## 2019-04-09 RX ORDER — HYDROCORTISONE SODIUM SUCCINATE 100 MG/2ML
100 INJECTION, POWDER, FOR SOLUTION INTRAMUSCULAR; INTRAVENOUS EVERY 8 HOURS
Status: DISCONTINUED | OUTPATIENT
Start: 2019-04-09 | End: 2019-04-09

## 2019-04-09 RX ORDER — DEXTROSE MONOHYDRATE AND SODIUM CHLORIDE 5; .9 G/100ML; G/100ML
25 INJECTION, SOLUTION INTRAVENOUS CONTINUOUS
Status: DISCONTINUED | OUTPATIENT
Start: 2019-04-09 | End: 2019-04-09

## 2019-04-09 RX ORDER — POTASSIUM CHLORIDE 7.45 MG/ML
10 INJECTION INTRAVENOUS
Status: COMPLETED | OUTPATIENT
Start: 2019-04-09 | End: 2019-04-09

## 2019-04-09 RX ORDER — HEPARIN SODIUM 5000 [USP'U]/ML
5000 INJECTION, SOLUTION INTRAVENOUS; SUBCUTANEOUS EVERY 12 HOURS
Status: DISCONTINUED | OUTPATIENT
Start: 2019-04-10 | End: 2019-04-12 | Stop reason: HOSPADM

## 2019-04-09 RX ORDER — LEVOTHYROXINE SODIUM 150 UG/1
150 TABLET ORAL
Status: DISCONTINUED | OUTPATIENT
Start: 2019-04-10 | End: 2019-04-12 | Stop reason: HOSPADM

## 2019-04-09 RX ORDER — ACETAMINOPHEN 650 MG/1
650 SUPPOSITORY RECTAL
Status: DISCONTINUED | OUTPATIENT
Start: 2019-04-09 | End: 2019-04-12 | Stop reason: HOSPADM

## 2019-04-09 RX ORDER — HYDROCORTISONE SODIUM SUCCINATE 100 MG/2ML
100 INJECTION, POWDER, FOR SOLUTION INTRAMUSCULAR; INTRAVENOUS EVERY 6 HOURS
Status: DISCONTINUED | OUTPATIENT
Start: 2019-04-09 | End: 2019-04-09 | Stop reason: SDUPTHER

## 2019-04-09 RX ORDER — ENOXAPARIN SODIUM 100 MG/ML
30 INJECTION SUBCUTANEOUS EVERY 24 HOURS
Status: DISCONTINUED | OUTPATIENT
Start: 2019-04-09 | End: 2019-04-09

## 2019-04-09 RX ORDER — DEXTROSE 50 % IN WATER (D50W) INTRAVENOUS SYRINGE
12.5-25 AS NEEDED
Status: DISCONTINUED | OUTPATIENT
Start: 2019-04-09 | End: 2019-04-12 | Stop reason: HOSPADM

## 2019-04-09 RX ORDER — LABETALOL HCL 20 MG/4 ML
20 SYRINGE (ML) INTRAVENOUS
Status: DISCONTINUED | OUTPATIENT
Start: 2019-04-09 | End: 2019-04-12 | Stop reason: HOSPADM

## 2019-04-09 RX ORDER — GUAIFENESIN 100 MG/5ML
81 LIQUID (ML) ORAL DAILY
Status: DISCONTINUED | OUTPATIENT
Start: 2019-04-09 | End: 2019-04-12 | Stop reason: HOSPADM

## 2019-04-09 RX ORDER — HYDROCORTISONE SODIUM SUCCINATE 100 MG/2ML
100 INJECTION, POWDER, FOR SOLUTION INTRAMUSCULAR; INTRAVENOUS EVERY 8 HOURS
Status: DISCONTINUED | OUTPATIENT
Start: 2019-04-10 | End: 2019-04-10

## 2019-04-09 RX ORDER — INSULIN LISPRO 100 [IU]/ML
INJECTION, SOLUTION INTRAVENOUS; SUBCUTANEOUS
Status: DISCONTINUED | OUTPATIENT
Start: 2019-04-09 | End: 2019-04-12 | Stop reason: HOSPADM

## 2019-04-09 RX ORDER — ALBUMIN HUMAN 250 G/1000ML
12.5 SOLUTION INTRAVENOUS
Status: DISCONTINUED | OUTPATIENT
Start: 2019-04-09 | End: 2019-04-12 | Stop reason: HOSPADM

## 2019-04-09 RX ORDER — SODIUM CHLORIDE 0.9 % (FLUSH) 0.9 %
5-40 SYRINGE (ML) INJECTION EVERY 8 HOURS
Status: DISCONTINUED | OUTPATIENT
Start: 2019-04-09 | End: 2019-04-12 | Stop reason: HOSPADM

## 2019-04-09 RX ORDER — SODIUM CHLORIDE 0.9 % (FLUSH) 0.9 %
5-40 SYRINGE (ML) INJECTION AS NEEDED
Status: DISCONTINUED | OUTPATIENT
Start: 2019-04-09 | End: 2019-04-12 | Stop reason: HOSPADM

## 2019-04-09 RX ORDER — MAGNESIUM SULFATE 100 %
4 CRYSTALS MISCELLANEOUS AS NEEDED
Status: DISCONTINUED | OUTPATIENT
Start: 2019-04-09 | End: 2019-04-12 | Stop reason: HOSPADM

## 2019-04-09 RX ADMIN — Medication 10 ML: at 21:49

## 2019-04-09 RX ADMIN — LEVOTHYROXINE SODIUM ANHYDROUS 100 MCG: 100 INJECTION, POWDER, LYOPHILIZED, FOR SOLUTION INTRAVENOUS at 01:47

## 2019-04-09 RX ADMIN — Medication 10 ML: at 06:26

## 2019-04-09 RX ADMIN — ALBUMIN (HUMAN) 12.5 G: 0.25 INJECTION, SOLUTION INTRAVENOUS at 15:51

## 2019-04-09 RX ADMIN — POTASSIUM CHLORIDE 10 MEQ: 10 INJECTION, SOLUTION INTRAVENOUS at 04:20

## 2019-04-09 RX ADMIN — POTASSIUM CHLORIDE 10 MEQ: 10 INJECTION, SOLUTION INTRAVENOUS at 05:20

## 2019-04-09 RX ADMIN — DEXTROSE MONOHYDRATE AND SODIUM CHLORIDE 25 ML/HR: 5; .9 INJECTION, SOLUTION INTRAVENOUS at 04:20

## 2019-04-09 RX ADMIN — HYDROCORTISONE SODIUM SUCCINATE 100 MG: 100 INJECTION, POWDER, FOR SOLUTION INTRAMUSCULAR; INTRAVENOUS at 13:57

## 2019-04-09 RX ADMIN — ENOXAPARIN SODIUM 30 MG: 30 INJECTION SUBCUTANEOUS at 08:57

## 2019-04-09 RX ADMIN — INSULIN LISPRO 2 UNITS: 100 INJECTION, SOLUTION INTRAVENOUS; SUBCUTANEOUS at 08:56

## 2019-04-09 RX ADMIN — ALBUMIN (HUMAN) 12.5 G: 0.25 INJECTION, SOLUTION INTRAVENOUS at 16:07

## 2019-04-09 RX ADMIN — HYDROCORTISONE SODIUM SUCCINATE 100 MG: 100 INJECTION, POWDER, FOR SOLUTION INTRAMUSCULAR; INTRAVENOUS at 06:26

## 2019-04-09 RX ADMIN — HYDROCORTISONE SODIUM SUCCINATE 100 MG: 100 INJECTION, POWDER, FOR SOLUTION INTRAMUSCULAR; INTRAVENOUS at 21:48

## 2019-04-09 RX ADMIN — INSULIN LISPRO 2 UNITS: 100 INJECTION, SOLUTION INTRAVENOUS; SUBCUTANEOUS at 12:03

## 2019-04-09 RX ADMIN — INSULIN LISPRO 2 UNITS: 100 INJECTION, SOLUTION INTRAVENOUS; SUBCUTANEOUS at 21:48

## 2019-04-09 RX ADMIN — Medication 10 ML: at 13:54

## 2019-04-09 RX ADMIN — DEXTROSE MONOHYDRATE AND SODIUM CHLORIDE 25 ML/HR: 5; .9 INJECTION, SOLUTION INTRAVENOUS at 08:58

## 2019-04-09 NOTE — PROGRESS NOTES
HD TRANSFER - OUT REPORT: 
 
Verbal report given to Cheyenne Galvez RN on Gilberto Whitman being transferred to PCU from Dialysis. Report consisted of patient's Situation, Background, Assessment and  
Recommendations(SBAR). Information from the following report(s) SBAR was reviewed with the receiving nurse. Method:  $$ Method: Hemodialysis (04/09/19 1445) Fluid Removed  NET Fluid Removed (mL): 2000 ml (04/09/19 1815) Patient response to treatment:  Well End Time  Hemodialysis End Time: 1815 (04/09/19 1815) If not documented, dialysis nurse to update post-dialysis row in HD/Filtration flowsheet Medications /Volume expansion agents or Fluid boluses administered during treatment? Yes, 25 grams/100 ml of Albumin during treatment for hypotension. Post-dialysis medication administration due? No  
Remind nurse to administer post-HD medication upon return to unit. Fistula hemostasis? Yes 
 
Line heparinization? No  
 
Lines: None Opportunity for questions and clarification was provided.    
 
Patient transported with: Transporter and IV

## 2019-04-09 NOTE — PROGRESS NOTES
Pt admitted earlier today. Came to see the patient but not in the room. Asking Endo consult for Myxedema Crisis.  No other change in plan and management from earlier assessment from Dr Lynn Urbina H&P

## 2019-04-09 NOTE — PROGRESS NOTES
Occupational Therapy EVALUATION/discharge Patient: Moo Zheng (59 y.o. male) Date: 4/9/2019 Primary Diagnosis: Severe hypothyroidism [E03.8] Hypoglycemia [E16.2] Precautions: Falls ASSESSMENT:  
Based on the objective data described below, the patient presents with h/o B BKAs, cognitive impairments, GW and decreased balance which impedes his functional independence at baseline. He is presently at his functional baseline of total A for transfers, LB dressing and toileting, max A for UB dressing and bathing and supervision/setup for grooming and self feeding. Patient's son provides all needed assistance per report of patient's wife. Further skilled acute occupational therapy is not indicated at this time. Discharge Recommendations: Recommend LTC vs home with increased assistance for care. If patient returns to home recommend hospital bed and jamila lift. Further Equipment Recommendations for Discharge: mechanical lift OBJECTIVE DATA SUMMARY:  
HISTORY:  
Past Medical History:  
Diagnosis Date  CAD (coronary artery disease)  Chronic kidney disease   
 tues/thurs/sat TREE [address]  
 Diabetes ([facility] [address]  Endocrine disease   
 hypothyroid  Gastrointestinal disorder   
 gallstones  Hypertension  Stroke ([facility] [address] 2011 speech general weakness  Thyroid disease Past Surgical History:  
Procedure Laterality Date [address] [place] UNLISTED  HX HEENT    
 cataract   removal  rt eye  HX ORTHOPAEDIC    
 11/2014 R BK Amputation and L  
 HX OTHER SURGICAL    
 HX VASCULAR ACCESS    
 L arm shunt  VASCULAR SURGERY PROCEDURE UNLIST Fistula R arm Prior Level of Function/Environment/Context: supervision/setup for self feeding and grooming, max A for bathing and UB dressing, total A for toileting, LB dressing, supine to sit transfers and bed to [facility] transfers.  Patient's son lifts patient from supine to sit and then picks him up to transfer him to a chair. Occupations in which the patient is/was successful, what are the barriers preventing that success:  
Performance Patterns (routines, roles, habits, and rituals):  
Personal Interests and/or values:  
Expanded or extensive additional review of patient history:  
 
Home Situation Home Environment: Private residence Wheelchair Ramp: Yes Living Alone: No 
Support Systems: Family member(s), Spouse/Significant Other/Partner(son is primary caregiver) Current DME Used/Available at Home: Wheelchair(B prosthetic legs) Tub or Shower Type: (sponge baths only) Hand dominance: Right EXAMINATION OF PERFORMANCE DEFICITS: 
Cognitive/Behavioral Status: 
Neurologic State: Alert Orientation Level: Oriented to person;Oriented to place; Disoriented to time Cognition: Follows commands Perception: Appears intact Perseveration: No perseveration noted Hearing: Auditory Auditory Impairment: None Vision/Perceptual:   
    
    
    
  
    
Acuity: Within Defined Limits Range of Motion: 
AROM: Generally decreased, functional 
  
  
  
  
  
  
  
Strength: 
Strength: Generally decreased, functional 
  
  
  
  
 
Coordination: 
Coordination: Generally decreased, functional 
Fine Motor Skills-Upper: Left Impaired;Right Impaired Tone & Sensation: 
Tone: Normal 
  
  
  
  
  
  
  
Balance: 
Sitting: Impaired(tested in bed with HOB raised, but wtih back usupported) Sitting - Static: Fair (occasional) Sitting - Dynamic: Poor (constant support) Functional Mobility and Transfers for ADLs:Bed Mobility: 
Rolling: Contact guard assistance ADL Assessment: 
Feeding: Supervision;Setup Oral Facial Hygiene/Grooming: Supervision;Setup Bathing: Maximum assistance Upper Body Dressing: Maximum assistance Lower Body Dressing: Total assistance Toileting: Total assistance Intervention and task modifications: Provided grooming and bed mobility training to assist with toileting hygiene. Functional Measure: 
Barthel Index: 
 
Bathin Bladder: 0 Bowels: 0 Groomin Dressin Feedin Mobility: 0 Stairs: 0 Toilet Use: 0 Transfer (Bed to Chair and Back): 0 Total: 5 The Barthel ADL Index: Guidelines 1. The index should be used as a record of what a patient does, not as a record of what a patient could do. 2. The main aim is to establish degree of independence from any help, physical or verbal, however minor and for whatever reason. 3. The need for supervision renders the patient not independent. 4. A patient's performance should be established using the best available evidence. Asking the patient, friends/relatives and nurses are the usual sources, but direct observation and common sense are also important. However direct testing is not needed. 5. Usually the patient's performance over the preceding 24-48 hours is important, but occasionally longer periods will be relevant. 6. Middle categories imply that the patient supplies over 50 per cent of the effort. 7. Use of aids to be independent is allowed. Ivonne Fatima., Barthel, DYobanyW. (0142). Functional evaluation: the Barthel Index. [address] [facility] (14)2. [facility] [place]ump[place] mark SALOME Singleton, Hannah Mast.[facility]., [place], [address] [place] [address] Ave (). Measuring the change indisability after inpatient rehabilitation; comparison of the responsiveness of the Barthel Index and Functional [place] Measure. Journal of Neurology, Neurosurgery, and Psychiatry, 66(4), 859-099. MOISE Mukherjee.CLARITA, MCKENZIE Strauss, & Letty Jimenez M.A. (2004.) Assessment of post-stroke quality of life in cost-effectiveness studies: The usefulness of the Barthel Index and the EuroQoL-5D. [facility], 13, [phone] Activity Tolerance: VSS After treatment:  
[]  Patient left in no apparent distress sitting up in chair [x]  Patient left in no apparent distress in bed 
[x]  Call bell left within reach [x]  Nursing notified 
[]  Caregiver present [x]  Bed alarm activated COMMUNICATION/EDUCATION:  
Communication/Collaboration: 
[]      Home safety education was provided and the patient/caregiver indicated understanding. []      Patient/family have participated as able and agree with findings and recommendations. [x]      Patient is unable to participate in plan of care at this time. Findings and recommendations were discussed with: Physical Therapist, Registered Nurse and  Charanjit Bautista, OTR/L Time Calculation: 25 mins

## 2019-04-09 NOTE — TELEPHONE ENCOUNTER
Adina Conroy, with [facility], called in a hospital consult for this patient. Adina Conroy can be reached at:  [phone].       Room # 9157  Seen For:   Myxedema Coma  Referring Doctor:   Dr. Aaron Osorio

## 2019-04-09 NOTE — ED TRIAGE NOTES
Pt arrives via EMS with increasing AMS and low blood sugar. EMS reports pt BG 30 on arrival at pts home. Pt was given 175cc of D10 and 15 grams of oral glucose. Pts glucose 92 on arrival to ED. Pt has hx of diabetes, HTN, Kidney failure, bilateral below the knee amputations. Pt AOX1, pt verbal. Pt unable to state wifes name. Pt follows commands. Pt has shunt in left arm. Wife bedside at this time. Bed locked and in low position, side rails up x 2. Call bell within reach.

## 2019-04-09 NOTE — PROGRESS NOTES
TRANSFER - IN REPORT: 
 
Verbal report received from Puma(name) on Mac Venkatesh  being received from ED (unit) for routine progression of care Report consisted of patients Situation, Background, Assessment and  
Recommendations(SBAR). Information from the following report(s) SBAR, Kardex, ED Summary, Intake/Output, MAR, Recent Results and Cardiac Rhythm NSR was reviewed with the receiving nurse. Opportunity for questions and clarification was provided. Assessment completed upon patients arrival to unit and care assumed.

## 2019-04-09 NOTE — ED NOTES
Pt provided blanket for comfort. Pt on monitors x 3. Pt. Resting comfortably in bed, denies needs at this time. Bed locked and low, call bell in reach.

## 2019-04-09 NOTE — PROGRESS NOTES
Reason for Admission:   Severe hypothyroidism RRAT Score:  31    
          
Resources/supports as identified by patient/family:  Lack of family support Top Challenges facing patient (as identified by patient/family and CM): Finances/Medication cost?   Medicare and BTIG Transportation? Transport [place] Support system or lack thereof? Family (lack) Living arrangements? Lives with spouse Self-care/ADLs/Cognition? Needs assistance Current Advanced Directive/Advance Care Plan:  FULL-spouse Plan for utilizing home health:    SNF placement Likelihood of readmission: HIGH Transition of Care Plan:            
 
CM met with pt and family by bedside. Pt is known to live with spouse in their one story home. Pt is known to need assistance with ADLs, and does not drive. Pt is [facility] with [facility] and uses [facility]). Pt has DME at home: wheelchair. Pt reported SNF and HHC in the past.   
 
Pt is an current dialysis pt at [facility] (T, TH, SAT-6:30AM), transport assist with pt's care. Pt's spouse and daughters are listed as his medical decision maker when discussing Advance Care Planning. CM discuss d/c needs and plans with pt's family and concerns regarding pt care. Pt's family willing for pt to go to SNF at d/c and selected  [facility]. Pt's family signed [address] document (signed) placed in chart. CM sent referral to SNF-[facility] pending). CM informed that SNF rep will be completing room visit with pt/family by bedside. Care Management Interventions PCP Verified by CM: Yes Mode of Transport at Discharge: Other (see comment) Transition of Care Consult (CM Consult): Discharge Planning Discharge Durable Medical Equipment: No 
Physical Therapy Consult:  Yes 
 Occupational Therapy Consult: Yes Speech Therapy Consult: No 
Current Support Network: Lives with Spouse, Own Home Confirm Follow Up Transport: Family Plan discussed with Pt/Family/Caregiver: Yes Freedom of Choice Offered: Yes Discharge Location Discharge Placement: Skilled nursing facility [facility], [address] [facility]

## 2019-04-09 NOTE — ED NOTES
TRANSFER - OUT REPORT: 
 
Verbal report given to RN (name) on Radha Gilman  being transferred to [facility] (unit) for routine progression of care Report consisted of patients Situation, Background, Assessment and  
Recommendations(SBAR). Information from the following report(s) SBAR, Kardex, ED Summary, Intake/Output, MAR and Recent Results was reviewed with the receiving nurse. Lines:  
Peripheral IV 04/08/19 Right Hand (Active) Site Assessment Clean, dry, & intact 4/8/2019 10:01 PM  
Phlebitis Assessment 0 4/8/2019 10:01 PM  
Infiltration Assessment 0 4/8/2019 10:01 PM  
Dressing Status Clean, dry, & intact 4/8/2019 10:01 PM  
Dressing Type Transparent 4/8/2019 10:01 PM  
Hub Color/Line Status Pink 4/8/2019 10:01 PM  
  
 
Opportunity for questions and clarification was provided. Patient transported with: 
 Registered Nurse Tech

## 2019-04-09 NOTE — CONSULTS
NEUROLOGY NOTE Chief Complaint Patient presents with  Low Blood Sugar Pt arrives via EMS with low blood sugar, Pt given 175cc of D10 and 15 grams of oral glucose prior to arrival.   
 
 
Reason for Consult I have been asked by Wesley Ward MD to see the patient in neurological consultation to render advice and opinion regarding alt mental status. HPI Moo Zheng is a [de-identified] y.o. male who presents to the hospital because of alt mental status. Hx obtained by chart review. According to admission notes \"sent to the ED for low blood sugar associated with altered mental status.  Most of the HPI obtained from the ED notes  As patient is not able to contribute to any history.  Per family patient was feeling weak and had no appetite and was not compliant with his home medication.  When EMS arrived patient was found to be lethargic and his blood sugar was in the 30s.  He received D10 and oral glucose, his blood sugar improved to the 90s but remained confused and lethargic and his SPEECH was found to be slurred. In the ED, his vital signs were stable, his labs showed normal CBC, BMP showed hypokalemia, his TSH was found to be more than 100.  CT scan of the brain was negative for any acute stroke. e above problems. \" 
 
Pt is more responsive to me today. ROS A ten system review of constitutional, cardiovascular, respiratory, musculoskeletal, endocrine, skin, SHEENT, genitourinary, psychiatric and neurologic systems was obtained and is unremarkable except as stated in HPI  
 
PMH Past Medical History:  
Diagnosis Date  CAD (coronary artery disease)  Chronic kidney disease   
 tues/thurs/sat TREE [address]  
 Diabetes ([facility] [address]  Endocrine disease   
 hypothyroid  Gastrointestinal disorder   
 gallstones  Hypertension  Stroke ([facility] [address] 2011 speech general weakness  Thyroid disease [place] Family History Problem Relation Age of Onset  Hypertension Mother  Diabetes Father Cezar Zhang Social History Socioeconomic History  Marital status:  Spouse name: Not on file  Number of children: Not on file  Years of education: Not on file  Highest education level: Not on file Tobacco Use  Smoking status: Never Smoker  Smokeless tobacco: Never Used Substance and Sexual Activity  Alcohol use: No  
 Drug use: No  
  Types: Prescription ALLERGIES No Known Allergies PHYSICAL EXAMINATION:  
Patient Vitals for the past 24 hrs: 
 Temp Pulse Resp BP SpO2  
04/09/19 1445 98 °F (36.7 °C) 67 18 116/60   
04/09/19 1134 97.6 °F (36.4 °C) 72 18 112/59 100 % 04/09/19 0727 97.7 °F (36.5 °C) 75 15 117/63 98 % 04/09/19 0601 97.6 °F (36.4 °C) 77 16 116/80 98 % 04/09/19 0546  77 15 128/78 99 % 04/09/19 0500  76 15 130/74 98 % 04/09/19 0430 97.5 °F (36.4 °C) 77 13 125/86 100 % 04/09/19 0400  73 13 135/75 99 % 04/09/19 0330  69  115/88 97 % 04/09/19 0300  67 15 119/74 98 % 04/09/19 0230  65  111/67 94 % 04/09/19 0200  67 14 125/73 95 % 04/09/19 0130  63 14 120/64 93 % 04/09/19 0100  61 14 121/68 96 % 04/09/19 0030  62 12 126/66 95 % 04/09/19 0001  61 12 123/61 97 % 04/08/19 2345  61 12 115/57 92 % 04/08/19 2330  (!) 59 11 119/60 96 % 04/08/19 2317  60 14 117/59 91 % 04/08/19 2306     92 % 04/08/19 2245  (!) 58 10 122/59 92 % 04/08/19 2230  (!) 59 13 126/63 96 % 04/08/19 2218  62 15 122/62 98 % 04/08/19 2217 97.4 °F (36.3 °C)      
04/08/19 2209  62 18  100 % 04/08/19 2208    125/65  General:  
General appearance: Pt is in no acute distress Distal pulses are preserved Fundoscopic exam: attempted Neurological Examination:  
Mental Status:  AAO x2. Speech is slow. Follows commands, has abnormal fund of knowledge, attention, short term recall, comprehension and insight. Cranial Nerves: Visual fields are full.  PERRL, Extraocular movements are full. Facial sensation intact. Facial movement intact. Hearing intact to conversation. Palate elevates symmetrically. Shoulder shrug symmetric. Tongue midline. Motor: Strength is 5/5 in all 4 ext. Bilateral BKA. Normal tone. No atrophy. Sensation: Normal to light touch Coordination/Cerebellar: Intact to finger-nose-finger Gait: deferred Skin: No significant bruising or lacerations. LAB DATA REVIEWED:   
Recent Results (from the past 24 hour(s)) GLUCOSE, POC Collection Time: 04/08/19  9:55 PM  
Result Value Ref Range Glucose (POC) 92 65 - 100 mg/dL Performed by Lico Wilson   
EKG, 12 LEAD, INITIAL Collection Time: 04/08/19 10:01 PM  
Result Value Ref Range Ventricular Rate 62 BPM  
 Atrial Rate 62 BPM  
 P-R Interval 206 ms QRS Duration 86 ms  
 Q-T Interval 456 ms  
 QTC Calculation (Bezet) 462 ms Calculated P Axis 36 degrees Calculated R Axis -2 degrees Calculated T Axis 11 degrees Diagnosis Normal sinus rhythm Normal ECG Confirmed by Ravi Daley ([id number]) on 4/9/2019 7:39:07 PM 
  
METABOLIC PANEL, COMPREHENSIVE Collection Time: 04/08/19 10:07 PM  
Result Value Ref Range Sodium 133 (L) 136 - 145 mmol/L Potassium 3.2 (L) 3.5 - 5.1 mmol/L Chloride 96 (L) 97 - 108 mmol/L  
 CO2 25 21 - 32 mmol/L Anion gap 12 5 - 15 mmol/L Glucose 141 (H) 65 - 100 mg/dL BUN 29 (H) 6 - 20 MG/DL Creatinine 7.28 (H) 0.70 - 1.30 MG/DL  
 BUN/Creatinine ratio 4 (L) 12 - 20 GFR est AA 9 (L) >60 ml/min/1.73m2 GFR est non-AA 7 (L) >60 ml/min/1.73m2 Calcium 9.0 8.5 - 10.1 MG/DL Bilirubin, total 0.5 0.2 - 1.0 MG/DL  
 ALT (SGPT) 34 12 - 78 U/L  
 AST (SGOT) 42 (H) 15 - 37 U/L Alk. phosphatase 89 45 - 117 U/L Protein, total 8.2 6.4 - 8.2 g/dL Albumin 3.2 (L) 3.5 - 5.0 g/dL Globulin 5.0 (H) 2.0 - 4.0 g/dL A-G Ratio 0.6 (L) 1.1 - 2.2    
CBC WITH AUTOMATED DIFF Collection Time: 04/08/19 10:07 PM  
Result Value Ref Range WBC 5.1 4.1 - 11.1 K/uL  
 RBC 2.99 (L) 4.10 - 5.70 M/uL  
 HGB 10.6 (L) 12.1 - 17.0 g/dL HCT 31.8 (L) 36.6 - 50.3 % .4 (H) 80.0 - 99.0 FL  
 MCH 35.5 (H) 26.0 - 34.0 PG  
 MCHC 33.3 30.0 - 36.5 g/dL  
 RDW 14.0 11.5 - 14.5 % PLATELET 552 (L) 120 - 400 K/uL MPV 10.9 8.9 - 12.9 FL  
 NRBC 0.0 0  WBC ABSOLUTE NRBC 0.00 0.00 - 0.01 K/uL NEUTROPHILS 74 32 - 75 % LYMPHOCYTES 16 12 - 49 % MONOCYTES 9 5 - 13 % EOSINOPHILS 1 0 - 7 % BASOPHILS 0 0 - 1 % IMMATURE GRANULOCYTES 0 0.0 - 0.5 % ABS. NEUTROPHILS 3.8 1.8 - 8.0 K/UL  
 ABS. LYMPHOCYTES 0.8 0.8 - 3.5 K/UL  
 ABS. MONOCYTES 0.5 0.0 - 1.0 K/UL  
 ABS. EOSINOPHILS 0.0 0.0 - 0.4 K/UL  
 ABS. BASOPHILS 0.0 0.0 - 0.1 K/UL  
 ABS. IMM. GRANS. 0.0 0.00 - 0.04 K/UL  
 DF AUTOMATED PROTHROMBIN TIME + INR Collection Time: 04/08/19 10:07 PM  
Result Value Ref Range INR 1.2 (H) 0.9 - 1.1 Prothrombin time 12.6 (H) 9.0 - 11.1 sec PTT Collection Time: 04/08/19 10:07 PM  
Result Value Ref Range aPTT 25.0 22.1 - 32.0 sec  
 aPTT, therapeutic range     58.0 - 77.0 SECS  
MAGNESIUM Collection Time: 04/08/19 10:07 PM  
Result Value Ref Range Magnesium 2.1 1.6 - 2.4 mg/dL TROPONIN I Collection Time: 04/08/19 10:07 PM  
Result Value Ref Range Troponin-I, Qt. <0.05 <0.05 ng/mL TSH 3RD GENERATION Collection Time: 04/08/19 10:07 PM  
Result Value Ref Range TSH >100.00 (H) 0.36 - 3.74 uIU/mL GLUCOSE, POC Collection Time: 04/09/19  3:55 AM  
Result Value Ref Range Glucose (POC) 179 (H) 65 - 100 mg/dL Performed by Jil Sheehan (HUONG) GLUCOSE, POC Collection Time: 04/09/19  8:47 AM  
Result Value Ref Range Glucose (POC) 232 (H) 65 - 100 mg/dL Performed by Haritha Bowles (RN) DUPLEX CAROTID BILATERAL Collection Time: 04/09/19  9:29 AM  
Result Value Ref Range Right subclavian sys 89.0 cm/s RIGHT SUBCLAVIAN ARTERY D 0.00 cm/s Right cca dist sys 60.8 cm/s Right CCA dist longoria 0.0 cm/s Right CCA prox sys 41.9 cm/s Right CCA prox longoria 0.0 cm/s Right ICA dist sys 26.7 cm/s Right ICA dist longoria 5.3 cm/s Right ICA mid sys 25.5 cm/s Right ICA mid longoria 0.0 cm/s Right ICA prox sys 59.7 cm/s Right ICA prox longoria 2.7 cm/s Right eca sys 70.4 cm/s RIGHT EXTERNAL CAROTID ARTERY D 0.00 cm/s Right vertebral sys 38.3 cm/s RIGHT VERTEBRAL ARTERY D 9.23 cm/s Right ICA/CCA sys 1.0 Left subclavian sys 148.5 cm/s LEFT SUBCLAVIAN ARTERY D 52.85 cm/s Left CCA dist sys 50.8 cm/s Left CCA dist longoria 0.0 cm/s Left CCA prox sys 49.2 cm/s Left CCA prox [place]oria 0.0 cm/s Left ICA dist sys 27.8 cm/s Left ICA dist longoria 5.8 cm/s Left ICA mid sys 29.5 cm/s Left ICA mid longoria 4.3 cm/s Left ICA prox sys 64.0 cm/s Left ICA prox longoria 11.0 cm/s Left ECA sys 47.2 cm/s LEFT EXTERNAL CAROTID ARTERY D 0.00 cm/s Left vertebral sys 43.5 cm/s LEFT VERTEBRAL ARTERY D 0.00 cm/s Left ICA/CCA sys 1.26   
ECHO ADULT COMPLETE Collection Time: 04/09/19  9:58 AM  
Result Value Ref Range Aortic Valve Systolic Peak Velocity 652.32 cm/s Aortic Valve Area by Continuity of Peak Velocity 2.4 cm2 AoV PG 11.6 mmHg LVIDd 3.81 (A) 4.2 - 5.9 cm  
 LVPWd 1.09 (A) 0.6 - 1.0 cm LVIDs 3.36 cm IVSd 2.00 (A) 0.6 - 1.0 cm  
 LVOT d 2.35 cm  
 LVOT Peak Velocity 93.83 cm/s LVOT Peak Gradient 3.5 mmHg LVOT VTI 26.35 cm  
 MVA (PHT) 3.8 cm2  
 MV A Isaias 90.42 cm/s  
 MV E Isaias 58.45 cm/s  
 MV E/A 0.65   
 MV Mean Gradient 1.8 mmHg Mitral Valve Annulus Velocity Time Integral 33.30 cm Left Atrium to Aortic Root Ratio 0.85 LV Mass .0 (A) 88 - 224 g LV Mass AL Index 141.2 (A) 49 - 115 g/m2 LVPWs 1.21 cm  
 RVSP 24.2 mmHg MV Peak Gradient 4.3 mmHg Est. RA Pressure 10.0 mmHg Mitral Regurgitant Peak Velocity 60.65 cm/s  Mitral Valve E Wave Deceleration Time 200.6 ms  
 Mitral Valve Pressure Half-time 58.2 ms Left Atrium Major Axis 2.82 cm Triscuspid Valve Regurgitation Peak Gradient 14.2 mmHg Mitral Valve Max Velocity 103.73 cm/s MVA VTI 3.4 cm2 LVOT .6 ml  
 TR Max Velocity 188.14 cm/s PASP 24.2 mmHg MR Peak Gradient 1.5 mmHg Ao Root D 3.32 cm  
 HEATHER/BSA Pk Isaias 1.2 cm2/m2 AV Cusp 0.00 cm GLUCOSE, POC Collection Time: 19 11:28 AM  
Result Value Ref Range Glucose (POC) 204 (H) 65 - 100 mg/dL Performed by LUX CAM (PCT) CON Imaging review: MRI brain Pending HOME MEDS Prior to Admission Medications Prescriptions Last Dose Informant Patient Reported? Taking? BD INSULIN SYRINGE ULTRA-FINE 1 mL 30 x 1/2\" syrg   Yes No  
Blood-Glucose Meter (FREESTYLE LITE METER) monitoring kit   No No  
Sig: Check blood sugar once daily HYDROcodone-acetaminophen (NORCO) 5-325 mg per tablet   No No  
Sig: Take 1 Tab by mouth every four (4) hours as needed for Pain. Max Daily Amount: 6 Tabs. PHOSPHORUS #1 (PHOSPHA 250 NEUTRAL PO)   Yes No  
Sig: Take 2 Tabs by mouth daily. MOIES-ALPHONSE 0.8 mg tab tablet   Yes No  
amLODIPine (NORVASC) 5 mg tablet   Yes No  
Sig: Take 5 mg by mouth daily. aspirin delayed-release 81 mg tablet   No No  
Sig: Take 1 Tab by mouth daily. calcium acetate (PHOSLO) 667 mg cap   Yes No  
Sig: Take 1 Cap by mouth three (3) times daily (with meals). carvedilol (COREG) 25 mg tablet   Yes No  
Sig: Take 25 mg by mouth two (2) times daily (with meals). ferrous sulfate 325 mg (65 mg iron) tablet   Yes No  
Sig: Take 325 mg by mouth two (2) times a day. glucose blood VI test strips (FREESTYLE LITE STRIPS) strip   No No  
Sig: Check blood sugar once daily  
insulin NPH/insulin regular (HUMULIN 70/30) 100 unit/mL (70-30) injection   Yes No  
Si Units by SubCUTAneous route daily. levothyroxine (SYNTHROID) 175 mcg tablet   Yes No  
Sig: Take 150 mcg by mouth Daily (before breakfast). ondansetron (ZOFRAN ODT) 4 mg disintegrating tablet   No No  
Sig: Take 1 Tab by mouth every eight (8) hours as needed for Nausea. polyethylene glycol (MIRALAX) 17 gram/dose powder   No No  
Sig: Take 17 g by mouth daily. pravastatin (PRAVACHOL) 10 mg tablet   Yes No  
Sig: Take 20 mg by mouth nightly. Facility-Administered Medications: None CURRENT MEDS Current Facility-Administered Medications Medication Dose Route Frequency  sodium chloride (NS) flush 5-40 mL  5-40 mL IntraVENous Q8H  
 aspirin chewable tablet 81 mg  81 mg Oral DAILY  hydrocortisone Sod Succ (PF) (SOLU-CORTEF) injection 100 mg  100 mg IntraVENous Q8H  
 insulin lispro (HUMALOG) injection   SubCUTAneous AC&HS  
 dextrose 5% and 0.9% NaCl infusion  25 mL/hr IntraVENous CONTINUOUS  
 [START ON 4/10/2019] levothyroxine (SYNTHROID) tablet 150 mcg  150 mcg Oral 6am  
 [START ON 4/10/2019] heparin (porcine) injection 5,000 Units  5,000 Units SubCUTAneous Q12H IMPRESSION: 
Jenny Lindo is a [de-identified] y.o. male who presents with alt mental status. Pt had hypoglycemia and TSh more than 100. 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 Will get MRI of the brain to r/o stroke. RECOMMENDATIONS: 
1. MRI brain Thank you very much for this consultation.   
 
Karime Weston MD 
Neurologist

## 2019-04-09 NOTE — PROGRESS NOTES
physical Therapy EVALUATION/DISCHARGE Patient: Peter Mccollum (8 y.o. male) Date: 4/9/2019 Primary Diagnosis: Severe hypothyroidism [E03.8] Hypoglycemia [E16.2] Precautions: falls ASSESSMENT : 
Based on the objective data described below, the patient presents with generalized weakness and impaired functional mobility and balance. Patient able to roll with CGA but not able to sit unsupported. Patient with h/o B BKAs and has not been ambulatory for last two years. Family present and report that patient is dependent for all mobility at baseline. Family states that son is primary caregiver and he \"picks him (patient) up like a baby and puts him in the w/c. Further skilled acute physical therapy is not indicated at this time. Recommend LTC vs home with increased assistance for care. If patient returns to home recommend hospital bed and jamila lift. PLAN : 
Discharge Recommendations: LTC vs home with increased assistance Further Equipment Recommendations for Discharge: hospital bed and mechanical lift SUBJECTIVE:  
Patient stated I haven't walked in a few years.  OBJECTIVE DATA SUMMARY:  
HISTORY:   
Past Medical History:  
Diagnosis Date  CAD (coronary artery disease)  Chronic kidney disease   
 tues/thurs/sat TREE [address]  
 Diabetes ([facility] [address]  Endocrine disease   
 hypothyroid  Gastrointestinal disorder   
 gallstones  Hypertension  Stroke ([facility] [address] 2011 speech general weakness  Thyroid disease Past Surgical History:  
Procedure Laterality Date [address] UNLISTED  HX HEENT    
 cataract   removal  rt eye  HX ORTHOPAEDIC    
 11/2014 R BK Amputation and L  
 HX OTHER SURGICAL    
 HX VASCULAR ACCESS    
 L arm shunt  VASCULAR SURGERY PROCEDURE UNLIST Fistula R arm Prior Level of Function/Home Situation: family reports that patient is dependent for all mobility; son provides total assist for all out of bed mobility Home Situation Home Environment: Private residence Wheelchair Ramp: Yes Living Alone: No 
Support Systems: Family member(s), Spouse/Significant Other/Partner(son is primary caregiver) Current DME Used/Available at Home: Wheelchair(B prosthetic legs) Tub or Shower Type: (sponge baths only) EXAMINATION/PRESENTATION/DECISION MAKING:  
Critical Behavior: 
Neurologic State: Alert Orientation Level: Oriented to person, Oriented to place, Disoriented to time Cognition: Follows commands Hearing: Auditory Auditory Impairment: None Range Of Motion: 
AROM: Generally decreased, functional 
  
  
  
  
  
  
  
Strength:   
Strength: Generally decreased, functional 
  
  
  
  
  
  
Tone & Sensation:  
Tone: Normal 
  
  
  
  
  
  
  
  
   
Coordination: 
Coordination: Generally decreased, functional 
Vision:  
Acuity: Within Defined Limits Functional Mobility: 
Bed Mobility: 
Rolling: Contact guard assistance Balance:  
Sitting: Impaired(tested in bed with HOB raised, but wtih back usupported) Sitting - Static: Fair (occasional) Sitting - Dynamic: Poor (constant support) Functional Measure: 
 
Elder Mobility Scale 0/20 Scores under 10  generally these patients are dependent in mobility maneuvers; require help with 
basic ADL, such as transfers, toileting and dressing. Scores between 10  13  generally these patients are borderline in terms of safe mobility and 
independence in ADL i.e. they require some help with some mobility maneuvers. Scores over 14  Generally these patients are able to perform mobility maneuvers alone and safely 
and are independent in basic ADL. Pain: 
Pain Scale 1: Numeric (0 - 10) Pain Intensity 1: 0 Activity Tolerance: VSS Please refer to the flowsheet for vital signs taken during this treatment. After treatment:  
[]   Patient left in no apparent distress sitting up in chair [x]   Patient left in no apparent distress in bed 
[]   Call bell left within reach [x]   Nursing notified 
[]   Caregiver present 
[]   Bed alarm activated COMMUNICATION/EDUCATION:  
Communication/Collaboration: 
[x]   Fall prevention education was provided and the patient/caregiver indicated understanding. []   Patient/family have participated as able and agree with findings and recommendations. []   Patient is unable to participate in plan of care at this time. Findings and recommendations were discussed with: Occupational Therapist, Registered Nurse and  Thank you for this referral. 
Ottoniel Trujillo, PT Time Calculation: 20 mins

## 2019-04-09 NOTE — H&P
Hospitalist Admission NoteNAME: Delvin Boston :  1940 MRN:  [id number] Date/Time:  2019 3:46 AM 
 
Patient PCP: Unknown, Provider 
________________________________________________________________________ Given the patient's current clinical presentation, I have a high level of concern for decompensation if discharged from the emergency department. Complex decision making was performed, which includes reviewing the patient's available past medical records, laboratory results, and x-ray films. My assessment of this patient's clinical condition and my plan of care is as follows. 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 Severe hypothyroidism Altered mental status plus TSH more than 100 in setting of noncompliance Will admit to PCU as patient although lethargic is still under alert, [place] is 15. No need for ICU We will continue with neuro checks every 2h-4h Status post IV levothyroxine 100 mcg  by the ED, will continue with 100 mcg Iv daily , will also add IV hydrocortisone 100 mg every 8 Keep n.p.o. Endocrine consulted Monitor heart rate and blood pressure. Slurred speech Most likely secondary to hypothyroidism VS CVA  
CT scan of the brain negative, will check MRI of the brain, 2D echo and Doppler of the carotids Hypoglycemia We will give gentle hydration with D5 normal saline at 25 cc/h as patient is end-stage renal disease on hemodialysis End-stage renal disease on hemodialysis Nephro consulted Hypokalemia K3.2, repleted Anemia of chronic disease, macrocytic anemia Monitor CBC Diabetes mellitus type 2 Patient takes insulin at home med rec, will hold it Restart once patient more stable Hypertension Patient takes amlodipine, permissive HTN Treat with hydralazine if SBP .220 or DBP >120 NOK \" Radha Spouse [phone] Body mass index is 29.05 kg/m². therefore classifying patient as obese, NOS (BMI of >30 kg/m2) I have personally reviewed the radiographs, laboratory data in Epic and decisions and statements above are based partially on this personal interpretation. Code Status: Full Code DVT Prophylaxis: Lovenox GI Prophylaxis: not indicated Subjective: CHIEF COMPLAINT: AMS HISTORY OF PRESENT ILLNESS:    
Kiara Tarango is a [de-identified] y.o.  male with known history as listed below presents to ED with complaint noted above. Available records were reviewed at the time of H&P. This is a 78-year-old male with past medical history of CAD, end-stage renal disease on hemodialysis Tuesday Thursday and Saturday, hypothyroidism who was sent to the ED for low blood sugar associated with altered mental status.  Most of the HPI obtained from the ED notes  As patient is not able to contribute to any history.  Per family patient was feeling weak and had no appetite and was not compliant with his home medication.  When EMS arrived patient was found to be lethargic and his blood sugar was in the 30s.  He received D10 and oral glucose, his blood sugar improved to the 90s but remained confused and lethargic and his SPEECH was found to be slurred. In the ED, his vital signs were stable, his labs showed normal CBC, BMP showed hypokalemia, his TSH was found to be more than 100.  CT scan of the brain was negative for any acute stroke. e above problems. Past Medical History:  
Diagnosis Date  CAD (coronary artery disease)  Chronic kidney disease   
 tues/thurs/sat TREE [address] and [address]  
 Diabetes ([facility] [address]  Endocrine disease   
 hypothyroid  Gastrointestinal disorder   
 gallstones  Hypertension  Stroke ([facility] [address] 2011 speech general weakness  Thyroid disease Past Surgical History:  
Procedure Laterality Date [address] UNLISTED  HX HEENT    
 cataract   removal  rt eye  HX ORTHOPAEDIC    
 11/2014 R BK Amputation and L  
 HX OTHER SURGICAL    
 HX VASCULAR ACCESS    
 L arm shunt  VASCULAR SURGERY PROCEDURE UNLIST Fistula R arm Social History Tobacco Use  Smoking status: Never Smoker  Smokeless tobacco: Never Used Substance Use Topics  Alcohol use: No  
  
Family History Problem Relation Age of Onset  Hypertension Mother  Diabetes Father No Known Allergies Prior to Admission medications Medication Sig Start Date End Date Taking? Authorizing Provider  
polyethylene glycol (MIRALAX) 17 gram/dose powder Take 17 g by mouth daily. 9/27/17   Fabiana Culver MD  
HYDROcodone-acetaminophen (NORCO) 5-325 mg per tablet Take 1 Tab by mouth every four (4) hours as needed for Pain. Max Daily Amount: 6 Tabs. 8/2/17   Kody Hopson MD  
ondansetron (ZOFRAN ODT) 4 mg disintegrating tablet Take 1 Tab by mouth every eight (8) hours as needed for Nausea. 8/2/17   Kody Hopson MD  
MOISE-ALPHONSE 0.8 mg tab tablet  5/4/15   Deangelo Crane MD  
aspirin delayed-release 81 mg tablet Take 1 Tab by mouth daily. 5/6/15   Popeye Martínez MD  
amLODIPine (NORVASC) 5 mg tablet Take 5 mg by mouth daily. Provider, Historical  
ferrous sulfate 325 mg (65 mg iron) tablet Take 325 mg by mouth two (2) times a day. Provider, Historical  
PHOSPHORUS #1 (PHOSPHA 250 NEUTRAL PO) Take 2 Tabs by mouth daily. Provider, Historical  
calcium acetate (PHOSLO) 667 mg cap Take 1 Cap by mouth three (3) times daily (with meals). Provider, Historical  
insulin NPH/insulin regular (HUMULIN 70/30) 100 unit/mL (70-30) injection 30 Units by SubCUTAneous route daily.     Provider, Historical  
glucose blood VI test strips (FREESTYLE LITE STRIPS) strip Check blood sugar once daily 10/9/14   Sera Orellana MD  
BD INSULIN SYRINGE ULTRA-FINE 1 mL 30 x 1/2\" syrg  7/4/14   Provider, Historical  
Blood-Glucose Meter (FREESTYLE LITE METER) monitoring kit Check blood sugar once daily 10/8/14   Sera Orellana MD  
 carvedilol (COREG) 25 mg tablet Take 25 mg by mouth two (2) times daily (with meals). Deangelo Crane MD  
levothyroxine (SYNTHROID) 175 mcg tablet Take 150 mcg by mouth Daily (before breakfast). Deangelo Crane MD  
pravastatin (PRAVACHOL) 10 mg tablet Take 20 mg by mouth nightly. Deangelo Crane MD  
 
REVIEW OF SYSTEMS:  See HPI for details Patient was not able to provide review of systems due to mental status change/acute illness Objective: VITALS:   
Visit Vitals /88 Pulse 69 Temp 97.4 °F (36.3 °C) Resp 15 Ht 5' 6\" (1.676 m) Wt 81.6 kg (180 lb) SpO2 97% BMI 29.05 kg/m² PHYSICAL EXAM:  
General:    Alert, cooperative, no distress, appears stated age. [place] 15 HEENT: Atraumatic, anicteric sclerae, pink conjunctivae No oral ulcers, mucosa moist, throat clear Neck:  Supple, symmetrical,  thyroid: non tender Lungs:   Clear to auscultation bilaterally. No Wheezing or Rhonchi. No rales. Chest wall:  No tenderness  No Accessory muscle use. Heart:   Regular  rhythm,  No  murmur   No edema Abdomen:   Soft, non-tender. Not distended. Bowel sounds normal 
Extremities: No cyanosis. No clubbing. Presence leg prothesis b/l  
Skin:     Not pale. Not Jaundiced  No rashes Psych:  Good insight. Not depressed. Not anxious or agitated. Neurologic: EOMs intact. No facial asymmetry. ++slurred speech. Symmetrical strength, Alert and oriented X 4. 
_______________________________________________________________________ Care Plan discussed with: 
  Comments Patient x Family RN x Care Manager Consultant:  joe MOFFETT MD  
_______________________________________________________________________ Recommended Disposition:  
Home with Family x HH/PT/OT/RN   
SNF/LTC   
[place]   
________________________________________________________________________ TOTAL TIME:  65 Minutes Critical Care Provided     Minutes non procedure based Comments >50% of visit spent in counseling and coordination of care x Chart review Discussion with patient and/or family and questions answered  
 
________________________________________________________________________ Signed: Estelle Avendano MD 
 
This note will not be viewable in [address] Procedures: see electronic medical records for all procedures/Xrays and details which were not copied into this note but were reviewed prior to creation of Plan. LAB DATA REVIEWED:   
Recent Results (from the past 24 hour(s)) GLUCOSE, POC Collection Time: 04/08/19  9:55 PM  
Result Value Ref Range Glucose (POC) 92 65 - 100 mg/dL Performed by Crambu METABOLIC PANEL, COMPREHENSIVE Collection Time: 04/08/19 10:07 PM  
Result Value Ref Range Sodium 133 (L) 136 - 145 mmol/L Potassium 3.2 (L) 3.5 - 5.1 mmol/L Chloride 96 (L) 97 - 108 mmol/L  
 CO2 25 21 - 32 mmol/L Anion gap 12 5 - 15 mmol/L Glucose 141 (H) 65 - 100 mg/dL BUN 29 (H) 6 - 20 MG/DL Creatinine 7.28 (H) 0.70 - 1.30 MG/DL  
 BUN/Creatinine ratio 4 (L) 12 - 20 GFR est AA 9 (L) >60 ml/min/1.73m2 GFR est non-AA 7 (L) >60 ml/min/1.73m2 Calcium 9.0 8.5 - 10.1 MG/DL Bilirubin, total 0.5 0.2 - 1.0 MG/DL  
 ALT (SGPT) 34 12 - 78 U/L  
 AST (SGOT) 42 (H) 15 - 37 U/L Alk. phosphatase 89 45 - 117 U/L Protein, total 8.2 6.4 - 8.2 g/dL Albumin 3.2 (L) 3.5 - 5.0 g/dL Globulin 5.0 (H) 2.0 - 4.0 g/dL A-G Ratio 0.6 (L) 1.1 - 2.2    
CBC WITH AUTOMATED DIFF Collection Time: 04/08/19 10:07 PM  
Result Value Ref Range WBC 5.1 4.1 - 11.1 K/uL  
 RBC 2.99 (L) 4.10 - 5.70 M/uL  
 HGB 10.6 (L) 12.1 - 17.0 g/dL HCT 31.8 (L) 36.6 - 50.3 % .4 (H) 80.0 - 99.0 FL  
 MCH 35.5 (H) 26.0 - 34.0 PG  
 MCHC 33.3 30.0 - 36.5 g/dL  
 RDW 14.0 11.5 - 14.5 % PLATELET 023 (L) 160 - 400 K/uL MPV 10.9 8.9 - 12.9 FL  
 NRBC 0.0 0  WBC ABSOLUTE NRBC 0.00 0.00 - 0.01 K/uL NEUTROPHILS 74 32 - 75 % LYMPHOCYTES 16 12 - 49 % MONOCYTES 9 5 - 13 % EOSINOPHILS 1 0 - 7 % BASOPHILS 0 0 - 1 % IMMATURE GRANULOCYTES 0 0.0 - 0.5 % ABS. NEUTROPHILS 3.8 1.8 - 8.0 K/UL  
 ABS. LYMPHOCYTES 0.8 0.8 - 3.5 K/UL  
 ABS. MONOCYTES 0.5 0.0 - 1.0 K/UL  
 ABS. EOSINOPHILS 0.0 0.0 - 0.4 K/UL  
 ABS. BASOPHILS 0.0 0.0 - 0.1 K/UL  
 ABS. IMM. GRANS. 0.0 0.00 - 0.04 K/UL  
 DF AUTOMATED PROTHROMBIN TIME + INR Collection Time: 04/08/19 10:07 PM  
Result Value Ref Range INR 1.2 (H) 0.9 - 1.1 Prothrombin time 12.6 (H) 9.0 - 11.1 sec PTT Collection Time: 04/08/19 10:07 PM  
Result Value Ref Range aPTT 25.0 22.1 - 32.0 sec  
 aPTT, therapeutic range     58.0 - 77.0 SECS  
MAGNESIUM Collection Time: 04/08/19 10:07 PM  
Result Value Ref Range Magnesium 2.1 1.6 - 2.4 mg/dL TROPONIN I Collection Time: 04/08/19 10:07 PM  
Result Value Ref Range Troponin-I, Qt. <0.05 <0.05 ng/mL TSH 3RD GENERATION Collection Time: 04/08/19 10:07 PM  
Result Value Ref Range TSH >100.00 (H) 0.36 - 3.74 uIU/mL

## 2019-04-09 NOTE — CONSULTS
[facility] Name:  Neo Snow 
MR#:  [id number] :  1940 ACCOUNT #:  [de-identified] DATE OF SERVICE:  2019 REQUESTING PHYSICIAN:  Wesley Ward MD. 
 
REASON FOR CONSULTATION:  For evaluation and management of ESRD. HISTORY OF PRESENT ILLNESS:  The patient is an 58-year-old -American male with past medical history significant for ESRD on HD, diabetes, hypothyroidism, hypertension, history of stroke, who presented with altered mental status. He was found to have hypoglycemia as well as severe hypothyroidism. His TSH is great than 100. He is a very poor historian. He was lethargic and not much responsive initially. When I saw him, he was alert, awake with somewhat garbled speech, but unable to provide any details. He vaguely told me about his dialysis related details, but he gets HD at [facility] schedule. He is due for dialysis today. In the ED, he was noted to have hypokalemia. He is receiving IV KCL. CT of the head was negative for any acute abnormalities. REVIEW OF SYSTEMS:  As noted above. Rest is deferred as not reliable. Past Medical History:  
Diagnosis Date  CAD (coronary artery disease)  Chronic kidney disease   
 tu/thurs/sat TREE [address]  
 Diabetes ([facility] [address]  Endocrine disease   
 hypothyroid  Gastrointestinal disorder   
 gallstones  Hypertension  Stroke ([facility] [address]  speech general weakness  Thyroid disease Past Surgical History:  
Procedure Laterality Date   [place] UNLISTED  HX HEENT    
 cataract   removal  rt eye  HX ORTHOPAEDIC    
 2014 R BK Amputation and L  
 HX OTHER SURGICAL    
 HX VASCULAR ACCESS    
 L arm shunt  VASCULAR SURGERY PROCEDURE UNLIST Fistula R arm No Known Allergies Prior to Admission Medications Prescriptions Last Dose Informant Patient Reported? Taking? BD INSULIN SYRINGE ULTRA-FINE 1 mL 30 x 1/2\" syrg   Yes No  
Blood-Glucose Meter (FREESTYLE LITE METER) monitoring kit   No No  
Sig: Check blood sugar once daily HYDROcodone-acetaminophen (NORCO) 5-325 mg per tablet   No No  
Sig: Take 1 Tab by mouth every four (4) hours as needed for Pain. Max Daily Amount: 6 Tabs. PHOSPHORUS #1 (PHOSPHA 250 NEUTRAL PO)   Yes No  
Sig: Take 2 Tabs by mouth daily. MOISE-ALPHONSE 0.8 mg tab tablet   Yes No  
amLODIPine (NORVASC) 5 mg tablet   Yes No  
Sig: Take 5 mg by mouth daily. aspirin delayed-release 81 mg tablet   No No  
Sig: Take 1 Tab by mouth daily. calcium acetate (PHOSLO) 667 mg cap   Yes No  
Sig: Take 1 Cap by mouth three (3) times daily (with meals). carvedilol (COREG) 25 mg tablet   Yes No  
Sig: Take 25 mg by mouth two (2) times daily (with meals). ferrous sulfate 325 mg (65 mg iron) tablet   Yes No  
Sig: Take 325 mg by mouth two (2) times a day. glucose blood VI test strips (FREESTYLE LITE STRIPS) strip   No No  
Sig: Check blood sugar once daily  
insulin NPH/insulin regular (HUMULIN 70/30) 100 unit/mL (70-30) injection   Yes No  
Si Units by SubCUTAneous route daily. levothyroxine (SYNTHROID) 175 mcg tablet   Yes No  
Sig: Take 150 mcg by mouth Daily (before breakfast). ondansetron (ZOFRAN ODT) 4 mg disintegrating tablet   No No  
Sig: Take 1 Tab by mouth every eight (8) hours as needed for Nausea. polyethylene glycol (MIRALAX) 17 gram/dose powder   No No  
Sig: Take 17 g by mouth daily. pravastatin (PRAVACHOL) 10 mg tablet   Yes No  
Sig: Take 20 mg by mouth nightly. Facility-Administered Medications: None Social History Tobacco Use  Smoking status: Never Smoker  Smokeless tobacco: Never Used Substance Use Topics  Alcohol use: No  
 Drug use: No  
  Types: Prescription Family History Problem Relation Age of Onset  Hypertension Mother  Diabetes Father PHYSICAL EXAMINATION: 
 GENERAL:  Elderly male, well built, well nourished, in no acute distress. VITAL SIGNS:  He is afebrile. Pulse 72, /59, respirations of 16, saturating 100% on room air. Weight is 81.6 kg. HEENT:  Head is atraumatic, normocephalic. No scleral icterus. Mucous membranes are moist. 
NECK:  No JVD. LUNGS:  Clear. No respiratory distress. HEART:  Regular rate rhythm. Normal S1, S2. 
EXTREMITIES:  No clubbing or cyanosis. Bilateral amputation. No edema at the stump site. Left arm AV graft is patent with thrill and bruit. CENTRAL NERVOUS SYSTEM:  He is alert, awake, garbled speech. Poor historian. No myoclonus. LABORATORY DATA:  Labs are reviewed  CBC shows anemia with hemoglobin of 10.6 and mild thrombocytopenia with platelets of 672. Chem panel:  BUN 29, creatinine 7.28, sodium 133, potassium 3.2. Troponin I was normal.  TSH was greater than 100. Chest x-ray showed no acute process. ASSESSMENT: 
1.  End-stage renal disease. 2.  Hypertension. 3.  Hypokalemia. 4.  Mild hyponatremia. 5.  Severe hypothyroidism. 6.  Altered mental status. 7.  Diabetes type 2 with hypoglycemia. RECOMMENDATIONS: 
1. We will do HD today as per schedule. 2.  UF of 1-2 kg as tolerated. 3.  We will use 3.5 K dialysate bath. 4.  Continue home meds. 5.  His previous phosphorus has been low. He is currently not on any phosphate binders. We will check phosphorus with next blood draw. 6.  IV Synthroid has been started. Thanks for renal consult. We will follow the patient with you. Discussed with the patient and nurse. HD orders written. Melisa notified. Oziel Maradiaga MD 
 
 
[id number] 
D:  04/09/2019 13:04 
T:  04/09/2019 13:42 JOB #:  [id number]

## 2019-04-09 NOTE — ED NOTES
Medication received from central pharm at this time. Pt medicated per MAR. Pt. Resting comfortably in bed, denies needs at this time. Bed locked and low, call bell in reach.

## 2019-04-09 NOTE — CONSULTS
[id number] pt seen. Consult dictated ESRD pt with AMS, hypoglycemia, severe hypothyroidism, mild hyponatremia, hypokalemia, HTN 
 
P: 
HD today per schedule Hold retacrit for now No phosphate binders. Previous phos has been low Akin Lamas MD 
[address] [facility]

## 2019-04-09 NOTE — PROCEDURES
[facility]  [phone] Vitals   Pre   Post   Assessment   Pre   Post    
Temp  Temp: 98 °F (36.7 °C) (04/09/19 1445)  98.1F LOC  A & O x 3, following commands A & O x 3, following commands HR   Pulse (Heart Rate): 65 (04/09/19 1615) 63 Lungs   Coarse  Coarse B/P   BP: 104/60 (04/09/19 1615) 120/61 Cardiac   RRR, Bilateral BKA   
RRR, Bilateral BKA Resp   Resp Rate: 18 (04/09/19 1615) 17 Skin   Dry, Bilateral BKA  Dry, Bilateral BKA Pain level  Pain Intensity 1: 0 (04/09/19 1134) 0 Edema  Generalized Generalized Orders: HEMODIALYSIS INPATIENT Duration (hr): 3.5; Dialyzer: Revaclear; Dialysate Bath:  K: 3.5; Dialysate Bath:  CA: 2.5; Dialysate Bath: Bicarb: 30; Sodium Profiling/Modeling: No; Profile (Beginning / mEq/L): 140; Profile (Ending / mEq/L): 140; Weight L... Jamee Green (Order [id number]) Duration:   Start:    1445 End:    1815 Total:   3.5 hours Dialyzer:   Dialyzer/Set Up Inspection: Nohemi Mendez (04/09/19 1445) K Bath:   Dialysate K (mEq/L): 3.5 (04/09/19 1445) Ca Bath:   Dialysate CA (mEq/L): 2.5 (04/09/19 1445) Na/Bicarb:   Dialysate NA (mEq/L): 140 (04/09/19 1445) Target Fluid Removal:   Goal/Amount of Fluid to Remove (mL): 2000 mL (04/09/19 1445) Access Type & Location:   LINA AV Fistula, no s/s of infection, + bruit/thrill Labs Obtained/Reviewed Critical Results Called   Date when labs were drawn- 
Hgb-   
HGB Date Value Ref Range Status 04/08/2019 10.6 (L) 12.1 - 17.0 g/dL Final  
 
K-   
Potassium Date Value Ref Range Status 04/08/2019 3.2 (L) 3.5 - 5.1 mmol/L Final  
 
Ca-  
Calcium Date Value Ref Range Status 04/08/2019 9.0 8.5 - 10.1 MG/DL Final  
 
Bun-  
BUN Date Value Ref Range Status 04/08/2019 29 (H) 6 - 20 MG/DL Final  
 
Creat-  
Creatinine Date Value Ref Range Status 04/08/2019 7.28 (H) 0.70 - 1.30 MG/DL Final  
 
  
Medications/ Blood Products Given Name   Dose   Route and Time Albumin 12.5 g  50 ml x 2    IV   1551, 1607 Blood Volume Processed (BVP):    79.2  Net Fluid Removed:  2000 ml Comments Time Out Done: 1440 Primary Nurse Rpt Pre: Doris Delaney RN 
Primary Nurse Rpt Post: Doris Delaney RN 
Pt Education: access care, compliance Care Plan: continue with dialysis per physician's orders Tx Summary: Got permission to bring pt up from PCU from Dr. iRcky Weaver. Pt. Tolerated treatment well. SBAR from Primary RN. Pt arrived at the suite with transporter. Cleaned LINA AV Fistula with alcohol. Cannulated with 15 gauge needles x 2 and secured with tape and without problems. Pump speed at 400.  
1445-HD started 1500-VSS, all lines visible and intact 1515-VSS, all lines visible and intact 1530-VSS, all lines visible and intact 1545-VSS, all lines visible and intact 1551- Albumin given for hypotension 1645-Dr. Aimee ley, Endocrinologist, all lines visible and intact 1700-VSS, all lines visible and secure 1715- VSS, all lines visible and intact 1730-VSS, all lines visible and intact 1745-Pt resting with eyes closed, VSS, all lines visible and intact 1800-VSS, all lines visible and intact 1815-HD ended All possible blood returned from circuit. Needles removed and pressure held for 5+ minutes each site. Clean bandages applied and secured with tape. SBAR to Primary RN. Patient stable. Bed in lowest position. Transported back to room. Admiting Diagnosis: Lethargy and confusion Pt's previous clinic- [facility] Consent signed - Informed Consent Verified: Yes (04/09/19 1445) Melisa Consent - Yes, Signed Hepatitis Status- Neg 3/20/19, Immune 3/20/19 Machine #- Machine Number: [id number] (04/09/19 1445) Telemetry status- Remote and Bedside Pre-dialysis wt. - Pre-Dialysis Weight: 82.4 kg (181 lb 10.5 oz) (04/09/19 1445)

## 2019-04-09 NOTE — PROGRESS NOTES
CM attempted to contact pt's spouse, via telephone. However, attempt was unsuccessful and CM could not leave voicemail due to the voice mailbox not being set up. CM will inform pt's nurse to contact CM if family comes to visit. Erinn Steward, [place]RA, [address]

## 2019-04-09 NOTE — PROGRESS NOTES
TRANSFER - IN REPORT: 
 
Verbal report received from Preet Florentino RN on Fontaine Levo  being received from PCU for ordered procedure Report consisted of patients Situation, Background, Assessment and  
Recommendations(SBAR). Information from the following report(s) Kardex was reviewed with the receiving nurse. Opportunity for questions and clarification was provided. Assessment completed upon patients arrival to unit and care assumed.

## 2019-04-09 NOTE — PROGRESS NOTES
Speech Pathology bedside swallow evaluation/discharge Patient: Rosi Ramachandran (63 y.o. male) Date: 4/9/2019 Primary Diagnosis: Severe hypothyroidism [E03.8] Hypoglycemia [E16.2] Precautions:     
 
ASSESSMENT : 
Based on the objective data described below, the patient presents with mod dysarthria. Swallow function of purees, thins and solids is wnl. Mastication is slow but thorough. No coughing or choking on any texture. Recommend [facility] soft as he is edentulous. Skilled acute therapy provided by a speech-language pathologist is not indicated at this time. PLAN : 
Re[facility]ations: 
[facility] 
[facility] whole Discharge Recommendations: To Be Determined SUBJECTIVE:  
Patient stated he drank water but not much of it. Made a face when given water. OBJECTIVE:  
 
Past Medical History:  
Diagnosis Date  CAD (coronary artery disease)  Chronic kidney disease   
 tues/thurs/sat TREE [address]  
 Diabetes ([facility] [address]  Endocrine disease   
 hypothyroid  Gastrointestinal disorder   
 gallstones  Hypertension  Stroke ([facility] [address] 2011 speech general weakness  Thyroid disease Past Surgical History:  
Procedure Laterality Date [address] [place] UNLISTED  HX HEENT    
 cataract   removal  rt eye  HX ORTHOPAEDIC    
 11/2014 R BK Amputation and L  
 HX OTHER SURGICAL    
 HX VASCULAR ACCESS    
 L arm shunt  VASCULAR SURGERY PROCEDURE UNLIST Fistula R arm Prior Level of Function/Home Situation:  
 Diet prior to admission:  
Current Diet: NPO Cognitive and Communication Status: 
Neurologic State: Alert Orientation Level: Oriented to person, Oriented to place, Disoriented to time Cognition: Follows commands Perception: Appears intact Perseveration: No perseveration noted Oral Assessment: 
Oral Assessment Labial: No impairment Dentition: Edentulous Lingual: Decreased rate(at midline with protrusion) Velum: Other (comment) Mandible: No impairment P.O. Trials: 
Patient Position: upright in bed Vocal quality prior to P.O.: No impairment Consistency Presented: Thin liquid;Puree; Solid How Presented: Self-fed/presented Bolus Acceptance: No impairment Bolus Formation/Control: Impaired Type of Impairment: Delayed;Mastication Propulsion: Delayed (# of seconds) Oral Residue: None Initiation of Swallow: Delayed (# of seconds) Laryngeal Elevation: Functional 
Aspiration Signs/Symptoms: None Pharyngeal Phase Characteristics: No impairment, issues, or problems Oral Phase Severity: Mild-moderate Pharyngeal Phase Severity : Mild NOMS:  
The NOMS functional outcome measure was used to quantify this patient's level of swallowing impairment. Based on the NOMS, the patient was determined to be at level 5 for swallow function NOMS Swallowing Levels: 
Level 1 (CN): NPO Level 2 (CM): NPO but takes consistency in therapy Level 3 (CL): Takes less than 50% of nutrition p.o. and continues with nonoral feedings; and/or safe with mod cues; and/or max diet restriction Level 4 (CK): Safe swallow but needs mod cues; and/or mod diet restriction; and/or still requires some nonoral feeding/supplements Level 5 (CJ): Safe swallow with min diet restriction; and/or needs min cues Level 6 (CI): Independent with p.o.; rare cues; usually self cues; may need to avoid some foods or needs extra time Level 7 (CH): Independent for all p.o. NILE (2003). National Outcomes Measurement System (NOMS): Adult Speech-Language Pathology User's Guide. Pain: 
Pain Scale 1: Numeric (0 - 10) Pain Intensity 1: 0 After treatment:  
[] Patient left in no apparent distress sitting up in chair 
[] Patient left in no apparent distress in bed 
[x] Call bell left within reach [x] Nursing notified 
[] Caregiver present 
[] Bed alarm activated COMMUNICATION/EDUCATION:  
The patients plan of care including findings, recommendations, and recommended diet changes were discussed with: Registered Nurse. Educated the pt that he would be started on a diet of soft foods. [x] Posted safety precautions in patient's room. [] Patient/family have participated as able and agree with findings and recommendations. [] Patient is unable to participate in plan of care at this time. Thank you for this referral. 
JOHANA Singh Time Calculation: 10 mins

## 2019-04-09 NOTE — PROGRESS NOTES
Speech path Pt is BERNICE currently. We have an order to evaluate the pt and will do so when he is available.   
Milan Calles, SLP

## 2019-04-09 NOTE — PROGRESS NOTES
Initial Nutrition Assessment: 
 
INTERVENTIONS/RECOMMENDATIONS:  
· Meals/Snacks: General/healthful diet: Continue current diet ASSESSMENT:  
Patient medically noted for hypothyroidism and hypoglycemia. PMH for HTN, DM, ESRD on HD, CVA, PAD, and bilateral BKAs. Patient initially NPO this morning but SLP cleared him for a mechanical soft diet. MST received for PI; wound care consulted. Patient dicussed during PCU rounds. RN reports patient is ready to eat and reported to be starving. Attempted to meet with patient but he was unavailable d/t CM meeting with him and family. Plans for HD today per nephrology; notes indicate a history of low phos. K+ low today. Renal restrictions not indicated at this time. Will monitor labs and PO intake. Diet Order: Consistent carb, Mechanical soft 
% Eaten:   
Patient Vitals for the past 72 hrs: 
 % Diet Eaten 04/09/19 0727 0 % Pertinent Medications: [x]Reviewed []Other: Humalog, Synthroid, KCl Pertinent Labs: [x]Reviewed []Other: K+ 3.2, -[phone] Food Allergies: [x]None []Other Last BM: 4/9   [x]Active     []Hyperactive  []Hypoactive       [] Absent BS Skin:    [] Intact   [] Incision  [x] Breakdown: (stage 2 left buttocks?) [] Edema []Other: Anthropometrics:  
Height: 5' 6\" (167.6 cm) Weight: 81.6 kg (180 lb) IBW (%IBW):   ( ) UBW (%UBW):   (  %) Last Weight Metrics: 
Weight Loss Metrics 4/8/2019 1/1/2019 12/13/2017 9/13/2017 8/12/2017 8/2/2017 4/4/2016 Today's Wt 180 lb 180 lb 218 lb 4.1 oz 220 lb 230 lb 230 lb 204 lb 3.2 oz  
BMI 29.05 kg/m2 29.05 kg/m2 35.23 kg/m2 35.51 kg/m2 37.12 kg/m2 37.12 kg/m2 32.97 kg/m2 BMI: Body mass index is 29.05 kg/m². This BMI is indicative of: 
 []Underweight    []Normal    [x]Overweight    [] Obesity   [] Extreme Obesity (BMI>40) Estimated Nutrition Needs (Based on):  
6271 Kcals/day(BMR (1473) x 1. 2AF) , 95 g(1.5 g/kg adjusted IBW) Protein Carbohydrate: At Least 130 g/day  Fluids: 1750 mL/day (1ml/kcal) Pt expected to meet estimated nutrient needs: [x]Yes []No 
 
NUTRITION DIAGNOSES:  
Problem:  Altered nutrition-related lab values Etiology: related to current medical condition Signs/Symptoms: as evidenced by K+ 3.2, , Cr 7.28 NUTRITION INTERVENTIONS: 
Meals/Snacks: General/healthful diet GOAL:  
PO intake >70% of meals next 2-4 days LEARNING NEEDS (Diet, Food/Nutrient-Drug Interaction):  
 [x] None Identified 
 [] Identified and Education Provided/Documented 
 [] Identified and Pt declined/was not appropriate Cultural, Orthodox, OR Ethnic Dietary Needs:  
 [x] None Identified 
 [] Identified and Addressed 
 
 [x] Interdisciplinary Care Plan Reviewed/Documented  
 [x] Discharge Planning: Consistent carb/2g Na diet; consistency per SLP MONITORING /EVALUATION:  
Food/Nutrient Intake Outcomes: Total energy intake Physical Signs/Symptoms Outcomes: Weight/weight change, Electrolyte and renal profile, Glucose profile NUTRITION RISK:  
 [] High              [x] Moderate           []  Low  []  Minimal/Uncompromised PT SEEN FOR:  
 []  MD Consult: []Calorie Count []Diabetic Diet Education []Diet Education []Electrolyte Management []General Nutrition Management and Supplements []Management of Tube Feeding []TPN Recommendations [x]  RN Referral:  []MST score >=2 
   []Enteral/Parenteral Nutrition PTA []Pregnant: Gestational DM or Multigestation [x]Pressure Ulcer/Wound Care needs 
     
[]  Low BMI 
[]  SANDRA Ching Pager [phone] Weekend Pager [phone]

## 2019-04-09 NOTE — CONSULTS
Consult Patient: Jennifer Brooks MRN: [id number]  SSN: [id number] YOB: 1940  Age: [de-identified] y.o. Sex: male Subjective:  
  
Jennifer Brooks is a [de-identified] y.o. male who is being seen for hypothyroidism. Per the chart pt presented to the ED via EMS for issues of AMS. Per the EMS he had a BG in the 30's and was given glucose. When he arrived his BG was 92. He was given D-10, started on an D5-NS IVF and his home insulin was discontinued. He also received a CT of the head, which did not show any evidence of acute stroke or any intracranial process. Pt has has a hx of DM and hypothyroidism and per his wife he had been feeling poorly for the last 3 days and his PO intake had been very low. She also reported that he had not been taking all of his medications. Despite his BGs improving, his mental status did not improve. A TSH was drawn and he was found to have a TSH >100. At home his record states he has been taking LT4 150mcg daily. He was given 100mcg of IV LT4 at 0100 this AM and per the RN caring for him, and per the record review, his mental status has shown improvement. At 0600 this AM he was also started on IV Hydrocortisone 100mg every 8 hours. His BG have been running in the 160 > 200 range. He was evaluated by Neurology who feels his AMS was due to severe hypothyroidism and not CVA. I saw Mr. Josemanuel Gannon this afternoon in HD. He was awake, but lethargic. He was oriented to person and place, but not year. He was not able to give any significant history and when I called his wife, she did not answer, so to majority of the history was obtained from chart review. Past Medical History:  
Diagnosis Date  CAD (coronary artery disease)  Chronic kidney disease   
 tues/thurs/sat TREE [address]  
 Diabetes ([facility] [address]  Endocrine disease   
 hypothyroid  Gastrointestinal disorder   
 gallstones  Hypertension  [facility] speech general weakness  Thyroid disease Past Surgical History:  
Procedure Laterality Date [address] UNLISTED  HX HEENT    
 cataract   removal  rt eye  HX ORTHOPAEDIC    
 11/2014 R BK Amputation and L  
 HX OTHER SURGICAL    
 HX VASCULAR ACCESS    
 L arm shunt  VASCULAR SURGERY PROCEDURE UNLIST Fistula R arm Family History Problem Relation Age of Onset  Hypertension Mother  Diabetes Father Social History Tobacco Use  Smoking status: Never Smoker  Smokeless tobacco: Never Used Substance Use Topics  Alcohol use: No  
  
Current Facility-Administered Medications Medication Dose Route Frequency Provider Last Rate Last Dose  sodium chloride (NS) flush 5-40 mL  5-40 mL IntraVENous Q8H Martha Rudd MD   10 mL at 04/09/19 1354  sodium chloride (NS) flush 5-40 mL  5-40 mL IntraVENous PRN Martha Rudd MD   10 mL at 04/09/19 2059  acetaminophen (TYLENOL) tablet 650 mg  650 mg Oral Q4H PRN Martha Rudd MD      
 Or  
 acetaminophen (TYLENOL) solution 650 mg  650 mg Per NG tube Q4H PRN Martha Rudd MD      
 Or  
 acetaminophen (TYLENOL) suppository 650 mg  650 mg Rectal Q4H PRN Martha Rudd MD      
 aspirin chewable tablet 81 mg  81 mg Oral DAILY Martha Rudd MD   Stopped at 04/09/19 0900  
 labetalol (NORMODYNE;TRANDATE) injection 20 mg  20 mg IntraVENous Q4H PRN Martha Rudd MD      
 hydrocortisone Sod Succ (PF) (SOLU-CORTEF) injection 100 mg  100 mg IntraVENous Q8H Martha Rudd MD   100 mg at 04/09/19 1357  insulin lispro (HUMALOG) injection   SubCUTAneous AC&HS Martha Rudd MD   2 Units at 04/09/19 1203  
 glucose chewable tablet 16 g  4 Tab Oral PRN Martha Rudd MD      
 dextrose (D50W) injection syrg 12.5-25 g  12.5-25 g IntraVENous PRN Martha Rudd MD      
 glucagon (GLUCAGEN) injection 1 mg  1 mg IntraMUSCular PRN Martha Rudd MD      
 dextrose 5% and 0.9% NaCl infusion  25 mL/hr IntraVENous CONTINUOUS Eric Curtis MD 25 mL/hr at 04/09/19 0858 25 mL/hr at 04/09/19 6584  albumin human 25% (BUMINATE) solution 12.5 g  12.5 g IntraVENous DIALYSIS PRN Mechelle Muller MD   12.5 g at 04/09/19 1607  [START ON 4/10/2019] levothyroxine (SYNTHROID) tablet 150 mcg  150 mcg Oral John Sahni MD      
 [START ON 4/10/2019] heparin (porcine) injection 5,000 Units  5,000 Units SubCUTAneous Q12H Emanuel Matthews MD      
  
 
No Known Allergies Review of Systems: 
Review of systems not obtained due to patient factors. Objective:  
 
Vitals:  
 04/09/19 1545 04/09/19 1600 04/09/19 1615 04/09/19 1630 BP: (!) 89/52 105/59 104/60 114/61 Pulse: 66 64 65 63 Resp: 16 17 18 18 Temp:      
TempSrc:      
SpO2:      
Weight:      
Height:      
  
 
Physical Exam: 
GENERAL: fatigued, cooperative, no distress, appears stated age EYE: negative THROAT & NECK: normal 
LUNG: clear to auscultation bilaterally HEART: regular rate and rhythm, S1, S2 normal, no murmur, click, rub or gallop ABDOMEN: soft, non-tender. Bowel sounds normal. No masses,  no organomegaly EXTREMITIES: Pulses 2+, bilateral BKA, no LE edema, SKIN: Normal. 
NEUROLOGIC: Alert and oriented to person and place only, no tremor DTR 1+ Recent Results (from the past 24 hour(s)) GLUCOSE, POC Collection Time: 04/08/19  9:55 PM  
Result Value Ref Range Glucose (POC) 92 65 - 100 mg/dL Performed by Sophie Tse   
EKG, 12 LEAD, INITIAL Collection Time: 04/08/19 10:01 PM  
Result Value Ref Range Ventricular Rate 62 BPM  
 Atrial Rate 62 BPM  
 P-R Interval 206 ms QRS Duration 86 ms  
 Q-T Interval 456 ms  
 QTC Calculation (Bezet) 462 ms Calculated P Axis 36 degrees Calculated R Axis -2 degrees Calculated T Axis 11 degrees Diagnosis Normal sinus rhythm Normal ECG Confirmed by Eligio Silva ([id number]) on 4/9/2019 3:68:52 PM 
  
METABOLIC PANEL, COMPREHENSIVE  Collection Time: 04/08/19 10:07 PM  
 Result Value Ref Range Sodium 133 (L) 136 - 145 mmol/L Potassium 3.2 (L) 3.5 - 5.1 mmol/L Chloride 96 (L) 97 - 108 mmol/L  
 CO2 25 21 - 32 mmol/L Anion gap 12 5 - 15 mmol/L Glucose 141 (H) 65 - 100 mg/dL BUN 29 (H) 6 - 20 MG/DL Creatinine 7.28 (H) 0.70 - 1.30 MG/DL  
 BUN/Creatinine ratio 4 (L) 12 - 20 GFR est AA 9 (L) >60 ml/min/1.73m2 GFR est non-AA 7 (L) >60 ml/min/1.73m2 Calcium 9.0 8.5 - 10.1 MG/DL Bilirubin, total 0.5 0.2 - 1.0 MG/DL  
 ALT (SGPT) 34 12 - 78 U/L  
 AST (SGOT) 42 (H) 15 - 37 U/L Alk. phosphatase 89 45 - 117 U/L Protein, total 8.2 6.4 - 8.2 g/dL Albumin 3.2 (L) 3.5 - 5.0 g/dL Globulin 5.0 (H) 2.0 - 4.0 g/dL A-G Ratio 0.6 (L) 1.1 - 2.2    
CBC WITH AUTOMATED DIFF Collection Time: 04/08/19 10:07 PM  
Result Value Ref Range WBC 5.1 4.1 - 11.1 K/uL  
 RBC 2.99 (L) 4.10 - 5.70 M/uL  
 HGB 10.6 (L) 12.1 - 17.0 g/dL HCT 31.8 (L) 36.6 - 50.3 % .4 (H) 80.0 - 99.0 FL  
 MCH 35.5 (H) 26.0 - 34.0 PG  
 MCHC 33.3 30.0 - 36.5 g/dL  
 RDW 14.0 11.5 - 14.5 % PLATELET 105 (L) 402 - 400 K/uL MPV 10.9 8.9 - 12.9 FL  
 NRBC 0.0 0  WBC ABSOLUTE NRBC 0.00 0.00 - 0.01 K/uL NEUTROPHILS 74 32 - 75 % LYMPHOCYTES 16 12 - 49 % MONOCYTES 9 5 - 13 % EOSINOPHILS 1 0 - 7 % BASOPHILS 0 0 - 1 % IMMATURE GRANULOCYTES 0 0.0 - 0.5 % ABS. NEUTROPHILS 3.8 1.8 - 8.0 K/UL  
 ABS. LYMPHOCYTES 0.8 0.8 - 3.5 K/UL  
 ABS. MONOCYTES 0.5 0.0 - 1.0 K/UL  
 ABS. EOSINOPHILS 0.0 0.0 - 0.4 K/UL  
 ABS. BASOPHILS 0.0 0.0 - 0.1 K/UL  
 ABS. IMM. GRANS. 0.0 0.00 - 0.04 K/UL  
 DF AUTOMATED PROTHROMBIN TIME + INR Collection Time: 04/08/19 10:07 PM  
Result Value Ref Range INR 1.2 (H) 0.9 - 1.1 Prothrombin time 12.6 (H) 9.0 - 11.1 sec PTT Collection Time: 04/08/19 10:07 PM  
Result Value Ref Range aPTT 25.0 22.1 - 32.0 sec  
 aPTT, therapeutic range     58.0 - 77.0 SECS  
MAGNESIUM  Collection Time: 04/08/19 10:07 PM  
 Result Value Ref Range Magnesium 2.1 1.6 - 2.4 mg/dL TROPONIN I Collection Time: 04/08/19 10:07 PM  
Result Value Ref Range Troponin-I, Qt. <0.05 <0.05 ng/mL TSH 3RD GENERATION Collection Time: 04/08/19 10:07 PM  
Result Value Ref Range TSH >100.00 (H) 0.36 - 3.74 uIU/mL GLUCOSE, POC Collection Time: 04/09/19  3:55 AM  
Result Value Ref Range Glucose (POC) 179 (H) 65 - 100 mg/dL Performed by Frandy Jerez (EDT) GLUCOSE, POC Collection Time: 04/09/19  8:47 AM  
Result Value Ref Range Glucose (POC) 232 (H) 65 - 100 mg/dL Performed by Daksha Ruff (RN) DUPLEX CAROTID BILATERAL Collection Time: 04/09/19  9:29 AM  
Result Value Ref Range Right subclavian sys 89.0 cm/s RIGHT SUBCLAVIAN ARTERY D 0.00 cm/s Right cca dist sys 60.8 cm/s Right CCA dist longoria 0.0 cm/s Right CCA prox sys 41.9 cm/s Right CCA prox longoria 0.0 cm/s Right ICA dist sys 26.7 cm/s Right ICA dist longoria 5.3 cm/s Right ICA mid sys 25.5 cm/s Right ICA mid longoria 0.0 cm/s Right ICA prox sys 59.7 cm/s Right ICA prox longoria 2.7 cm/s Right eca sys 70.4 cm/s RIGHT EXTERNAL CAROTID ARTERY D 0.00 cm/s Right vertebral sys 38.3 cm/s RIGHT VERTEBRAL ARTERY D 9.23 cm/s Right ICA/CCA sys 1.0 Left subclavian sys 148.5 cm/s LEFT SUBCLAVIAN ARTERY D 52.85 cm/s Left CCA dist sys 50.8 cm/s Left CCA dist longoria 0.0 cm/s Left CCA prox sys 49.2 cm/s Left CCA prox longoria 0.0 cm/s Left ICA dist sys 27.8 cm/s Left ICA dist longoria 5.8 cm/s Left ICA mid sys 29.5 cm/s Left ICA mid longoria 4.3 cm/s Left ICA prox sys 64.0 cm/s Left ICA prox longoria 11.0 cm/s Left ECA sys 47.2 cm/s LEFT EXTERNAL CAROTID ARTERY D 0.00 cm/s Left vertebral sys 43.5 cm/s LEFT VERTEBRAL ARTERY D 0.00 cm/s Left ICA/CCA sys 1.26   
ECHO ADULT COMPLETE Collection Time: 04/09/19  9:58 AM  
Result Value Ref Range Aortic Valve Systolic Peak Velocity 445.32 cm/s Aortic Valve Area by Continuity of Peak Velocity 2.4 cm2 AoV PG 11.6 mmHg LVIDd 3.81 (A) 4.2 - 5.9 cm  
 LVPWd 1.09 (A) 0.6 - 1.0 cm LVIDs 3.36 cm IVSd 2.00 (A) 0.6 - 1.0 cm  
 LVOT d 2.35 cm  
 LVOT Peak Velocity 93.83 cm/s LVOT Peak Gradient 3.5 mmHg LVOT VTI 26.35 cm  
 MVA (PHT) 3.8 cm2  
 MV A Isaias 90.42 cm/s  
 MV E Isaias 58.45 cm/s  
 MV E/A 0.65   
 MV Mean Gradient 1.8 mmHg Mitral Valve Annulus Velocity Time Integral 33.30 cm Left Atrium to Aortic Root Ratio 0.85 LV Mass .0 (A) 88 - 224 g LV Mass AL Index 141.2 (A) 49 - 115 g/m2 LVPWs 1.21 cm  
 RVSP 24.2 mmHg MV Peak Gradient 4.3 mmHg Est. RA Pressure 10.0 mmHg Mitral Regurgitant Peak Velocity 60.65 cm/s Mitral Valve E Wave Deceleration Time 200.6 ms  
 Mitral Valve Pressure Half-time 58.2 ms Left Atrium Major Axis 2.82 cm Triscuspid Valve Regurgitation Peak Gradient 14.2 mmHg Mitral Valve Max Velocity 103.73 cm/s MVA VTI 3.4 cm2 LVOT .6 ml  
 TR Max Velocity 188.14 cm/s PASP 24.2 mmHg MR Peak Gradient 1.5 mmHg Ao Root D 3.32 cm  
 HEATHER/BSA Pk Isaias 1.2 cm2/m2 AV Cusp 0.00 cm GLUCOSE, POC Collection Time: 04/09/19 11:28 AM  
Result Value Ref Range Glucose (POC) 204 (H) 65 - 100 mg/dL Performed by LesConciergesNEY (PCT) CON   
GLUCOSE, POC Collection Time: 04/09/19  4:58 PM  
Result Value Ref Range Glucose (POC) 160 (H) 65 - 100 mg/dL Performed by LesConciergesNEY (PCT) CON   
 
EXAM: CT HEAD WO CONT 
  
INDICATION: Decreased alertness; Stroke 
  
COMPARISON: 8/12/2017. 
  
CONTRAST: None. 
  
TECHNIQUE: Unenhanced CT of the head was performed using 5 mm images. Brain and 
bone windows were generated.   CT dose reduction was achieved through use of a 
standardized protocol tailored for this examination and automatic exposure 
control for dose modulation.   
  
FINDINGS: 
The ventricles and sulci are prominent but symmetric in size, shape and 
 configuration and midline. There is moderate periventricular white matter 
hypodensity. There is no intracranial hemorrhage, extra-axial collection, mass, 
mass effect or midline shift. The basilar cisterns are open. No acute infarct 
is identified. The bone windows demonstrate no abnormalities. The visualized 
portions of the paranasal sinuses and mastoid air cells are clear. Bilateral 
vertebral artery calcification is noted. 
  
IMPRESSION IMPRESSION: Central atrophy. No acute intracranial process identified Assessment:  
 
Hospital Problems  Date Reviewed: 10/16/2015 Codes Class Noted POA Severe hypothyroidism ICD-10-CM: E03.8 ICD-9-CM: 244.8  4/9/2019 Unknown Hypoglycemia ICD-10-CM: E16.2 ICD-9-CM: 251.2  4/9/2019 Unknown Plan:  
 
1) Hypothyroidism > I agree with continuing the LT5 150mcg PO daily and the HC 100mg IV every 8 hours. If after a day his vitals look stable we can start to wean the stress dose steroids down. Will continue to attempt to reach his wife for further history. Will continue to monitor. Signed By: Darwin Whipple MD   
 April 9, 2019

## 2019-04-09 NOTE — PROGRESS NOTES
CM informed that pt has been accepted to [facility]. CM will inform pt/family, MD and nurse. KYRA Marie, [address] [facility]

## 2019-04-09 NOTE — ED PROVIDER NOTES
EMERGENCY DEPARTMENT HISTORY AND PHYSICAL EXAM 
 
 
Date: 4/8/2019 Patient Name: Eula Blas History of Presenting Illness Chief Complaint Patient presents with  Low Blood Sugar Pt arrives via EMS with low blood sugar, Pt given 175cc of D10 and 15 grams of oral glucose prior to arrival.   
 
 
History Provided By: Patient, Patient's Wife and EMS 
 
HPI: Eula Blas, [de-identified] y.o. male with PMHx significant for diabetes, presents via EMS to the ED with cc of low blood sugar per EMS. Patient's wife states that he began feeling bad about 24-48 hours ago. He feels weak, he has had no appetite, and he is not taking his home medicines. EMS was called to the [facility] when he became very lethargic and not alert. His blood sugar was 30 per EMS. He received D10 175 cc and oral glucose. According to his wife he does continue to have decreased alertness, and is having confusion such as not knowing who she is. His speech is slurred and he is slow to respond to questions. His wife states that this is not normal for him. She states that he will occasionally stutter words but is normally very alert and speech is coherent. There are no other complaints, changes, or physical findings at this time. PCP: Unknown, Provider No current facility-administered medications on file prior to encounter. Current Outpatient Medications on File Prior to Encounter Medication Sig Dispense Refill  polyethylene glycol (MIRALAX) 17 gram/dose powder Take 17 g by mouth daily. 238 g 0  
 HYDROcodone-acetaminophen (NORCO) 5-325 mg per tablet Take 1 Tab by mouth every four (4) hours as needed for Pain. Max Daily Amount: 6 Tabs. 15 Tab 0  
 ondansetron (ZOFRAN ODT) 4 mg disintegrating tablet Take 1 Tab by mouth every eight (8) hours as needed for Nausea. 10 Tab 0  
 MOISE-ALPHONSE 0.8 mg tab tablet   3  
 aspirin delayed-release 81 mg tablet Take 1 Tab by mouth daily.  30 Tab 11  
  amLODIPine (NORVASC) 5 mg tablet Take 5 mg by mouth daily.  ferrous sulfate 325 mg (65 mg iron) tablet Take 325 mg by mouth two (2) times a day.  PHOSPHORUS #1 (PHOSPHA 250 NEUTRAL PO) Take 2 Tabs by mouth daily.  calcium acetate (PHOSLO) 667 mg cap Take 1 Cap by mouth three (3) times daily (with meals).  insulin NPH/insulin regular (HUMULIN 70/30) 100 unit/mL (70-30) injection 30 Units by SubCUTAneous route daily.  glucose blood VI test strips (FREESTYLE LITE STRIPS) strip Check blood sugar once daily 1 Package 11  
 BD INSULIN SYRINGE ULTRA-FINE 1 mL 30 x 1/2\" syrg  Blood-Glucose Meter (FREESTYLE LITE METER) monitoring kit Check blood sugar once daily 1 kit 0  
 carvedilol (COREG) 25 mg tablet Take 25 mg by mouth two (2) times daily (with meals).  levothyroxine (SYNTHROID) 175 mcg tablet Take 150 mcg by mouth Daily (before breakfast).  pravastatin (PRAVACHOL) 10 mg tablet Take 20 mg by mouth nightly. Past History Past Medical History: 
Past Medical History:  
Diagnosis Date  CAD (coronary artery disease)  Chronic kidney disease   
 tues/thurs/sat TREE [address]  
 Diabetes ([facility] [address]  Endocrine disease   
 hypothyroid  Gastrointestinal disorder   
 gallstones  Hypertension  Stroke ([facility] [address] 2011 speech general weakness  Thyroid disease Past Surgical History: 
Past Surgical History:  
Procedure Laterality Date [address] [place] UNLISTED  HX HEENT    
 cataract   removal  rt eye  HX ORTHOPAEDIC    
 11/2014 R BK Amputation and L  
 HX OTHER SURGICAL    
 HX VASCULAR ACCESS    
 L arm shunt  VASCULAR SURGERY PROCEDURE UNLIST Fistula R arm Family History: 
Family History Problem Relation Age of Onset  Hypertension Mother  Diabetes Father Social History: 
Social History Tobacco Use  Smoking status: Never Smoker  Smokeless tobacco: Never Used Substance Use Topics  Alcohol use: No  
 Drug use: No  
  Types: Prescription Allergies: 
No Known Allergies Review of Systems Review of Systems Constitutional: Positive for appetite change. Negative for chills and fever. HENT: Negative for congestion, ear pain, rhinorrhea, sore throat and trouble swallowing. Eyes: Negative for visual disturbance. Respiratory: Negative for cough, chest tightness and shortness of breath. Cardiovascular: Negative for chest pain and palpitations. Gastrointestinal: Negative for abdominal pain, blood in stool, constipation, diarrhea, nausea and vomiting. Genitourinary: Negative for decreased urine volume, difficulty urinating, dysuria and frequency. Musculoskeletal: Negative for back pain and neck pain. Skin: Negative for color change and rash. Neurological: Positive for speech difficulty and weakness. Negative for dizziness, light-headedness and headaches. Psychiatric/Behavioral: Positive for confusion. Physical Exam  
Physical Exam  
Constitutional: He appears well-developed and well-nourished. Slow to respond to questions, dysphasia HENT:  
Mouth/Throat: Oropharynx is clear and moist.  
Eyes: Conjunctivae are normal.  
Neck: Neck supple. Cardiovascular: Normal rate and regular rhythm. Pulmonary/Chest: Effort normal and breath sounds normal. No accessory muscle usage. No respiratory distress. Abdominal: Soft. He exhibits no distension. There is no tenderness. Musculoskeletal:  
Bilateral BKA's Lymphadenopathy:  
  He has no cervical adenopathy. Neurological: He is alert. He has normal strength. He is disoriented (time and family members names). A cranial nerve deficit (left facial droop) is present. No sensory deficit. Skin: Skin is warm and dry. Nursing note and vitals reviewed. Diagnostic Study Results Labs - Recent Results (from the past 12 hour(s)) GLUCOSE, POC  Collection Time: 04/08/19  9:55 PM  
 Result Value Ref Range Glucose (POC) 92 65 - 100 mg/dL Performed by [facility] METABOLIC PANEL, COMPREHENSIVE Collection Time: 04/08/19 10:07 PM  
Result Value Ref Range Sodium 133 (L) 136 - 145 mmol/L Potassium 3.2 (L) 3.5 - 5.1 mmol/L Chloride 96 (L) 97 - 108 mmol/L  
 CO2 25 21 - 32 mmol/L Anion gap 12 5 - 15 mmol/L Glucose 141 (H) 65 - 100 mg/dL BUN 29 (H) 6 - 20 MG/DL Creatinine 7.28 (H) 0.70 - 1.30 MG/DL  
 BUN/Creatinine ratio 4 (L) 12 - 20 GFR est AA 9 (L) >60 ml/min/1.73m2 GFR est non-AA 7 (L) >60 ml/min/1.73m2 Calcium 9.0 8.5 - 10.1 MG/DL Bilirubin, total 0.5 0.2 - 1.0 MG/DL  
 ALT (SGPT) 34 12 - 78 U/L  
 AST (SGOT) 42 (H) 15 - 37 U/L Alk. phosphatase 89 45 - 117 U/L Protein, total 8.2 6.4 - 8.2 g/dL Albumin 3.2 (L) 3.5 - 5.0 g/dL Globulin 5.0 (H) 2.0 - 4.0 g/dL A-G Ratio 0.6 (L) 1.1 - 2.2    
CBC WITH AUTOMATED DIFF Collection Time: 04/08/19 10:07 PM  
Result Value Ref Range WBC 5.1 4.1 - 11.1 K/uL  
 RBC 2.99 (L) 4.10 - 5.70 M/uL  
 HGB 10.6 (L) 12.1 - 17.0 g/dL HCT 31.8 (L) 36.6 - 50.3 % .4 (H) 80.0 - 99.0 FL  
 MCH 35.5 (H) 26.0 - 34.0 PG  
 MCHC 33.3 30.0 - 36.5 g/dL  
 RDW 14.0 11.5 - 14.5 % PLATELET 696 (L) 161 - 400 K/uL MPV 10.9 8.9 - 12.9 FL  
 NRBC 0.0 0  WBC ABSOLUTE NRBC 0.00 0.00 - 0.01 K/uL NEUTROPHILS 74 32 - 75 % LYMPHOCYTES 16 12 - 49 % MONOCYTES 9 5 - 13 % EOSINOPHILS 1 0 - 7 % BASOPHILS 0 0 - 1 % IMMATURE GRANULOCYTES 0 0.0 - 0.5 % ABS. NEUTROPHILS 3.8 1.8 - 8.0 K/UL  
 ABS. LYMPHOCYTES 0.8 0.8 - 3.5 K/UL  
 ABS. MONOCYTES 0.5 0.0 - 1.0 K/UL  
 ABS. EOSINOPHILS 0.0 0.0 - 0.4 K/UL  
 ABS. BASOPHILS 0.0 0.0 - 0.1 K/UL  
 ABS. IMM. GRANS. 0.0 0.00 - 0.04 K/UL  
 DF AUTOMATED PROTHROMBIN TIME + INR Collection Time: 04/08/19 10:07 PM  
Result Value Ref Range INR 1.2 (H) 0.9 - 1.1 Prothrombin time 12.6 (H) 9.0 - 11.1 sec PTT Collection Time: 04/08/19 10:07 PM  
Result Value Ref Range aPTT 25.0 22.1 - 32.0 sec  
 aPTT, therapeutic range     58.0 - 77.0 SECS  
MAGNESIUM Collection Time: 04/08/19 10:07 PM  
Result Value Ref Range Magnesium 2.1 1.6 - 2.4 mg/dL TROPONIN I Collection Time: 04/08/19 10:07 PM  
Result Value Ref Range Troponin-I, Qt. <0.05 <0.05 ng/mL TSH 3RD GENERATION Collection Time: 04/08/19 10:07 PM  
Result Value Ref Range TSH >100.00 (H) 0.36 - 3.74 uIU/mL Radiologic Studies -  
CT HEAD WO CONT Final Result IMPRESSION: Central atrophy. No acute intracranial process identified XR CHEST PORT Final Result IMPRESSION: No acute cardiopulmonary process seen CT Results  (Last 48 hours) 04/08/19 2253  CT HEAD WO CONT Final result Impression:  IMPRESSION: Central atrophy. No acute intracranial process identified Narrative:  EXAM: CT HEAD WO CONT INDICATION: Decreased alertness; Stroke COMPARISON: 8/12/2017. CONTRAST: None. TECHNIQUE: Unenhanced CT of the head was performed using 5 mm images. Brain and  
bone windows were generated. CT dose reduction was achieved through use of a  
standardized protocol tailored for this examination and automatic exposure  
control for dose modulation. FINDINGS:  
The ventricles and sulci are prominent but symmetric in size, shape and  
configuration and midline. There is moderate periventricular white matter  
hypodensity. There is no intracranial hemorrhage, extra-axial collection, mass,  
mass effect or midline shift. The basilar cisterns are open. No acute infarct  
is identified. The bone windows demonstrate no abnormalities. The visualized  
portions of the paranasal sinuses and mastoid air cells are clear. Bilateral  
vertebral artery calcification is noted. CXR Results  (Last 48 hours) 04/08/19 2236  XR CHEST PORT Final result Impression:  IMPRESSION: No acute cardiopulmonary process seen Narrative:  EXAM: XR CHEST PORT INDICATION: weakness COMPARISON: 12/13/2017 FINDINGS: A portable AP radiograph of the chest was obtained at 2224 hours. The  
patient is on a cardiac monitor. The lungs are clear. The cardiac and  
mediastinal contours and pulmonary vascularity are normal.  The bones and soft  
tissues are grossly within normal limits. Medical Decision Making I am the first provider for this patient. I reviewed the vital signs, available nursing notes, past medical history, past surgical history, family history and social history. Vital Signs-Reviewed the patient's vital signs. Patient Vitals for the past 12 hrs: 
 Temp Pulse Resp BP SpO2  
04/08/19 2317  60 14 117/59 91 % 04/08/19 2306     92 % 04/08/19 2245  (!) 58 10 122/59 92 % 04/08/19 2230  (!) 59 13 126/63 96 % 04/08/19 2218  62 15 122/62 98 % 04/08/19 2217 97.4 °F (36.3 °C)      
04/08/19 2209  62 18  100 % 04/08/19 2208    125/65  Pulse Oximetry Analysis - 100% on RA Cardiac Monitor:  
Rate: 62 bpm 
Rhythm: Normal Sinus Rhythm EKG interpretation: (Preliminary) Rhythm: normal sinus rhythm; and regular . Rate (approx.): 62; Axis: normal; MS interval: normal; QRS interval: normal ; ST/T wave: normal; Other findings: normal. 
 
Records Reviewed: Nursing Notes, Old Medical Records, Ambulance Run Sheet, Previous Radiology Studies and Previous Laboratory Studies Provider Notes (Medical Decision Making): This patient with a history of diabetes presents with altered mental status and acute encephalopathy. EMS noted that he was hypoglycemic however after treatment of the low blood sugar he remains altered. He does have some speech difficulty and answering questions and a dysphasia which is new.   CT of his head is unremarkable however he may still have had a stroke. Unfortunately last known well was more than 24 hours ago outside the window for any intervention. His wife also notes that he has basically stopped taking all of his medicines at home. He has a history of hypothyroidism and is supposed to be taking Synthroid. His TSH level is extremely high above 100. 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 ED Course:  
Initial assessment performed. The patients presenting problems have been discussed, and they are in agreement with the care plan formulated and outlined with them. I have encouraged them to ask questions as they arise throughout their visit. Critical Care Time:  
0 Disposition: 
Admit Diagnosis Clinical Impression: 1. Acute encephalopathy 2. Hypothyroid coma ([facility] [address] This note will not be viewable in [address]

## 2019-04-09 NOTE — PROGRESS NOTES
TRANSFER - IN REPORT: 
 
Verbal report received from Irais Jarrett RN on Rosa Sanchez  being received from PCU for Dialysis. Report consisted of patients Situation, Background, Assessment and  
Recommendations(SBAR). Information from the following report(s) SBAR was reviewed with the receiving nurse. Opportunity for questions and clarification was provided. Assessment completed upon patients arrival to unit and care assumed.

## 2019-04-09 NOTE — PROGRESS NOTES
SHIFT SUMMARY: 
 
0601: Patient has bilateral below the knee amputations with bilateral prosthetics in place. Patient initially refused to remove prosthetics for fear of them being misplaced. Encouraged patient that belongings will be placed in patients closet. Additionally, the patient was made aware that the skin underneath prosthetics need to be assessed for wounds or skin breakdown. Patient agreed. With removal of the prosthetics, noted insects crawling out of bilateral shoes. Both prosthetics double bagged. Patient cleaned with Chlorhexidine wipes and linens changed. Charge RN, Rosa MALCOLM, in room at time. Noted a painful Pressure Ulcer Stage 2 to bilateral buttocks. Condition of wound is pink with area scabbed. Area cleaned with soap and water and REED. Mobility services and wound care consult placed.

## 2019-04-09 NOTE — PROGRESS NOTES
-Please complete MRI History and Safety Screening Form for this patient using [employer] only under Orders Requiring a Screening Form: 
 

## 2019-04-09 NOTE — PROCEDURES
[facility] Dialysis Team [place]  [phone] Vitals   Pre   Post   Assessment   Pre   Post    
Temp  Temp: 98 °F (36.7 °C) (04/09/19 1445)   LOC  A & O x 3 HR   Pulse (Heart Rate): 66 (04/09/19 1515)  Lungs   Clear B/P   BP: 112/60 (04/09/19 1515)  Cardiac   Normal sinus rhythm S1 S2 Resp   Resp Rate: 18 (04/09/19 1515)  Skin   Warm & dry Pain level  Pain Intensity 1: 0 (04/09/19 1134)  Edema  none Orders: Duration:   Start:    7782 End:     Total:     
Dialyzer:   Dialyzer/Set Up Inspection: Herbert Puckett (04/09/19 1445) K Bath:   Dialysate K (mEq/L): 3.5 (04/09/19 1445) Ca Bath:   Dialysate CA (mEq/L): 2.5 (04/09/19 1445) Na/Bicarb:   Dialysate NA (mEq/L): 140 (04/09/19 1445) Target Fluid Removal:   Goal/Amount of Fluid to Remove (mL): 2000 mL (04/09/19 1445) Access  AVF Type & Location:   LINA AVF Labs Obtained/Reviewed Critical Results Called   Date when labs were drawn- 
Hgb-   
HGB Date Value Ref Range Status 04/08/2019 10.6 (L) 12.1 - 17.0 g/dL Final  
 
K-   
Potassium Date Value Ref Range Status 04/08/2019 3.2 (L) 3.5 - 5.1 mmol/L Final  
 
Ca-  
Calcium Date Value Ref Range Status 04/08/2019 9.0 8.5 - 10.1 MG/DL Final  
 
Bun-  
BUN Date Value Ref Range Status 04/08/2019 29 (H) 6 - 20 MG/DL Final  
 
Creat-  
Creatinine Date Value Ref Range Status 04/08/2019 7.28 (H) 0.70 - 1.30 MG/DL Final  
 
  
Medications/ Blood Products Given Name   Dose   Route and Time Albumin 12.5gm  7536 Blood Volume Processed (BVP):     Net Fluid Removed:    
Comments Time Out Done: 1430 Primary Nurse Rpt Pre: Berna Madsen Primary Nurse Rpt Post: 
Pt Education: 
Care Plan: Continue HD Tx Summary:LINA AVF: skin CDI. No s/s of infection. No issues with cannulation or hemostasis. Running well at . Pt arrived to HD suite A&Ox3. Consent signed & on file. SBAR received from Primary RN. 1445: Pt cannulated with 23P needles per policy & Arterial and venous site has scar tissue, a bit challenging to cannulate. Labs drawn per request/ order. VSS. Dialysis Tx initiated. 1500: Pt. Stable, lines secure and visible 1515: Pt. Resting well lines secure and visible 1530: Pt. Stable, lines secure and visible 1545: pt. Resting, lines secure and visible 
1600: B/P hypotensive, albumin admin. 1830: Pt resting quietly. 1130: Tx ended. VSS. All possible blood returned to patient. Hemostasis achieved without issue. Bed locked and in the lowest position, call bell and belongings in reach. SBAR given to Primary, RN. Patient is stable at time of their/ my departure. All Dialysis related medications have been reviewed. Admiting Diagnosis: Altered Mental status / Hypoglycemia Pt's previous clinic- [facility] Consent signed - Informed Consent Verified: Yes (04/09/19 1445) Melisa Consent - Yes Hepatitis Status- Negative Machine #- Machine Number: [id number] (04/09/19 1445) Telemetry status- No 
Pre-dialysis wt. - Pre-Dialysis Weight: 82.4 kg (181 lb 10.5 oz) (04/09/19 1445)

## 2019-04-09 NOTE — PROGRESS NOTES
PCU SHIFT NURSING NOTE Bedside shift change report given to Dary Moralez (oncoming nurse) by Hoa Cruz (offgoing nurse). Report included the following information SBAR, Kardex, ED Summary, Recent Results and Cardiac Rhythm NSR. Shift Summary:  
8758 Patient in bed resting quietly. Vitals are stable. Admission Date 4/8/2019 Admission Diagnosis Severe hypothyroidism [E03.8] Hypoglycemia [E16.2] Consults IP CONSULT TO HOSPITALIST 
IP CONSULT TO NEUROLOGY 
IP CONSULT TO ENDOCRINOLOGY 
IP CONSULT TO NEPHROLOGY Consults []PT []OT []Speech  
[]Case Management  
  
[] Palliative Cardiac Monitoring Order []Yes []No  
 
IV drips []Yes Drip:                            Dose: 
Drip:                            Dose: 
Drip:                            Dose:  
[]No  
 
GI Prophylaxis []Yes []No  
 
 
 
DVT Prophylaxis SCDs:     
     
 Gio stockings:     
  
[] Medication []Contraindicated []None Activity Level Activity Level: Bed Rest   
 Activity Assistance: Complete care Purposeful Rounding every 1-2 hour? []Yes Sutton Score  Total Score: 3 Bed Alarm (If score 3 or >) []Yes  
[] Refused (See signed refusal form in chart) Hunter Score  Hunter Score: 12 Hunter Score (if score 14 or less) []PMT consult  
[]Wound Care consult []Specialty bed  
[] Nutrition consult Needs prior to discharge:  
Home O2 required:   
[]Yes []No  
 If yes, how much O2 required? Other:  
 Last Bowel Movement: Last Bowel Movement Date: 04/09/19(had stool prior to admit to floor/assessed by ER RN) Influenza Vaccine Pneumonia Vaccine Diet Active Orders Diet DIET NPO  
  
LDAs Peripheral IV 04/08/19 Right Hand (Active) Site Assessment Clean, dry, & intact 4/9/2019  6:01 AM  
Phlebitis Assessment 0 4/9/2019  6:01 AM  
Infiltration Assessment 0 4/9/2019  6:01 AM  
Dressing Status Clean, dry, & intact 4/9/2019  6:01 AM  
 Dressing Type Tape;Transparent 4/9/2019  6:01 AM  
Hub Color/Line Status Pink; Infusing 4/9/2019  6:01 AM  
                  
Urinary Catheter Intake & Output Readmission Risk Assessment Tool Score High Risk 32 Total Score 3 Has Seen PCP in Last 6 Months (Yes=3, No=0) 2 . Living with Significant Other. Assisted Living. LTAC. SNF. or  
Rehab  
 9 Pt. Coverage (Medicare=5 , Medicaid, or Self-Pay=4) 17 Charlson Comorbidity Score (Age + Comorbid Conditions) Criteria that do not apply:  
 Patient Length of Stay (>5 days = 3) IP Visits Last 12 Months (1-3=4, 4=9, >4=11) Expected Length of Stay 5d 0h  
Actual Length of Stay 0

## 2019-04-10 LAB
ALBUMIN SERPL-MCNC: 3.3 G/DL (ref 3.5–5)
ALBUMIN/GLOB SERPL: 0.7 {RATIO} (ref 1.1–2.2)
ALP SERPL-CCNC: 81 U/L (ref 45–117)
ALT SERPL-CCNC: 34 U/L (ref 12–78)
ANION GAP SERPL CALC-SCNC: 9 MMOL/L (ref 5–15)
AST SERPL-CCNC: 33 U/L (ref 15–37)
BILIRUB DIRECT SERPL-MCNC: 0.2 MG/DL (ref 0–0.2)
BILIRUB SERPL-MCNC: 0.5 MG/DL (ref 0.2–1)
BUN SERPL-MCNC: 24 MG/DL (ref 6–20)
BUN/CREAT SERPL: 4 (ref 12–20)
CALCIUM SERPL-MCNC: 8.7 MG/DL (ref 8.5–10.1)
CHLORIDE SERPL-SCNC: 98 MMOL/L (ref 97–108)
CHOLEST SERPL-MCNC: 336 MG/DL
CO2 SERPL-SCNC: 26 MMOL/L (ref 21–32)
CREAT SERPL-MCNC: 6.03 MG/DL (ref 0.7–1.3)
ERYTHROCYTE [DISTWIDTH] IN BLOOD BY AUTOMATED COUNT: 14.1 % (ref 11.5–14.5)
EST. AVERAGE GLUCOSE BLD GHB EST-MCNC: 154 MG/DL
GLOBULIN SER CALC-MCNC: 4.6 G/DL (ref 2–4)
GLUCOSE BLD STRIP.AUTO-MCNC: 219 MG/DL (ref 65–100)
GLUCOSE BLD STRIP.AUTO-MCNC: 231 MG/DL (ref 65–100)
GLUCOSE BLD STRIP.AUTO-MCNC: 254 MG/DL (ref 65–100)
GLUCOSE BLD STRIP.AUTO-MCNC: 256 MG/DL (ref 65–100)
GLUCOSE SERPL-MCNC: 254 MG/DL (ref 65–100)
HBA1C MFR BLD: 7 % (ref 4.2–6.3)
HCT VFR BLD AUTO: 32.2 % (ref 36.6–50.3)
HDLC SERPL-MCNC: 44 MG/DL
HDLC SERPL: 7.6 {RATIO} (ref 0–5)
HGB BLD-MCNC: 11 G/DL (ref 12.1–17)
LDLC SERPL CALC-MCNC: 279.2 MG/DL (ref 0–100)
LIPID PROFILE,FLP: ABNORMAL
MAGNESIUM SERPL-MCNC: 2 MG/DL (ref 1.6–2.4)
MCH RBC QN AUTO: 35.9 PG (ref 26–34)
MCHC RBC AUTO-ENTMCNC: 34.2 G/DL (ref 30–36.5)
MCV RBC AUTO: 105.2 FL (ref 80–99)
NRBC # BLD: 0 K/UL (ref 0–0.01)
NRBC BLD-RTO: 0 PER 100 WBC
PHOSPHATE SERPL-MCNC: 2.9 MG/DL (ref 2.6–4.7)
PLATELET # BLD AUTO: 138 K/UL (ref 150–400)
PMV BLD AUTO: 10.1 FL (ref 8.9–12.9)
POTASSIUM SERPL-SCNC: 4.2 MMOL/L (ref 3.5–5.1)
PROT SERPL-MCNC: 7.9 G/DL (ref 6.4–8.2)
RBC # BLD AUTO: 3.06 M/UL (ref 4.1–5.7)
SERVICE CMNT-IMP: ABNORMAL
SODIUM SERPL-SCNC: 133 MMOL/L (ref 136–145)
T3FREE SERPL-MCNC: 0.7 PG/ML (ref 2.2–4)
T4 FREE SERPL-MCNC: 0.2 NG/DL (ref 0.8–1.5)
TRIGL SERPL-MCNC: 64 MG/DL (ref ?–150)
TSH SERPL DL<=0.05 MIU/L-ACNC: 89.8 UIU/ML (ref 0.36–3.74)
VLDLC SERPL CALC-MCNC: 12.8 MG/DL
WBC # BLD AUTO: 5 K/UL (ref 4.1–11.1)

## 2019-04-10 PROCEDURE — 74011250636 HC RX REV CODE- 250/636: Performed by: INTERNAL MEDICINE

## 2019-04-10 PROCEDURE — 65660000000 HC RM CCU STEPDOWN

## 2019-04-10 PROCEDURE — 84481 FREE ASSAY (FT-3): CPT

## 2019-04-10 PROCEDURE — 80061 LIPID PANEL: CPT

## 2019-04-10 PROCEDURE — 84443 ASSAY THYROID STIM HORMONE: CPT

## 2019-04-10 PROCEDURE — 74011636637 HC RX REV CODE- 636/637: Performed by: INTERNAL MEDICINE

## 2019-04-10 PROCEDURE — 83735 ASSAY OF MAGNESIUM: CPT

## 2019-04-10 PROCEDURE — 84439 ASSAY OF FREE THYROXINE: CPT

## 2019-04-10 PROCEDURE — 85027 COMPLETE CBC AUTOMATED: CPT

## 2019-04-10 PROCEDURE — 83036 HEMOGLOBIN GLYCOSYLATED A1C: CPT

## 2019-04-10 PROCEDURE — 76450000000

## 2019-04-10 PROCEDURE — 80076 HEPATIC FUNCTION PANEL: CPT

## 2019-04-10 PROCEDURE — 74011250637 HC RX REV CODE- 250/637: Performed by: INTERNAL MEDICINE

## 2019-04-10 PROCEDURE — 84100 ASSAY OF PHOSPHORUS: CPT

## 2019-04-10 PROCEDURE — 36415 COLL VENOUS BLD VENIPUNCTURE: CPT

## 2019-04-10 PROCEDURE — 82962 GLUCOSE BLOOD TEST: CPT

## 2019-04-10 PROCEDURE — 80048 BASIC METABOLIC PNL TOTAL CA: CPT

## 2019-04-10 RX ORDER — PREDNISONE 20 MG/1
40 TABLET ORAL
Status: DISCONTINUED | OUTPATIENT
Start: 2019-04-11 | End: 2019-04-12 | Stop reason: HOSPADM

## 2019-04-10 RX ADMIN — INSULIN LISPRO 3 UNITS: 100 INJECTION, SOLUTION INTRAVENOUS; SUBCUTANEOUS at 08:58

## 2019-04-10 RX ADMIN — Medication 10 ML: at 06:01

## 2019-04-10 RX ADMIN — INSULIN LISPRO 3 UNITS: 100 INJECTION, SOLUTION INTRAVENOUS; SUBCUTANEOUS at 12:33

## 2019-04-10 RX ADMIN — INSULIN LISPRO 2 UNITS: 100 INJECTION, SOLUTION INTRAVENOUS; SUBCUTANEOUS at 17:50

## 2019-04-10 RX ADMIN — HUMAN INSULIN 5 UNITS: 100 INJECTION, SUSPENSION SUBCUTANEOUS at 17:50

## 2019-04-10 RX ADMIN — Medication 10 ML: at 14:00

## 2019-04-10 RX ADMIN — HEPARIN SODIUM 5000 UNITS: 5000 INJECTION INTRAVENOUS; SUBCUTANEOUS at 08:58

## 2019-04-10 RX ADMIN — INSULIN LISPRO 1 UNITS: 100 INJECTION, SOLUTION INTRAVENOUS; SUBCUTANEOUS at 21:33

## 2019-04-10 RX ADMIN — Medication 10 ML: at 21:34

## 2019-04-10 RX ADMIN — ASPIRIN 81 MG 81 MG: 81 TABLET ORAL at 08:59

## 2019-04-10 RX ADMIN — HYDROCORTISONE SODIUM SUCCINATE 100 MG: 100 INJECTION, POWDER, FOR SOLUTION INTRAMUSCULAR; INTRAVENOUS at 14:00

## 2019-04-10 RX ADMIN — HEPARIN SODIUM 5000 UNITS: 5000 INJECTION INTRAVENOUS; SUBCUTANEOUS at 21:34

## 2019-04-10 NOTE — PROGRESS NOTES
Spiritual Care Assessment/Progress Note [address] 
 
 
NAME: Channing Luong      MRN: [id number] AGE: [de-identified] y.o. SEX: male Ep[employer]alian Affiliation: [employer]  
Language: [place] 4/10/2019     Total Time (in minutes): 15 Spiritual Assessment begun in [facility] 2 PROGRESSIVE CARE through conversation with: 
  
    []Patient        [] Family    [] Friend(s) Reason for Consult: Palliative Care, Initial/Spiritual Assessment Spiritual beliefs: (Please include comment if needed) 
   [] Identifies with a jagjit tradition:     
   [] Supported by a jagjit community:        
   [] Claims no spiritual orientation:       
   [] Seeking spiritual identity:            
   [] Adheres to an individual form of spirituality:       
   [x] Not able to assess:                   
 
    
Identified resources for coping:  
   [] Prayer                           
   [] Music                  [] Guided Imagery 
   [] Family/friends                 [] Pet visits [] Devotional reading                         [x] Unknown 
   [] Other:                                      
 
 
Interventions offered during this visit: (See comments for more details) Plan of Care: 
 
 [] Support spiritual and/or cultural needs  
 [] Support AMD and/or advance care planning process    
 [] Support grieving process 
 [] Coordinate Rites and/or Rituals  
 [] Coordination with community clergy 
 [x] No spiritual needs identified at this time 
 [] Detailed Plan of Care below (See Comments)  [] Make referral to Music Therapy 
[] Make referral to Pet Therapy    
[] Make referral to Addiction services 
[] Make referral to [facility] 
[] Make referral to Spiritual Care Partner 
[] No future visits requested       
[x] Follow up visits as needed Comments: I knocked and entered the patient's room. The patient was in bed with the sheets covering his head. No family was present. Rev. Herberth Arredondo EdD MDiv Palliative  Fellow For [address]ui Page [phone])

## 2019-04-10 NOTE — PROGRESS NOTES
Pt non compliant, d/w wife. He had been refusing to take his meds for long time. 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 Full note to follow

## 2019-04-10 NOTE — PROGRESS NOTES
Hospitalist Progress Note NAME: Pj Loyola :  1940 MRN:  [id number] Assessment / Plan: 
Myxedema coma with acute metabolic encephalopathy with like underlying dementia, Hypoglycemia with BS 30 at home and hypotension POA Continue Synthroid Taper steroids considering remain hemodynamically stable > 24 hours Appreciate endocrinology input TSH more than 100 in setting of noncompliance Palliative care consult Slurred speech secondary to above CT scan of the brain negative Awaiting MRI brain Neurology is on the case 2D echo with normal EF Carotid doppler without significant stenosis Hypoglycemia We will give gentle hydration with D5 normal saline at 25 cc/h as patient is end-stage renal disease on hemodialysis End-stage renal disease on hemodialysis Nephro on for managing HD 
  
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 
  
Body mass index is 29.05 kg/m². therefore classifying patient as overweight Code Status: Full Code, Wife is NOK 
DVT Prophylaxis: Lovenox GI Prophylaxis: not indicated Subjective: Pt seen and examined at bedside. Poor insight, NAD. Overnight events d/w RN Chief Complaint / Reason for Physician Visit: f/u \"acute encephalopathy\" Review of Systems: 
Symptom Y/N Comments  Symptom Y/N Comments Fever/Chills n   Chest Pain n   
Poor Appetite    Edema Cough n   Abdominal Pain n   
Sputum    Joint Pain SOB/SOLANO n   Pruritis/Rash Nausea/vomit    Tolerating PT/OT n Diarrhea    Tolerating Diet y Constipation    Other Could NOT obtain due to:   
 
Objective: VITALS:  
Last 24hrs VS reviewed since prior progress note. Most recent are: Patient Vitals for the past 24 hrs: 
 Temp Pulse Resp BP SpO2  
04/10/19 0305 98.2 °F (36.8 °C) 65 18 125/69 97 % 04/09/19 2317  69  125/62   
04/09/19 2314 97.9 °F (36.6 °C) 67 18 125/62 98 % 04/09/19 1850 98.2 °F (36.8 °C) 69 18 121/54 100 % 04/09/19 1815  63 18 120/61   
04/09/19 1800  60 18 107/62   
04/09/19 1745  63 18 105/60   
04/09/19 1729  62 16 106/56   
04/09/19 1715  61 17 113/51   
04/09/19 1700  65 17 110/55   
04/09/19 1645  66 17 115/63  Intake/Output Summary (Last 24 hours) at 4/10/2019 1642 Last data filed at 4/10/2019 7378 Gross per 24 hour Intake 360 ml Output 2000 ml Net -1640 ml PHYSICAL EXAM: 
General: WD, WN. Alert, cooperative, no acute distress   
EENT:  EOMI. Anicteric sclerae. MMM Resp:  CTA bilaterally, no wheezing or rales. No accessory muscle use CV:  Regular  rhythm,  No edema GI:  Soft, Non distended, Non tender.  +Bowel sounds Neurologic:  Alert, normal speech, Psych:   Poor insight. Not anxious nor agitated Skin:  No rashes. No jaundice Reviewed most current lab test results and cultures  YES Reviewed most current radiology test results   YES Review and summation of old records today    NO Reviewed patient's current orders and MAR    YES 
PMH/ reviewed - no change compared to H&P 
________________________________________________________________________ Care Plan discussed with: 
  Comments Patient y Family  y At bedside RN y   
Care Manager Consultant  y Multidiciplinary team rounds were held today with , nursing, pharmacist and clinical coordinator. Patient's plan of care was discussed; medications were reviewed and discharge planning was addressed. ________________________________________________________________________ Total NON critical care TIME:  35 Minutes Total CRITICAL CARE TIME Spent:   Minutes non procedure based Comments >50% of visit spent in counseling and coordination of care    
________________________________________________________________________ Albino Mendez MD  
 
Procedures: see electronic medical records for all procedures/Xrays and details which were not copied into this note but were reviewed prior to creation of Plan. LABS: 
I reviewed today's most current labs and imaging studies. Pertinent labs include: 
Recent Labs 04/10/19 
0421 04/08/19 
2207 WBC 5.0 5.1 HGB 11.0* 10.6* HCT 32.2* 31.8*  
* 144* Recent Labs 04/10/19 
0421 04/08/19 
2207 * 133* K 4.2 3.2*  
CL 98 96* CO2 26 25 * 141* BUN 24* 29* CREA 6.03* 7.28* CA 8.7 9.0 MG 2.0 2.1 PHOS 2.9  --   
ALB 3.3* 3.2* TBILI 0.5 0.5 SGOT 33 42* ALT 34 34 INR  --  1.2* Signed: Albino Mendez MD

## 2019-04-10 NOTE — WOUND CARE
Wound Care consult follow up: Yesterday patient could not be seen due to being off of the unit so orders were written for skin care based on available documentation of the wound. Today the patient was examined and the orders for skin care to the buttocks still stands as correct treatment. Patient has some moisture skin damage that is very minor and both of them gamal well. Continue the same - apply the Sensicare zinc cream to the buttock / gluteal cleft at least twice per day and as needed for incontinence. Prevent pressure injury by turning the patient at least every two hours. He needs help with this and he is bed / wheelchair bound while in the hospital. Patient is double amputee.   
Sudarshan Freire RN, BSN, [employer]

## 2019-04-10 NOTE — PROGRESS NOTES
2378-received report from Valentina Mackey, [facility] pt in bed awake getting ready to go to sleep. Assessment, wound care done, removed non-functional IV. Pt denies having any pain. No complaints voiced.

## 2019-04-10 NOTE — PROGRESS NOTES
[place]: [phone] ([phone]) [facility]: [phone] ([phone]) Channing Luong is an [de-identified] yo  [facility] American male who lives at home with his wife. He came in to the hospital due to low blood sugar. No family present in ED when assessment done. Patient, according to chart, was noted to be lethargic and weak at home. With D10 and glucose, patient's BS improved. 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 Palliative Medicine asked to be involved for Care Decisions by Dr Victor Manuel Milner. Dr Victor Manuel Milner, per chart, had discussed Code Status with family and they wanted patient to remain Full Code. Per the same note, patient had asked for his wife to be his medical decision maker. No AMD is completed. (Please see Dr. Victor Manuel Milner' ACP note). Patient reported to be decisional by his bedside RN, Margaret Lara. Patient may be able to make his own decisions regarding his care if alert and cognizant. Patient is noted to be \"non-compliant\" at home with his Levothyroxine. This may be true, however from talking to staff caring for patient, his home environment has been less than ideal and likely not hygienic. Apparently patient came in with caked feces and his prosthetics had \"bugs\" in them. Per staff, one of the daughters noted that the house is frost infested and daughter reported that she won't even go there. Patient has voiced to staff that no one cares for him at home and that he would like to go to LTC facility go live. It is likely that there is no one to assist him with patient's  medications at home or to follow up with his needs. Patient was fast asleep when LCSW went in, covered up with his blanket. He did respond to sound of his name but seemed tired and asked that LCSW come back later. If patient can complete AMD, that will be assessed and attempted. May be helpful to discuss Code Status with patient if he is decisional, to see what he would want.  May attempt this without family present first to see what patient understands and what his thoughts are. May need to involve family so they know patient's wishes. Will follow up again tomorrow.

## 2019-04-10 NOTE — PROGRESS NOTES
Progress Note Patient: Zhen Daley MRN: [id number]  SSN: [id number] YOB: 1940  Age: [de-identified] y.o. Sex: male Admit Date: 4/8/2019 LOS: 1 day Subjective: No acute events overnight. This AM pt awake, still drowsy, and oriented to person and place. He had some memory of me from yesterday. He does not know what his home dose of LT4 was \"my wife will know\". Per the RN he did take his LT4 150mcg this AM with a sip of water. He denies issues of CP, SOB, abdominal pain. Objective:  
 
Vitals:  
 04/09/19 1850 04/09/19 2314 04/09/19 2317 04/10/19 5238 BP: 121/54 125/62 125/62 125/69 Pulse: 69 67 69 65 Resp: 18 18  18 Temp: 98.2 °F (36.8 °C) 97.9 °F (36.6 °C)  98.2 °F (36.8 °C) TempSrc:      
SpO2: 100% 98%  97% Weight:    181 lb 1.6 oz (82.1 kg) Height:      
  
 
Intake and Output: 
Current Shift: No intake/output data recorded. Last three shifts: 04/08 1901 - 04/10 0700 In: 600 [P.O.:600] Out: 2000 Physical Exam:  
GENERAL: fatigued, cooperative, no distress, appears stated age LUNG: clear to auscultation bilaterally HEART: regular rate and rhythm, S1, S2 normal, no murmur, click, rub or gallop ABDOMEN: soft, non-tender. Bowel sounds normal. No masses,  no organomegaly NEUROLOGIC: Drowsy, but alert and oriented to person and place Lab/Data Review: All lab results for the last 24 hours reviewed. Assessment:  
 
Active Problems: 
  Severe hypothyroidism (4/9/2019) Hypoglycemia (4/9/2019) Plan:  
 
1) Hypothyroidism > Continue the Levothyroxine 150mcg PO daily. Will attempt to contact his wife to get more history on his pre-admission adherence to his home LT4. If he has been taking the LT4 then we may need to increase his home dose some. Signed By: Kendell Huitron MD   
 April 10, 2019

## 2019-04-10 NOTE — PROGRESS NOTES
Name: Katie Pelletier MRN: [id number] : 1940 Assessment: 
ESRD ([facility] unit; TTS) HTN Hypothyroidism with TSH >100 AMS Anemia of ESRD- mild DM-2 SHPT with low Phos-not on binders Plan/Recommendations: 
HD tomm per schedule Subjective: 
Resting. Muffled speech. Hard to interpret No acute c/o ROS:  
As above Exam: 
Visit Vitals /69 (BP 1 Location: Right arm, BP Patient Position: At rest) Pulse 65 Temp 98.2 °F (36.8 °C) Resp 18 Ht 5' 6\" (1.676 m) Wt 82.1 kg (181 lb 1.6 oz) SpO2 97% BMI 29.23 kg/m² Elderly in NAD Clear RRR, no rub L arm access patent B/l LE amputation Alert awake. Confused Current Facility-Administered Medications Medication Dose Route Frequency Last Dose  sodium chloride (NS) flush 5-40 mL  5-40 mL IntraVENous Q8H 10 mL at 04/10/19 0601  
 sodium chloride (NS) flush 5-40 mL  5-40 mL IntraVENous PRN 10 mL at 19 0426  acetaminophen (TYLENOL) tablet 650 mg  650 mg Oral Q4H PRN Or  
 acetaminophen (TYLENOL) solution 650 mg  650 mg Per NG tube Q4H PRN Or  
 acetaminophen (TYLENOL) suppository 650 mg  650 mg Rectal Q4H PRN    
 aspirin chewable tablet 81 mg  81 mg Oral DAILY 81 mg at 04/10/19 0859  labetalol (NORMODYNE;TRANDATE) injection 20 mg  20 mg IntraVENous Q4H PRN    
 insulin lispro (HUMALOG) injection   SubCUTAneous AC&HS 3 Units at 04/10/19 3828  glucose chewable tablet 16 g  4 Tab Oral PRN    
 dextrose (D50W) injection syrg 12.5-25 g  12.5-25 g IntraVENous PRN    
 glucagon (GLUCAGEN) injection 1 mg  1 mg IntraMUSCular PRN    
 albumin human 25% (BUMINATE) solution 12.5 g  12.5 g IntraVENous DIALYSIS PRN 12.5 g at 19 1607  levothyroxine (SYNTHROID) tablet 150 mcg  150 mcg Oral 6am Stopped at 04/10/19 0600  heparin (porcine) injection 5,000 Units  5,000 Units SubCUTAneous Q12H 5,000 Units at 04/10/19 0672  hydrocortisone Sod Succ (PF) (SOLU-CORTEF) injection 100 mg  100 mg IntraVENous Q8H Labs/Data: 
 
Lab Results Component Value Date/Time WBC 5.0 04/10/2019 04:21 AM  
 Hemoglobin (POC) 10.5 (L) 10/16/2015 07:32 AM  
 HGB 11.0 (L) 04/10/2019 04:21 AM  
 Hematocrit (POC) 31 (L) 10/16/2015 07:32 AM  
 HCT 32.2 (L) 04/10/2019 04:21 AM  
 PLATELET 103 (L) 98/96/1515 04:21 AM  
 .2 (H) 04/10/2019 04:21 AM  
 
 
Lab Results Component Value Date/Time Sodium 133 (L) 04/10/2019 04:21 AM  
 Potassium 4.2 04/10/2019 04:21 AM  
 Chloride 98 04/10/2019 04:21 AM  
 CO2 26 04/10/2019 04:21 AM  
 Anion gap 9 04/10/2019 04:21 AM  
 Glucose 254 (H) 04/10/2019 04:21 AM  
 BUN 24 (H) 04/10/2019 04:21 AM  
 Creatinine 6.03 (H) 04/10/2019 04:21 AM  
 BUN/Creatinine ratio 4 (L) 04/10/2019 04:21 AM  
 GFR est AA 11 (L) 04/10/2019 04:21 AM  
 GFR est non-AA 9 (L) 04/10/2019 04:21 AM  
 Calcium 8.7 04/10/2019 04:21 AM  
 
 
Wt Readings from Last 3 Encounters:  
04/10/19 82.1 kg (181 lb 1.6 oz) 01/01/19 81.6 kg (180 lb) 12/13/17 99 kg (218 lb 4.1 oz) Intake/Output Summary (Last 24 hours) at 4/10/2019 1230 Last data filed at 4/10/2019 1339 Gross per 24 hour Intake 600 ml Output 2000 ml Net -1400 ml Patient seen and examined. Chart reviewed. Labs, data and other pertinent notes reviewed in last 24 hrs. PMH/SH/FH reviewed and unchanged compared to H&P Discussed with pt Brie Ren MD

## 2019-04-10 NOTE — PROGRESS NOTES
NEUROLOGY NOTE Chief Complaint Patient presents with  Low Blood Sugar Pt arrives via EMS with low blood sugar, Pt given 175cc of D10 and 15 grams of oral glucose prior to arrival.   
 
 
SUBJECTIVE: 
Family states that he is doing better Has baseline dementia HPI Jennifer Brooks is a [de-identified] y.o. male who presents to the hospital because of alt mental status. Hx obtained by chart review. According to admission notes \"sent to the ED for low blood sugar associated with altered mental status.  Most of the HPI obtained from the ED notes  As patient is not able to contribute to any history.  Per family patient was feeling weak and had no appetite and was not compliant with his home medication.  When EMS arrived patient was found to be lethargic and his blood sugar was in the 30s.  He received D10 and oral glucose, his blood sugar improved to the 90s but remained confused and lethargic and his SPEECH was found to be slurred. In the ED, his vital signs were stable, his labs showed normal CBC, BMP showed hypokalemia, his TSH was found to be more than 100.  CT scan of the brain was negative for any acute stroke. e above problems. \" 
 
Pt is more responsive to me today. ROS A ten system review of constitutional, cardiovascular, respiratory, musculoskeletal, endocrine, skin, SHEENT, genitourinary, psychiatric and neurologic systems was obtained and is unremarkable except as stated in HPI  
 
PMH Past Medical History:  
Diagnosis Date  CAD (coronary artery disease)  Chronic kidney disease   
 tues/thurs/sat [facility] [address]  
 Diabetes ([facility] [address]  Endocrine disease   
 hypothyroid  Gastrointestinal disorder   
 gallstones  Hypertension  Stroke ([facility] [address] 2011 speech general weakness  Thyroid disease [place] Family History Problem Relation Age of Onset  Hypertension Mother  Diabetes Father [facility] FACILITY Social History Socioeconomic History  Marital status:   
 Spouse name: Not on file  Number of children: Not on file  Years of education: Not on file  Highest education level: Not on file Tobacco Use  Smoking status: Never Smoker  Smokeless tobacco: Never Used Substance and Sexual Activity  Alcohol use: No  
 Drug use: No  
  Types: Prescription ALLERGIES No Known Allergies PHYSICAL EXAMINATION:  
Patient Vitals for the past 24 hrs: 
 Temp Pulse Resp BP SpO2  
04/10/19 0305 98.2 °F (36.8 °C) 65 18 125/69 97 % 04/09/19 2317  69  125/62   
04/09/19 2314 97.9 °F (36.6 °C) 67 18 125/62 98 % 04/09/19 1850 98.2 °F (36.8 °C) 69 18 121/54 100 % 04/09/19 1815  63 18 120/61   
04/09/19 1800  60 18 107/62   
04/09/19 1745  63 18 105/60   
04/09/19 1729  62 16 106/56   
04/09/19 1715  61 17 113/51   
04/09/19 1700  65 17 110/55   
04/09/19 1645  66 17 115/63   
04/09/19 1630  63 18 114/61   
04/09/19 1615  65 18 104/60   
04/09/19 1600  64 17 105/59   
04/09/19 1545  66 16 (!) 89/52   
04/09/19 1530  65 18 102/56   
04/09/19 1515  66 18 112/60   
04/09/19 1500  66 18 115/60   
04/09/19 1445 98 °F (36.7 °C) 67 18 116/60 100 % General:  
General appearance: Pt is in no acute distress Distal pulses are preserved Neurological Examination:  
Mental Status:  AAO x2. Speech is slow. Follows commands, has abnormal fund of knowledge, attention, short term recall, comprehension and insight. Cranial Nerves: Visual fields are full. PERRL, Extraocular movements are full. Facial sensation intact. Facial movement intact. Hearing intact to conversation. Palate elevates symmetrically. Shoulder shrug symmetric. Tongue midline. Motor: Strength is 5/5 in all 4 ext. Bilateral BKA. Normal tone. No atrophy. Sensation: Normal to light touch Coordination/Cerebellar: Intact to finger-nose-finger Gait: deferred Skin: No significant bruising or lacerations.  
 
LAB DATA REVIEWED:   
 Recent Results (from the past 24 hour(s)) GLUCOSE, POC Collection Time: 04/09/19  4:58 PM  
Result Value Ref Range Glucose (POC) 160 (H) 65 - 100 mg/dL Performed by ULX CAM (PCT) GRACE   
GLUCOSE, POC Collection Time: 04/09/19  8:53 PM  
Result Value Ref Range Glucose (POC) 260 (H) 65 - 100 mg/dL Performed by Fay ALANIS   
LIPID PANEL Collection Time: 04/10/19  4:21 AM  
Result Value Ref Range LIPID PROFILE Cholesterol, total 336 (H) <200 MG/DL Triglyceride 64 <150 MG/DL  
 HDL Cholesterol 44 MG/DL  
 LDL, calculated 279.2 (H) 0 - 100 MG/DL VLDL, calculated 12.8 MG/DL  
 CHOL/HDL Ratio 7.6 (H) 0.0 - 5.0 HEMOGLOBIN A1C WITH EAG Collection Time: 04/10/19  4:21 AM  
Result Value Ref Range Hemoglobin A1c 7.0 (H) 4.2 - 6.3 % Est. average glucose 154 mg/dL CBC W/O DIFF Collection Time: 04/10/19  4:21 AM  
Result Value Ref Range WBC 5.0 4.1 - 11.1 K/uL  
 RBC 3.06 (L) 4.10 - 5.70 M/uL  
 HGB 11.0 (L) 12.1 - 17.0 g/dL HCT 32.2 (L) 36.6 - 50.3 % .2 (H) 80.0 - 99.0 FL  
 MCH 35.9 (H) 26.0 - 34.0 PG  
 MCHC 34.2 30.0 - 36.5 g/dL  
 RDW 14.1 11.5 - 14.5 % PLATELET 785 (L) 178 - 400 K/uL MPV 10.1 8.9 - 12.9 FL  
 NRBC 0.0 0  WBC ABSOLUTE NRBC 0.00 0.00 - 0.01 K/uL T3, FREE Collection Time: 04/10/19  4:21 AM  
Result Value Ref Range Free Triiodothyronine (T3) 0.7 (L) 2.2 - 4.0 pg/mL METABOLIC PANEL, BASIC Collection Time: 04/10/19  4:21 AM  
Result Value Ref Range Sodium 133 (L) 136 - 145 mmol/L Potassium 4.2 3.5 - 5.1 mmol/L Chloride 98 97 - 108 mmol/L  
 CO2 26 21 - 32 mmol/L Anion gap 9 5 - 15 mmol/L Glucose 254 (H) 65 - 100 mg/dL BUN 24 (H) 6 - 20 MG/DL Creatinine 6.03 (H) 0.70 - 1.30 MG/DL  
 BUN/Creatinine ratio 4 (L) 12 - 20 GFR est AA 11 (L) >60 ml/min/1.73m2 GFR est non-AA 9 (L) >60 ml/min/1.73m2 Calcium 8.7 8.5 - 10.1 MG/DL MAGNESIUM  Collection Time: 04/10/19  4:21 AM  
 Result Value Ref Range Magnesium 2.0 1.6 - 2.4 mg/dL PHOSPHORUS Collection Time: 04/10/19  4:21 AM  
Result Value Ref Range Phosphorus 2.9 2.6 - 4.7 MG/DL  
HEPATIC FUNCTION PANEL Collection Time: 04/10/19  4:21 AM  
Result Value Ref Range Protein, total 7.9 6.4 - 8.2 g/dL Albumin 3.3 (L) 3.5 - 5.0 g/dL Globulin 4.6 (H) 2.0 - 4.0 g/dL A-G Ratio 0.7 (L) 1.1 - 2.2 Bilirubin, total 0.5 0.2 - 1.0 MG/DL Bilirubin, direct 0.2 0.0 - 0.2 MG/DL Alk. phosphatase 81 45 - 117 U/L  
 AST (SGOT) 33 15 - 37 U/L  
 ALT (SGPT) 34 12 - 78 U/L  
T4, FREE Collection Time: 04/10/19  4:21 AM  
Result Value Ref Range T4, Free 0.2 (L) 0.8 - 1.5 NG/DL  
TSH 3RD GENERATION Collection Time: 04/10/19  4:21 AM  
Result Value Ref Range TSH 89.80 (H) 0.36 - 3.74 uIU/mL GLUCOSE, POC Collection Time: 04/10/19  7:56 AM  
Result Value Ref Range Glucose (POC) 256 (H) 65 - 100 mg/dL Performed by Chritsian Teresa GLUCOSE, POC Collection Time: 04/10/19 11:46 AM  
Result Value Ref Range Glucose (POC) 254 (H) 65 - 100 mg/dL Performed by Christian Teresa Imaging review: MRI brain Pending HOME MEDS Prior to Admission Medications Prescriptions Last Dose Informant Patient Reported? Taking? BD INSULIN SYRINGE ULTRA-FINE 1 mL 30 x 1/2\" syrg   Yes No  
Blood-Glucose Meter (FREESTYLE LITE METER) monitoring kit   No No  
Sig: Check blood sugar once daily HYDROcodone-acetaminophen (NORCO) 5-325 mg per tablet   No No  
Sig: Take 1 Tab by mouth every four (4) hours as needed for Pain. Max Daily Amount: 6 Tabs. PHOSPHORUS #1 (PHOSPHA 250 NEUTRAL PO)   Yes No  
Sig: Take 2 Tabs by mouth daily. MOISE-ALPHONSE 0.8 mg tab tablet   Yes No  
amLODIPine (NORVASC) 5 mg tablet   Yes No  
Sig: Take 5 mg by mouth daily. aspirin delayed-release 81 mg tablet   No No  
Sig: Take 1 Tab by mouth daily.   
calcium acetate (PHOSLO) 667 mg cap   Yes No  
 Sig: Take 1 Cap by mouth three (3) times daily (with meals). carvedilol (COREG) 25 mg tablet   Yes No  
Sig: Take 25 mg by mouth two (2) times daily (with meals). ferrous sulfate 325 mg (65 mg iron) tablet   Yes No  
Sig: Take 325 mg by mouth two (2) times a day. glucose blood VI test strips (FREESTYLE LITE STRIPS) strip   No No  
Sig: Check blood sugar once daily  
insulin NPH/insulin regular (HUMULIN 70/30) 100 unit/mL (70-30) injection   Yes No  
Si Units by SubCUTAneous route daily. levothyroxine (SYNTHROID) 175 mcg tablet   Yes No  
Sig: Take 150 mcg by mouth Daily (before breakfast). ondansetron (ZOFRAN ODT) 4 mg disintegrating tablet   No No  
Sig: Take 1 Tab by mouth every eight (8) hours as needed for Nausea. polyethylene glycol (MIRALAX) 17 gram/dose powder   No No  
Sig: Take 17 g by mouth daily. pravastatin (PRAVACHOL) 10 mg tablet   Yes No  
Sig: Take 20 mg by mouth nightly. Facility-Administered Medications: None CURRENT MEDS Current Facility-Administered Medications Medication Dose Route Frequency  sodium chloride (NS) flush 5-40 mL  5-40 mL IntraVENous Q8H  
 aspirin chewable tablet 81 mg  81 mg Oral DAILY  insulin lispro (HUMALOG) injection   SubCUTAneous AC&HS  levothyroxine (SYNTHROID) tablet 150 mcg  150 mcg Oral 6am  
 heparin (porcine) injection 5,000 Units  5,000 Units SubCUTAneous Q12H  hydrocortisone Sod Succ (PF) (SOLU-CORTEF) injection 100 mg  100 mg IntraVENous Q8H IMPRESSION: 
Maude Chau is a [de-identified] y.o. male who presents with alt mental status. Pt had hypoglycemia and TSh more than 100. 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 Will get MRI of the brain to r/o stroke. RECOMMENDATIONS: 
1. MRI brain - pending Adriana Mustafa MD 
Neurologist

## 2019-04-10 NOTE — ACP (ADVANCE CARE PLANNING)
Advance Care Planning Note NAME: Lori Garcia :  1940 MRN:  [id number] Date/Time:  4/10/2019 2:41 PM 
 
Active Diagnoses: Myxedema crisis Non compliance Hypoglycemia These active diagnoses are of sufficient risk that focused discussion on advance care planning is indicated in order to allow the patient to thoughtfully consider personal goals of care, and if situations arise that prevent the ability to personally give input, to ensure appropriate representation of their personal desires for different levels and aggressiveness of care. Discussion:  
Discussion with family. Pt non compliant, d/w wife. He had been refusing to take his meds for long time. 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 Code status addressed and pt and family wants to be a Full Code. Patient wants central line and vasopressors if needed. Patient  would like to assign wife Nasra Lamar as the surrogate decision maker. Persons present and participating in discussion: Christine Ariza MD, Nasra Lamar Time Spent:  
Total time spent face-to-face in education and discussion: 16 minutes.   
 
 
 
Loki Rodriguez MD  
Hospitalist

## 2019-04-10 NOTE — PROGRESS NOTES
I spoke with Mr. Ashleigh Dye wife this afternoon. She notes that he has not been eating very much for the last few days. She also reports that he has not been taking her Levothyroxine at home \"for over a year\". If he is not taking his Levothyroxine PO at home, I will co-ordinate with his Nephrologist to see if it is possible to arrange for him to receive IV levothyroxine with his dialysis. Dr. Eren Hernandez did not think that the IV LT4 would be an option with HD, but if he were to bring his PO LT4 with him to HD on T/T/Sa the HD tech could give him the pill and ensure that he does take it. When he is ready for discharge home we can work on arraigning to have him bring his LT4 bottle with him to HD.

## 2019-04-11 ENCOUNTER — APPOINTMENT (OUTPATIENT)
Dept: MRI IMAGING | Age: 79
DRG: 080 | End: 2019-04-11
Attending: PSYCHIATRY & NEUROLOGY
Payer: MEDICARE

## 2019-04-11 LAB
BACTERIA SPEC CULT: NORMAL
BACTERIA SPEC CULT: NORMAL
GLUCOSE BLD STRIP.AUTO-MCNC: 134 MG/DL (ref 65–100)
GLUCOSE BLD STRIP.AUTO-MCNC: 140 MG/DL (ref 65–100)
GLUCOSE BLD STRIP.AUTO-MCNC: 191 MG/DL (ref 65–100)
GLUCOSE BLD STRIP.AUTO-MCNC: 207 MG/DL (ref 65–100)
SERVICE CMNT-IMP: ABNORMAL
SERVICE CMNT-IMP: NORMAL

## 2019-04-11 PROCEDURE — 74011250636 HC RX REV CODE- 250/636: Performed by: INTERNAL MEDICINE

## 2019-04-11 PROCEDURE — 74011636637 HC RX REV CODE- 636/637: Performed by: INTERNAL MEDICINE

## 2019-04-11 PROCEDURE — 74011250637 HC RX REV CODE- 250/637: Performed by: INTERNAL MEDICINE

## 2019-04-11 PROCEDURE — 90935 HEMODIALYSIS ONE EVALUATION: CPT

## 2019-04-11 PROCEDURE — 5A1D70Z PERFORMANCE OF URINARY FILTRATION, INTERMITTENT, LESS THAN 6 HOURS PER DAY: ICD-10-PCS | Performed by: INTERNAL MEDICINE

## 2019-04-11 PROCEDURE — 65660000000 HC RM CCU STEPDOWN

## 2019-04-11 PROCEDURE — 70551 MRI BRAIN STEM W/O DYE: CPT

## 2019-04-11 PROCEDURE — 82962 GLUCOSE BLOOD TEST: CPT

## 2019-04-11 RX ADMIN — LEVOTHYROXINE SODIUM 150 MCG: 150 TABLET ORAL at 05:57

## 2019-04-11 RX ADMIN — HEPARIN SODIUM 5000 UNITS: 5000 INJECTION INTRAVENOUS; SUBCUTANEOUS at 08:51

## 2019-04-11 RX ADMIN — Medication 10 ML: at 05:58

## 2019-04-11 RX ADMIN — HUMAN INSULIN 5 UNITS: 100 INJECTION, SUSPENSION SUBCUTANEOUS at 17:26

## 2019-04-11 RX ADMIN — Medication 10 ML: at 17:04

## 2019-04-11 RX ADMIN — HUMAN INSULIN 5 UNITS: 100 INJECTION, SUSPENSION SUBCUTANEOUS at 08:50

## 2019-04-11 RX ADMIN — HEPARIN SODIUM 5000 UNITS: 5000 INJECTION INTRAVENOUS; SUBCUTANEOUS at 21:09

## 2019-04-11 RX ADMIN — Medication 10 ML: at 21:11

## 2019-04-11 RX ADMIN — ASPIRIN 81 MG 81 MG: 81 TABLET ORAL at 08:51

## 2019-04-11 RX ADMIN — PREDNISONE 40 MG: 20 TABLET ORAL at 08:51

## 2019-04-11 NOTE — CONSULTS
Palliative Medicine Consult Honorio: [phone] ([phone]) Patient Name: Clifford Rodrigues YOB: 1940 Date of Initial Consult: 4/10/19 Reason for Consult: Care Decisions Requesting Provider: Melvin Harding MD 
Primary Care Physician: Unknown, Provider SUMMARY:  
Clifford Rodrigues is a [de-identified] y.o. 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 It was noted during our conversations with the patient and his daughters that his wife has been unable to care for him at home, and the state of their house is in quite disarray. I suspect that his med management is nonexistent as he seems to need assistance with things such as this, and his wife has been unable to care for him, herself or the house. He did not want to go back home with her, and asked for placement in assisted living, which should improve his compliance with medications as he will have more active caregivers Current medical issues leading to Palliative Medicine involvement include: goals of care discussion in setting of complex medical disease and inability to manage his medications PALLIATIVE DIAGNOSES:  
1. Goals of care 2. Advanced care planning 3. Frailty 4. Debility/weakness PLAN:  
1. Met with Mr Gabriel Peters again today (See Doris Whitney's note from yesterday) 2. He shared again that he would NOT want his wife to be his decision maker, but rather his daughter Narciso Ross, in the event he was incapacitated again. 3. We talked about CPR, but I am not sure he fully understands the concept.   He stated he would want to be on a breathing machine if it would help keep him alive, but when I asked if he was OK with being on a breathing machine for the rest of his life, he said something that sounded affermative, but I question his complex decision making skills. 4. His daughter Josué Josue who he named as mPOA, voiced some concerns that she has limited availability to meet the medical team in person, as she is dealing with her child and her own health issues, but she was willing to be decision maker over the phone 5. For now patient will remain a full code, and this will need to be addressed, but I think it is a conversation better to be had once he is established at the nursing facility when Josué Josue can be present for the conversation. 6. I am cautiously optimistic that the patients health will improve with more consistent oversight of his med management, now that his wife will no longer be his primary caregiver 7. For now, goals are clear on this patient. I suspect we will follow him at subsequent admissions, but for now will sign off 8. Please feel free to re-consult if patients health condition deteriorates and there is a more urgent need to address code status with his mPOA 9. Initial consult note routed to primary continuity provider and/or primary health care team members 10. Communicated plan of care with: Palliative [facility] Team 
 
 GOALS OF CARE / TREATMENT PREFERENCES:  
 
GOALS OF CARE: 
Patient/Health Care Proxy Stated Goals: Prolong life TREATMENT PREFERENCES:  
Code Status: Full Code Advance Care Planning: 
[x] [facility] Interdisciplinary Team has updated the ACP Navigator with Mando 8 and Patient Capacity Primary Decision Maker: Dixon Smith Daughter - [phone] Advance Care Planning 4/11/2019 Patient's Healthcare Decision Maker is: Named in scanned ACP document Primary Decision Maker Name -  
Primary Decision Maker Relationship to Patient -  
Confirm Advance Directive Yes, on file Medical Interventions: Full interventions Other Instructions: Other: As far as possible, the palliative care team has discussed with patient / health care proxy about goals of care / treatment preferences for patient. HISTORY:  
 
History obtained from: patient CHIEF COMPLAINT: \"You keep waking me up! \" HPI/SUBJECTIVE: The patient is:  
[x] Verbal and participatory [] Non-participatory due to: Wants us to let him sleep Excited about moving to the first floor after we told him they will not bother him as much there Tells us he is moving into a \"nursing home\" Speech is slurred and he has a stutter, but if you ask him to repeat himself, he is generally clearer the second time he says something Clinical Pain Assessment (nonverbal scale for severity on nonverbal patients):  
Clinical Pain Assessment Severity: 0 Duration: for how long has pt been experiencing pain (e.g., 2 days, 1 month, years) Frequency: how often pain is an issue (e.g., several times per day, once every few days, constant) FUNCTIONAL ASSESSMENT:  
 
Palliative Performance Scale (PPS): PPS: 40 PSYCHOSOCIAL/SPIRITUAL SCREENING:  
 
Palliative IDT has assessed this patient for cultural preferences / practices and a referral made as appropriate to needs (Cultural Services, Patient Advocacy, Ethics, etc.) Any spiritual / Baptist concerns: 
[] Yes /  [x] No 
 
Caregiver Burnout: 
[] Yes /  [] No /  [x] No Caregiver Present Anticipatory grief assessment:  
[x] Normal  / [] Maladaptive ESAS Anxiety: Anxiety: 0 
 
ESAS Depression: Depression: 0 REVIEW OF SYSTEMS:  
 
Positive and pertinent negative findings in ROS are noted above in HPI. The following systems were [x] reviewed / [] unable to be reviewed as noted in HPI Other findings are noted below. Systems: constitutional, ears/nose/mouth/throat, respiratory, gastrointestinal, genitourinary, musculoskeletal, integumentary, neurologic, psychiatric, endocrine. Positive findings noted below. Modified ESAS Completed by: provider Fatigue: 6 Depression: 0 Pain: 0 Anxiety: 0 Nausea: 0 Anorexia: 0 Dyspnea: 0 Constipation: No  
  Stool Occurrence(s): 1 PHYSICAL EXAM:  
 
From RN flowsheet: 
Wt Readings from Last 3 Encounters:  
04/11/19 187 lb 11.2 oz (85.1 kg) 01/01/19 180 lb (81.6 kg) 12/13/17 218 lb 4.1 oz (99 kg) Blood pressure (!) 84/54, pulse 66, temperature 98.4 °F (36.9 °C), temperature source Axillary, resp. rate 18, height 5' 6\" (1.676 m), weight 187 lb 11.2 oz (85.1 kg), SpO2 97 %. Pain Scale 1: Numeric (0 - 10) Pain Intensity 1: 0 Last bowel movement, if known:  
 
Constitutional: alert, wants to be left alone to sleep Eyes: pupils equal, anicteric ENMT: no nasal discharge, moist mucous membranes Cardiovascular: regular rhythm, Respiratory: breathing not labored, symmetric Gastrointestinal: soft non-tender, Musculoskeletal: no deformity, no tenderness to palpation Skin: warm, dry Neurologic: following commands, moving all extremities Psychiatric: full affect, no hallucinations Other: 
 
 
 HISTORY:  
 
Active Problems: 
  Severe hypothyroidism (4/9/2019) Hypoglycemia (4/9/2019) Past Medical History:  
Diagnosis Date  CAD (coronary artery disease)  Chronic kidney disease   
 tues/thurs/sat TREE [address]  
 Diabetes ([facility] [place] [address]  Endocrine disease   
 hypothyroid  Gastrointestinal disorder   
 gallstones  Hypertension  Stroke ([facility] [place] [address] 2011 speech general weakness  Thyroid disease Past Surgical History:  
Procedure Laterality Date [address] UNLISTED  HX HEENT    
 cataract   removal  rt eye  HX ORTHOPAEDIC    
 11/2014 R BK Amputation and L  
 HX OTHER SURGICAL    
 HX VASCULAR ACCESS    
 L arm shunt  VASCULAR SURGERY PROCEDURE UNLIST Fistula R arm Family History Problem Relation Age of Onset  Hypertension Mother  Diabetes Father History reviewed, no pertinent family history. Social History Tobacco Use  Smoking status: Never Smoker  Smokeless tobacco: Never Used Substance Use Topics  Alcohol use: No  
 
No Known Allergies Current Facility-Administered Medications Medication Dose Route Frequency  predniSONE (DELTASONE) tablet 40 mg  40 mg Oral DAILY WITH BREAKFAST  insulin NPH (NOVOLIN N, HUMULIN N) injection 5 Units  5 Units SubCUTAneous ACB&D  
 sodium chloride (NS) flush 5-40 mL  5-40 mL IntraVENous Q8H  
 sodium chloride (NS) flush 5-40 mL  5-40 mL IntraVENous PRN  
 acetaminophen (TYLENOL) tablet 650 mg  650 mg Oral Q4H PRN Or  
 acetaminophen (TYLENOL) solution 650 mg  650 mg Per NG tube Q4H PRN Or  
 acetaminophen (TYLENOL) suppository 650 mg  650 mg Rectal Q4H PRN  
 aspirin chewable tablet 81 mg  81 mg Oral DAILY  labetalol (NORMODYNE;TRANDATE) 20 mg/4 mL (5 mg/mL) injection 20 mg  20 mg IntraVENous Q4H PRN  
 insulin lispro (HUMALOG) injection   SubCUTAneous AC&HS  
 glucose chewable tablet 16 g  4 Tab Oral PRN  
 dextrose (D50W) injection syrg 12.5-25 g  12.5-25 g IntraVENous PRN  
 glucagon (GLUCAGEN) injection 1 mg  1 mg IntraMUSCular PRN  
 albumin human 25% (BUMINATE) solution 12.5 g  12.5 g IntraVENous DIALYSIS PRN  
 levothyroxine (SYNTHROID) tablet 150 mcg  150 mcg Oral 6am  
 heparin (porcine) injection 5,000 Units  5,000 Units SubCUTAneous Q12H  
 
 
 
 LAB AND IMAGING FINDINGS:  
 
Lab Results Component Value Date/Time WBC 5.0 04/10/2019 04:21 AM  
 HGB 11.0 (L) 04/10/2019 04:21 AM  
 PLATELET 001 (L) 49/16/0807 04:21 AM  
 
Lab Results Component Value Date/Time  Sodium 133 (L) 04/10/2019 04:21 AM  
 Potassium 4.2 04/10/2019 04:21 AM  
 Chloride 98 04/10/2019 04:21 AM  
 CO2 26 04/10/2019 04:21 AM  
 BUN 24 (H) 04/10/2019 04:21 AM  
 Creatinine 6.03 (H) 04/10/2019 04:21 AM  
 Calcium 8.7 04/10/2019 04:21 AM  
 Magnesium 2.0 04/10/2019 04:21 AM  
 Phosphorus 2.9 04/10/2019 04:21 AM  
  
Lab Results Component Value Date/Time AST (SGOT) 33 04/10/2019 04:21 AM  
 Alk. phosphatase 81 04/10/2019 04:21 AM  
 Protein, total 7.9 04/10/2019 04:21 AM  
 Albumin 3.3 (L) 04/10/2019 04:21 AM  
 Globulin 4.6 (H) 04/10/2019 04:21 AM  
 
Lab Results Component Value Date/Time INR 1.2 (H) 04/08/2019 10:07 PM  
 Prothrombin time 12.6 (H) 04/08/2019 10:07 PM  
 aPTT 25.0 04/08/2019 10:07 PM  
  
Lab Results Component Value Date/Time Iron 21 (L) 08/15/2015 04:50 AM  
 TIBC 114 (L) 08/15/2015 04:50 AM  
 Iron % saturation 18 (L) 08/15/2015 04:50 AM  
 Ferritin 1,525 (H) 08/15/2015 04:50 AM  
  
No results found for: PH, PCO2, PO2 No components found for: [place] Lab Results Component Value Date/Time  (H) 12/13/2017 03:28 PM  
 CK - MB 3.7 (H) 12/13/2017 03:28 PM  
  
 
 
   
 
Total time:  
Counseling / coordination time, spent as noted above:  
> 50% counseling / coordination?:  
 
Prolonged service was provided for  []30 min   []75 min in face to face time in the presence of the patient, spent as noted above. Time Start:  
Time End:  
Note: this can only be billed with 34180 (initial) or 04063 (follow up). If multiple start / stop times, list each separately.

## 2019-04-11 NOTE — PROGRESS NOTES
NEUROLOGY NOTE Chief Complaint Patient presents with  Low Blood Sugar Pt arrives via EMS with low blood sugar, Pt given 175cc of D10 and 15 grams of oral glucose prior to arrival.   
 
 
SUBJECTIVE: 
No new neurological symptoms Has baseline dementia HPI Josie Zamora is a [de-identified] y.o. male who presents to the hospital because of alt mental status. Hx obtained by chart review. According to admission notes \"sent to the ED for low blood sugar associated with altered mental status.  Most of the HPI obtained from the ED notes  As patient is not able to contribute to any history.  Per family patient was feeling weak and had no appetite and was not compliant with his home medication.  When EMS arrived patient was found to be lethargic and his blood sugar was in the 30s.  He received D10 and oral glucose, his blood sugar improved to the 90s but remained confused and lethargic and his SPEECH was found to be slurred. In the ED, his vital signs were stable, his labs showed normal CBC, BMP showed hypokalemia, his TSH was found to be more than 100.  CT scan of the brain was negative for any acute stroke. e above problems. \" 
 
Pt is more responsive to me today. ROS A ten system review of constitutional, cardiovascular, respiratory, musculoskeletal, endocrine, skin, SHEENT, genitourinary, psychiatric and neurologic systems was obtained and is unremarkable except as stated in HPI  
 
PMH Past Medical History:  
Diagnosis Date  CAD (coronary artery disease)  Chronic kidney disease   
 tues/thurs/sat TREE [address]  
 Diabetes ([facility] [address]  Endocrine disease   
 hypothyroid  Gastrointestinal disorder   
 gallstones  Hypertension  Stroke ([facility] [address] 2011 speech general weakness  Thyroid disease [place] Family History Problem Relation Age of Onset  Hypertension Mother  Diabetes Father [address] Social History Socioeconomic History  Marital status:   
 Spouse name: Not on file  Number of children: Not on file  Years of education: Not on file  Highest education level: Not on file Tobacco Use  Smoking status: Never Smoker  Smokeless tobacco: Never Used Substance and Sexual Activity  Alcohol use: No  
 Drug use: No  
  Types: Prescription ALLERGIES No Known Allergies PHYSICAL EXAMINATION:  
Patient Vitals for the past 24 hrs: 
 Temp Pulse Resp BP SpO2  
04/11/19 0739  68   98 % 04/11/19 0735  66     
04/11/19 0734 97.8 °F (36.6 °C) 66 18 121/62 98 % 04/11/19 0336 97.6 °F (36.4 °C) 64 18 116/60 96 % 04/10/19 2242 98.2 °F (36.8 °C) 66 18 120/63 98 % 04/10/19 1932 97.9 °F (36.6 °C) 66 18 118/70 99 % 04/10/19 1503 97.7 °F (36.5 °C) 63 18 128/72 96 % General:  
General appearance: Pt is in no acute distress Distal pulses are preserved Neurological Examination:  
Mental Status:  AAO x2. Speech is slow. Follows commands, has abnormal fund of knowledge, attention, short term recall, comprehension and insight. Cranial Nerves: Visual fields are full. PERRL, Extraocular movements are full. Facial sensation intact. Facial movement intact. Hearing intact to conversation. Palate elevates symmetrically. Shoulder shrug symmetric. Tongue midline. Motor: Strength is 5/5 in all 4 ext. Bilateral BKA. Normal tone. No atrophy. Sensation: Normal to light touch Coordination/Cerebellar: Intact to finger-nose-finger Gait: deferred Skin: No significant bruising or lacerations. LAB DATA REVIEWED:   
Recent Results (from the past 24 hour(s)) GLUCOSE, POC Collection Time: 04/10/19 11:46 AM  
Result Value Ref Range Glucose (POC) 254 (H) 65 - 100 mg/dL Performed by Presley Nolasco GLUCOSE, POC Collection Time: 04/10/19  4:47 PM  
Result Value Ref Range Glucose (POC) 231 (H) 65 - 100 mg/dL Performed by Presley Nolasco GLUCOSE, POC  Collection Time: 04/10/19  8:59 PM  
 Result Value Ref Range Glucose (POC) 219 (H) 65 - 100 mg/dL Performed by Gurpreet May ZEKE   
GLUCOSE, POC Collection Time: 19  7:12 AM  
Result Value Ref Range Glucose (POC) 140 (H) 65 - 100 mg/dL Performed by Jose Arora (PCT) Imaging review: MRI brain Pending HOME MEDS Prior to Admission Medications Prescriptions Last Dose Informant Patient Reported? Taking? BD INSULIN SYRINGE ULTRA-FINE 1 mL 30 x 1/2\" syrg   Yes No  
Blood-Glucose Meter (FREESTYLE LITE METER) monitoring kit   No No  
Sig: Check blood sugar once daily HYDROcodone-acetaminophen (NORCO) 5-325 mg per tablet   No No  
Sig: Take 1 Tab by mouth every four (4) hours as needed for Pain. Max Daily Amount: 6 Tabs. PHOSPHORUS #1 (PHOSPHA 250 NEUTRAL PO)   Yes No  
Sig: Take 2 Tabs by mouth daily. MOISE-ALPHONSE 0.8 mg tab tablet   Yes No  
amLODIPine (NORVASC) 5 mg tablet   Yes No  
Sig: Take 5 mg by mouth daily. aspirin delayed-release 81 mg tablet   No No  
Sig: Take 1 Tab by mouth daily. calcium acetate (PHOSLO) 667 mg cap   Yes No  
Sig: Take 1 Cap by mouth three (3) times daily (with meals). carvedilol (COREG) 25 mg tablet   Yes No  
Sig: Take 25 mg by mouth two (2) times daily (with meals). ferrous sulfate 325 mg (65 mg iron) tablet   Yes No  
Sig: Take 325 mg by mouth two (2) times a day. glucose blood VI test strips (FREESTYLE LITE STRIPS) strip   No No  
Sig: Check blood sugar once daily  
insulin NPH/insulin regular (HUMULIN 70/30) 100 unit/mL (70-30) injection   Yes No  
Si Units by SubCUTAneous route daily. levothyroxine (SYNTHROID) 175 mcg tablet   Yes No  
Sig: Take 150 mcg by mouth Daily (before breakfast). ondansetron (ZOFRAN ODT) 4 mg disintegrating tablet   No No  
Sig: Take 1 Tab by mouth every eight (8) hours as needed for Nausea. polyethylene glycol (MIRALAX) 17 gram/dose powder   No No  
Sig: Take 17 g by mouth daily.   
pravastatin (PRAVACHOL) 10 mg tablet   Yes No  
 Sig: Take 20 mg by mouth nightly. Facility-Administered Medications: None CURRENT MEDS Current Facility-Administered Medications Medication Dose Route Frequency  predniSONE (DELTASONE) tablet 40 mg  40 mg Oral DAILY WITH BREAKFAST  insulin NPH (NOVOLIN N, HUMULIN N) injection 5 Units  5 Units SubCUTAneous ACB&D  
 sodium chloride (NS) flush 5-40 mL  5-40 mL IntraVENous Q8H  
 aspirin chewable tablet 81 mg  81 mg Oral DAILY  insulin lispro (HUMALOG) injection   SubCUTAneous AC&HS  levothyroxine (SYNTHROID) tablet 150 mcg  150 mcg Oral 6am  
 heparin (porcine) injection 5,000 Units  5,000 Units SubCUTAneous Q12H IMPRESSION: 
Channing Luong is a [de-identified] y.o. male who presents with alt mental status. Pt had hypoglycemia and TSh more than 100. 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 Will get MRI of the brain to r/o stroke. RECOMMENDATIONS: 
1. MRI brain - still pending Call once done and if abnormal.  
 
Yasmany De Leon MD 
Neurologist

## 2019-04-11 NOTE — PROGRESS NOTES
Name: Margarita Mayer MRN: [id number] : 1940 Assessment: 
ESRD ([facility] unit; TTS) HTN Hypothyroidism with TSH >100 AMS Anemia of ESRD- mild DM-2 SHPT with low Phos-not on binders Tolerating HD> using L Arm graft, 450 QB, 3.5k, 2.5 Ca, Na of 140. BP 97/53. UF cut down from 2>>1.5 Kg. Prn IV Albumin. revaclear dialyzer Plan/Recommendations: 
Next HD on Sat if pt still here Ct Synthroid- will remind him to take synthroid at HD unit and also help administer with HD RN, if he brought the medication with him Subjective: 
Resting during HD. Seen around 2 pm. BP lowish on machine with SBP of 97 Arousable. Garbled speech- difficult to interpret ROS:  
As above Exam: 
Visit Vitals /60 Pulse 65 Temp 98.4 °F (36.9 °C) (Axillary) Resp 18 Ht 5' 6\" (1.676 m) Wt 85.1 kg (187 lb 11.2 oz) SpO2 97% BMI 30.30 kg/m² Elderly in NAD Puffy eyelids/facial appearance Clear RRR, no rub L arm access patent B/l LE amputation Sleepy but arousable Current Facility-Administered Medications Medication Dose Route Frequency Last Dose  predniSONE (DELTASONE) tablet 40 mg  40 mg Oral DAILY WITH BREAKFAST 40 mg at 19 0851  
 insulin NPH (NOVOLIN N, HUMULIN N) injection 5 Units  5 Units SubCUTAneous ACB&D 5 Units at 19 0850  sodium chloride (NS) flush 5-40 mL  5-40 mL IntraVENous Q8H 10 mL at 19 0558  sodium chloride (NS) flush 5-40 mL  5-40 mL IntraVENous PRN 10 mL at 19 7039  acetaminophen (TYLENOL) tablet 650 mg  650 mg Oral Q4H PRN Or  
 acetaminophen (TYLENOL) solution 650 mg  650 mg Per NG tube Q4H PRN Or  
 acetaminophen (TYLENOL) suppository 650 mg  650 mg Rectal Q4H PRN    
 aspirin chewable tablet 81 mg  81 mg Oral DAILY 81 mg at 19 0851  labetalol (NORMODYNE;TRANDATE) 20 mg/4 mL (5 mg/mL) injection 20 mg  20 mg IntraVENous Q4H PRN    
 insulin lispro (HUMALOG) injection   SubCUTAneous AC&HS Stopped at 04/11/19 0730  
 glucose chewable tablet 16 g  4 Tab Oral PRN    
 dextrose (D50W) injection syrg 12.5-25 g  12.5-25 g IntraVENous PRN    
 glucagon (GLUCAGEN) injection 1 mg  1 mg IntraMUSCular PRN    
 albumin human 25% (BUMINATE) solution 12.5 g  12.5 g IntraVENous DIALYSIS PRN 12.5 g at 04/09/19 1607  levothyroxine (SYNTHROID) tablet 150 mcg  150 mcg Oral 6am 150 mcg at 04/11/19 0557  heparin (porcine) injection 5,000 Units  5,000 Units SubCUTAneous Q12H 5,000 Units at 04/11/19 1104 Labs/Data: 
 
Lab Results Component Value Date/Time WBC 5.0 04/10/2019 04:21 AM  
 Hemoglobin (POC) 10.5 (L) 10/16/2015 07:32 AM  
 HGB 11.0 (L) 04/10/2019 04:21 AM  
 Hematocrit (POC) 31 (L) 10/16/2015 07:32 AM  
 HCT 32.2 (L) 04/10/2019 04:21 AM  
 PLATELET 744 (L) 49/24/0612 04:21 AM  
 .2 (H) 04/10/2019 04:21 AM  
 
 
Lab Results Component Value Date/Time Sodium 133 (L) 04/10/2019 04:21 AM  
 Potassium 4.2 04/10/2019 04:21 AM  
 Chloride 98 04/10/2019 04:21 AM  
 CO2 26 04/10/2019 04:21 AM  
 Anion gap 9 04/10/2019 04:21 AM  
 Glucose 254 (H) 04/10/2019 04:21 AM  
 BUN 24 (H) 04/10/2019 04:21 AM  
 Creatinine 6.03 (H) 04/10/2019 04:21 AM  
 BUN/Creatinine ratio 4 (L) 04/10/2019 04:21 AM  
 GFR est AA 11 (L) 04/10/2019 04:21 AM  
 GFR est non-AA 9 (L) 04/10/2019 04:21 AM  
 Calcium 8.7 04/10/2019 04:21 AM  
 
 
Wt Readings from Last 3 Encounters:  
04/11/19 85.1 kg (187 lb 11.2 oz) 01/01/19 81.6 kg (180 lb) 12/13/17 99 kg (218 lb 4.1 oz) Intake/Output Summary (Last 24 hours) at 4/11/2019 1445 Last data filed at 4/11/2019 1035 Gross per 24 hour Intake 440 ml Output  Net 440 ml Patient seen and examined. Chart reviewed. Labs, data and other pertinent notes reviewed in last 24 hrs. PMH/SH/FH reviewed and unchanged compared to H&P Discussed with pt/HD RN Ty Whiting MD

## 2019-04-11 NOTE — ACP (ADVANCE CARE PLANNING)
Primary Decision Maker: Christa Dejesus - Daughter - [phone] Advance Care Planning 4/11/2019 Patient's Healthcare Decision Maker is: Named in scanned ACP document Primary Decision Maker Name -  
Primary Decision Maker Relationship to Patient -  
Confirm Advance Directive Yes, on file Patient is alert, sleeping but woke up when name called, he was able to engage and discuss the mPOA portion of AMD with the Palliative team of Nikkie Rock NP and Kiana Alvarado LCSW. Patient stutters and at times needs to be asked to repeat what he states (he has a history of CVA) but with patience he can be understood. When LCSW was in the room with patient earlier, his daughter Macy Mohan called on the phone and patient spoke to her. LCSW introduced self to Patricia Storey and explained to her what we were talking to patient about (AMD). She understands this. Patient was only able to complete the first part of the AMD as it was questionable if he understood the complexities of the healthcare portion of AMD. He named his daughter Patricia Storey as [facility]. He was clear that he did not want his wife to be the person that is called. Patricia Storey noted that she makes all the MD appointments for her father and that he calls her frequently to ask for her help. She has a son with health issues and she herself has some health issues, but she is available via phone. Patient did not want anyone else named on the AMD. 
 
Patient is a Full Code. He noted that he would want to try CPR to see if it helps him. Again, not sure he fully understands complex medical decisions. A copy of this AMD is mailed to Macy Mohan and copy left in room for patient. Copy left on hard chart for scanning.

## 2019-04-11 NOTE — PROGRESS NOTES
Hospitalist Progress Note NAME: Gino Cheng :  1940 MRN:  [id number] Assessment / Plan: 
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 No further work up at this time 2D echo with normal EF Carotid doppler without significant stenosis Hypoglycemia  
resolved End-stage renal disease on hemodialysis Nephro on for managing HD. Continue with HD 
  
Hypokalemia 
resolved Anemia due to renal disease H&H stable Diabetes mellitus type 2 A1C 7.0; continue with NPH Check qac/qhs blood glucose and follow SSI Hypertension BP stable while holding meds Non compliant at home, continue to monitor 
  
Body mass index is 29.05 kg/m². therefore classifying patient as overweight Code Status: Full Code, Wife is NOK 
DVT Prophylaxis: Lovenox GI Prophylaxis: not indicated Disposition: SNF Tentative discharge tomorrow Chief Complaint / Reason for Physician Visit: \"I am tired, I am feeling ok\" Discussed overnight event with RN Review of Systems: 
Symptom Y/N Comments  Symptom Y/N Comments Fever/Chills n   Chest Pain n   
Poor Appetite    Edema Cough n   Abdominal Pain n   
Sputum    Joint Pain SOB/SOLANO n   Pruritis/Rash Nausea/vomit    Tolerating PT/OT n Diarrhea    Tolerating Diet y Constipation    Other Could NOT obtain due to:   
 
Objective: VITALS:  
Last 24hrs VS reviewed since prior progress note. Most recent are: 
Patient Vitals for the past 24 hrs: 
 Temp Pulse Resp BP SpO2  
19 1415  66  101/56  04/11/19 1400  64  97/52   
04/11/19 1345  64  96/55   
04/11/19 1330  66  116/62   
04/11/19 1315  65  105/58   
04/11/19 1300 98.4 °F (36.9 °C) 67 18 133/66 97 % 04/11/19 1202 98.5 °F (36.9 °C) 67 18 131/58 97 % 04/11/19 0739  68   98 % 04/11/19 0735  66     
04/11/19 0734 97.8 °F (36.6 °C) 66 18 121/62 98 % 04/11/19 0336 97.6 °F (36.4 °C) 64 18 116/60 96 % 04/10/19 2242 98.2 °F (36.8 °C) 66 18 120/63 98 % 04/10/19 1932 97.9 °F (36.6 °C) 66 18 118/70 99 % 04/10/19 1503 97.7 °F (36.5 °C) 63 18 128/72 96 % Intake/Output Summary (Last 24 hours) at 4/11/2019 1428 Last data filed at 4/11/2019 1035 Gross per 24 hour Intake 440 ml Output  Net 440 ml PHYSICAL EXAM: 
General: Ill appearing. Alert, cooperative, no acute distress   
EENT:  EOMI. Anicteric sclerae. MMM Resp:  CTA bilaterally, no wheezing or rales. No accessory muscle use CV:  Regular  rhythm,  No edema GI:  Soft, Non distended, Non tender.  +Bowel sounds Neurologic:  Alert, normal speech, provides simple question, unable to carry out meaningful conversation Psych:   Poor insight. Not anxious nor agitated Skin:  No rashes. No jaundice Reviewed most current lab test results and cultures  YES Reviewed most current radiology test results   YES Review and summation of old records today    NO Reviewed patient's current orders and MAR    YES 
PMH/SH reviewed - no change compared to H&P 
________________________________________________________________________ Care Plan discussed with: 
  Comments Patient y Family RN y   
Care Manager [facility]ant [facility] rounds were held today with , nursing, pharmacist and clinical coordinator. Patient's plan of care was discussed; medications were reviewed and discharge planning was addressed. ________________________________________________________________________ Total NON critical care TIME:  30 Minutes Total CRITICAL CARE TIME Spent:   Minutes non procedure based Comments >50% of visit spent in counseling and coordination of care    
________________________________________________________________________ Bhupendra Rodriguez NP Procedures: see electronic medical records for all procedures/Xrays and details which were not copied into this note but were reviewed prior to creation of Plan. LABS: 
I reviewed today's most current labs and imaging studies. Pertinent labs include: 
Recent Labs 04/10/19 
0421 04/08/19 
2207 WBC 5.0 5.1 HGB 11.0* 10.6* HCT 32.2* 31.8*  
* 144* Recent Labs 04/10/19 
0421 04/08/19 
2207 * 133* K 4.2 3.2*  
CL 98 96* CO2 26 25 * 141* BUN 24* 29* CREA 6.03* 7.28* CA 8.7 9.0 MG 2.0 2.1 PHOS 2.9  --   
ALB 3.3* 3.2* TBILI 0.5 0.5 SGOT 33 42* ALT 34 34 INR  --  1.2* Signed: Roland Parham, MONSERRAT

## 2019-04-11 NOTE — PROCEDURES
[facility] Dialysis Team [place]  [phone] Vitals   Pre   Post   Assessment   Pre   Post    
Temp  Temp: 98.4 °F (36.9 °C) (04/11/19 1300)  98.4 LOC  alert alert HR   Pulse (Heart Rate): 67 (04/11/19 1300) 66 Lungs   Clear to diminished RA  
unlabored  Clear to diminished RA  
unlabored B/P   BP: 133/66 (04/11/19 1300) 112/62 Cardiac   RRR  RRR Resp   Resp Rate: 18 (04/11/19 1300) 16 Skin   Warm intact Bilateral BKA  Warm intact Bilateral BKA Pain level  Pain Intensity 1: 0 (04/11/19 0734) 0 Edema  generalized generalized Orders: Duration:   Start:    1300 End:   1630 Total:   3.5 Dialyzer:   Dialyzer/Set Up Inspection: Tracy Cea (04/11/19 1300) K Bath:   Dialysate K (mEq/L): 3.5 (04/11/19 1300) Ca Bath:   Dialysate CA (mEq/L): 2.5 (04/11/19 1300) Na/Bicarb:   Dialysate NA (mEq/L): 140 (04/11/19 1300) Target Fluid Removal:   Goal/Amount of Fluid to Remove (mL): 2000 mL (04/11/19 1300) Access  Fistula Type & Location:   LINA AVF: skin CDI. No s/s of infection. No issues with cannulation or hemostasis. Running well at .  
+B/T All Dialysis related medications have been reviewed. Labs Obtained/Reviewed Critical Results Called   Date when labs were drawn- 
Hgb-   
HGB Date Value Ref Range Status 04/10/2019 11.0 (L) 12.1 - 17.0 g/dL Final  
 
K-   
Potassium Date Value Ref Range Status 04/10/2019 4.2 3.5 - 5.1 mmol/L Final  
  Comment:  
  INVESTIGATED PER DELTA CHECK PROTOCOL Ca-  
Calcium Date Value Ref Range Status 04/10/2019 8.7 8.5 - 10.1 MG/DL Final  
 
Bun-  
BUN Date Value Ref Range Status 04/10/2019 24 (H) 6 - 20 MG/DL Final  
 
Creat-  
Creatinine Date Value Ref Range Status 04/10/2019 6.03 (H) 0.70 - 1.30 MG/DL Final  
 
  
Medications/ Blood Products Given Name   Dose   Route and Time Blood Volume Processed (BVP):   77.1L Net Fluid Removed:  2000 Comments Time Out Done: 1250 Primary Nurse Rpt Pre: Lindle Sandifer Primary Nurse Rpt Post: Lindle Sandifer Pt Education:access care Care Plan: HD Tx Summary: 
Pt arrived to HD suite A&Ox4. Consent signed & on file. SBAR received from Primary RN. 1250: Pt cannulated with 50U needles per policy & without issue. Labs drawn per request/ order. VSS. Dialysis Tx initiated. Pt resting quietly 1515 bp 84/54 UF off. 200cc NS bolus. Will continue to monitor. 1530 UF on. Will continue to monitor. 1630 Tx ended. VSS. All possible blood returned to patient. Hemostasis achieved without issue. Bed locked and in the lowest position, call bell and belongings in reach. SBAR given to Primary, RN. Patient is stable at time of their/ my departure. Admiting Diagnosis:Lethargy and confusion Pt's previous [address] Consent signed - Informed Consent Verified: Yes (04/11/19 1300) Jordanita Consent - yes Hepatitis Status- HBsAg negative 3/20/19 Immune 3/20/19 Machine #- Machine Number: 1 (04/11/19 1300) Telemetry status-none Pre-dialysis wt. - Pre-Dialysis Weight: 82.4 kg (181 lb 10.5 oz) (04/09/19 2495)

## 2019-04-11 NOTE — PROGRESS NOTES
CORRECTION: 
MRI result in the system was done 2011. No new MRI was ordered this admission. NO change in plan of care at this time.  dk

## 2019-04-11 NOTE — PROCEDURES
TRANSFER - OUT REPORT: 
 
Verbal report given to [place] on Angy Virgen  being transferred to  [id number] for Report consisted of patients Situation, Background, Assessment and  
Recommendations(SBAR). Information from the following report(s) trx  was reviewed with the receiving nurse. Opportunity for questions and clarification was provided. Patient transported with: 
 bed TRANSFER - IN REPORT: 
 
Verbal report received from 1121 on Angy Gutierrezpe  being received from 1121for Report consisted of patients Situation, Background, Assessment and  
Recommendations(SBAR). Information from the following report(s) Kardex, and labs  was reviewed with the receiving nurse. Opportunity for questions and clarification was provided. Assessment completed upon patients arrival to unit and care assumed.

## 2019-04-11 NOTE — PROGRESS NOTES
Pt being transferred to  [id number]. I will attempt to call report now. [facility] will be T[facility] and is supposed to call me back. finished report to [facility] and put pt in for transport Informed staff that pt still needs MRI per Dr Adama Reyes.

## 2019-04-12 VITALS
DIASTOLIC BLOOD PRESSURE: 70 MMHG | TEMPERATURE: 97.7 F | RESPIRATION RATE: 18 BRPM | HEIGHT: 66 IN | HEART RATE: 63 BPM | WEIGHT: 182.1 LBS | BODY MASS INDEX: 29.27 KG/M2 | SYSTOLIC BLOOD PRESSURE: 164 MMHG | OXYGEN SATURATION: 99 %

## 2019-04-12 PROBLEM — F03.90 DEMENTIA (HCC): Status: ACTIVE | Noted: 2019-04-12

## 2019-04-12 LAB
ANION GAP SERPL CALC-SCNC: 11 MMOL/L (ref 5–15)
BASOPHILS # BLD: 0 K/UL (ref 0–0.1)
BASOPHILS NFR BLD: 0 % (ref 0–1)
BUN SERPL-MCNC: 25 MG/DL (ref 6–20)
BUN/CREAT SERPL: 5 (ref 12–20)
CALCIUM SERPL-MCNC: 8.4 MG/DL (ref 8.5–10.1)
CHLORIDE SERPL-SCNC: 97 MMOL/L (ref 97–108)
CO2 SERPL-SCNC: 25 MMOL/L (ref 21–32)
CREAT SERPL-MCNC: 5.5 MG/DL (ref 0.7–1.3)
DIFFERENTIAL METHOD BLD: ABNORMAL
EOSINOPHIL # BLD: 0 K/UL (ref 0–0.4)
EOSINOPHIL NFR BLD: 0 % (ref 0–7)
ERYTHROCYTE [DISTWIDTH] IN BLOOD BY AUTOMATED COUNT: 14.2 % (ref 11.5–14.5)
GLUCOSE BLD STRIP.AUTO-MCNC: 164 MG/DL (ref 65–100)
GLUCOSE BLD STRIP.AUTO-MCNC: 213 MG/DL (ref 65–100)
GLUCOSE BLD STRIP.AUTO-MCNC: 257 MG/DL (ref 65–100)
GLUCOSE SERPL-MCNC: 178 MG/DL (ref 65–100)
HCT VFR BLD AUTO: 33.1 % (ref 36.6–50.3)
HGB BLD-MCNC: 11.1 G/DL (ref 12.1–17)
IMM GRANULOCYTES # BLD AUTO: 0 K/UL (ref 0–0.04)
IMM GRANULOCYTES NFR BLD AUTO: 0 % (ref 0–0.5)
LYMPHOCYTES # BLD: 0.7 K/UL (ref 0.8–3.5)
LYMPHOCYTES NFR BLD: 13 % (ref 12–49)
MCH RBC QN AUTO: 35.8 PG (ref 26–34)
MCHC RBC AUTO-ENTMCNC: 33.5 G/DL (ref 30–36.5)
MCV RBC AUTO: 106.8 FL (ref 80–99)
MONOCYTES # BLD: 0.4 K/UL (ref 0–1)
MONOCYTES NFR BLD: 7 % (ref 5–13)
NEUTS SEG # BLD: 4.6 K/UL (ref 1.8–8)
NEUTS SEG NFR BLD: 80 % (ref 32–75)
NRBC # BLD: 0 K/UL (ref 0–0.01)
NRBC BLD-RTO: 0 PER 100 WBC
PLATELET # BLD AUTO: 138 K/UL (ref 150–400)
PMV BLD AUTO: 10.3 FL (ref 8.9–12.9)
POTASSIUM SERPL-SCNC: 4.7 MMOL/L (ref 3.5–5.1)
RBC # BLD AUTO: 3.1 M/UL (ref 4.1–5.7)
RBC MORPH BLD: ABNORMAL
SERVICE CMNT-IMP: ABNORMAL
SODIUM SERPL-SCNC: 133 MMOL/L (ref 136–145)
WBC # BLD AUTO: 5.7 K/UL (ref 4.1–11.1)

## 2019-04-12 PROCEDURE — 82962 GLUCOSE BLOOD TEST: CPT

## 2019-04-12 PROCEDURE — 74011636637 HC RX REV CODE- 636/637: Performed by: INTERNAL MEDICINE

## 2019-04-12 PROCEDURE — 74011250637 HC RX REV CODE- 250/637: Performed by: INTERNAL MEDICINE

## 2019-04-12 PROCEDURE — 80048 BASIC METABOLIC PNL TOTAL CA: CPT

## 2019-04-12 PROCEDURE — 85025 COMPLETE CBC W/AUTO DIFF WBC: CPT

## 2019-04-12 PROCEDURE — 74011250636 HC RX REV CODE- 250/636: Performed by: INTERNAL MEDICINE

## 2019-04-12 PROCEDURE — 36415 COLL VENOUS BLD VENIPUNCTURE: CPT

## 2019-04-12 RX ORDER — PREDNISONE 10 MG/1
40 TABLET ORAL
Qty: 6 TAB | Refills: 0 | Status: SHIPPED | OUTPATIENT
Start: 2019-04-12 | End: 2019-05-09

## 2019-04-12 RX ADMIN — INSULIN LISPRO 2 UNITS: 100 INJECTION, SOLUTION INTRAVENOUS; SUBCUTANEOUS at 16:35

## 2019-04-12 RX ADMIN — Medication 10 ML: at 05:11

## 2019-04-12 RX ADMIN — INSULIN LISPRO 3 UNITS: 100 INJECTION, SOLUTION INTRAVENOUS; SUBCUTANEOUS at 12:53

## 2019-04-12 RX ADMIN — HUMAN INSULIN 5 UNITS: 100 INJECTION, SUSPENSION SUBCUTANEOUS at 16:35

## 2019-04-12 RX ADMIN — Medication 10 ML: at 14:34

## 2019-04-12 RX ADMIN — ASPIRIN 81 MG 81 MG: 81 TABLET ORAL at 10:45

## 2019-04-12 RX ADMIN — HEPARIN SODIUM 5000 UNITS: 5000 INJECTION INTRAVENOUS; SUBCUTANEOUS at 10:46

## 2019-04-12 RX ADMIN — LEVOTHYROXINE SODIUM 150 MCG: 150 TABLET ORAL at 06:05

## 2019-04-12 NOTE — PROGRESS NOTES
Oncology End of Shift Note Bedside shift change report given to KIRA Zamora (incoming nurse) by Paul Gregory (outgoing nurse) on Fontaine Levo. Report included the following information SBAR, Kardex, MAR and Recent Results. Shift Summary: No acute changes, labs drawn Issues for Physician to Address:   
 
Patient on Cardiac Monitoring? [] Yes 
[] No 
 
Rhythm:   
 
 
 
Shift Events Paul Gregory

## 2019-04-12 NOTE — ACP (ADVANCE CARE PLANNING)
Advance Care Planning 4/12/2019 Patient's Healthcare Decision Maker is: Legal Next of Kin Primary Decision Maker Name -  
Primary Decision Maker Relationship to Patient -  
Confirm Advance Directive None Patient's daughter Pankaj Funes, who was named on mPOA form does not want to take on this responsibility now, based on telephone call with Deanne CHILDS. Kenroy Guevara noted that she spoke to patient's wife, per Meagan Montse, and that wife was willing to be mPOA. Wife is Mary Rodrigues but patient had noted that he did not want to be decision maker yesterday. Met with patient today to discuss above. He was not aware of this. Apparently Kenroy Guevara does call patient daily. Asked patient if there was anyone else he would want us to call to be mPOA. Today patient notes it would be okay to contact his other daughter Derrick Gtz (#[phone]) to see if she is willing to serve as mPOA. Telephone call placed to Derrick Gtz, left a message to contact Palliative LCSW. Will await call from her and ask her re mPOA. If this can be completed before patient leaves today, a new AMD will be done. If not, then wife will be Mary Rodrigues mPOA.

## 2019-04-12 NOTE — PROGRESS NOTES
Pt transferred from PCU to Oncology Unit on 4/11/19. 1.  CM noted DC order and Plan to go to SNF at [facility] and then plan is to transition to LTC. 2.  CM called [facility]: Main Number: is [phone] and left VM with Admissions. CM called Allen Ochoa with [facility]: [phone] and she indicated that they can take Pt this afternoon. They have a Pt discharging Mid day so she requested that we set up tranport for her at 3 PM.  Ismael Morris said that she would call me back with Room Number Number for RN to call report is [phone] ask for One Kayce Allen. 3. CM talked with RN and she indicated that Pt is not alert and oriented. CM spoke to Pt and let him know that we are working on discharging him to SNF today. CM called AZIZA, Alexandra BECERRIL: and she indicated that she does not want to be primary decision maker for Dad. She would like for Mother, David Pratt to be listed as the main caregiver. AMD was complted 4/11/19 with Palliative NP Rhoda. CM will try to notify Palliative NP Rhoda to let her know to discuss with family today to get that corrected. Mirlande Hyde requested that I let Mother, David Pratt know the DC plan. CM called wife Cinthia Rahman [phone] and she is in agreement with DC plan. Oral and Written notification given to patient and/or caregiver informing them that they are currently an Outpatient receiving care in our facility. Outpatient services include Observ[facility]. This was given over the phone and information was left in the room as requested by family. They were not interested in appealing the discharge. Family will plan on making arrangements with [facility] to complete paperwork and bring him clothes this evening. 3.  CM is working on setting up [facility] transport for Pt to be picked up at 3 PM today. CM sent request via Jascha. 4.  Wife requested that I notify Pt transport company that Pt is being DC.   CM called Tiffanie: [phone] and she will plan on picking Pt up at normal time from [facility] 4/13/19 tomorrow AM.  CM let Alexa at [facility] know that information via phone. 11:53 PM  
CM received message back from [facility] and they can not transport until 4PM. PCS Packet on bedside chart. CM called Wife and let her know room number and change in transport time. CM called Gia Salas: [phone] and she indicated that Pt can resume HD tomorrow: 4/13/19 for 7 AM chair time. Tiffanie will pick Pt up between 6-630 AM via [address] [facility]. Tamara Salgado requested that I fax DC Summary, Last HD flow sheet and H&P to [phone]. CM noted consult to set up new PCP. Pt may be transitioning from SNF to LTC. Nichole Acevedo is completing [facility] today for Pt. No need for new PCP as LTC MD will follow Pt at [facility]. No further CM needs. Care Management Interventions PCP Verified by CM: Yes Mode of Transport at Discharge: Other (see comment) Transition of Care Consult (CM Consult): Discharge Planning Discharge Durable Medical Equipment: No 
Physical Therapy Consult: Yes Occupational Therapy Consult: Yes Speech Therapy Consult: No 
Current Support Network: Lives with Spouse, Own Home Confirm Follow Up Transport: Family Plan discussed with Pt/Family/Caregiver: Yes Freedom of Choice Offered: Yes Discharge Location Discharge Placement: Skilled nursing facility [facility] CM Ext [phone]

## 2019-04-12 NOTE — DISCHARGE SUMMARY
Hospitalist Discharge Summary Patient ID: 
Semaj Dominguez [id number] 
21 y.o. 
1940 PCP on record: Unknown, Provider Admit date: 4/8/2019 Discharge date and time: 4/12/2019 DISCHARGE DIAGNOSIS: 
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  
End-stage renal disease on hemodialysis Hypokalemia Anemia due to renal disease Diabetes mellitus type 2 Hypertension Body mass index is 29.05 kg/m². therefore classifying patient as overweight CONSULTATIONS: 
IP CONSULT TO NEUROLOGY 
IP CONSULT TO ENDOCRINOLOGY 
IP CONSULT TO NEPHROLOGY 
IP CONSULT TO PALLIATIVE CARE - PROVIDER Excerpted HPI from H&P of Stephanie Cardenas MD: 
Evert Euceda is a [de-identified] y.o.  male with known history as listed below presents to ED with complaint noted above. Available records were reviewed at the time of H&P. This is a 67-year-old male with past medical history of CAD, end-stage renal disease on hemodialysis Tuesday Thursday and Saturday, hypothyroidism who was sent to the ED for low blood sugar associated with altered mental status.  Most of the HPI obtained from the ED notes  As patient is not able to contribute to any history.  Per family patient was feeling weak and had no appetite and was not compliant with his home medication.  When EMS arrived patient was found to be lethargic and his blood sugar was in the 30s.  He received D10 and oral glucose, his blood sugar improved to the 90s but remained confused and lethargic and his SPEECH was found to be slurred. In the ED, his vital signs were stable, his labs showed normal CBC, BMP showed hypokalemia, his TSH was found to be more than 100.  CT scan of the brain was negative for any acute stroke. e above problems. ______________________________________________________________________ DISCHARGE SUMMARY/HOSPITAL COURSE:  for full details see H&P, daily progress notes, labs, consult notes. 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 
TSH more than 100 in setting of noncompliance Continue with synthroid Slurred speech secondary Margene Angelucci CT scan of the brain negative MRI of head and neck (-) Pt was seen and evaluated by the neurologist. No further work up at this time 2D echo with normal EF Carotid doppler without significant stenosis 
  
Hypoglycemia  
resolved End-stage renal disease on hemodialysis Nephro on for managing HD. Continue with HD 
  
Hypokalemia 
resolved Anemia due to renal disease H&H stable Diabetes mellitus type 2 A1C 7.0; continue with NPH Check qac/qhs blood glucose and follow SSI Hypertension BP stable while holding meds Non compliant at home, continue to monitor 
  
Body mass index is 29.05 kg/m². therefore classifying patient as overweight 
 
 
 
 
_______________________________________________________________________ Patient seen and examined by me on discharge day. Pertinent Findings: 
Gen:    Not in distress Chest: Clear lungs CVS:   Regular rhythm. No edema Abd:  Soft, not distended, not tender Neuro:  Alert, orient to self and place 
_______________________________________________________________________ DISCHARGE MEDICATIONS:  
Current Discharge Medication List  
  
START taking these medications Details  
predniSONE (DELTASONE) 10 mg tablet Take 40 mg by mouth daily (with breakfast). 3 tab once a day for one day, 
2 tab once a day for one day, Then 1 tab once a day for one day 
Qty: 6 Tab, Refills: 0 insulin NPH (NOVOLIN N, HUMULIN N) 100 unit/mL injection 8 Units by SubCUTAneous route Before breakfast and dinner. Qty: 1 Vial, Refills: 0 CONTINUE these medications which have NOT CHANGED Details  
polyethylene glycol (MIRALAX) 17 gram/dose powder Take 17 g by mouth daily. Qty: 238 g, Refills: 0  
  
ondansetron (ZOFRAN ODT) 4 mg disintegrating tablet Take 1 Tab by mouth every eight (8) hours as needed for Nausea. Qty: 10 Tab, Refills: 0  
  
MOISE-ALPHONSE 0.8 mg tab tablet Refills: 3  
  
aspirin delayed-release 81 mg tablet Take 1 Tab by mouth daily. Qty: 30 Tab, Refills: 11  
  
ferrous sulfate 325 mg (65 mg iron) tablet Take 325 mg by mouth two (2) times a day. PHOSPHORUS #1 (PHOSPHA 250 NEUTRAL PO) Take 2 Tabs by mouth daily. calcium acetate (PHOSLO) 667 mg cap Take 1 Cap by mouth three (3) times daily (with meals). glucose blood VI test strips (FREESTYLE LITE STRIPS) strip Check blood sugar once daily 
Qty: 1 Package, Refills: 11  
 Associated Diagnoses: Type II or unspecified type diabetes mellitus with neurological manifestations, uncontrolled(250.62) ([facility] [address] BD INSULIN SYRINGE ULTRA-FINE 1 mL 30 x 1/2\" syrg Blood-Glucose Meter (FREESTYLE LITE METER) monitoring kit Check blood sugar once daily 
Qty: 1 kit, Refills: 0  
  
levothyroxine (SYNTHROID) 175 mcg tablet Take 150 mcg by mouth Daily (before breakfast). pravastatin (PRAVACHOL) 10 mg tablet Take 20 mg by mouth nightly. STOP taking these medications HYDROcodone-acetaminophen (NORCO) 5-325 mg per tablet Comments:  
Reason for Stopping:   
   
 amLODIPine (NORVASC) 5 mg tablet Comments:  
Reason for Stopping:   
   
 insulin NPH/insulin regular (HUMULIN 70/30) 100 unit/mL (70-30) injection Comments:  
Reason for Stopping:   
   
 carvedilol (COREG) 25 mg tablet Comments:  
Reason for Stopping: My Recommended Diet, Activity, Wound Care, and follow-up labs are listed in the patient's Discharge Insturctions which I have personally completed and reviewed. _______________________________________________________________________ DISPOSITION:   
Home with Family:   
Home with HH/PT/OT/RN:   
SNF/LTC: y  
[place]:   
OTHER:   
 
 
Condition at Discharge:  Stable 
_______________________________________________________________________ Follow up with: PCP : Unknown, Provider Follow-up Information Follow up With Specialties Details Why Contact [address] On 4/12/2019 this is your SNF provider. [address] [phone] [facility]  On 4/13/2019 resume HD oin Saturday, 4/13/19 at 6:30 AM.  Tiffanie, private transport company will pick Pt up 4/13/19 around 6 AM for 7 AM chair time. Pt goes T/TH/S at [place] [address] [id number] [facility] 
[phone] Total time in minutes spent coordinating this discharge (includes going over instructions, follow-up, prescriptions, and preparing report for sign off to her PCP) :  35 minutes Signed: 
Michelle Bundy NP

## 2019-04-12 NOTE — PROGRESS NOTES
Patient being discharged to [facility] AT [facility] nursing [facility]. Transportation transported the patient at 5:30 pm. No further questions asked. Patient brought all belongings currently in the room with him to the nursing facility.

## 2019-04-12 NOTE — DISCHARGE INSTRUCTIONS
Patient Discharge Instructions     Pt Name  Moise Barreto   Date of Birth 1940   Age  [de-identified] y.o. Medical Record Number  [id number]   PCP Unknown, Provider    Admit date:  4/8/2019 @    [place] [address]    Room Number  1121/01   Date of Discharge 4/12/2019     Admission Diagnoses:     Severe hypothyroidism        No Known Allergies     You were admitted to [address] for  Severe hypothyroidism    YOUR OTHER MEDICAL DIAGNOSES INCLUDE (BUT NOT LIMITED TO ):  Present on Admission:   Severe hypothyroidism   Hypoglycemia   HTN (hypertension)   ESRD (end stage renal disease) on dialysis ([facility]   DM type 2 (diabetes mellitus, type 2) ([facility]   PAD (peripheral artery disease) ([facility]   History of CVA (cerebrovascular accident)   Bilateral carotid artery stenosis   Anemia in chronic kidney disease   DM neuro manif type II, uncontrolled   Dementia      DIET:  Diabetic/renal Diet   Recommended activity: Activity as tolerated  Follow up : Follow-up Information     Follow up With Specialties Details Why Contact Info    [address] [place], [place] [phone]      Unknown, Provider    Patient not available to ask             Skilled nursing facility MD responsible for above upon discharge. Checking blood glucose per facility protocol      · It is important that you take the medication exactly as they are prescribed. · Keep your medication in the bottles provided by the pharmacist and keep a list of the medication names, dosages, and times to be taken in your wallet. · Do not take other medications without consulting your doctor.        ADDITIONAL INFORMATION: If you experience any of the following symptoms or have any health problem not listed below, then please call your primary care physician or return to the emergency room if you cannot get hold of your doctor: Fever, chills, nausea, vomiting, diarrhea, change in mentation, falling, bleeding, shortness of breath. I understand that if any problems occur once I am discharged, I am supposed to call my Primary care physician for further care or seek help in the Emergency Department at the nearest Healthcare facility. I have had an opportunity to discuss my clinical issues with my doctor and nursing staff. I understand and acknowledge receipt of the above instructions.                                                                                                                                            Physician's or R.N.'s Signature                                                            Date/Time                                                                                                                                              Patient or Representative Signature                                                 Date/Time

## 2019-04-12 NOTE — DIABETES MGMT
DTC Progress Note Recommendations/ Comments: Please consider discontinuing NPH with weaning of steroids. Noted pt refused both this am. 
 
Current hospital DM medication: NPH 5 units Q12hrs, humalog correction Chart reviewed on [facility] [place]. Patient is a [de-identified] y.o. male with known Type 2 Diabetes on 70/30 30 units daily at home. A1c:  
Lab Results Component Value Date/Time Hemoglobin A1c 7.0 (H) 04/10/2019 04:21 AM  
 Hemoglobin A1c 7.4 (H) 04/13/2011 03:00 AM  
 
 
Recent Glucose Results:  
Lab Results Component Value Date/Time  (H) 04/12/2019 05:10 AM  
 GLUCPOC 164 (H) 04/12/2019 07:35 AM  
 GLUCPOC 191 (H) 04/11/2019 08:31 PM  
 GLUCPOC 134 (H) 04/11/2019 04:56 PM  
  
 
Lab Results Component Value Date/Time Creatinine 5.50 (H) 04/12/2019 05:10 AM  
 
Estimated Creatinine Clearance: 10.8 mL/min (A) (based on SCr of 5.5 mg/dL (H)). Active Orders Diet DIET DIABETIC WITH OPTIONS Consistent Carb 1800kcal; Mechanical Soft PO intake:  
Patient Vitals for the past 72 hrs: 
 % Diet Eaten 04/12/19 0913 100 % 04/11/19 1035 100 % 04/10/19 1932 100 % 04/10/19 1209 100 % 04/10/19 0806 100 % 04/09/19 1943 85 % 04/09/19 1359 100 % Will continue to follow as needed. Thank you Gilda Rodriguez, BSN, RN, CDE [facility] Time spent: 5 minutes

## 2019-04-12 NOTE — PROGRESS NOTES
Edgard Yanes from [facility] was called for report. No further questions asked. Contact information given if any further questions were to come up in the future.

## 2019-04-12 NOTE — PALLIATIVE CARE DISCHARGE
Goals of Care/Treatment Preferences The Palliative Medicine team was consulted as part of your/your loved one's care in the hospital. Our team is a supportive service; we strive to relieve suffering and improve quality of life. You met with the team of Gita Leahy NP and Moon Paz LCSW. We talked about having someone named that you trust that could be your voice, when and if you are unable to speak for yourself. We completed an AMD but then your daughter Hailey Brewer was unable to carry this responsibility. Hence the decision making then falls to your wife as Mariangel Padilla. Your daughter Jerilyn Alcala was called as you wished to ask her to be \"your voice\", but unfortunately we did not receive a phone call from her. We reviewed advance care planning information, which includes the following: 
Patient's [address] is[de-identified] Legal Next of Kin Confirm Advance Directive: None Patient/Health Care Proxy Stated Goals: Prolong life We reviewed / discussed your code status as: Full Code Full Code means perform CPR in the event of cardiac arrest. 
    DNR means do NOT perform CPR in the event of cardiac arrest. 
    Partial Code means you have specific preferences, please discuss with your healthcare team. 
    Jimmy Guerra means this issue was not addressed / resolved during your stay Medical Interventions: Full interventions Other Instructions: We feel it would be helpful for your family, mPOA to discuss code status in light of your current medical condition. If you need to reach us, our # is [phone] Because of the importance of this information, we are providing you with a printed copy to share with other healthcare providers after this hospitalization is complete.

## 2019-04-12 NOTE — PROGRESS NOTES
Problem: Falls - Risk of 
Goal: *Absence of Falls Description Document Malina King Fall Risk and appropriate interventions in the flowsheet. Outcome: Resolved/Met Problem: Patient Education: Go to Patient Education Activity Goal: Patient/Family Education Outcome: Resolved/Met Problem: Pressure Injury - Risk of 
Goal: *Prevention of pressure injury Description Document Hunter Scale and appropriate interventions in the flowsheet. Outcome: Resolved/Met Problem: Patient Education: Go to Patient Education Activity Goal: Patient/Family Education Outcome: Resolved/Met Problem: Diabetes Self-Management Goal: *Disease process and treatment process Description Define diabetes and identify own type of diabetes; list 3 options for treating diabetes. Outcome: Resolved/Met Goal: *Incorporating nutritional management into lifestyle Description Describe effect of type, amount and timing of food on blood glucose; list 3 methods for planning meals. Outcome: Resolved/Met Goal: *Incorporating physical activity into lifestyle Description State effect of exercise on blood glucose levels. Outcome: Resolved/Met Goal: *Developing strategies to promote health/change behavior Description Define the ABC's of diabetes; identify appropriate screenings, schedule and personal plan for screenings. Outcome: Resolved/Met Goal: *Using medications safely Description State effect of diabetes medications on diabetes; name diabetes medication taking, action and side effects. Outcome: Resolved/Met Goal: *Monitoring blood glucose, interpreting and using results Description Identify recommended blood glucose targets  and personal targets. Outcome: Resolved/Met Goal: *Prevention, detection, treatment of acute complications Description List symptoms of hyper- and hypoglycemia; describe how to treat low blood sugar and actions for lowering  high blood glucose level. Outcome: Resolved/Met Goal: *Prevention, detection and treatment of chronic complications Description Define the natural course of diabetes and describe the relationship of blood glucose levels to long term complications of diabetes. Outcome: Resolved/Met Goal: *Developing strategies to address psychosocial issues Description Describe feelings about living with diabetes; identify support needed and support network Outcome: Resolved/Met Goal: *Insulin pump training Outcome: Resolved/Met Goal: *Sick day guidelines Outcome: Resolved/Met Goal: *Patient Specific Goal (EDIT GOAL, INSERT TEXT) Outcome: Resolved/Met Problem: Patient Education: Go to Patient Education Activity Goal: Patient/Family Education Outcome: Resolved/Met

## 2019-04-12 NOTE — PROGRESS NOTES
Name: Moise Barreto MRN: [id number] : 1940 Assessment: 
ESRD (TOO [place] unit; TTS) HTN Hypothyroidism with TSH >100 AMS-mostly due to hypothyrodism Anemia of ESRD- mild DM-2 SHPT with low Phos-not on binders Plan/Recommendations: 
HD tomm per schedule Ct Synthroid- will remind him to take synthroid at HD unit and also help administer with HD RN, if he brought the medication with him Subjective: 
Resting. No c/o. Little more alert/awake ROS:  
As above Exam: 
Visit Vitals BP (!) 112/39 (BP 1 Location: Right arm, BP Patient Position: At rest) Pulse 64 Temp 98.1 °F (36.7 °C) Resp 18 Ht 5' 6\" (1.676 m) Wt 82.6 kg (182 lb 1.6 oz) SpO2 98% BMI 29.39 kg/m² Elderly in NAD Puffy eyelids/facial appearance L arm access in place B/l LE amputation Alert awake Current Facility-Administered Medications Medication Dose Route Frequency Last Dose  predniSONE (DELTASONE) tablet 40 mg  40 mg Oral DAILY WITH BREAKFAST 40 mg at 19 0851  
 insulin NPH (NOVOLIN N, HUMULIN N) injection 5 Units  5 Units SubCUTAneous ACB&D 5 Units at 19 1726  sodium chloride (NS) flush 5-40 mL  5-40 mL IntraVENous Q8H 10 mL at 19 0511  
 sodium chloride (NS) flush 5-40 mL  5-40 mL IntraVENous PRN 10 mL at 19 9171  acetaminophen (TYLENOL) tablet 650 mg  650 mg Oral Q4H PRN Or  
 acetaminophen (TYLENOL) solution 650 mg  650 mg Per NG tube Q4H PRN Or  
 acetaminophen (TYLENOL) suppository 650 mg  650 mg Rectal Q4H PRN    
 aspirin chewable tablet 81 mg  81 mg Oral DAILY 81 mg at 19 1045  labetalol (NORMODYNE;TRANDATE) 20 mg/4 mL (5 mg/mL) injection 20 mg  20 mg IntraVENous Q4H PRN    
 insulin lispro (HUMALOG) injection   SubCUTAneous AC&HS 3 Units at 19 1253  glucose chewable tablet 16 g  4 Tab Oral PRN    
 dextrose (D50W) injection syrg 12.5-25 g  12.5-25 g IntraVENous PRN    
 glucagon (GLUCAGEN) injection 1 mg  1 mg IntraMUSCular PRN    
 albumin human 25% (BUMINATE) solution 12.5 g  12.5 g IntraVENous DIALYSIS PRN 12.5 g at 04/09/19 1607  levothyroxine (SYNTHROID) tablet 150 mcg  150 mcg Oral 6am 150 mcg at 04/12/19 9005  heparin (porcine) injection 5,000 Units  5,000 Units SubCUTAneous Q12H 5,000 Units at 04/12/19 1046 Labs/Data: 
 
Lab Results Component Value Date/Time WBC 5.7 04/12/2019 05:10 AM  
 Hemoglobin (POC) 10.5 (L) 10/16/2015 07:32 AM  
 HGB 11.1 (L) 04/12/2019 05:10 AM  
 Hematocrit (POC) 31 (L) 10/16/2015 07:32 AM  
 HCT 33.1 (L) 04/12/2019 05:10 AM  
 PLATELET 603 (L) 09/15/1076 05:10 AM  
 .8 (H) 04/12/2019 05:10 AM  
 
 
Lab Results Component Value Date/Time Sodium 133 (L) 04/12/2019 05:10 AM  
 Potassium 4.7 04/12/2019 05:10 AM  
 Chloride 97 04/12/2019 05:10 AM  
 CO2 25 04/12/2019 05:10 AM  
 Anion gap 11 04/12/2019 05:10 AM  
 Glucose 178 (H) 04/12/2019 05:10 AM  
 BUN 25 (H) 04/12/2019 05:10 AM  
 Creatinine 5.50 (H) 04/12/2019 05:10 AM  
 BUN/Creatinine ratio 5 (L) 04/12/2019 05:10 AM  
 GFR est AA 12 (L) 04/12/2019 05:10 AM  
 GFR est non-AA 10 (L) 04/12/2019 05:10 AM  
 Calcium 8.4 (L) 04/12/2019 05:10 AM  
 
 
Wt Readings from Last 3 Encounters:  
04/12/19 82.6 kg (182 lb 1.6 oz) 04/11/19 82 kg (180 lb 12.4 oz) 01/01/19 81.6 kg (180 lb) Intake/Output Summary (Last 24 hours) at 4/12/2019 1300 Last data filed at 4/12/2019 1357 Gross per 24 hour Intake 120 ml Output 1500 ml Net -1380 ml Patient seen and examined. Chart reviewed. Labs, data and other pertinent notes reviewed in last 24 hrs. PMH/SH/FH reviewed and unchanged compared to H&P Discussed with pt Krysten Hewitt MD

## 2019-05-09 NOTE — PERIOP NOTES
REC'D NOTES AND MAR FROM NURSING HOME; ALL INFO ENTERED IN CC.  WRITTEN PRE OP DIRECTIONS FAXED BACK TO THEM. ALONG WITH CHG SOAP DIRECTIONS.

## 2019-05-09 NOTE — PERIOP NOTES
SPOKE WITH PT'S WIFE, SHE STATES PT IS IN [facility]. SHE WANTS ALL INFO OBTAINED FROM THEM. CALLED REHAB, ASKED FOR PT'S NURSE, PHONE RANG WITH NO ANSWER, REPEATEDLY, THEN CALL WAS DISCONNECTED. CALLED BACK, SPOKE WITH NURSING SUPERVISOR, EXPLAINED WHAT HAPPENED WITH PREVIOUS CALL, SHE WILL GET PT'S H&P AND MAR AND FAX TO PAT.   ONCE REC'D AND INFO ENTERED INTO CC, PRE OP DIRECTIONS WILL BE FAXED BACK TO THEM [phone]

## 2019-05-12 ENCOUNTER — ANESTHESIA EVENT (OUTPATIENT)
Dept: SURGERY | Age: 79
End: 2019-05-12
Payer: MEDICARE

## 2019-05-13 ENCOUNTER — HOSPITAL ENCOUNTER (OUTPATIENT)
Age: 79
Setting detail: OUTPATIENT SURGERY
Discharge: NURSING, REHAB OR LONG-TERM CARE | End: 2019-05-13
Payer: MEDICARE

## 2019-05-13 ENCOUNTER — ANESTHESIA (OUTPATIENT)
Dept: SURGERY | Age: 79
End: 2019-05-13
Payer: MEDICARE

## 2019-05-13 VITALS
DIASTOLIC BLOOD PRESSURE: 68 MMHG | OXYGEN SATURATION: 96 % | HEART RATE: 75 BPM | WEIGHT: 182.1 LBS | TEMPERATURE: 96.8 F | HEIGHT: 66 IN | SYSTOLIC BLOOD PRESSURE: 134 MMHG | BODY MASS INDEX: 29.27 KG/M2 | RESPIRATION RATE: 16 BRPM

## 2019-05-13 DIAGNOSIS — N18.6 ESRD (END STAGE RENAL DISEASE) ON DIALYSIS (HCC): Primary | Chronic | ICD-10-CM

## 2019-05-13 DIAGNOSIS — Z99.2 ESRD (END STAGE RENAL DISEASE) ON DIALYSIS (HCC): Primary | Chronic | ICD-10-CM

## 2019-05-13 LAB
ANION GAP BLD CALC-SCNC: 18 MMOL/L (ref 10–20)
BUN BLD-MCNC: 24 MG/DL (ref 9–20)
CA-I BLD-MCNC: 1.02 MMOL/L (ref 1.12–1.32)
CHLORIDE BLD-SCNC: 100 MMOL/L (ref 98–107)
CO2 BLD-SCNC: 24 MMOL/L (ref 21–32)
CREAT BLD-MCNC: 5.7 MG/DL (ref 0.6–1.3)
GLUCOSE BLD STRIP.AUTO-MCNC: 115 MG/DL (ref 65–100)
GLUCOSE BLD-MCNC: 116 MG/DL (ref 65–100)
HCT VFR BLD CALC: 28 % (ref 36.6–50.3)
POTASSIUM BLD-SCNC: 3.2 MMOL/L (ref 3.5–5.1)
SERVICE CMNT-IMP: ABNORMAL
SERVICE CMNT-IMP: ABNORMAL
SODIUM BLD-SCNC: 137 MMOL/L (ref 136–145)

## 2019-05-13 PROCEDURE — 74011000250 HC RX REV CODE- 250

## 2019-05-13 PROCEDURE — 80047 BASIC METABLC PNL IONIZED CA: CPT

## 2019-05-13 PROCEDURE — 77030020256 HC SOL INJ NACL 0.9%  500ML

## 2019-05-13 PROCEDURE — C1768 GRAFT, VASCULAR: HCPCS

## 2019-05-13 PROCEDURE — 76010000138 HC OR TIME 0.5 TO 1 HR

## 2019-05-13 PROCEDURE — 82962 GLUCOSE BLOOD TEST: CPT

## 2019-05-13 PROCEDURE — 74011000258 HC RX REV CODE- 258

## 2019-05-13 PROCEDURE — 77030020782 HC GWN BAIR PAWS FLX 3M -B

## 2019-05-13 PROCEDURE — 77030032490 HC SLV COMPR SCD KNE COVD -B

## 2019-05-13 PROCEDURE — 74011250636 HC RX REV CODE- 250/636: Performed by: ANESTHESIOLOGY

## 2019-05-13 PROCEDURE — 74011250636 HC RX REV CODE- 250/636

## 2019-05-13 PROCEDURE — 77030031139 HC SUT VCRL2 J&J -A

## 2019-05-13 PROCEDURE — 77030011640 HC PAD GRND REM COVD -A

## 2019-05-13 PROCEDURE — 77030003601 HC NDL NRV BLK BBMI -A

## 2019-05-13 PROCEDURE — 77030018846 HC SOL IRR STRL H20 ICUM -A

## 2019-05-13 PROCEDURE — 76060000033 HC ANESTHESIA 1 TO 1.5 HR

## 2019-05-13 PROCEDURE — 76210000017 HC OR PH I REC 1.5 TO 2 HR

## 2019-05-13 DEVICE — PROPATEN VASCULAR GRAFT SW 4-7MMX80CM TAPERED HEPARIN
Type: IMPLANTABLE DEVICE | Site: ARM | Status: FUNCTIONAL
Brand: GORE PROPATEN VASCULAR GRAFT

## 2019-05-13 RX ORDER — OXYCODONE AND ACETAMINOPHEN 5; 325 MG/1; MG/1
1 TABLET ORAL AS NEEDED
Status: DISCONTINUED | OUTPATIENT
Start: 2019-05-13 | End: 2019-05-13 | Stop reason: HOSPADM

## 2019-05-13 RX ORDER — SODIUM CHLORIDE 0.9 % (FLUSH) 0.9 %
5-40 SYRINGE (ML) INJECTION AS NEEDED
Status: DISCONTINUED | OUTPATIENT
Start: 2019-05-13 | End: 2019-05-13 | Stop reason: HOSPADM

## 2019-05-13 RX ORDER — SODIUM CHLORIDE 9 MG/ML
INJECTION, SOLUTION INTRAVENOUS
Status: DISCONTINUED | OUTPATIENT
Start: 2019-05-13 | End: 2019-05-13 | Stop reason: HOSPADM

## 2019-05-13 RX ORDER — SODIUM CHLORIDE, SODIUM LACTATE, POTASSIUM CHLORIDE, CALCIUM CHLORIDE 600; 310; 30; 20 MG/100ML; MG/100ML; MG/100ML; MG/100ML
125 INJECTION, SOLUTION INTRAVENOUS CONTINUOUS
Status: DISCONTINUED | OUTPATIENT
Start: 2019-05-13 | End: 2019-05-13 | Stop reason: HOSPADM

## 2019-05-13 RX ORDER — FENTANYL CITRATE 50 UG/ML
25 INJECTION, SOLUTION INTRAMUSCULAR; INTRAVENOUS
Status: DISCONTINUED | OUTPATIENT
Start: 2019-05-13 | End: 2019-05-13 | Stop reason: HOSPADM

## 2019-05-13 RX ORDER — SODIUM CHLORIDE 0.9 % (FLUSH) 0.9 %
5-40 SYRINGE (ML) INJECTION EVERY 8 HOURS
Status: DISCONTINUED | OUTPATIENT
Start: 2019-05-13 | End: 2019-05-13 | Stop reason: HOSPADM

## 2019-05-13 RX ORDER — ACETAMINOPHEN 325 MG/1
650 TABLET ORAL ONCE
Status: DISCONTINUED | OUTPATIENT
Start: 2019-05-13 | End: 2019-05-13 | Stop reason: HOSPADM

## 2019-05-13 RX ORDER — SODIUM CHLORIDE 9 MG/ML
25 INJECTION, SOLUTION INTRAVENOUS CONTINUOUS
Status: DISCONTINUED | OUTPATIENT
Start: 2019-05-13 | End: 2019-05-13 | Stop reason: HOSPADM

## 2019-05-13 RX ORDER — HYDROMORPHONE HYDROCHLORIDE 1 MG/ML
0.2 INJECTION, SOLUTION INTRAMUSCULAR; INTRAVENOUS; SUBCUTANEOUS
Status: DISCONTINUED | OUTPATIENT
Start: 2019-05-13 | End: 2019-05-13 | Stop reason: HOSPADM

## 2019-05-13 RX ORDER — MIDAZOLAM HYDROCHLORIDE 1 MG/ML
0.5 INJECTION, SOLUTION INTRAMUSCULAR; INTRAVENOUS
Status: DISCONTINUED | OUTPATIENT
Start: 2019-05-13 | End: 2019-05-13 | Stop reason: HOSPADM

## 2019-05-13 RX ORDER — PROPOFOL 10 MG/ML
INJECTION, EMULSION INTRAVENOUS AS NEEDED
Status: DISCONTINUED | OUTPATIENT
Start: 2019-05-13 | End: 2019-05-13 | Stop reason: HOSPADM

## 2019-05-13 RX ORDER — DEXMEDETOMIDINE HYDROCHLORIDE 4 UG/ML
INJECTION, SOLUTION INTRAVENOUS AS NEEDED
Status: DISCONTINUED | OUTPATIENT
Start: 2019-05-13 | End: 2019-05-13 | Stop reason: HOSPADM

## 2019-05-13 RX ORDER — LIDOCAINE HYDROCHLORIDE 10 MG/ML
0.1 INJECTION, SOLUTION EPIDURAL; INFILTRATION; INTRACAUDAL; PERINEURAL AS NEEDED
Status: DISCONTINUED | OUTPATIENT
Start: 2019-05-13 | End: 2019-05-13 | Stop reason: HOSPADM

## 2019-05-13 RX ORDER — PROPOFOL 10 MG/ML
INJECTION, EMULSION INTRAVENOUS
Status: DISCONTINUED | OUTPATIENT
Start: 2019-05-13 | End: 2019-05-13 | Stop reason: HOSPADM

## 2019-05-13 RX ORDER — CEFAZOLIN SODIUM/WATER 2 G/20 ML
2 SYRINGE (ML) INTRAVENOUS
Status: COMPLETED | OUTPATIENT
Start: 2019-05-13 | End: 2019-05-13

## 2019-05-13 RX ORDER — LIDOCAINE HYDROCHLORIDE 20 MG/ML
INJECTION, SOLUTION EPIDURAL; INFILTRATION; INTRACAUDAL; PERINEURAL AS NEEDED
Status: DISCONTINUED | OUTPATIENT
Start: 2019-05-13 | End: 2019-05-13 | Stop reason: HOSPADM

## 2019-05-13 RX ORDER — FENTANYL CITRATE 50 UG/ML
50 INJECTION, SOLUTION INTRAMUSCULAR; INTRAVENOUS AS NEEDED
Status: DISCONTINUED | OUTPATIENT
Start: 2019-05-13 | End: 2019-05-13 | Stop reason: HOSPADM

## 2019-05-13 RX ORDER — MORPHINE SULFATE 10 MG/ML
2 INJECTION, SOLUTION INTRAMUSCULAR; INTRAVENOUS
Status: DISCONTINUED | OUTPATIENT
Start: 2019-05-13 | End: 2019-05-13 | Stop reason: HOSPADM

## 2019-05-13 RX ORDER — HYDROCODONE BITARTRATE AND ACETAMINOPHEN 7.5; 325 MG/1; MG/1
1 TABLET ORAL
Qty: 25 TAB | Refills: 0 | Status: SHIPPED | OUTPATIENT
Start: 2019-05-13 | End: 2019-05-20

## 2019-05-13 RX ORDER — DIPHENHYDRAMINE HYDROCHLORIDE 50 MG/ML
12.5 INJECTION, SOLUTION INTRAMUSCULAR; INTRAVENOUS AS NEEDED
Status: DISCONTINUED | OUTPATIENT
Start: 2019-05-13 | End: 2019-05-13 | Stop reason: HOSPADM

## 2019-05-13 RX ORDER — ONDANSETRON 2 MG/ML
4 INJECTION INTRAMUSCULAR; INTRAVENOUS AS NEEDED
Status: DISCONTINUED | OUTPATIENT
Start: 2019-05-13 | End: 2019-05-13 | Stop reason: HOSPADM

## 2019-05-13 RX ORDER — MIDAZOLAM HYDROCHLORIDE 1 MG/ML
1 INJECTION, SOLUTION INTRAMUSCULAR; INTRAVENOUS AS NEEDED
Status: DISCONTINUED | OUTPATIENT
Start: 2019-05-13 | End: 2019-05-13 | Stop reason: HOSPADM

## 2019-05-13 RX ADMIN — SODIUM CHLORIDE: 9 INJECTION, SOLUTION INTRAVENOUS at 09:21

## 2019-05-13 RX ADMIN — PROPOFOL 10 MG: 10 INJECTION, EMULSION INTRAVENOUS at 10:06

## 2019-05-13 RX ADMIN — DEXMEDETOMIDINE HYDROCHLORIDE 5 MCG: 4 INJECTION, SOLUTION INTRAVENOUS at 09:36

## 2019-05-13 RX ADMIN — LIDOCAINE HYDROCHLORIDE 40 MG: 20 INJECTION, SOLUTION EPIDURAL; INFILTRATION; INTRACAUDAL; PERINEURAL at 09:36

## 2019-05-13 RX ADMIN — FENTANYL CITRATE 25 MCG: 50 INJECTION, SOLUTION INTRAMUSCULAR; INTRAVENOUS at 08:55

## 2019-05-13 RX ADMIN — SODIUM CHLORIDE 25 ML/HR: 900 INJECTION, SOLUTION INTRAVENOUS at 08:28

## 2019-05-13 RX ADMIN — Medication 2 G: at 09:40

## 2019-05-13 RX ADMIN — PROPOFOL 10 MG: 10 INJECTION, EMULSION INTRAVENOUS at 09:36

## 2019-05-13 RX ADMIN — PROPOFOL 75 MCG/KG/MIN: 10 INJECTION, EMULSION INTRAVENOUS at 09:36

## 2019-05-13 NOTE — DISCHARGE INSTRUCTIONS
Patient Discharge Instructions    Jennifer Brooks / [id number] : 1940    Admitted 2019 Discharged: 2019     Take Home Medications     · It is important that you take the medication exactly as they are prescribed. · Keep your medication in the bottles provided by the pharmacist and keep a list of the medication names, dosages, and times to be taken in your wallet. · Do not take other medications without consulting your doctor. What to do at Home    Recommended diet: Diabetic Diet, start with light, bland foods; advance if no nausea    Recommended activity: Activity as tolerated; protect left arm from injury until numbness is totally gone    Additional Instructions: remove arm dressing on Wed and leave open    Follow-up with Dr Charmayne Brazier in 2 weeks, call [phone] for appt      Information obtained by :  I understand that if any problems occur once I am at home I am to contact my physician. I understand and acknowledge receipt of the instructions indicated above. Physician's or R.N.'s Signature                                                                  Date/Time                                                                                                                                              Patient or Representative Signature                                                          Date/Time      Hemodialysis Access: What to Expect at [address] [place] [address]  Hemodialysis is a way to remove wastes from the blood when your kidneys can no longer do the job. It is not a cure, but it can help you live longer and feel better. It is a lifesaving treatment when you have kidney failure. Hemodialysis is often called dialysis.  Your doctor created a place (called an access) in your arm for your blood to flow in and out of your body during your dialysis sessions. Your arm will probably be bruised and swollen. It may hurt. The cut (incision) may bleed. The pain and bleeding will get better over several days. You will probably need only over-the-counter pain medicine. You can reduce swelling by propping your arm on 1 or 2 pillows and keeping your elbow straight. You will have stitches. These may dissolve on their own, or your doctor will tell you when to come in to have them removed. You should also be able to return to work in a few days. You may feel some coolness or numbness in your hand. These feelings usually go away in a few weeks. Your doctor may suggest squeezing a soft object. This will strengthen your access and may make hemodialysis faster and easier. You should always be able to feel blood rushing through the fistula or graft. It feels like a slight vibration when you put your fingers on the skin over the fistula or graft. This feeling is called a thrill or pulse. This care sheet gives you a general idea about how long it will take for you to recover. But each person recovers at a different pace. Follow the steps below to get better as quickly as possible. How can you care for yourself at home? Activity    · Rest when you feel tired. Getting enough sleep will help you recover. Do not lie on or sleep on the arm with the access.     · Avoid activities such as washing windows or gardening that put stress on the arm with the access.     · You may use your arm, but do not lift anything that weighs more than about 15 pounds. This may include a child, heavy grocery bags, a heavy briefcase or backpack, cat litter or dog food bags, or a vacuum .     · You can shower, but keep the access dry for the first 2 days.  Cover the area with a plastic bag to keep it dry.     · Do not soak or scrub the incision until it has healed.     · Wear an arm guard to protect the area if you play sports or work with your arms.     · You may drive when your doctor says it is okay. This is usually in 1 to 2 days.     · Most people are able to return to work about 1 or 2 days after surgery. Diet    · Follow an eating plan that is good for your kidneys. A registered dietitian can help you make a meal plan that is right for you. You may need to limit protein, salt, fluids, and certain foods. Medicines    · Your doctor will tell you if and when you can restart your medicines. He or she will also give you instructions about taking any new medicines.     · If you take blood thinners, such as warfarin (Coumadin), clopidogrel (Plavix), or aspirin, be sure to talk to your doctor. He or she will tell you if and when to start taking those medicines again. Make sure that you understand exactly what your doctor wants you to do.     · Take pain medicines exactly as directed. ? If the doctor gave you a prescription medicine for pain, take it as prescribed. ? If you are not taking a prescription pain medicine, ask your doctor if you can take acetaminophen (Tylenol). Do not take ibuprofen (Advil, Motrin) or naproxen (Aleve), or similar medicines, unless your doctor tells you to. They may make chronic kidney disease worse. ? Do not take two or more pain medicines at the same time unless the doctor told you to. Many pain medicines have acetaminophen, which is Tylenol. Too much acetaminophen (Tylenol) can be harmful.     · If you think your pain medicine is making you sick to your stomach:  ? Take your medicine after meals (unless your doctor has told you not to). ? Ask your doctor for a different pain medicine.     · If your doctor prescribed antibiotics, take them as directed. Do not stop taking them just because you feel better. You need to take the full course of antibiotics. Incision care    · Keep the area dry for 2 days.  After 2 days, wash the area with soap and water every day, and always before dialysis.     · Do not soak or scrub the incision until it has healed.     · If you have a bandage, change it every day or as your doctor recommends. Your doctor will tell you when you can remove it. Exercise    · Squeeze a soft ball or other object as your doctor tells you. This will help blood flow through the access and help prevent blood clots.    Elevation    · Prop up the sore arm on a pillow anytime you sit or lie down during the next 3 days. Try to keep it above the level of your heart. This will help reduce swelling. Other instructions    · Every day, check your access for a pulse or thrill in the fistula or graft area. A thrill is a vibration. To feel a pulse or thrill, place the first two fingers of your hand over the access.     · Do not bump your arm.     · Do not wear tight clothing, jewelry, or anything else that may squeeze the access.     · Use your other arm to have blood drawn or blood pressure taken.     · Do not put cream or lotion on or near the access.     · Make sure all doctors you deal with know you have a vascular access. Follow-up care is a key part of your treatment and safety. Be sure to make and go to all appointments, and call your doctor if you are having problems. It's also a good idea to know your test results and keep a list of the medicines you take. When should you call for help? Call 911 anytime you think you may need emergency care. For example, call if:    · You passed out (lost consciousness).     · You have chest pain, are short of breath, or cough up blood.    Call your doctor now or seek immediate medical care if:    · Your hand or arm is cold or dark-colored.     · You have no pulse in your access.     · You have nausea or you vomit.     · You have pain that does not get better after you take pain medicine.     · You have loose stitches, or your incision comes open.     · You are bleeding from the incision.     · You have signs of infection, such as:  ? Increased pain, swelling, warmth, or redness. ? Red streaks leading from the area.   ? Pus draining from the area. ? A fever.     · You have signs of a blood clot in your leg (called a deep vein thrombosis), such as:  ? Pain in your calf, back of the knee, thigh, or groin. ? Redness or swelling in your leg.    Watch closely for changes in your health, and be sure to contact your doctor if you have any problems. Where can you learn more? Go to http://layla-billy.info/. Enter P616 in the search box to learn more about \"Hemodialysis Access: What to Expect at Home. \"  Current as of: March 14, 2018  Content Version: 11.9  © 1132-6303 [employer]. Care instructions adapted under license by [employer] (which disclaims liability or warranty for this information). If you have questions about a medical condition or this instruction, always ask your healthcare professional. [address] any warranty or liability for your use of this information. ______________________________________________________________________    Anesthesia Discharge Instructions    After general anesthesia or intervenous sedation, for 24 hours or while taking prescription Narcotics:  · Limit your activities  · Do not drive or operate hazardous machinery  · If you have not urinated within 8 hours after discharge, please contact your surgeon on call. · Do not make important personal or business decisions  · Do not drink alcoholic beverages    Report the following to your surgeon:  · Excessive pain, swelling, redness or odor of or around the surgical area  · Temperature over 100.5 degrees  · Nausea and vomiting lasting longer than 4 hours or if unable to take medication  · Any signs of decreased circulation or nerve impairment to extremity:  Change in color, persistent numbness, tingling, coldness or increased pain.   · Any questions

## 2019-05-13 NOTE — ANESTHESIA PREPROCEDURE EVALUATION
Relevant Problems No relevant active problems Anesthetic History No history of anesthetic complications Review of Systems / Medical History Patient summary reviewed, nursing notes reviewed and pertinent labs reviewed Pulmonary Within defined limits Neuro/Psych Within defined limits CVA Cardiovascular Within defined limits Hypertension CAD Exercise tolerance: <4 METS 
  
GI/Hepatic/Renal 
Within defined limits Renal disease: dialysis Endo/Other Within defined limits Diabetes: type 2 Hypothyroidism Other Findings Physical Exam 
 
Airway Mallampati: II 
TM Distance: > 6 cm Neck ROM: normal range of motion Mouth opening: Normal 
 
 Cardiovascular Murmur: Grade 2, Mitral area Dental 
 
Dentition: Poor dentition Pulmonary Breath sounds clear to auscultation Abdominal 
GI exam deferred Other Findings Anesthetic Plan ASA: 3 Anesthesia type: regional - supraclavicular block Induction: Intravenous Anesthetic plan and risks discussed with: Patient

## 2019-05-13 NOTE — ANESTHESIA POSTPROCEDURE EVALUATION
Post-Anesthesia Evaluation and Assessment Patient: Rosa Sanchez MRN: [id number]  SSN: [id number] YOB: 1940  Age: [de-identified] y.o. Sex: male I have evaluated the patient and they are stable and ready for discharge from the PACU. Cardiovascular Function/Vital Signs Visit Vitals /68 Pulse 75 Temp 36 °C (96.8 °F) Resp 16 Ht 5' 6\" (1.676 m) Wt 82.6 kg (182 lb 1.6 oz) SpO2 96% BMI 29.39 kg/m² Patient is status post Regional anesthesia for Procedure(s): 
INSERTION LEFT UPPER ARM DIALYSIS GRAFT. Nausea/Vomiting: None Postoperative hydration reviewed and adequate. Pain: 
Pain Scale 1: Adult Nonverbal Pain Scale (05/13/19 1147) Pain Intensity 1: 0 (05/13/19 0815) Managed Neurological Status:  
Neuro (WDL): Within Defined Limits (05/13/19 1147) Neuro Neurologic State: Sleeping (05/13/19 1147) Speech: (not understandable; nods to questions) (05/13/19 1147) At baseline Mental Status, Level of Consciousness: Alert and  oriented to person, place, and time Pulmonary Status:  
O2 Device: Room air (05/13/19 1145) Adequate oxygenation and airway patent Complications related to anesthesia: None Post-anesthesia assessment completed. No concerns Signed By: Alex Dodd MD   
 May 13, 2019 Procedure(s): 
INSERTION LEFT UPPER ARM DIALYSIS GRAFT. 
 
regional 
 
<BSHSIANPOST> Vitals Value Taken Time /68 5/13/2019 12:00 PM  
Temp 36 °C (96.8 °F) 5/13/2019 11:47 AM  
Pulse 75 5/13/2019 12:04 PM  
Resp 13 5/13/2019 12:04 PM  
SpO2 97 % 5/13/2019 12:04 PM  
Vitals shown include unvalidated device data.

## 2019-05-13 NOTE — BRIEF OP NOTE
BRIEF OPERATIVE NOTE    Date of Procedure: 5/13/2019   Preoperative Diagnosis: END STAGE RENAL DISEASE  Postoperative Diagnosis: END STAGE RENAL DISEASE    Procedure(s):  INSERTION LEFT UPPER ARM DIALYSIS GRAFT  Surgeon(s) and Role:     Rod Lopes MD - Primary         Surgical Assistant: Robert Griffin    Surgical Staff:  Circ-1: Augusta Monique RN  Circ-Relief: Yue Acosta RN  Scrub Tech-1: Alpheus Palms  Surg Asst-1: Gisel Licea  Event Time In Time Out   Incision Start 2845    Incision Close 1023      Anesthesia: Regional   Estimated Blood Loss: minimal  Specimens: * No specimens in log *   Findings: none   Complications: none  Implants:   Implant Name Type Inv.  Item Serial No.  Lot No. LRB No. Used Action   GRAFT HEP 4-7MM X 80CM US --  - [id number] Graft GRAFT HEP 4-7MM X 80CM US --  [id number] [employer] NA Left 1 Implanted

## 2019-05-13 NOTE — OP NOTES
[address] [facility] 
OPERATIVE REPORT Name:  Janet Doyle 
MR#:  [id number] :  1940 ACCOUNT #:  [de-identified] DATE OF SERVICE:  2019 PREOPERATIVE DIAGNOSIS:  Renal failure. POSTOPERATIVE DIAGNOSIS:  Renal failure. PROCEDURE PERFORMED:  Insertion of left upper arm [place] dialysis graft. SURGEON:  Mirna Petty MD 
 
ASSISTANT:  Norman Goltz. ANESTHESIA:  Regional block. COMPLICATIONS:  None. SPECIMENS REMOVED:  None. IMPLANTS:  4-7 mm tapered [place] dialysis graft. ESTIMATED BLOOD LOSS:  Minimal. 
 
INDICATIONS:  The patient is an 24-year-old male with end-stage renal disease on hemodialysis. He had a left upper arm fistula that failed after a number of years. A graft will be placed. He is currently dialyzing through a tunneled catheter. PROCEDURE:  The patient's left arm was prepped and draped. A longitudinal incision was made in the medial proximal upper arm. The basilic vein and brachial artery were dissected free. A 4-7-mm tapered [place] graft was obtained. This was tunneled in a loop configuration in the upper arm using 3 counterincisions with the arterial side lateral.  The 4 mm end was sewn to the brachial artery using running CV5 [place] suture. The venous 7 mm end was then sewn end-to-side using running CV5 [place] suture. Following this, there was a palpable thrill in the graft. The incisions were closed with Vicryl subcutaneous suture and skin staples. Dressings were applied and the patient was returned to the recovery room in stable condition. Viktor White MD 
 
 
[id number] 
D:  2019 10:31 T:  2019 10:35 
JOB #:  [id number]

## 2020-04-08 ENCOUNTER — APPOINTMENT (OUTPATIENT)
Dept: GENERAL RADIOLOGY | Age: 80
DRG: 469 | End: 2020-04-08
Attending: EMERGENCY MEDICINE
Payer: MEDICARE

## 2020-04-08 ENCOUNTER — HOSPITAL ENCOUNTER (INPATIENT)
Age: 80
LOS: 7 days | Discharge: NURSING, REHAB OR LONG-TERM CARE | DRG: 469 | End: 2020-04-15
Attending: EMERGENCY MEDICINE | Admitting: HOSPITALIST
Payer: MEDICARE

## 2020-04-08 ENCOUNTER — ANESTHESIA EVENT (OUTPATIENT)
Dept: SURGERY | Age: 80
DRG: 469 | End: 2020-04-08
Payer: MEDICARE

## 2020-04-08 DIAGNOSIS — S72.002A CLOSED DISPLACED FRACTURE OF LEFT FEMORAL NECK (HCC): Primary | ICD-10-CM

## 2020-04-08 DIAGNOSIS — N18.6 ESRD (END STAGE RENAL DISEASE) ON DIALYSIS (HCC): ICD-10-CM

## 2020-04-08 DIAGNOSIS — Z96.649 S/P HIP HEMIARTHROPLASTY: ICD-10-CM

## 2020-04-08 DIAGNOSIS — Z99.2 ESRD (END STAGE RENAL DISEASE) ON DIALYSIS (HCC): ICD-10-CM

## 2020-04-08 LAB
ALBUMIN SERPL-MCNC: 2.8 G/DL (ref 3.5–5)
ALBUMIN/GLOB SERPL: 0.6 {RATIO} (ref 1.1–2.2)
ALP SERPL-CCNC: 126 U/L (ref 45–117)
ALT SERPL-CCNC: 17 U/L (ref 12–78)
ANION GAP SERPL CALC-SCNC: 7 MMOL/L (ref 5–15)
AST SERPL-CCNC: 17 U/L (ref 15–37)
ATRIAL RATE: 88 BPM
BASOPHILS # BLD: 0 K/UL (ref 0–0.1)
BASOPHILS NFR BLD: 0 % (ref 0–1)
BILIRUB SERPL-MCNC: 0.6 MG/DL (ref 0.2–1)
BUN SERPL-MCNC: 35 MG/DL (ref 6–20)
BUN/CREAT SERPL: 6 (ref 12–20)
CALCIUM SERPL-MCNC: 8.4 MG/DL (ref 8.5–10.1)
CALCULATED P AXIS, ECG09: 20 DEGREES
CALCULATED R AXIS, ECG10: 57 DEGREES
CALCULATED T AXIS, ECG11: -48 DEGREES
CHLORIDE SERPL-SCNC: 97 MMOL/L (ref 97–108)
CO2 SERPL-SCNC: 29 MMOL/L (ref 21–32)
CREAT SERPL-MCNC: 5.53 MG/DL (ref 0.7–1.3)
DIAGNOSIS, 93000: NORMAL
DIFFERENTIAL METHOD BLD: ABNORMAL
EOSINOPHIL # BLD: 0.1 K/UL (ref 0–0.4)
EOSINOPHIL NFR BLD: 3 % (ref 0–7)
ERYTHROCYTE [DISTWIDTH] IN BLOOD BY AUTOMATED COUNT: 13.6 % (ref 11.5–14.5)
GLOBULIN SER CALC-MCNC: 4.7 G/DL (ref 2–4)
GLUCOSE BLD STRIP.AUTO-MCNC: 134 MG/DL (ref 65–100)
GLUCOSE BLD STRIP.AUTO-MCNC: 191 MG/DL (ref 65–100)
GLUCOSE BLD STRIP.AUTO-MCNC: 197 MG/DL (ref 65–100)
GLUCOSE SERPL-MCNC: 167 MG/DL (ref 65–100)
HCT VFR BLD AUTO: 37.2 % (ref 36.6–50.3)
HGB BLD-MCNC: 11.9 G/DL (ref 12.1–17)
IMM GRANULOCYTES # BLD AUTO: 0 K/UL (ref 0–0.04)
IMM GRANULOCYTES NFR BLD AUTO: 0 % (ref 0–0.5)
INR PPP: 1.1 (ref 0.9–1.1)
LYMPHOCYTES # BLD: 0.7 K/UL (ref 0.8–3.5)
LYMPHOCYTES NFR BLD: 15 % (ref 12–49)
MAGNESIUM SERPL-MCNC: 1.9 MG/DL (ref 1.6–2.4)
MCH RBC QN AUTO: 34.3 PG (ref 26–34)
MCHC RBC AUTO-ENTMCNC: 32 G/DL (ref 30–36.5)
MCV RBC AUTO: 107.2 FL (ref 80–99)
MONOCYTES # BLD: 0.7 K/UL (ref 0–1)
MONOCYTES NFR BLD: 14 % (ref 5–13)
NEUTS SEG # BLD: 3.2 K/UL (ref 1.8–8)
NEUTS SEG NFR BLD: 68 % (ref 32–75)
NRBC # BLD: 0 K/UL (ref 0–0.01)
NRBC BLD-RTO: 0 PER 100 WBC
P-R INTERVAL, ECG05: 166 MS
PHOSPHATE SERPL-MCNC: 3.5 MG/DL (ref 2.6–4.7)
PLATELET # BLD AUTO: 140 K/UL (ref 150–400)
PMV BLD AUTO: 10.5 FL (ref 8.9–12.9)
POTASSIUM SERPL-SCNC: 4.7 MMOL/L (ref 3.5–5.1)
PROT SERPL-MCNC: 7.5 G/DL (ref 6.4–8.2)
PROTHROMBIN TIME: 11.7 SEC (ref 9–11.1)
Q-T INTERVAL, ECG07: 394 MS
QRS DURATION, ECG06: 92 MS
QTC CALCULATION (BEZET), ECG08: 476 MS
RBC # BLD AUTO: 3.47 M/UL (ref 4.1–5.7)
RBC MORPH BLD: ABNORMAL
SERVICE CMNT-IMP: ABNORMAL
SODIUM SERPL-SCNC: 133 MMOL/L (ref 136–145)
TROPONIN I SERPL-MCNC: <0.05 NG/ML
TSH SERPL DL<=0.05 MIU/L-ACNC: 8.3 UIU/ML (ref 0.36–3.74)
VENTRICULAR RATE, ECG03: 88 BPM
WBC # BLD AUTO: 4.7 K/UL (ref 4.1–11.1)

## 2020-04-08 PROCEDURE — 85025 COMPLETE CBC W/AUTO DIFF WBC: CPT

## 2020-04-08 PROCEDURE — 82962 GLUCOSE BLOOD TEST: CPT

## 2020-04-08 PROCEDURE — 84443 ASSAY THYROID STIM HORMONE: CPT

## 2020-04-08 PROCEDURE — 74011250637 HC RX REV CODE- 250/637: Performed by: PHYSICIAN ASSISTANT

## 2020-04-08 PROCEDURE — 84484 ASSAY OF TROPONIN QUANT: CPT

## 2020-04-08 PROCEDURE — 74011250636 HC RX REV CODE- 250/636: Performed by: EMERGENCY MEDICINE

## 2020-04-08 PROCEDURE — 93005 ELECTROCARDIOGRAM TRACING: CPT

## 2020-04-08 PROCEDURE — 99284 EMERGENCY DEPT VISIT MOD MDM: CPT

## 2020-04-08 PROCEDURE — 90935 HEMODIALYSIS ONE EVALUATION: CPT

## 2020-04-08 PROCEDURE — 73502 X-RAY EXAM HIP UNI 2-3 VIEWS: CPT

## 2020-04-08 PROCEDURE — 85610 PROTHROMBIN TIME: CPT

## 2020-04-08 PROCEDURE — 94760 N-INVAS EAR/PLS OXIMETRY 1: CPT

## 2020-04-08 PROCEDURE — 74011250637 HC RX REV CODE- 250/637: Performed by: HOSPITALIST

## 2020-04-08 PROCEDURE — 84100 ASSAY OF PHOSPHORUS: CPT

## 2020-04-08 PROCEDURE — 65270000029 HC RM PRIVATE

## 2020-04-08 PROCEDURE — 96374 THER/PROPH/DIAG INJ IV PUSH: CPT

## 2020-04-08 PROCEDURE — 83735 ASSAY OF MAGNESIUM: CPT

## 2020-04-08 PROCEDURE — 80053 COMPREHEN METABOLIC PANEL: CPT

## 2020-04-08 PROCEDURE — 71045 X-RAY EXAM CHEST 1 VIEW: CPT

## 2020-04-08 PROCEDURE — 74011250637 HC RX REV CODE- 250/637: Performed by: EMERGENCY MEDICINE

## 2020-04-08 PROCEDURE — 36415 COLL VENOUS BLD VENIPUNCTURE: CPT

## 2020-04-08 PROCEDURE — 5A1D70Z PERFORMANCE OF URINARY FILTRATION, INTERMITTENT, LESS THAN 6 HOURS PER DAY: ICD-10-PCS | Performed by: INTERNAL MEDICINE

## 2020-04-08 RX ORDER — ONDANSETRON 2 MG/ML
4 INJECTION INTRAMUSCULAR; INTRAVENOUS
Status: DISCONTINUED | OUTPATIENT
Start: 2020-04-08 | End: 2020-04-08

## 2020-04-08 RX ORDER — MIDODRINE HYDROCHLORIDE 5 MG/1
10 TABLET ORAL
Status: DISCONTINUED | OUTPATIENT
Start: 2020-04-09 | End: 2020-04-10

## 2020-04-08 RX ORDER — ONDANSETRON 2 MG/ML
4 INJECTION INTRAMUSCULAR; INTRAVENOUS
Status: DISCONTINUED | OUTPATIENT
Start: 2020-04-08 | End: 2020-04-09

## 2020-04-08 RX ORDER — MIDODRINE HYDROCHLORIDE 5 MG/1
10 TABLET ORAL
Status: DISCONTINUED | OUTPATIENT
Start: 2020-04-08 | End: 2020-04-08

## 2020-04-08 RX ORDER — MELATONIN
5000 DAILY
Status: DISCONTINUED | OUTPATIENT
Start: 2020-04-08 | End: 2020-04-15 | Stop reason: HOSPADM

## 2020-04-08 RX ORDER — MIDODRINE HYDROCHLORIDE 10 MG/1
10 TABLET ORAL
COMMUNITY

## 2020-04-08 RX ORDER — SODIUM CHLORIDE 0.9 % (FLUSH) 0.9 %
5-40 SYRINGE (ML) INJECTION EVERY 8 HOURS
Status: DISCONTINUED | OUTPATIENT
Start: 2020-04-08 | End: 2020-04-09

## 2020-04-08 RX ORDER — PRAVASTATIN SODIUM 20 MG/1
20 TABLET ORAL
COMMUNITY

## 2020-04-08 RX ORDER — MIDODRINE HYDROCHLORIDE 5 MG/1
10 TABLET ORAL
Status: DISCONTINUED | OUTPATIENT
Start: 2020-04-09 | End: 2020-04-08

## 2020-04-08 RX ORDER — ONDANSETRON 2 MG/ML
4 INJECTION INTRAMUSCULAR; INTRAVENOUS
Status: COMPLETED | OUTPATIENT
Start: 2020-04-08 | End: 2020-04-08

## 2020-04-08 RX ORDER — SODIUM CHLORIDE 0.9 % (FLUSH) 0.9 %
5-40 SYRINGE (ML) INJECTION AS NEEDED
Status: DISCONTINUED | OUTPATIENT
Start: 2020-04-08 | End: 2020-04-09

## 2020-04-08 RX ORDER — NALOXONE HYDROCHLORIDE 0.4 MG/ML
0.4 INJECTION, SOLUTION INTRAMUSCULAR; INTRAVENOUS; SUBCUTANEOUS AS NEEDED
Status: DISCONTINUED | OUTPATIENT
Start: 2020-04-08 | End: 2020-04-09

## 2020-04-08 RX ORDER — DOCUSATE SODIUM 100 MG/1
100 CAPSULE, LIQUID FILLED ORAL 2 TIMES DAILY
Status: DISCONTINUED | OUTPATIENT
Start: 2020-04-08 | End: 2020-04-09

## 2020-04-08 RX ORDER — HYDROMORPHONE HYDROCHLORIDE 1 MG/ML
.5-1 INJECTION, SOLUTION INTRAMUSCULAR; INTRAVENOUS; SUBCUTANEOUS
Status: DISCONTINUED | OUTPATIENT
Start: 2020-04-08 | End: 2020-04-10

## 2020-04-08 RX ORDER — HYDROCODONE BITARTRATE AND ACETAMINOPHEN 5; 325 MG/1; MG/1
1 TABLET ORAL
Status: COMPLETED | OUTPATIENT
Start: 2020-04-08 | End: 2020-04-08

## 2020-04-08 RX ORDER — ACETAMINOPHEN 325 MG/1
650 TABLET ORAL
Status: DISCONTINUED | OUTPATIENT
Start: 2020-04-08 | End: 2020-04-09

## 2020-04-08 RX ORDER — OXYCODONE HYDROCHLORIDE 5 MG/1
5 TABLET ORAL
Status: DISCONTINUED | OUTPATIENT
Start: 2020-04-08 | End: 2020-04-09

## 2020-04-08 RX ORDER — OXYCODONE HYDROCHLORIDE 5 MG/1
2.5 TABLET ORAL
Status: DISCONTINUED | OUTPATIENT
Start: 2020-04-08 | End: 2020-04-09

## 2020-04-08 RX ORDER — AMOXICILLIN 250 MG
2 CAPSULE ORAL 2 TIMES DAILY
Status: DISCONTINUED | OUTPATIENT
Start: 2020-04-08 | End: 2020-04-09

## 2020-04-08 RX ORDER — MAGNESIUM SULFATE 100 %
4 CRYSTALS MISCELLANEOUS AS NEEDED
Status: DISCONTINUED | OUTPATIENT
Start: 2020-04-08 | End: 2020-04-15 | Stop reason: HOSPADM

## 2020-04-08 RX ORDER — POLYETHYLENE GLYCOL 3350 17 G/17G
17 POWDER, FOR SOLUTION ORAL DAILY
Status: DISCONTINUED | OUTPATIENT
Start: 2020-04-08 | End: 2020-04-09

## 2020-04-08 RX ORDER — INSULIN LISPRO 100 [IU]/ML
INJECTION, SOLUTION INTRAVENOUS; SUBCUTANEOUS
Status: DISCONTINUED | OUTPATIENT
Start: 2020-04-08 | End: 2020-04-15 | Stop reason: HOSPADM

## 2020-04-08 RX ORDER — DEXTROSE 50 % IN WATER (D50W) INTRAVENOUS SYRINGE
12.5-25 AS NEEDED
Status: DISCONTINUED | OUTPATIENT
Start: 2020-04-08 | End: 2020-04-15 | Stop reason: HOSPADM

## 2020-04-08 RX ORDER — LEVOTHYROXINE SODIUM 100 UG/1
200 TABLET ORAL
Status: DISCONTINUED | OUTPATIENT
Start: 2020-04-08 | End: 2020-04-15 | Stop reason: HOSPADM

## 2020-04-08 RX ORDER — PRAVASTATIN SODIUM 10 MG/1
20 TABLET ORAL
Status: DISCONTINUED | OUTPATIENT
Start: 2020-04-08 | End: 2020-04-15 | Stop reason: HOSPADM

## 2020-04-08 RX ORDER — NALOXONE HYDROCHLORIDE 0.4 MG/ML
0.4 INJECTION, SOLUTION INTRAMUSCULAR; INTRAVENOUS; SUBCUTANEOUS AS NEEDED
Status: DISCONTINUED | OUTPATIENT
Start: 2020-04-08 | End: 2020-04-15 | Stop reason: HOSPADM

## 2020-04-08 RX ORDER — ACETAMINOPHEN 325 MG/1
650 TABLET ORAL EVERY 6 HOURS
Status: DISCONTINUED | OUTPATIENT
Start: 2020-04-08 | End: 2020-04-15 | Stop reason: HOSPADM

## 2020-04-08 RX ORDER — LEVOTHYROXINE SODIUM 200 UG/1
200 TABLET ORAL
COMMUNITY
End: 2021-01-01

## 2020-04-08 RX ORDER — ASPIRIN 81 MG/1
81 TABLET ORAL DAILY
Status: DISCONTINUED | OUTPATIENT
Start: 2020-04-08 | End: 2020-04-09

## 2020-04-08 RX ADMIN — Medication 10 ML: at 12:38

## 2020-04-08 RX ADMIN — LEVOTHYROXINE SODIUM 200 MCG: 100 TABLET ORAL at 12:36

## 2020-04-08 RX ADMIN — ASPIRIN 81 MG: 81 TABLET ORAL at 12:36

## 2020-04-08 RX ADMIN — Medication 10 ML: at 16:30

## 2020-04-08 RX ADMIN — ACETAMINOPHEN 650 MG: 325 TABLET, FILM COATED ORAL at 12:33

## 2020-04-08 RX ADMIN — PRAVASTATIN SODIUM 20 MG: 10 TABLET ORAL at 21:46

## 2020-04-08 RX ADMIN — ACETAMINOPHEN 650 MG: 325 TABLET, FILM COATED ORAL at 23:33

## 2020-04-08 RX ADMIN — Medication 10 ML: at 21:47

## 2020-04-08 RX ADMIN — MELATONIN 5 TABLET: at 12:36

## 2020-04-08 RX ADMIN — ONDANSETRON 4 MG: 2 INJECTION INTRAMUSCULAR; INTRAVENOUS at 09:24

## 2020-04-08 RX ADMIN — MIDODRINE HYDROCHLORIDE 10 MG: 5 TABLET ORAL at 14:23

## 2020-04-08 RX ADMIN — HYDROCODONE BITARTRATE AND ACETAMINOPHEN 1 TABLET: 5; 325 TABLET ORAL at 09:23

## 2020-04-08 NOTE — ED TRIAGE NOTES
Assumed care of pt via EMS stretcher. Pt is A&O x 4 and reports CC of leg pain that began a week ago when he fell. Pt comes from [facility] and had an x-ray done that shows he has a fracture of the femoral neck. Pt is not on any blood thinners. Pt rates pain 8 out of 10. Dr. Janine Dupree at bedside evaluating pt. 
 
0848: Hospitalist at bedside. Medicated pt per orders. Spoke with daughter Dipak Sanchez. She says she would like pt wife to be updated on pt plan of care. Let daughter know pt would be admitted to hospital.  
Daughter phone number Dipak Sanchez: [phone] Wife phone number Le Sebastián: [phone]

## 2020-04-08 NOTE — ACP (ADVANCE CARE PLANNING)
Advance Care Planning Note NAME: Bubba Weems :  1940 MRN:  [id number] Date/Time:  2020 10:30 AM 
 
Active Diagnoses: 
Hospital Problems  Date Reviewed: 2019 Codes Class Noted POA Fracture of femoral neck, left, closed ([facility] [address] ICD-10-CM: K90.836I ICD-9-CM: 820.8  2020 Unknown ESRD (end stage renal disease) on dialysis (HCC) (Chronic) ICD-10-CM: N18.6, Z99.2 ICD-9-CM: 585.6, V45.11 Chronic 10/8/2014 Unknown Hypothyroid Hx CVA Hx DM These active diagnoses are of sufficient risk that focused discussion on advance care planning is indicated in order to allow the patient to thoughtfully consider personal goals of care, and if situations arise that prevent the ability to personally give input, to ensure appropriate representation of their personal desires for different levels and aggressiveness of care. Discussion:  
Code status addressed and wants to be a Full Code. Patient wants central line and vasopressors if needed. Patient would also want a feeding tube, if needed, for nutritional support. Patient  would like to assign wife Kim Comer  as the surrogate decision maker. Persons present and participating in discussion: Jacob Lame, Mikell Dakins, MD, wife Kim Comer (over phone) Time Spent:  
Total time spent face-to-face in education and discussion:   16  minutes.   
 
 
 
Mikell Dakins, MD  
Hospitalist

## 2020-04-08 NOTE — DIALYSIS
[facility] Dialysis Team [place]  [phone] Vitals   Pre   Post   Assessment   Pre   Post    
Temp  Temp: 97.4 °F (36.3 °C) (04/08/20 1352)  97.4 LOC  A&Ox3 No change HR   Pulse (Heart Rate): 86 (04/08/20 1352) 86 Lungs   Clear to auscultation  No change B/P   BP: 91/44 (04/08/20 1352) 109/58 Cardiac   RRR  No change Resp   Resp Rate: 18 (04/08/20 1352) 18 Skin   Warm,dry  No change Pain level  0/10 0/10 Edema  generalized No change Orders: Duration:   Start:    1357 End:    1654 Total:   3 hours Dialyzer:   Dialyzer/Set Up Inspection: Claudean Shave (04/08/20 1352) K Bath:   Dialysate K (mEq/L): 3 (04/08/20 1352) Ca Bath:   Dialysate CA (mEq/L): 2.5 (04/08/20 1352) Na/Bicarb:   Dialysate NA (mEq/L): 138 (04/08/20 1352) Target Fluid Removal:   Goal/Amount of Fluid to Remove (mL): 1000 mL (04/08/20 1352) Access Type & Location:   LINA AVG +B&T No s/s of infection. Site disinfected with alcohol and cannulated with two 15g needles. Aspirated and flushed well with NS. Needles covered with band aids and secured with tape. Labs Obtained/Reviewed Critical Results Called   Date when labs were drawn- 
Hgb-   
HGB Date Value Ref Range Status 04/08/2020 11.9 (L) 12.1 - 17.0 g/dL Final  
 
K-   
Potassium Date Value Ref Range Status 04/08/2020 4.7 3.5 - 5.1 mmol/L Final  
  Comment:  
  Sample is hemolyzed (hemoglobin is 
likely >50 mg/dL) and thus the 
potassium, AST, ammonia, and 
phosphorus results may be adversely 
affected. If the clinical situation 
warrants, recommend recollection of 
a new specimen with attention to 
preventing hemolysis. Ca-  
Calcium Date Value Ref Range Status 04/08/2020 8.4 (L) 8.5 - 10.1 MG/DL Final  
 
Bun-  
BUN Date Value Ref Range Status 04/08/2020 35 (H) 6 - 20 MG/DL Final  
 
Creat-  
Creatinine Date Value Ref Range Status 04/08/2020 5.53 (H) 0.70 - 1.30 MG/DL Final  
 
  
Medications/ Blood Products Given Name   Dose   Route and Time NONE Blood Volume Processed (BVP):    69.2L Net Fluid Removed:  1,000mL Comments Time Out Done: yes Primary Nurse Rpt Pre: Ellen Lee RN 
Primary Nurse Rpt Post: Ellen Lee RN 
Pt Education:Procedure, access care, s/s of infection. Care Plan:Continue dialysis as ordered. Tx Summary: Tx complete per order. At end, all possible blood returned to pt. Lines flushed with NS. Needles removed and hemostasis achieved. Sites secured with gauze and tape. Report given to primary RN. Pt returned to room. Admiting Diagnosis:L Femoral head fracture. Pt's previous [address] Consent signed - Informed Consent Verified: Yes (04/08/20 9712) Melisa Consent - Obtained (4/8/2020) Hepatitis Status- Neg (4/2/2020) Machine #- Machine Number: [id number] (04/08/20 [id number])

## 2020-04-08 NOTE — CONSULTS
Orthopedic CONSULT NOTE Subjective:  
 
Date of Consultation:  April 8, 2020 Myrtha Leventhal is a 78 y.o. male who is being seen for left hip pain after fall . Injury occurred  Last week  while Pt. was reaching for something and fell from  chair. Had GLF . Workup has revealed left hip fracture . Patient's ambulatory status includes wheel chair  and their living environment is SNF. Pt. Denies head trauma/LOC during injury. ESRD on dialysis Patient Active Problem List  
 Diagnosis Date Noted  Elevated LFTs 11/13/2014 Priority: 1 - One  
  Class: Present on Admission  Sepsis ([facility] [address] 11/13/2014 Priority: 1 - One  
  Class: Present on Admission  Gangrene of foot ([facility] [address] 11/13/2014 Priority: 1 - One  
  Class: Present on Admission  Anemia in chronic kidney disease 11/13/2014 Priority: 2 - Two Class: Chronic  Fracture of femoral neck, left, closed ([facility] [address] 04/08/2020  Dementia ([facility] [address] 04/12/2019  Severe hypothyroidism 04/09/2019  Hypoglycemia 04/09/2019  Bilateral carotid artery stenosis 04/09/2019  DM type 2 (diabetes mellitus, type 2) ([facility] [address] 08/14/2015  PAD (peripheral artery disease) ([facility] [address] 08/14/2015  ESRD (end stage renal disease) on dialysis ([facility] [address] 10/08/2014 Class: Chronic  History of CVA (cerebrovascular accident) 10/08/2014 Class: Present on Admission  Hypothyroid 10/08/2014  
 HTN (hypertension) 04/13/2011  DM neuro manif type II, uncontrolled 04/13/2011  Other and unspecified hyperlipidemia 04/13/2011 Family History Problem Relation Age of Onset  Hypertension Mother  Diabetes Father Social History Tobacco Use  Smoking status: Never Smoker  Smokeless tobacco: Never Used Substance Use Topics  Alcohol use: No  
 
Past Medical History:  
Diagnosis Date  Anemia  CAD (coronary artery disease)  Chronic kidney disease   
 tues/thurs/sat TREE [address]  
 Cognitive communication deficit  Diabetes ([facility] [address]  Dysarthria and anarthria  Dysphagia  Endocrine disease   
 hypothyroid  Gastrointestinal disorder   
 gallstones  Hx of BKA, left ([address]  Hx of BKA, right ([address]  Hypertension  Muscle weakness (generalized)  Stroke ([facility] [address] 2011 speech general weakness  Thyroid disease Past Surgical History:  
Procedure Laterality Date [address] [place] UNLISTED  HX HEENT    
 cataract   removal  rt eye  HX ORTHOPAEDIC    
 11/2014 R BK Amputation and L  
 HX OTHER SURGICAL    
 HX VASCULAR ACCESS    
 L arm shunt  VASCULAR SURGERY PROCEDURE UNLIST Fistula R arm Prior to Admission medications Medication Sig Start Date End Date Taking? Authorizing Provider  
cholecalciferol, vitamin D3, (VITAMIN D3 PO) Take 5,000 Units by mouth daily. Yes Provider, Historical  
midodrine (PROAMITINE) 10 mg tablet Take 10 mg by mouth Q TU, TH & SAT. With dialysis   Yes Provider, Historical  
levothyroxine (SYNTHROID) 200 mcg tablet Take 200 mcg by mouth Daily (before breakfast). Yes Provider, Historical  
pravastatin (PRAVACHOL) 20 mg tablet Take 20 mg by mouth nightly. Yes Provider, Historical  
OTHER,NON-FORMULARY, GLUCOSE NA/F CAFFIENE FREE TAB CHEW, TAKE 3 TABS BY MOUTH PRN FOR HYPOGLYCEMIA   Yes Provider, Historical  
OTHER,NON-FORMULARY, GLUCOST 15 40% GEL TAKEN 1GM BY MOUTH, PRN FOR BS<60 AND ALERT MD   Yes Provider, Historical  
glucagon (GLUCAGEN) 1 mg injection 1 mg by IntraMUSCular route once. FOR BS<60 AND UNRESPONSIVE   Yes Provider, Historical  
polyethylene glycol (MIRALAX) 17 gram/dose powder Take 17 g by mouth daily. 9/27/17  Yes Keyana Robison MD  
ondansetron (ZOFRAN ODT) 4 mg disintegrating tablet Take 1 Tab by mouth every eight (8) hours as needed for Nausea. 8/2/17  Yes Jenny Herrmann MD  
aspirin delayed-release 81 mg tablet Take 1 Tab by mouth daily.  5/6/15  Yes Giuliana Sanchez MD  
 
 Current Facility-Administered Medications Medication Dose Route Frequency  acetaminophen (TYLENOL) tablet 650 mg  650 mg Oral Q6H PRN  
 ondansetron (ZOFRAN) injection 4 mg  4 mg IntraVENous Q6H PRN  
 sodium chloride (NS) flush 5-40 mL  5-40 mL IntraVENous Q8H  
 sodium chloride (NS) flush 5-40 mL  5-40 mL IntraVENous PRN  
 naloxone (NARCAN) injection 0.4 mg  0.4 mg IntraVENous PRN  
 docusate sodium (COLACE) capsule 100 mg  100 mg Oral BID  aspirin delayed-release tablet 81 mg  81 mg Oral DAILY  cholecalciferol (VITAMIN D3) (1000 Units /25 mcg) tablet 5 Tab  5,000 Units Oral DAILY  levothyroxine (SYNTHROID) tablet 200 mcg  200 mcg Oral ACB  polyethylene glycol (MIRALAX) packet 17 g  17 g Oral DAILY  pravastatin (PRAVACHOL) tablet 20 mg  20 mg Oral QHS  insulin lispro (HUMALOG) injection   SubCUTAneous AC&HS  
 glucose chewable tablet 16 g  4 Tab Oral PRN  
 dextrose (D50W) injection syrg 12.5-25 g  12.5-25 g IntraVENous PRN  
 glucagon (GLUCAGEN) injection 1 mg  1 mg IntraMUSCular PRN  
 midodrine (PROAMITINE) tablet 10 mg  10 mg Oral Q TU, TH & SAT  ceFAZolin (ANCEF) 2 g in sterile water (preservative free) 20 mL IV syringe  2 g IntraVENous ON CALL TO OR  
 acetaminophen (TYLENOL) tablet 650 mg  650 mg Oral Q6H  
 oxyCODONE IR (ROXICODONE) tablet 2.5 mg  2.5 mg Oral Q4H PRN  
 oxyCODONE IR (ROXICODONE) tablet 5 mg  5 mg Oral Q4H PRN  
 naloxone (NARCAN) injection 0.4 mg  0.4 mg IntraVENous PRN  
 senna-docusate (PERICOLACE) 8.6-50 mg per tablet 2 Tab  2 Tab Oral BID  
 HYDROmorphone (PF) (DILAUDID) injection 0.5-1 mg  0.5-1 mg IntraVENous Q2H PRN No Known Allergies Review of Systems:  A comprehensive review of systems was negative except for that written in the HPI. Mental Status: no dementia Objective:  
 
Patient Vitals for the past 8 hrs: 
 BP Temp Pulse Resp SpO2 Weight 04/08/20 1009 101/57 97.5 °F (36.4 °C) 87 18 98 %   
 20 0930 102/50    99 %   
20 0902     99 %   
20 0742 102/53 97.9 °F (36.6 °C) 91 18 97 % 77.7 kg (171 lb 4.8 oz) Temp (24hrs), Av.7 °F (36.5 °C), Min:97.5 °F (36.4 °C), Max:97.9 °F (36.6 °C) EXAM: alert, cooperative, no distress, oriented, normal, normal mood,  
Pt. Is TTP over left hip. ROM not done due to pain. Imaging Review: XRStudy Result INDICATION:  Trauma  
  
AP view of the pelvis and lateral view of the left hip. 
  
The osseous structures are diffusely demineralized. There is an acute fracture 
of the left femoral neck with approximately one half shaft width superior 
lateral displacement of the distal left femur. No additional fracture is seen. Extensive atherosclerotic calcifications are noted. 
  
IMPRESSION IMPRESSION: Acute left femoral neck fracture, as described above. Labs:  
Recent Results (from the past 24 hour(s)) CBC WITH AUTOMATED DIFF Collection Time: 20  8:25 AM  
Result Value Ref Range WBC 4.7 4.1 - 11.1 K/uL  
 RBC 3.47 (L) 4.10 - 5.70 M/uL  
 HGB 11.9 (L) 12.1 - 17.0 g/dL HCT 37.2 36.6 - 50.3 % .2 (H) 80.0 - 99.0 FL  
 MCH 34.3 (H) 26.0 - 34.0 PG  
 MCHC 32.0 30.0 - 36.5 g/dL  
 RDW 13.6 11.5 - 14.5 % PLATELET 399 (L) 131 - 400 K/uL MPV 10.5 8.9 - 12.9 FL  
 NRBC 0.0 0  WBC ABSOLUTE NRBC 0.00 0.00 - 0.01 K/uL NEUTROPHILS 68 32 - 75 % LYMPHOCYTES 15 12 - 49 % MONOCYTES 14 (H) 5 - 13 % EOSINOPHILS 3 0 - 7 % BASOPHILS 0 0 - 1 % IMMATURE GRANULOCYTES 0 0.0 - 0.5 % ABS. NEUTROPHILS 3.2 1.8 - 8.0 K/UL  
 ABS. LYMPHOCYTES 0.7 (L) 0.8 - 3.5 K/UL  
 ABS. MONOCYTES 0.7 0.0 - 1.0 K/UL  
 ABS. EOSINOPHILS 0.1 0.0 - 0.4 K/UL  
 ABS. BASOPHILS 0.0 0.0 - 0.1 K/UL  
 ABS. IMM. GRANS. 0.0 0.00 - 0.04 K/UL  
 DF AUTOMATED    
 RBC COMMENTS MACROCYTOSIS 
1+ METABOLIC PANEL, COMPREHENSIVE Collection Time: 20  8:25 AM  
Result Value Ref Range Sodium 133 (L) 136 - 145 mmol/L Potassium 4.7 3.5 - 5.1 mmol/L Chloride 97 97 - 108 mmol/L  
 CO2 29 21 - 32 mmol/L Anion gap 7 5 - 15 mmol/L Glucose 167 (H) 65 - 100 mg/dL BUN 35 (H) 6 - 20 MG/DL Creatinine 5.53 (H) 0.70 - 1.30 MG/DL  
 BUN/Creatinine ratio 6 (L) 12 - 20 GFR est AA 12 (L) >60 ml/min/1.73m2 GFR est non-AA 10 (L) >60 ml/min/1.73m2 Calcium 8.4 (L) 8.5 - 10.1 MG/DL Bilirubin, total 0.6 0.2 - 1.0 MG/DL  
 ALT (SGPT) 17 12 - 78 U/L  
 AST (SGOT) 17 15 - 37 U/L Alk. phosphatase 126 (H) 45 - 117 U/L Protein, total 7.5 6.4 - 8.2 g/dL Albumin 2.8 (L) 3.5 - 5.0 g/dL Globulin 4.7 (H) 2.0 - 4.0 g/dL A-G Ratio 0.6 (L) 1.1 - 2.2    
TROPONIN I Collection Time: 04/08/20  8:25 AM  
Result Value Ref Range Troponin-I, Qt. <0.05 <0.05 ng/mL MAGNESIUM Collection Time: 04/08/20  8:25 AM  
Result Value Ref Range Magnesium 1.9 1.6 - 2.4 mg/dL PHOSPHORUS Collection Time: 04/08/20  8:25 AM  
Result Value Ref Range Phosphorus 3.5 2.6 - 4.7 MG/DL  
TSH 3RD GENERATION Collection Time: 04/08/20  8:25 AM  
Result Value Ref Range TSH 8.30 (H) 0.36 - 3.74 uIU/mL EKG, 12 LEAD, INITIAL Collection Time: 04/08/20  9:10 AM  
Result Value Ref Range Ventricular Rate 88 BPM  
 Atrial Rate 88 BPM  
 P-R Interval 166 ms  
 QRS Duration 92 ms Q-T Interval 394 ms QTC Calculation (Bezet) 476 ms Calculated P Axis 20 degrees Calculated R Axis 57 degrees Calculated T Axis -48 degrees Diagnosis Sinus rhythm with sinus arrhythmia with premature ventricular complexes or  
fusion complexes Low voltage QRS Nonspecific T wave abnormality Prolonged QT When compared with ECG of 08-APR-2019 22:01, 
fusion complexes are now present 
premature ventricular complexes are now present Questionable change in QRS axis Nonspecific T wave abnormality now evident in Lateral leads PROTHROMBIN TIME + INR  Collection Time: 04/08/20  9:24 AM  
 Result Value Ref Range INR 1.1 0.9 - 1.1 Prothrombin time 11.7 (H) 9.0 - 11.1 sec GLUCOSE, POC Collection Time: 04/08/20 10:11 AM  
Result Value Ref Range Glucose (POC) 134 (H) 65 - 100 mg/dL Performed by Sharri Barrera PCT Impression:  
 
Patient Active Problem List  
 Diagnosis Date Noted  Elevated LFTs 11/13/2014 Priority: 1 - One  
  Class: Present on Admission  Sepsis ([address] 11/13/2014 Priority: 1 - One  
  Class: Present on Admission  Gangrene of foot ([address] 11/13/2014 Priority: 1 - One  
  Class: Present on Admission  Anemia in chronic kidney disease 11/13/2014 Priority: 2 - Two Class: Chronic  Fracture of femoral neck, left, closed ([address] 04/08/2020  Dementia ([facility] [address] 04/12/2019  Severe hypothyroidism 04/09/2019  Hypoglycemia 04/09/2019  Bilateral carotid artery stenosis 04/09/2019  DM type 2 (diabetes mellitus, type 2) ([address] 08/14/2015  PAD (peripheral artery disease) ([facility] [address] 08/14/2015  ESRD (end stage renal disease) on dialysis ([address] 10/08/2014 Class: Chronic  History of CVA (cerebrovascular accident) 10/08/2014 Class: Present on Admission  Hypothyroid 10/08/2014  
 HTN (hypertension) 04/13/2011  DM neuro manif type II, uncontrolled 04/13/2011  Other and unspecified hyperlipidemia 04/13/2011 Active Problems: 
  ESRD (end stage renal disease) on dialysis ([address] (10/8/2014) Fracture of femoral neck, left, closed ([address] (4/8/2020) Plan:  
Admitted to medicine Npo after midnight Consent for left hip hemiarthroplasty Bedrest 
 
Cristin Avendaño PA-C This patient was seen and examined by be me personally with the findings as documented above by the NP/PA with the following changes/additions: NONE - displaced L femoral neck fx - does some ambulation, mostly wheelchair. Plan for hemiarthroplasty. 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 Plan for treatment is as described and formulated by me after discussion with the patient and family personally.

## 2020-04-08 NOTE — H&P
Hospitalist Admission Note NAME: Pavel Yao :  1940 MRN:  [id number] Date/Time:  2020 9:45 AM 
 
Patient PCP: Joy Harper MD  
Renal:  [facility], [place]:  Dr. Jordy Morris 
______________________________________________________________________ Assessment & Plan: 
Acute left femoral neck fracture, POA 
--fell 7 days ago from wheelchair reaching for something and having pain since. Xray at nursing home yesterday with femoral neck fracture and was sent to ER today. --Pre-op cardiac risk assessment:  Pt evaluated using revised cardiac risk index and is felt to be moderate cardiovascular risk for intermediate  risk surgery with a  2.4% risk for major complications based on these criteria. This risk has been discussed with the patient and wife Alvarado Cardoza over phone, and pt wishes to proceed. Plan for surgery without further cardiac testing if this risk is acceptable per surgery and anesthesia. --surgery planned for tomorrow per ortho. Pain management per ortho 
--continue miralax daily. Add colace bid Further risk reduction will involve medical management of other comorbid conditions in the perioperative period. Hypotension 
--continue midodrine 10mg T//Sat with dialysis --BP in ER been running 100/50s PAD s/p b/l BKA 
--has b/l prosthesis and uses wheelchair --continue pravastatin ESRD on HD TTS Chronic hyponatremia, Na stable 133 
--consult nephrology to continue dialysis --defer sodium management to nephrology Hypothyroid Hx myxedema coma  with acute encephalopathy on top of dementia 
--continue levothyroxine --check TSH 
--MRI  with severe atrophy Hx CVA 
--has mild right arm weakness on exam compared to left. Leg strength not tested due to femoral neck fracture 
-- Continue aspirin. Hx DM with hypoglycemia 
--no longer on insulin. Has glucagon prn 
--put on low dose SSI. Check A1c Chronic anemia hgb stable 11 
 
 CAD per chart 
--details unknown, not seen by cardiology per chart review. Wife and patient denies any hx of chf or MI 
--echo 4/19 with LVEF 51-55%. Denies any chest pain, SOB with exertion but uses wheelchair most of time Body mass index is 27.65 kg/m². Code: discuss with wife, full code DVT prophylaxis: SCD Surrogate decision maker:  Discussed with patient, he wants Jenny to be wife Le Lowery [phone]. This is contrary to advance directive on file completed 4/2019 in which he designated daughter Dipak Sanchez. She was contacted and says Jenny is now patient's wife. Subjective: CHIEF COMPLAINT:  Left hip pain HISTORY OF PRESENT ILLNESS:    
Ryder Whitfield is a 78 y.o. male with dementia, ESRD on HD, HTN, hx DM, hypothyroid with hx myxedema coma 4/19 due to noncompliance, PAD s/p b/l BKA who is sent from [facility] for left femoral neck fracture. Hx from patient but limited by patient's manner of speech that is somewhat difficult to understand. He is oriented to self, situation, month, not to year. Uses wheelchair primarily for mobility. Has b/l prosthesis for transfer. He fell a week ago from wheelchair when trying to reach something over a cabinet and been having hip pain since. Pain increasing worse and had xray done at nursing home yesterday showing left hip fracture. Denies CP, SOB. Has mild nonproductive cough. No fever, chills. Denies any sign of bleeding. Had dialysis yesterday. Denies any hx of MI, CHF. We were asked to admit for work up and evaluation of the above problems. Past Medical History:  
Diagnosis Date  Anemia  CAD (coronary artery disease)  Chronic kidney disease   
 tues/thurs/sat TREE [address]  
 Cognitive communication deficit  Diabetes ([facility] [address]  Dysarthria and anarthria  Dysphagia  Endocrine disease   
 hypothyroid  Gastrointestinal disorder   
 gallstones  Hx of BKA, left ([facility] [address]  Hx of BKA, right ([facility] [address]  Hypertension  Muscle weakness (generalized)  Stroke ([facility] [address] 2011 speech general weakness  Thyroid disease Past Surgical History:  
Procedure Laterality Date [address] UNLISTED  HX HEENT    
 cataract   removal  rt eye  HX ORTHOPAEDIC    
 11/2014 R BK Amputation and L  
 HX OTHER SURGICAL    
 HX VASCULAR ACCESS    
 L arm shunt  VASCULAR SURGERY PROCEDURE UNLIST Fistula R arm Social History Tobacco Use  Smoking status: Never Smoker  Smokeless tobacco: Never Used Substance Use Topics  Alcohol use: No  
  
Family History Problem Relation Age of Onset  Hypertension Mother  Diabetes Father No Known Allergies Prior to Admission medications Medication Sig Start Date End Date Taking? Authorizing Provider  
cholecalciferol, vitamin D3, (VITAMIN D3 PO) Take 5,000 Units by mouth daily. Yes Provider, Historical  
midodrine (PROAMITINE) 10 mg tablet Take 10 mg by mouth Q TU, TH & SAT. With dialysis   Yes Provider, Historical  
levothyroxine (SYNTHROID) 200 mcg tablet Take 200 mcg by mouth Daily (before breakfast). Yes Provider, Historical  
pravastatin (PRAVACHOL) 20 mg tablet Take 20 mg by mouth nightly. Yes Provider, Historical  
polyethylene glycol (MIRALAX) 17 gram/dose powder Take 17 g by mouth daily. 9/27/17  Yes Brook Stauffer MD  
aspirin delayed-release 81 mg tablet Take 1 Tab by mouth daily. 5/6/15  Yes Bertram Castaneda MD  
calcium acetate [facility]) 667 mg (169 mg calcium)/5 mL soln ORAL SOLUTION Take 5 mL by mouth two (2) times daily (with meals).  BREAKFAST AND DINNER    Provider, Historical  
OTHER,NON-FORMULARY, GLUCOSE NA/F CAFFIENE FREE TAB CHEW, TAKE 3 TABS BY MOUTH PRN FOR HYPOGLYCEMIA    Provider, Historical  
OTHER,NON-FORMULARY, GLUCOST 15 40% GEL TAKEN 1GM BY MOUTH, PRN FOR BS<60 AND ALERT MD    Provider, Historical  
 glucagon (GLUCAGEN) 1 mg injection 1 mg by IntraMUSCular route once. FOR BS<60 AND UNRESPONSIVE    Provider, Historical  
insulin NPH (NOVOLIN N, HUMULIN N) 100 unit/mL injection 8 Units by SubCUTAneous route Before breakfast and dinner. 4/12/19   Bhupendra Vásquez, NP  
ondansetron (ZOFRAN ODT) 4 mg disintegrating tablet Take 1 Tab by mouth every eight (8) hours as needed for Nausea. 8/2/17   Lexx Mcdaniel MD  
MOISE-ALPHONSE 0.8 mg tab tablet 1 Tab daily (with dinner). 5PM 5/4/15   Other, MD Deangelo  
ferrous sulfate 325 mg (65 mg iron) tablet Take 325 mg by mouth two (2) times a day. Provider, Historical  
glucose blood VI test strips (FREESTYLE LITE STRIPS) strip Check blood sugar once daily 10/9/14   Triny Hawkins MD  
BD INSULIN SYRINGE ULTRA-FINE 1 mL 30 x 1/2\" syrg  7/4/14   Provider, Historical  
Blood-Glucose Meter (FREESTYLE LITE METER) monitoring kit Check blood sugar once daily 10/8/14   Triny Hawkins MD  
 
REVIEW OF SYSTEMS:  POSITIVE= Bold. Negative = normal text General:  fever, chills, sweats, generalized weakness, weight loss/gain, loss of appetite Eyes:  blurred vision, eye pain, loss of vision, diplopia Ear Nose and Throat:  rhinorrhea, pharyngitis Respiratory:   cough, sputum production, SOB, wheezing, SOLANO, pleuritic pain 
Cardiology:  chest pain, palpitations, orthopnea, PND, edema, syncope Gastrointestinal:  abdominal pain, N/V, dysphagia, diarrhea, constipation, bleeding Genitourinary:  frequency, urgency, dysuria, hematuria, incontinence Muskuloskeletal :  arthralgia, myalgia Hematology:  easy bruising, bleeding, lymphadenopathy Dermatological:  rash, ulceration, pruritis Endocrine:  hot flashes or polydipsia Neurological:  headache, dizziness, confusion, focal weakness, paresthesia, memory loss, gait disturbance Psychological: anxiety, depression, agitation Objective: VITALS:   
Visit Vitals /50 Pulse 91 Temp 97.9 °F (36.6 °C) Resp 18 Wt 77.7 kg (171 lb 4.8 oz) SpO2 99% BMI 27.65 kg/m² Temp (24hrs), Av.9 °F (36.6 °C), Min:97.9 °F (36.6 °C), Max:97.9 °F (36.6 °C) Body mass index is 27.65 kg/m². PHYSICAL EXAM: 
 
General:    Alert, cooperative, no distress, appears stated age. HEENT: Atraumatic, anicteric sclerae, pink conjunctivae No oral ulcers, mucosa moist, edentulous, throat clear. Hearing intact. Neck:  Supple, symmetrical,  thyroid: non tender Lungs:   Clear to auscultation bilaterally. No Wheezing or Rhonchi. No rales. Chest wall:  No tenderness  No Accessory muscle use. Heart:   Regular  rhythm,  No  murmur +S4  No gallop. No edema. Abdomen:   Soft, non-tender. Not distended. Bowel sounds normal. No masses Extremities: Exam limited by patient still wearing pants. No cyanosis. No clubbing. Bilateral leg prosthesis in place. Dialysis fistula in left upper arm Skin:     Not pale Not Jaundiced  No rashes Psych:   Not depressed. Not anxious or agitated. Neurologic: EOMs intact. No facial asymmetry. No aphasia. Manner of speech rambling and difficult to understand. Right arm 4/5, left arm 5/5. Legs not tested, Alert and oriented X self, situation, month, not oriented to year. IMAGING RESULTS: 
 []       I have personally reviewed the actual   []     CXR  []     CT scan CXR: Cardiomediastinal silhouette is stable. Lungs are clear bilaterally. Pleural 
spaces are normal. Osseous structures are intact. Left subclavian/axillary 
vascular stents are noted. Xray left hip and pelvis: The osseous structures are diffusely demineralized. There is an acute fracture 
of the left femoral neck with approximately one half shaft width superior 
lateral displacement of the distal left femur. No additional fracture is seen. Extensive atherosclerotic calcifications are noted. CT : 
EKG: reviewed, Sinus with PAC, new nonspecific T flat laterally, low voltage ________________________________________________________________________ Care Plan discussed with: 
  Comments Patient y Family  y Wife Blas Leaks, daughter Beaulah Nation RN Care Manager Consultant:     
________________________________________________________________________ Prophylaxis: 
GI none DVT SCD  
________________________________________________________________________ Recommended Disposition:  
Home with Family HH/PT/OT/RN   
SNF/LTC y  
[place]   
________________________________________________________________________ Code Status: 
Full Code y  
DNR/DNI   
________________________________________________________________________ TOTAL TIME:  60 minutes Comments  
 y Reviewed previous records  
>50% of visit spent in counseling and coordination of care  Discussion with patient and/or family and questions answered 
  
 
 
______________________________________________________________________ Keyla Stover MD 
 
 
Procedures: see electronic medical records for all procedures/Xrays and details which were not copied into this note but were reviewed prior to creation of Plan. LAB DATA REVIEWED:   
Recent Results (from the past 24 hour(s)) CBC WITH AUTOMATED DIFF Collection Time: 04/08/20  8:25 AM  
Result Value Ref Range WBC 4.7 4.1 - 11.1 K/uL  
 RBC 3.47 (L) 4.10 - 5.70 M/uL  
 HGB 11.9 (L) 12.1 - 17.0 g/dL HCT 37.2 36.6 - 50.3 % .2 (H) 80.0 - 99.0 FL  
 MCH 34.3 (H) 26.0 - 34.0 PG  
 MCHC 32.0 30.0 - 36.5 g/dL  
 RDW 13.6 11.5 - 14.5 % PLATELET 324 (L) 178 - 400 K/uL MPV 10.5 8.9 - 12.9 FL  
 NRBC 0.0 0  WBC ABSOLUTE NRBC 0.00 0.00 - 0.01 K/uL NEUTROPHILS 68 32 - 75 % LYMPHOCYTES 15 12 - 49 % MONOCYTES 14 (H) 5 - 13 % EOSINOPHILS 3 0 - 7 % BASOPHILS 0 0 - 1 % IMMATURE GRANULOCYTES 0 0.0 - 0.5 % ABS. NEUTROPHILS 3.2 1.8 - 8.0 K/UL  
 ABS. LYMPHOCYTES 0.7 (L) 0.8 - 3.5 K/UL  
 ABS. MONOCYTES 0.7 0.0 - 1.0 K/UL ABS. EOSINOPHILS 0.1 0.0 - 0.4 K/UL  
 ABS. BASOPHILS 0.0 0.0 - 0.1 K/UL  
 ABS. IMM. GRANS. 0.0 0.00 - 0.04 K/UL  
 DF AUTOMATED    
 RBC COMMENTS MACROCYTOSIS 
1+ METABOLIC PANEL, COMPREHENSIVE Collection Time: 04/08/20  8:25 AM  
Result Value Ref Range Sodium 133 (L) 136 - 145 mmol/L Potassium 4.7 3.5 - 5.1 mmol/L Chloride 97 97 - 108 mmol/L  
 CO2 29 21 - 32 mmol/L Anion gap 7 5 - 15 mmol/L Glucose 167 (H) 65 - 100 mg/dL BUN 35 (H) 6 - 20 MG/DL Creatinine 5.53 (H) 0.70 - 1.30 MG/DL  
 BUN/Creatinine ratio 6 (L) 12 - 20 GFR est AA 12 (L) >60 ml/min/1.73m2 GFR est non-AA 10 (L) >60 ml/min/1.73m2 Calcium 8.4 (L) 8.5 - 10.1 MG/DL Bilirubin, total 0.6 0.2 - 1.0 MG/DL  
 ALT (SGPT) 17 12 - 78 U/L  
 AST (SGOT) 17 15 - 37 U/L Alk. phosphatase 126 (H) 45 - 117 U/L Protein, total 7.5 6.4 - 8.2 g/dL Albumin 2.8 (L) 3.5 - 5.0 g/dL Globulin 4.7 (H) 2.0 - 4.0 g/dL A-G Ratio 0.6 (L) 1.1 - 2.2 EKG, 12 LEAD, INITIAL Collection Time: 04/08/20  9:10 AM  
Result Value Ref Range Ventricular Rate 88 BPM  
 Atrial Rate 88 BPM  
 P-R Interval 166 ms  
 QRS Duration 92 ms Q-T Interval 394 ms QTC Calculation (Bezet) 476 ms Calculated P Axis 20 degrees Calculated R Axis 57 degrees Calculated T Axis -48 degrees Diagnosis Sinus rhythm with sinus arrhythmia with premature ventricular complexes or  
fusion complexes Low voltage QRS Nonspecific T wave abnormality Prolonged QT When compared with ECG of 08-APR-2019 22:01, 
fusion complexes are now present 
premature ventricular complexes are now present Questionable change in QRS axis Nonspecific T wave abnormality now evident in Lateral leads

## 2020-04-08 NOTE — ED NOTES
TRANSFER - OUT REPORT: 
 
Verbal report given to Jacqueline Patel (name) on Karyn Alexander  being transferred to Ortho (unit) for routine progression of care Report consisted of patients Situation, Background, Assessment and  
Recommendations(SBAR). Information from the following report(s) SBAR, ED Summary and Recent Results was reviewed with the receiving nurse. Lines:  
Peripheral IV 04/08/20 Right Antecubital (Active) Site Assessment Clean, dry, & intact 4/8/2020  9:26 AM  
Phlebitis Assessment 0 4/8/2020  9:26 AM  
Infiltration Assessment 0 4/8/2020  9:26 AM  
Dressing Status Clean, dry, & intact 4/8/2020  9:26 AM  
Dressing Type Tape;Transparent 4/8/2020  9:26 AM  
Hub Color/Line Status Patent; Flushed;Pink 4/8/2020  9:26 AM  
Action Taken Blood drawn 4/8/2020  9:26 AM  
  
 
Opportunity for questions and clarification was provided. Patient transported with: 
Ratify

## 2020-04-08 NOTE — ED PROVIDER NOTES
EMERGENCY DEPARTMENT HISTORY AND PHYSICAL EXAM 
 
 
Date: 4/8/2020 Patient Name: Dwight Ortiz History of Presenting Illness Chief Complaint Patient presents with  Leg Pain  
  left leg History Provided By: Patient, EMS and Nursing home notes HPI: Dwight Ortiz, 78 y.o. male presents to the ED with cc of left hip fracture. The patient states that he was in his wheelchair last week looking for something on his desk when the wheelchair rolled around and he fell out of it. He states he has been complaining of left hip pain since then. His nursing home took an x-ray yesterday, which revealed an angulated left femoral neck fracture. He states that his pain is an 8 out of 10 in severity. He has been taking Tylenol for the symptoms. He has a history of bilateral BKA and also end-stage renal disease. He states that he is due for dialysis tomorrow. He denies shortness of breath, cough, chest pain, fever or chills. He also denies back pain. He states that he does have increased numbness in the left thigh since he fell. He denies loss of consciousness when he fell and also denies headache or neck pain. There are no other complaints, changes, or physical findings at this time. PCP: Unknown, Provider No current facility-administered medications on file prior to encounter. Current Outpatient Medications on File Prior to Encounter Medication Sig Dispense Refill  calcium acetate (PHOSLYRA) 667 mg (169 mg calcium)/5 mL soln ORAL SOLUTION Take 5 mL by mouth two (2) times daily (with meals). BREAKFAST AND DINNER    
 OTHER,NON-FORMULARY, GLUCOSE NA/F CAFFIENE FREE TAB CHEW, TAKE 3 TABS BY MOUTH PRN FOR HYPOGLYCEMIA    
 OTHER,NON-FORMULARY, GLUCOST 15 40% GEL TAKEN 1GM BY MOUTH, PRN FOR BS<60 AND ALERT MD    
 glucagon (GLUCAGEN) 1 mg injection 1 mg by IntraMUSCular route once. FOR BS<60 AND UNRESPONSIVE  insulin NPH (NOVOLIN N, HUMULIN N) 100 unit/mL injection 8 Units by SubCUTAneous route Before breakfast and dinner. 1 Vial 0  
 polyethylene glycol (MIRALAX) 17 gram/dose powder Take 17 g by mouth daily. 238 g 0  
 ondansetron (ZOFRAN ODT) 4 mg disintegrating tablet Take 1 Tab by mouth every eight (8) hours as needed for Nausea. 10 Tab 0  
 MOISE-ALPHONSE 0.8 mg tab tablet 1 Tab daily (with dinner). 5PM  3  
 aspirin delayed-release 81 mg tablet Take 1 Tab by mouth daily. 30 Tab 11  
 ferrous sulfate 325 mg (65 mg iron) tablet Take 325 mg by mouth two (2) times a day.  glucose blood VI test strips (FREESTYLE LITE STRIPS) strip Check blood sugar once daily 1 Package 11  
 BD INSULIN SYRINGE ULTRA-FINE 1 mL 30 x 1/2\" syrg  Blood-Glucose Meter (FREESTYLE LITE METER) monitoring kit Check blood sugar once daily 1 kit 0  
 levothyroxine (SYNTHROID) 175 mcg tablet Take 175 mcg by mouth Daily (before breakfast).  pravastatin (PRAVACHOL) 10 mg tablet Take 20 mg by mouth nightly. Past History Past Medical History: 
Past Medical History:  
Diagnosis Date  Anemia  CAD (coronary artery disease)  Chronic kidney disease   
 tues/thurs/sat TREE [address]  
 Cognitive communication deficit  Diabetes ([address]  Dysarthria and anarthria  Dysphagia  Endocrine disease   
 hypothyroid  Gastrointestinal disorder   
 gallstones  Hx of BKA, left ([address]  Hx of BKA, right ([address]  Hypertension  Muscle weakness (generalized)  Stroke ([address] 2011 speech general weakness  Thyroid disease Past Surgical History: 
Past Surgical History:  
Procedure Laterality Date [address] UNLISTED  HX HEENT    
 cataract   removal  rt eye  HX ORTHOPAEDIC    
 11/2014 R BK Amputation and L  
 HX OTHER SURGICAL    
 HX VASCULAR ACCESS    
 L arm shunt  VASCULAR SURGERY PROCEDURE UNLIST Fistula R arm Family History: 
Family History Problem Relation Age of Onset  Hypertension Mother  Diabetes Father Social History: 
Social History Tobacco Use  Smoking status: Never Smoker  Smokeless tobacco: Never Used Substance Use Topics  Alcohol use: No  
 Drug use: No  
  Types: Prescription Allergies: 
No Known Allergies Review of Systems Review of Systems Constitutional: Negative for fever. HENT: Negative for congestion. Eyes: Negative. Respiratory: Negative for shortness of breath. Cardiovascular: Negative for chest pain. Gastrointestinal: Negative for abdominal pain. Endocrine: Negative for heat intolerance. Genitourinary: Negative. Musculoskeletal: Negative for back pain. Skin: Negative for rash. Allergic/Immunologic: Negative for immunocompromised state. Neurological: Negative for dizziness and headaches. Hematological: Does not bruise/bleed easily. Psychiatric/Behavioral: Negative. All other systems reviewed and are negative. Physical Exam  
Physical Exam 
Vitals signs and nursing note reviewed. Constitutional:   
   General: He is not in acute distress. Appearance: He is well-developed. HENT:  
   Head: Normocephalic and atraumatic. Eyes:  
   Extraocular Movements: Extraocular movements intact. Neck: Musculoskeletal: Normal range of motion. Cardiovascular:  
   Rate and Rhythm: Normal rate and regular rhythm. Heart sounds: Normal heart sounds. Pulmonary:  
   Effort: Pulmonary effort is normal.  
   Breath sounds: Normal breath sounds. Abdominal:  
   General: Bowel sounds are normal.  
   Palpations: Abdomen is soft. Musculoskeletal:  
   Comments: bilateral BKA's, left hip tenderness, decreased range of motion left lower extremity Skin: 
   General: Skin is warm and dry. Neurological:  
   General: No focal deficit present. Mental Status: He is alert and oriented to person, place, and time.   
   Coordination: Coordination normal.  
Psychiatric:     
 Mood and Affect: Mood normal.     
   Behavior: Behavior normal.  
 
 
 
Diagnostic Study Results Labs - Recent Results (from the past 12 hour(s)) CBC WITH AUTOMATED DIFF Collection Time: 04/08/20  8:25 AM  
Result Value Ref Range WBC 4.7 4.1 - 11.1 K/uL  
 RBC 3.47 (L) 4.10 - 5.70 M/uL  
 HGB 11.9 (L) 12.1 - 17.0 g/dL HCT 37.2 36.6 - 50.3 % .2 (H) 80.0 - 99.0 FL  
 MCH 34.3 (H) 26.0 - 34.0 PG  
 MCHC 32.0 30.0 - 36.5 g/dL  
 RDW 13.6 11.5 - 14.5 % PLATELET 227 (L) 080 - 400 K/uL MPV 10.5 8.9 - 12.9 FL  
 NRBC 0.0 0  WBC ABSOLUTE NRBC 0.00 0.00 - 0.01 K/uL NEUTROPHILS PENDING % LYMPHOCYTES PENDING % MONOCYTES PENDING % EOSINOPHILS PENDING % BASOPHILS PENDING % IMMATURE GRANULOCYTES PENDING %  
 ABS. NEUTROPHILS PENDING K/UL  
 ABS. LYMPHOCYTES PENDING K/UL  
 ABS. MONOCYTES PENDING K/UL  
 ABS. EOSINOPHILS PENDING K/UL  
 ABS. BASOPHILS PENDING K/UL  
 ABS. IMM. GRANS. PENDING K/UL  
 DF PENDING   
METABOLIC PANEL, COMPREHENSIVE Collection Time: 04/08/20  8:25 AM  
Result Value Ref Range Sodium 133 (L) 136 - 145 mmol/L Potassium 4.7 3.5 - 5.1 mmol/L Chloride 97 97 - 108 mmol/L  
 CO2 29 21 - 32 mmol/L Anion gap 7 5 - 15 mmol/L Glucose 167 (H) 65 - 100 mg/dL BUN 35 (H) 6 - 20 MG/DL Creatinine 5.53 (H) 0.70 - 1.30 MG/DL  
 BUN/Creatinine ratio 6 (L) 12 - 20 GFR est AA 12 (L) >60 ml/min/1.73m2 GFR est non-AA 10 (L) >60 ml/min/1.73m2 Calcium 8.4 (L) 8.5 - 10.1 MG/DL Bilirubin, total 0.6 0.2 - 1.0 MG/DL  
 ALT (SGPT) 17 12 - 78 U/L  
 AST (SGOT) 17 15 - 37 U/L Alk. phosphatase 126 (H) 45 - 117 U/L Protein, total 7.5 6.4 - 8.2 g/dL Albumin 2.8 (L) 3.5 - 5.0 g/dL Globulin 4.7 (H) 2.0 - 4.0 g/dL A-G Ratio 0.6 (L) 1.1 - 2.2 Radiologic Studies -  
XR HIP LT W OR WO PELV 2-3 VWS Final Result IMPRESSION: Acute left femoral neck fracture, as described above. XR CHEST SNGL V Final Result IMPRESSION: No acute cardiopulmonary disease. CT Results  (Last 48 hours) None CXR Results  (Last 48 hours) None Medical Decision Making I am the first provider for this patient. I reviewed the vital signs, available nursing notes, past medical history, past surgical history, family history and social history. Vital Signs-Reviewed the patient's vital signs. Patient Vitals for the past 12 hrs: 
 Temp Pulse Resp BP SpO2  
04/08/20 0742 97.9 °F (36.6 °C) 91 18 102/53 97 % EKG interpretation: (Preliminary) Rhythm: normal sinus rhythm and PVC's; and regular . Rate (approx.): 88; Axis: normal; IL interval: normal; QRS interval: normal ; ST/T wave: non-specific changes; Other findings: Nonspecific ST changes which are new compared to 1 year ago. Records Reviewed: Nursing Notes, Old Medical Records, Ambulance Run Sheet, Previous Radiology Studies and Previous Laboratory Studies Provider Notes (Medical Decision Making): Fracture, sprain, contusion ED Course:  
Initial assessment performed. The patients presenting problems have been discussed, and they are in agreement with the care plan formulated and outlined with them. I have encouraged them to ask questions as they arise throughout their visit. Consult note: I discussed the case with Kanika Landaverde, orthopedic physician assistant. He recommends a medicine admit and clearance for surgery tomorrow morning. Consult note: The patient is being admitted by mark Brown 
  
 
Critical Care Time:  
 
0 Disposition: 
admit DISCHARGE PLAN: 
1. Current Discharge Medication List  
  
 
2. Follow-up Information None 3. Return to ED if worse Diagnosis Clinical Impression: 1. Closed displaced fracture of left femoral neck ([address] 2. ESRD (end stage renal disease) on dialysis ([facility] [address] Attestations: 
 
Kieran Ball MD 
 
 Please note that this dictation was completed with [employer], the computer voice recognition software. Quite often unanticipated grammatical, syntax, homophones, and other interpretive errors are inadvertently transcribed by the computer software. Please disregard these errors. Please excuse any errors that have escaped final proofreading. Thank you.

## 2020-04-08 NOTE — PROGRESS NOTES
Care Manager Initial Assessment BRADY: Return back to LTC at [facility]  
 2nd IM Medicare Letter will be needed at discharge [facility] transport will need to be set up for transport back to [facility] Reason for Admission:   Fracture of femoral neck RUR Score:   Not calculated in ED. Due to patient's medical issues and level of support, patient presents as a moderate risk for readmission. PCP: First and Last name:  Unknown, LTC patient at [facility] and seeing doctor there Name of Practice:  
 Are you a current patient: Yes/No:  
 Approximate date of last visit: Do you (patient/family) have any concerns for transition/discharge? None at this time Plan for utilizing home health:   No plan, patient [facility] Current Advanced Directive/Advance Care Plan: On File, has daughter Eleanor López ([phone]) listed as Healthcare Agent, however, after conversations with patient, wife and daughter, it was stated that wife is mPOA. Updated AMD is required to reflect this. Transition of Care Plan:     Return to LTC at [facility] Pt is an 78year old male, admitted for leg pain after fall last week. Xray shows fracture of femoral neck. Pt is a LTC resident at [address] [place] [address] (has been there since April 2019). CM spoke with wife, El mSith ([phone]) to complete initial assessment. Wife reports patient needs help with all ADLs. Pt is wheelchair bound. She reports that patient will return back to LTC once ready for discharge. Pt has Medicare and Medicaid insurance. Pt being seen by physicians at [facility] for primary care services. Pt is a dialysis patient TTS scheduled at 10:00am at [facility]. [place] provides transport for patient if needed. Medications provided through the facility. Care Management Interventions PCP Verified by CM: No(being seen by doctor at [facility] [address] [place] [address]) Mode of Transport at Discharge: BLS Transition of Care Consult (CM Consult): [address] Confirm Follow Up Transport: Other (see comment) The Plan for Transition of Care is Related to the Following Treatment Goals : LTC - return to [place] The Patient and/or Patient Representative was Provided with a Choice of Provider and Agrees with the Discharge Plan?: Yes Name of the Patient Representative Who was Provided with a Choice of Provider and Agrees with the Discharge Plan: Wife/daughter Freedom of Choice List was Provided with Basic Dialogue that Supports the Patient's Individualized Plan of Care/Goals, Treatment Preferences and Shares the Quality Data Associated with the Providers?: Yes Discharge Location Discharge Placement: Long Term Care([facility]) Lazaro Bingham Care Manager - ED [facility] Advanced Steps ACP Facilitator Zone Phone: [phone] Mobile: [phone]

## 2020-04-08 NOTE — CONSULTS
Consultation Note NAME: Patti Hazel :  1940 MRN:  [id number] Date/Time:  2020 12:47 PM 
 
I have been asked to see this patient by Dr. Ace Valdez  for advice/opinion re: eskd. Assessment :    Plan: 
eskd Mild hyponatremia Acute left femoral neck fracture PAD s/p bilateral bka Hypotension, borderline TTS HD at [facility] (to go to the OR tomorrow, will switch to MWF HD so he can get HD prior to OR visit). On Midodrine 
 
hgb 11.9 (hold ebony for now, watch hgb after hip surgery) Subjective: CHIEF COMPLAINT:  eskd HISTORY OF PRESENT ILLNESS:    
Yuliana Manuel is a 78 y.o.   male who has a history of the below. Mr. Kari Vo is a pleasant poor historian. Gay Half about 7 days ago from wheelchair and has hip pain. No sob. I reviewed his chart. I spoke with his nurse. Past Medical History:  
Diagnosis Date  Anemia  CAD (coronary artery disease)  Chronic kidney disease   
 tu/thurs/sat TREE [address]  
 Cognitive communication deficit  Diabetes ([address]  Dysarthria and anarthria  Dysphagia  Endocrine disease   
 hypothyroid  Gastrointestinal disorder   
 gallstones  Hx of BKA, left ([address]  Hx of BKA, right ([address]  Hypertension  Muscle weakness (generalized)  Stroke ([address]  speech general weakness  Thyroid disease Past Surgical History:  
Procedure Laterality Date [address] UNLISTED  HX HEENT    
 cataract   removal  rt eye  HX ORTHOPAEDIC    
 2014 R BK Amputation and L  
 HX OTHER SURGICAL    
 HX VASCULAR ACCESS    
 L arm shunt  VASCULAR SURGERY PROCEDURE UNLIST Fistula R arm Social History Tobacco Use  Smoking status: Never Smoker  Smokeless tobacco: Never Used Substance Use Topics  Alcohol use: No  
  
Family History Problem Relation Age of Onset  Hypertension Mother  Diabetes Father No Known Allergies Prior to Admission medications Medication Sig Start Date End Date Taking? Authorizing Provider  
cholecalciferol, vitamin D3, (VITAMIN D3 PO) Take 5,000 Units by mouth daily. Yes Provider, Historical  
midodrine (PROAMITINE) 10 mg tablet Take 10 mg by mouth Q TU, TH & SAT. With dialysis   Yes Provider, Historical  
levothyroxine (SYNTHROID) 200 mcg tablet Take 200 mcg by mouth Daily (before breakfast). Yes Provider, Historical  
pravastatin (PRAVACHOL) 20 mg tablet Take 20 mg by mouth nightly. Yes Provider, Historical  
OTHER,NON-FORMULARY, GLUCOSE NA/F CAFFIENE FREE TAB CHEW, TAKE 3 TABS BY MOUTH PRN FOR HYPOGLYCEMIA   Yes Provider, Historical  
OTHER,NON-FORMULARY, GLUCOST 15 40% GEL TAKEN 1GM BY MOUTH, PRN FOR BS<60 AND ALERT MD   Yes Provider, Historical  
glucagon (GLUCAGEN) 1 mg injection 1 mg by IntraMUSCular route once. FOR BS<60 AND UNRESPONSIVE   Yes Provider, Historical  
polyethylene glycol (MIRALAX) 17 gram/dose powder Take 17 g by mouth daily. 9/27/17  Yes Kristin Norwood MD  
ondansetron (ZOFRAN ODT) 4 mg disintegrating tablet Take 1 Tab by mouth every eight (8) hours as needed for Nausea. 8/2/17  Yes Marquez Moffett MD  
aspirin delayed-release 81 mg tablet Take 1 Tab by mouth daily. 5/6/15  Yes Makeda Goetz MD  
 
REVIEW OF SYSTEMS:   
 []  Unable to obtain reliable ROS due to  [] mental status  [] sedated   [] intubated 
 [x] Total of 12 systems reviewed as follows: 
Constitutional: negative fever, negative chills, negative weight loss Eyes:   negative visual changes ENT:   negative sore throat, tongue or lip swelling Respiratory:  negative cough, negative dyspnea Cards:  negative for chest pain, palpitations, lower extremity edema GI:   negative for nausea, vomiting, diarrhea, and abdominal pain :  negative for frequency, dysuria Integument:  negative for rash and pruritus Heme:  negative for easy bruising and gum/nose bleeding Musculoskel: negative for myalgias,  back pain and muscle weakness Neuro:  negative for headaches, dizziness, vertigo Psych:  negative for feelings of anxiety, depression Travel?: none Objective: VITALS:   
Visit Vitals BP 93/57 Pulse 83 Temp 97.8 °F (36.6 °C) Resp 18 Wt 77.7 kg (171 lb 4.8 oz) SpO2 100% BMI 27.65 kg/m² PHYSICAL EXAM: 
Gen:  [x]  WD [x]  WN  [] cachectic []  thin []  obese []  disheveled 
           []  ill apearing  []   Critical  []   Chronic    [x]  No acute distress HEENT:   [x] NC/AT/PERRLA/EOMI 
  [] pink conjunctivae      [] pale conjunctivae PERRL  [] yes  [] no      [] moist mucosa    [] dry mucosa 
  hearing intact to voice [x] yes  [] No 
              
NECK:   supple [] yes  [] no        masses [] yes  [] No 
             thyroid  []  non tender  []  tender RESP:   [x] CTA bilaterally/no wheezing/rhonchi/rales/crackles [] rhonchi bilaterally - no dullness  [] wheezing   [] rhonchi   [] crackles  
  use of accessory muscles [] yes [x] no CARD:   [x]  regular rate and rhythm/No murmurs/rubs/gallops 
  murmur  [] yes ()  [] no      Rubs  [] yes  [] no       Gallops [] yes  [] no 
  Rate []  regular  []  irregular        carotid bruits  [] Right  []  Left LE edema [] yes  [x] no           JVP  []  yes   []  no 
 
ABD:    [x] soft/non distended/non tender/+bowel sounds/no HSM []  Rigid    tenderness [] yes [] no   Liver enlargement  []  yes []  no  
             Spleen enlargement  []  yes []  no     distended []  yes [] no  
  bowel sound  [] hypoactive   [] hyperactive LYMPH:    Neck []  yes [x]  no       Axillae []  yes []  no SKIN:   Rashes []  yes   []  no    Ulcers []  yes   []  no 
             [] tight to palpitation    skin turgor []  good  [] poor  [] decreased Cyanosis/clubbing []  yes []  no 
 
NEUR:   [x] cranial nerves II-XII grossly intact    
  [] Cranial nerves deficit []  facial droop    []  slurred speech   [] aphasic  
  [] Strength normal     []  weakness  []  LUE  []   RUE/ []  LLE  []   RLE 
  follows commands  [x]  yes []  no        
 
PSYCH:   insight [] poor [] good   Alert and Oriented to  [x] person  [x] place  []  time  
                 [] depressed [] anxious [] agitated  [] lethargic [] stuporous  [] sedated LAB DATA REVIEWED:   
Recent Labs 04/08/20 0825 WBC 4.7 HGB 11.9*  
HCT 37.2 * Recent Labs 04/08/20 0825 * K 4.7 CL 97  
CO2 29 BUN 35* CREA 5.53* * CA 8.4* MG 1.9 PHOS 3.5 Recent Labs 04/08/20 0825 SGOT 17 ALT 17  
* TBILI 0.6 ALB 2.8*  
GLOB 4.7* Recent Labs 04/08/20 
1350 INR 1.1 PTP 11.7* No results for input(s): FE, TIBC, PSAT, FERR in the last 72 hours. No results for input(s): PH, PCO2, PO2 in the last 72 hours. No results for input(s): CPK, CKMB in the last 72 hours. No lab exists for component: TROPONINI Lab Results Component Value Date/Time Glucose (POC) 134 (H) 04/08/2020 10:11 AM  
 Glucose (POC) 115 (H) 05/13/2019 11:05 AM  
 Glucose (POC) 116 (H) 05/13/2019 08:34 AM  
 Glucose (POC) 213 (H) 04/12/2019 04:22 PM  
 Glucose (POC) 257 (H) 04/12/2019 12:41 PM  
 Glucose (POC) 164 (H) 04/12/2019 07:35 AM  
 
 
Procedures: see electronic medical records for all procedures/Xrays and details which were not copied into this note but were reviewed prior to creation of Plan.   
________________________________________________________________________ 
  
 
___________________________________________________ Consulting Physician:  Danae Nobles MD

## 2020-04-08 NOTE — PROGRESS NOTES
Sent a perfect serve message to Dr. Rita Doyle regarding the Nephrology consult. Reason for consult: ESRD on hd TTS, left femoral neck fracture with surgery planned tomorrow. Dr. Rita Doyle did respond back with the check nneka accepting the consult. Call back given [phone].

## 2020-04-09 ENCOUNTER — APPOINTMENT (OUTPATIENT)
Dept: GENERAL RADIOLOGY | Age: 80
DRG: 469 | End: 2020-04-09
Attending: ORTHOPAEDIC SURGERY
Payer: MEDICARE

## 2020-04-09 ENCOUNTER — ANESTHESIA (OUTPATIENT)
Dept: SURGERY | Age: 80
DRG: 469 | End: 2020-04-09
Payer: MEDICARE

## 2020-04-09 LAB
ABO + RH BLD: NORMAL
ANION GAP SERPL CALC-SCNC: 8 MMOL/L (ref 5–15)
BLOOD GROUP ANTIBODIES SERPL: NORMAL
BUN SERPL-MCNC: 24 MG/DL (ref 6–20)
BUN/CREAT SERPL: 5 (ref 12–20)
CALCIUM SERPL-MCNC: 8.5 MG/DL (ref 8.5–10.1)
CHLORIDE SERPL-SCNC: 98 MMOL/L (ref 97–108)
CO2 SERPL-SCNC: 29 MMOL/L (ref 21–32)
CREAT SERPL-MCNC: 4.48 MG/DL (ref 0.7–1.3)
ERYTHROCYTE [DISTWIDTH] IN BLOOD BY AUTOMATED COUNT: 13.6 % (ref 11.5–14.5)
EST. AVERAGE GLUCOSE BLD GHB EST-MCNC: 120 MG/DL
GLUCOSE BLD STRIP.AUTO-MCNC: 155 MG/DL (ref 65–100)
GLUCOSE BLD STRIP.AUTO-MCNC: 202 MG/DL (ref 65–100)
GLUCOSE BLD STRIP.AUTO-MCNC: 228 MG/DL (ref 65–100)
GLUCOSE BLD STRIP.AUTO-MCNC: 245 MG/DL (ref 65–100)
GLUCOSE SERPL-MCNC: 124 MG/DL (ref 65–100)
HBA1C MFR BLD: 5.8 % (ref 4–5.6)
HCT VFR BLD AUTO: 37 % (ref 36.6–50.3)
HGB BLD-MCNC: 11.9 G/DL (ref 12.1–17)
MCH RBC QN AUTO: 34.7 PG (ref 26–34)
MCHC RBC AUTO-ENTMCNC: 32.2 G/DL (ref 30–36.5)
MCV RBC AUTO: 107.9 FL (ref 80–99)
NRBC # BLD: 0 K/UL (ref 0–0.01)
NRBC BLD-RTO: 0 PER 100 WBC
PLATELET # BLD AUTO: 135 K/UL (ref 150–400)
PMV BLD AUTO: 10.1 FL (ref 8.9–12.9)
POTASSIUM SERPL-SCNC: 4.5 MMOL/L (ref 3.5–5.1)
RBC # BLD AUTO: 3.43 M/UL (ref 4.1–5.7)
SERVICE CMNT-IMP: ABNORMAL
SODIUM SERPL-SCNC: 135 MMOL/L (ref 136–145)
SPECIMEN EXP DATE BLD: NORMAL
WBC # BLD AUTO: 4.6 K/UL (ref 4.1–11.1)

## 2020-04-09 PROCEDURE — 77030031139 HC SUT VCRL2 J&J -A: Performed by: ORTHOPAEDIC SURGERY

## 2020-04-09 PROCEDURE — 86900 BLOOD TYPING SEROLOGIC ABO: CPT

## 2020-04-09 PROCEDURE — 85027 COMPLETE CBC AUTOMATED: CPT

## 2020-04-09 PROCEDURE — 0SRS0JZ REPLACEMENT OF LEFT HIP JOINT, FEMORAL SURFACE WITH SYNTHETIC SUBSTITUTE, OPEN APPROACH: ICD-10-PCS | Performed by: ORTHOPAEDIC SURGERY

## 2020-04-09 PROCEDURE — 77030039267 HC ADH SKN EXOFIN S2SG -B: Performed by: ORTHOPAEDIC SURGERY

## 2020-04-09 PROCEDURE — 73501 X-RAY EXAM HIP UNI 1 VIEW: CPT

## 2020-04-09 PROCEDURE — 74011000250 HC RX REV CODE- 250: Performed by: PHYSICIAN ASSISTANT

## 2020-04-09 PROCEDURE — 74011636637 HC RX REV CODE- 636/637: Performed by: PHYSICIAN ASSISTANT

## 2020-04-09 PROCEDURE — 77030020061 HC IV BLD WRMR ADMIN SET 3M -B: Performed by: ANESTHESIOLOGY

## 2020-04-09 PROCEDURE — 76210000000 HC OR PH I REC 2 TO 2.5 HR: Performed by: ORTHOPAEDIC SURGERY

## 2020-04-09 PROCEDURE — 83036 HEMOGLOBIN GLYCOSYLATED A1C: CPT

## 2020-04-09 PROCEDURE — 74011250636 HC RX REV CODE- 250/636: Performed by: PHYSICIAN ASSISTANT

## 2020-04-09 PROCEDURE — C1776 JOINT DEVICE (IMPLANTABLE): HCPCS | Performed by: ORTHOPAEDIC SURGERY

## 2020-04-09 PROCEDURE — 76010000153 HC OR TIME 1.5 TO 2 HR: Performed by: ORTHOPAEDIC SURGERY

## 2020-04-09 PROCEDURE — 77030018846 HC SOL IRR STRL H20 ICUM -A: Performed by: ORTHOPAEDIC SURGERY

## 2020-04-09 PROCEDURE — 74011250636 HC RX REV CODE- 250/636

## 2020-04-09 PROCEDURE — 77030019908 HC STETH ESOPH SIMS -A: Performed by: ANESTHESIOLOGY

## 2020-04-09 PROCEDURE — 74011250637 HC RX REV CODE- 250/637: Performed by: ORTHOPAEDIC SURGERY

## 2020-04-09 PROCEDURE — 80048 BASIC METABOLIC PNL TOTAL CA: CPT

## 2020-04-09 PROCEDURE — 94760 N-INVAS EAR/PLS OXIMETRY 1: CPT

## 2020-04-09 PROCEDURE — 77030022704 HC SUT VLOC COVD -B: Performed by: ORTHOPAEDIC SURGERY

## 2020-04-09 PROCEDURE — 77030033138 HC SUT PGA STRATFX J&J -B: Performed by: ORTHOPAEDIC SURGERY

## 2020-04-09 PROCEDURE — 77030035643 HC BLD SAW OSC PRECIS STRY -C: Performed by: ORTHOPAEDIC SURGERY

## 2020-04-09 PROCEDURE — 77030040361 HC SLV COMPR DVT MDII -B: Performed by: ORTHOPAEDIC SURGERY

## 2020-04-09 PROCEDURE — 74011250636 HC RX REV CODE- 250/636: Performed by: REGISTERED NURSE

## 2020-04-09 PROCEDURE — 74011250636 HC RX REV CODE- 250/636: Performed by: ANESTHESIOLOGY

## 2020-04-09 PROCEDURE — 77030018836 HC SOL IRR NACL ICUM -A: Performed by: ORTHOPAEDIC SURGERY

## 2020-04-09 PROCEDURE — 77030014647 HC SEAL FBRN TISSL BAXT -D: Performed by: ORTHOPAEDIC SURGERY

## 2020-04-09 PROCEDURE — 77010033678 HC OXYGEN DAILY

## 2020-04-09 PROCEDURE — 77030003666 HC NDL SPINAL BD -A: Performed by: ORTHOPAEDIC SURGERY

## 2020-04-09 PROCEDURE — 36415 COLL VENOUS BLD VENIPUNCTURE: CPT

## 2020-04-09 PROCEDURE — 74011250637 HC RX REV CODE- 250/637: Performed by: PHYSICIAN ASSISTANT

## 2020-04-09 PROCEDURE — 74011250636 HC RX REV CODE- 250/636: Performed by: INTERNAL MEDICINE

## 2020-04-09 PROCEDURE — 77030011640 HC PAD GRND REM COVD -A: Performed by: ORTHOPAEDIC SURGERY

## 2020-04-09 PROCEDURE — 76060000034 HC ANESTHESIA 1.5 TO 2 HR: Performed by: ORTHOPAEDIC SURGERY

## 2020-04-09 PROCEDURE — 77030026438 HC STYL ET INTUB CARD -A: Performed by: ANESTHESIOLOGY

## 2020-04-09 PROCEDURE — 77030018723 HC ELCTRD BLD COVD -A: Performed by: ORTHOPAEDIC SURGERY

## 2020-04-09 PROCEDURE — 77030008684 HC TU ET CUF COVD -B: Performed by: ANESTHESIOLOGY

## 2020-04-09 PROCEDURE — 77030018673: Performed by: ORTHOPAEDIC SURGERY

## 2020-04-09 PROCEDURE — 74011000250 HC RX REV CODE- 250: Performed by: REGISTERED NURSE

## 2020-04-09 PROCEDURE — 76000 FLUOROSCOPY <1 HR PHYS/QHP: CPT

## 2020-04-09 PROCEDURE — 82962 GLUCOSE BLOOD TEST: CPT

## 2020-04-09 PROCEDURE — 65270000029 HC RM PRIVATE

## 2020-04-09 PROCEDURE — 77030013079 HC BLNKT BAIR HGGR 3M -A: Performed by: ANESTHESIOLOGY

## 2020-04-09 PROCEDURE — 77030035236 HC SUT PDS STRATFX BARB J&J -B: Performed by: ORTHOPAEDIC SURGERY

## 2020-04-09 DEVICE — COMPONENT HIP CMNTLS BPLR HA PREMIER: Type: IMPLANTABLE DEVICE | Status: FUNCTIONAL

## 2020-04-09 DEVICE — IMPLANTABLE DEVICE
Type: IMPLANTABLE DEVICE | Site: HIP | Status: FUNCTIONAL
Brand: ACUMATCH L-SERIES

## 2020-04-09 DEVICE — IMPLANTABLE DEVICE
Type: IMPLANTABLE DEVICE | Site: HIP | Status: FUNCTIONAL
Brand: NOVATION

## 2020-04-09 RX ORDER — MIDAZOLAM HYDROCHLORIDE 1 MG/ML
0.5 INJECTION, SOLUTION INTRAMUSCULAR; INTRAVENOUS
Status: DISCONTINUED | OUTPATIENT
Start: 2020-04-09 | End: 2020-04-09 | Stop reason: HOSPADM

## 2020-04-09 RX ORDER — ROCURONIUM BROMIDE 10 MG/ML
INJECTION, SOLUTION INTRAVENOUS AS NEEDED
Status: DISCONTINUED | OUTPATIENT
Start: 2020-04-09 | End: 2020-04-09 | Stop reason: HOSPADM

## 2020-04-09 RX ORDER — POLYETHYLENE GLYCOL 3350 17 G/17G
17 POWDER, FOR SOLUTION ORAL DAILY
Status: DISCONTINUED | OUTPATIENT
Start: 2020-04-10 | End: 2020-04-09

## 2020-04-09 RX ORDER — SODIUM CHLORIDE 9 MG/ML
25 INJECTION, SOLUTION INTRAVENOUS CONTINUOUS
Status: DISPENSED | OUTPATIENT
Start: 2020-04-09 | End: 2020-04-10

## 2020-04-09 RX ORDER — MORPHINE SULFATE 2 MG/ML
2 INJECTION, SOLUTION INTRAMUSCULAR; INTRAVENOUS
Status: DISCONTINUED | OUTPATIENT
Start: 2020-04-09 | End: 2020-04-10

## 2020-04-09 RX ORDER — NALOXONE HYDROCHLORIDE 0.4 MG/ML
0.4 INJECTION, SOLUTION INTRAMUSCULAR; INTRAVENOUS; SUBCUTANEOUS AS NEEDED
Status: DISCONTINUED | OUTPATIENT
Start: 2020-04-09 | End: 2020-04-15 | Stop reason: HOSPADM

## 2020-04-09 RX ORDER — MIDAZOLAM HYDROCHLORIDE 1 MG/ML
1 INJECTION, SOLUTION INTRAMUSCULAR; INTRAVENOUS AS NEEDED
Status: DISCONTINUED | OUTPATIENT
Start: 2020-04-09 | End: 2020-04-09 | Stop reason: HOSPADM

## 2020-04-09 RX ORDER — HYDROMORPHONE HYDROCHLORIDE 2 MG/ML
INJECTION, SOLUTION INTRAMUSCULAR; INTRAVENOUS; SUBCUTANEOUS AS NEEDED
Status: DISCONTINUED | OUTPATIENT
Start: 2020-04-09 | End: 2020-04-09 | Stop reason: HOSPADM

## 2020-04-09 RX ORDER — DEXAMETHASONE SODIUM PHOSPHATE 4 MG/ML
INJECTION, SOLUTION INTRA-ARTICULAR; INTRALESIONAL; INTRAMUSCULAR; INTRAVENOUS; SOFT TISSUE AS NEEDED
Status: DISCONTINUED | OUTPATIENT
Start: 2020-04-09 | End: 2020-04-09 | Stop reason: HOSPADM

## 2020-04-09 RX ORDER — GLYCOPYRROLATE 0.2 MG/ML
INJECTION INTRAMUSCULAR; INTRAVENOUS AS NEEDED
Status: DISCONTINUED | OUTPATIENT
Start: 2020-04-09 | End: 2020-04-09 | Stop reason: HOSPADM

## 2020-04-09 RX ORDER — DIPHENHYDRAMINE HCL 12.5MG/5ML
12.5 LIQUID (ML) ORAL
Status: DISCONTINUED | OUTPATIENT
Start: 2020-04-09 | End: 2020-04-10 | Stop reason: SDUPTHER

## 2020-04-09 RX ORDER — SUCCINYLCHOLINE CHLORIDE 20 MG/ML
INJECTION INTRAMUSCULAR; INTRAVENOUS AS NEEDED
Status: DISCONTINUED | OUTPATIENT
Start: 2020-04-09 | End: 2020-04-09 | Stop reason: HOSPADM

## 2020-04-09 RX ORDER — HYDROMORPHONE HYDROCHLORIDE 1 MG/ML
0.5 INJECTION, SOLUTION INTRAMUSCULAR; INTRAVENOUS; SUBCUTANEOUS
Status: DISCONTINUED | OUTPATIENT
Start: 2020-04-09 | End: 2020-04-09 | Stop reason: HOSPADM

## 2020-04-09 RX ORDER — HYDROCODONE BITARTRATE AND ACETAMINOPHEN 5; 325 MG/1; MG/1
1 TABLET ORAL AS NEEDED
Status: DISCONTINUED | OUTPATIENT
Start: 2020-04-09 | End: 2020-04-09 | Stop reason: HOSPADM

## 2020-04-09 RX ORDER — FENTANYL CITRATE 50 UG/ML
25 INJECTION, SOLUTION INTRAMUSCULAR; INTRAVENOUS
Status: DISCONTINUED | OUTPATIENT
Start: 2020-04-09 | End: 2020-04-09 | Stop reason: HOSPADM

## 2020-04-09 RX ORDER — ONDANSETRON 2 MG/ML
4 INJECTION INTRAMUSCULAR; INTRAVENOUS AS NEEDED
Status: DISCONTINUED | OUTPATIENT
Start: 2020-04-09 | End: 2020-04-09 | Stop reason: HOSPADM

## 2020-04-09 RX ORDER — AMOXICILLIN 250 MG
1 CAPSULE ORAL 2 TIMES DAILY
Status: DISCONTINUED | OUTPATIENT
Start: 2020-04-09 | End: 2020-04-15 | Stop reason: HOSPADM

## 2020-04-09 RX ORDER — SODIUM CHLORIDE 9 MG/ML
75 INJECTION, SOLUTION INTRAVENOUS CONTINUOUS
Status: DISCONTINUED | OUTPATIENT
Start: 2020-04-09 | End: 2020-04-09

## 2020-04-09 RX ORDER — ACETAMINOPHEN 325 MG/1
650 TABLET ORAL EVERY 6 HOURS
Qty: 112 TAB | Refills: 0 | Status: SHIPPED
Start: 2020-04-09 | End: 2020-04-23

## 2020-04-09 RX ORDER — ETOMIDATE 2 MG/ML
INJECTION INTRAVENOUS AS NEEDED
Status: DISCONTINUED | OUTPATIENT
Start: 2020-04-09 | End: 2020-04-09 | Stop reason: HOSPADM

## 2020-04-09 RX ORDER — AMOXICILLIN 250 MG
1 CAPSULE ORAL 2 TIMES DAILY
Qty: 60 TAB | Refills: 0 | Status: SHIPPED
Start: 2020-04-09 | End: 2020-05-09

## 2020-04-09 RX ORDER — SODIUM CHLORIDE 0.9 % (FLUSH) 0.9 %
5-40 SYRINGE (ML) INJECTION EVERY 8 HOURS
Status: DISCONTINUED | OUTPATIENT
Start: 2020-04-09 | End: 2020-04-15 | Stop reason: HOSPADM

## 2020-04-09 RX ORDER — ONDANSETRON 2 MG/ML
INJECTION INTRAMUSCULAR; INTRAVENOUS AS NEEDED
Status: DISCONTINUED | OUTPATIENT
Start: 2020-04-09 | End: 2020-04-09 | Stop reason: HOSPADM

## 2020-04-09 RX ORDER — SODIUM CHLORIDE 9 MG/ML
25 INJECTION, SOLUTION INTRAVENOUS CONTINUOUS
Status: DISCONTINUED | OUTPATIENT
Start: 2020-04-09 | End: 2020-04-09 | Stop reason: HOSPADM

## 2020-04-09 RX ORDER — FACIAL-BODY WIPES
10 EACH TOPICAL DAILY PRN
Status: DISCONTINUED | OUTPATIENT
Start: 2020-04-11 | End: 2020-04-15 | Stop reason: HOSPADM

## 2020-04-09 RX ORDER — OXYCODONE HYDROCHLORIDE 5 MG/1
2.5 TABLET ORAL
Status: DISCONTINUED | OUTPATIENT
Start: 2020-04-09 | End: 2020-04-15 | Stop reason: HOSPADM

## 2020-04-09 RX ORDER — ONDANSETRON 2 MG/ML
4 INJECTION INTRAMUSCULAR; INTRAVENOUS
Status: ACTIVE | OUTPATIENT
Start: 2020-04-09 | End: 2020-04-10

## 2020-04-09 RX ORDER — OXYCODONE HYDROCHLORIDE 5 MG/1
2.5-5 TABLET ORAL
Qty: 60 TAB | Refills: 0 | Status: SHIPPED | OUTPATIENT
Start: 2020-04-09 | End: 2020-04-14

## 2020-04-09 RX ORDER — LIDOCAINE HYDROCHLORIDE 20 MG/ML
INJECTION, SOLUTION EPIDURAL; INFILTRATION; INTRACAUDAL; PERINEURAL AS NEEDED
Status: DISCONTINUED | OUTPATIENT
Start: 2020-04-09 | End: 2020-04-09 | Stop reason: HOSPADM

## 2020-04-09 RX ORDER — FENTANYL CITRATE 50 UG/ML
INJECTION, SOLUTION INTRAMUSCULAR; INTRAVENOUS AS NEEDED
Status: DISCONTINUED | OUTPATIENT
Start: 2020-04-09 | End: 2020-04-09 | Stop reason: HOSPADM

## 2020-04-09 RX ORDER — ASPIRIN 81 MG/1
81 TABLET ORAL 2 TIMES DAILY
Qty: 60 TAB | Refills: 0 | Status: SHIPPED
Start: 2020-04-09 | End: 2020-05-09

## 2020-04-09 RX ORDER — SODIUM CHLORIDE 0.9 % (FLUSH) 0.9 %
5-40 SYRINGE (ML) INJECTION AS NEEDED
Status: DISCONTINUED | OUTPATIENT
Start: 2020-04-09 | End: 2020-04-15 | Stop reason: HOSPADM

## 2020-04-09 RX ORDER — FENTANYL CITRATE 50 UG/ML
50 INJECTION, SOLUTION INTRAMUSCULAR; INTRAVENOUS AS NEEDED
Status: DISCONTINUED | OUTPATIENT
Start: 2020-04-09 | End: 2020-04-09 | Stop reason: HOSPADM

## 2020-04-09 RX ORDER — LIDOCAINE HYDROCHLORIDE 10 MG/ML
0.1 INJECTION, SOLUTION EPIDURAL; INFILTRATION; INTRACAUDAL; PERINEURAL AS NEEDED
Status: DISCONTINUED | OUTPATIENT
Start: 2020-04-09 | End: 2020-04-09 | Stop reason: HOSPADM

## 2020-04-09 RX ORDER — ONDANSETRON 4 MG/1
4 TABLET, ORALLY DISINTEGRATING ORAL
Status: DISCONTINUED | OUTPATIENT
Start: 2020-04-11 | End: 2020-04-15 | Stop reason: HOSPADM

## 2020-04-09 RX ORDER — DEXAMETHASONE SODIUM PHOSPHATE 4 MG/ML
10 INJECTION, SOLUTION INTRA-ARTICULAR; INTRALESIONAL; INTRAMUSCULAR; INTRAVENOUS; SOFT TISSUE ONCE
Status: COMPLETED | OUTPATIENT
Start: 2020-04-10 | End: 2020-04-10

## 2020-04-09 RX ORDER — DIPHENHYDRAMINE HCL 12.5MG/5ML
12.5 LIQUID (ML) ORAL
Status: DISCONTINUED | OUTPATIENT
Start: 2020-04-09 | End: 2020-04-09

## 2020-04-09 RX ORDER — ASPIRIN 81 MG/1
81 TABLET ORAL 2 TIMES DAILY
Status: DISCONTINUED | OUTPATIENT
Start: 2020-04-09 | End: 2020-04-15 | Stop reason: HOSPADM

## 2020-04-09 RX ORDER — NEOSTIGMINE METHYLSULFATE 1 MG/ML
INJECTION, SOLUTION INTRAVENOUS AS NEEDED
Status: DISCONTINUED | OUTPATIENT
Start: 2020-04-09 | End: 2020-04-09 | Stop reason: HOSPADM

## 2020-04-09 RX ORDER — ACETAMINOPHEN 325 MG/1
650 TABLET ORAL EVERY 6 HOURS
Status: DISCONTINUED | OUTPATIENT
Start: 2020-04-09 | End: 2020-04-09

## 2020-04-09 RX ORDER — OXYCODONE HYDROCHLORIDE 5 MG/1
5 TABLET ORAL
Status: DISCONTINUED | OUTPATIENT
Start: 2020-04-09 | End: 2020-04-10

## 2020-04-09 RX ORDER — DIPHENHYDRAMINE HYDROCHLORIDE 50 MG/ML
12.5 INJECTION, SOLUTION INTRAMUSCULAR; INTRAVENOUS AS NEEDED
Status: DISCONTINUED | OUTPATIENT
Start: 2020-04-09 | End: 2020-04-09 | Stop reason: HOSPADM

## 2020-04-09 RX ADMIN — GLYCOPYRROLATE 0.2 MG: 0.2 INJECTION, SOLUTION INTRAMUSCULAR; INTRAVENOUS at 08:51

## 2020-04-09 RX ADMIN — FENTANYL CITRATE 50 MCG: 50 INJECTION, SOLUTION INTRAMUSCULAR; INTRAVENOUS at 07:40

## 2020-04-09 RX ADMIN — Medication 10 ML: at 14:00

## 2020-04-09 RX ADMIN — SODIUM CHLORIDE 75 ML/HR: 900 INJECTION, SOLUTION INTRAVENOUS at 09:27

## 2020-04-09 RX ADMIN — GLYCOPYRROLATE 0.4 MG: 0.2 INJECTION, SOLUTION INTRAMUSCULAR; INTRAVENOUS at 08:43

## 2020-04-09 RX ADMIN — ONDANSETRON HYDROCHLORIDE 4 MG: 2 INJECTION, SOLUTION INTRAMUSCULAR; INTRAVENOUS at 08:03

## 2020-04-09 RX ADMIN — SODIUM CHLORIDE 25 ML/HR: 900 INJECTION, SOLUTION INTRAVENOUS at 07:04

## 2020-04-09 RX ADMIN — Medication 1 AMPULE: at 21:29

## 2020-04-09 RX ADMIN — SUCCINYLCHOLINE CHLORIDE 80 MG: 20 INJECTION, SOLUTION INTRAMUSCULAR; INTRAVENOUS at 07:40

## 2020-04-09 RX ADMIN — Medication 1 AMPULE: at 12:40

## 2020-04-09 RX ADMIN — HYDROMORPHONE HYDROCHLORIDE 0.2 MG: 2 INJECTION, SOLUTION INTRAMUSCULAR; INTRAVENOUS; SUBCUTANEOUS at 08:09

## 2020-04-09 RX ADMIN — ETOMIDATE 24 MG: 2 INJECTION, SOLUTION INTRAVENOUS at 07:40

## 2020-04-09 RX ADMIN — MELATONIN 5 TABLET: at 12:39

## 2020-04-09 RX ADMIN — INSULIN LISPRO 2 UNITS: 100 INJECTION, SOLUTION INTRAVENOUS; SUBCUTANEOUS at 16:30

## 2020-04-09 RX ADMIN — POLYETHYLENE GLYCOL 3350 17 G: 17 POWDER, FOR SOLUTION ORAL at 12:42

## 2020-04-09 RX ADMIN — WATER 2 G: 1 INJECTION INTRAMUSCULAR; INTRAVENOUS; SUBCUTANEOUS at 07:32

## 2020-04-09 RX ADMIN — INSULIN LISPRO 2 UNITS: 100 INJECTION, SOLUTION INTRAVENOUS; SUBCUTANEOUS at 12:39

## 2020-04-09 RX ADMIN — WATER 2 G: 1 INJECTION INTRAMUSCULAR; INTRAVENOUS; SUBCUTANEOUS at 16:00

## 2020-04-09 RX ADMIN — DOCUSATE SODIUM 100 MG: 100 CAPSULE, LIQUID FILLED ORAL at 12:42

## 2020-04-09 RX ADMIN — WATER 2 G: 1 INJECTION INTRAMUSCULAR; INTRAVENOUS; SUBCUTANEOUS at 23:10

## 2020-04-09 RX ADMIN — ROCURONIUM BROMIDE 10 MG: 10 INJECTION INTRAVENOUS at 07:40

## 2020-04-09 RX ADMIN — Medication 3 MG: at 08:43

## 2020-04-09 RX ADMIN — ROCURONIUM BROMIDE 10 MG: 10 INJECTION INTRAVENOUS at 07:59

## 2020-04-09 RX ADMIN — ACETAMINOPHEN 650 MG: 325 TABLET, FILM COATED ORAL at 12:41

## 2020-04-09 RX ADMIN — DEXAMETHASONE SODIUM PHOSPHATE 8 MG: 4 INJECTION, SOLUTION INTRAMUSCULAR; INTRAVENOUS at 08:03

## 2020-04-09 RX ADMIN — SODIUM CHLORIDE 25 ML/HR: 900 INJECTION, SOLUTION INTRAVENOUS at 16:00

## 2020-04-09 RX ADMIN — Medication 10 ML: at 21:38

## 2020-04-09 RX ADMIN — PHENYLEPHRINE HYDROCHLORIDE 100 MCG/MIN: 10 INJECTION INTRAVENOUS at 07:44

## 2020-04-09 RX ADMIN — SENNOSIDES AND DOCUSATE SODIUM 2 TABLET: 8.6; 5 TABLET ORAL at 12:39

## 2020-04-09 RX ADMIN — LIDOCAINE HYDROCHLORIDE 80 MG: 20 INJECTION, SOLUTION EPIDURAL; INFILTRATION; INTRACAUDAL; PERINEURAL at 07:40

## 2020-04-09 NOTE — PROGRESS NOTES
Physical Therapy 4/9/2020 Order received, chart reviewed. Patient POD 0 from L hip hemiarthroplasty following fracture. Hold PT and f/u tomorrow for evaluation per pathway. Thank you.  
 
Raquel Velasquez, PT, DPT

## 2020-04-09 NOTE — PROGRESS NOTES
Orthopedic End of Shift Note Bedside shift change report given to [facility], RN (oncoming nurse) by Jerry Holt (offgoing nurse). Report included the following information SBAR, Kardex, Procedure Summary, Intake/Output, MAR, Accordion, Recent Results and Med Rec Status. POD# Significant issues during shift: none Issues for Physician to address: none Activity This Shift 
(check all that apply) [] chair 
[] dangle 
 [] bathroom 
[] bedside commode [] hallway [x] bedrest  
Nausea/Vomiting [] yes [x] no    
Voiding Status [] void [] Leary [] I&O Cath Bowel Movements [] yes [x] no Foot Pumps or SCD [] yes [x] no Ice Pack [] yes    [x] no Incentive Spirometer [] yes [x] no Volume:     
Telemetry Monitoring   [] yes [x] no Rhythm:  
Supplemental O2 [] yes [x] no Sat off O2:   %

## 2020-04-09 NOTE — PROGRESS NOTES
Problem: Falls - Risk of 
Goal: *Absence of Falls Description: Document Clydene Bone Fall Risk and appropriate interventions in the flowsheet. Outcome: Progressing Towards Goal 
Note: Fall Risk Interventions: 
Mobility Interventions: Communicate number of staff needed for ambulation/transfer Medication Interventions: Patient to call before getting OOB Elimination Interventions: Call light in reach History of Falls Interventions: Room close to nurse's station

## 2020-04-09 NOTE — OP NOTES
Migel Murphy MD - Adult Reconstruction and Total Joint Replacement Penny Corley - MRN [id number] - : 1940 (78 y.o.) Date: 2020 Pre-operative Diagnosis: Left Hip Femoral Neck Fracture Post-operative Diagnosis: same Procedure: Left Hip Hemiarthroplasty, Direct Anterior Approach Implants Used:  
Implant Name Type Inv. Item Serial No.  Lot No. LRB No. Used Action STEM [facility] W/HA STD OFFST SZ13 -- COLLARED - [id number]  STEM ELMNT W/HA STD OFFST OI96 -- COLLARED [id number] [employer] N/A Left 1 Implanted SLEEVE HIP UNIPOLAR 12/14 -3.5 --  - [id number]  SLEEVE HIP UNIPOLAR 1214 -3.5 --  [id number] [employer] N/A Left 1 Implanted HEAD UNIPOLAR [facility] STD 49MM --  - [id number]  HEAD UNIPOLAR [facility] STD 49MM --  [id number] [employer] N/A Left 1 Implanted Anesthesia: GETA Surgeon: Migel Murphy Assist: DRU Pierce (Performing all or most of the following assistant-at-surgery services including but not limited to: proper patient positioning, sterile/prep and draping, placement of instruments, operative exposure, minor portions of bone / soft tissue excision, final irrigation and debridement, deep and superficial closure, application of final dressings) EBL: 250cc Drains: none Specimens: none Complications: none Condition: stable to PACU Brief History: Displaced femoral neck fracture. The patient/family understood no guarantees could be given about the outcome of the procedure and wished to proceed. Description of Procedure: After being identified in the preoperative holding area and having their operative site marked, the patient was brought back to the operating room where they underwent anesthesia to good effect. They were  placed in the supine position with a bump under the hip. The operative extremity was then prepped and draped in the usual fashion using sterile technique.  Preoperative antibiotics had been administered. An appropriate time-out was performed. A curvilinear incision was made 2 fingerbreadths lateral and distal to the ASIS. The skin flaps were developed down to the tensor fascia. This was divided sharply. Blunt dissection was taken medially over the tensor muscle. Retractors were placed superior and inferior to the femoral neck. The anterior fat pad was debrided. Circumflex vessels were identified and cauterized. A provisional neck cut was performed. The head was removed from the acetabulum and sized. We then turned our attention to the femur. Elevators were used to gain access to the proximal femur. Soft tissue releases were performed as necessary. The canal finder and lateralizer were utilized. We then sequentially broached up to the final size trial. We placed a trial neck and head and had good restoration of leg length and offset with good soft tissue balance. We selected these as our final implants. Final components were inserted and the hip was reduced after thorough lavage. Local anesthetic was utilized. The tensor fascia was closed. Periarticular injection was performed. Skin closure was performed in layers. A sterile dressing was applied. The patient was awakened, moved to the stretcher, and taken to the recovery room in stable condition. At the conclusion of the procedure, all counts were correct. There were no immediate complications.  
 
 
Asmita Garsia MD

## 2020-04-09 NOTE — ANESTHESIA PREPROCEDURE EVALUATION
Relevant Problems   No relevant active problems       Anesthetic History   No history of anesthetic complications            Review of Systems / Medical History  Patient summary reviewed, nursing notes reviewed and pertinent labs reviewed    Pulmonary  Within defined limits                 Neuro/Psych   Within defined limits    CVA       Cardiovascular  Within defined limits  Hypertension          CAD    Exercise tolerance: <4 METS     GI/Hepatic/Renal  Within defined limits       Renal disease: dialysis       Endo/Other  Within defined limits  Diabetes: type 2  Hypothyroidism       Other Findings   Comments: Dialysis 4/8/2020         Physical Exam    Airway  Mallampati: II  TM Distance: > 6 cm  Neck ROM: normal range of motion   Mouth opening: Normal     Cardiovascular          Murmur: Grade 2, Mitral area     Dental    Dentition: Edentulous     Pulmonary  Breath sounds clear to auscultation               Abdominal  GI exam deferred       Other Findings            Anesthetic Plan    ASA: 3  Anesthesia type: general    Monitoring Plan: BIS      Induction: Intravenous  Anesthetic plan and risks discussed with: Patient

## 2020-04-09 NOTE — PERIOP NOTES
TRANSFER - OUT REPORT: 
 
Verbal report given to Renato Rondon RN(name) on Maurilio Schuster  being transferred to [facility](unit) for routine post - op Report consisted of patients Situation, Background, Assessment and  
Recommendations(SBAR). Information from the following report(s) OR Summary, Procedure Summary, Intake/Output and MAR was reviewed with the receiving nurse. Opportunity for questions and clarification was provided. Patient transported with: 
 O2 @ 3 liters Tech

## 2020-04-09 NOTE — PROGRESS NOTES
Ortho / Neurosurgery NP Note POD# 0  s/p LEFT HIP HEMIARTHROPLASTY (ESSENTIAL) Pt resting in bed. Drowsy but awakes to voice, follows commands, answers questions appropriately. No complaints. Denies pain. Drifts back to sleep. POSS -3 Tolerating clears - advance diet as tolerated VSS Afebrile. 2L O2 Visit Vitals /75 (BP 1 Location: Right arm, BP Patient Position: At rest) Pulse 84 Temp 97.2 °F (36.2 °C) Resp 16 Wt 77.7 kg (171 lb 4.8 oz) SpO2 99% BMI 27.65 kg/m² Voiding status: anuric Output (mL) Last Bowel Movement Date: 04/07/20 (04/08/20 1940) Labs Lab Results Component Value Date/Time HGB 11.9 (L) 04/09/2020 04:25 AM  
  
Lab Results Component Value Date/Time INR 1.1 04/08/2020 09:24 AM  
  
Lab Results Component Value Date/Time Sodium 135 (L) 04/09/2020 04:25 AM  
 Potassium 4.5 04/09/2020 04:25 AM  
 Chloride 98 04/09/2020 04:25 AM  
 CO2 29 04/09/2020 04:25 AM  
 Glucose 124 (H) 04/09/2020 04:25 AM  
 BUN 24 (H) 04/09/2020 04:25 AM  
 Creatinine 4.48 (H) 04/09/2020 04:25 AM  
 Calcium 8.5 04/09/2020 04:25 AM  
 
Recent Glucose Results:  
Lab Results Component Value Date/Time  (H) 04/09/2020 04:25 AM  
 GLUCPOC 245 (H) 04/09/2020 12:12 PM  
 GLUCPOC 202 (H) 04/09/2020 09:19 AM  
 GLUCPOC 191 (H) 04/08/2020 08:53 PM  
 
 
 
 
Body mass index is 27.65 kg/m². : A BMI > 30 is classified as obesity and > 40 is classified as morbid obesity. Optifoam dressing c.d.i Cryotherapy in place over incision Calves soft and supple; No pain with passive stretch Sensation and motor intact - Phillip BKA SCDs for mechanical DVT proph while in bed PLAN: 
1) PT BID, OT - WBAT - phillip BKA, prosthetics at bedside. Pt states he is wheelchair bond, wears prosthetics. 2) Aspirin 81 mg PO BID for DVT Prophylaxis 3) GI Prophylaxis - Pepcid 4) ESRD - HD (chico valdez, sat) Nephrology following 5) Readniess for discharge: [x] Vital Signs stable - wean O2 as tolerated  
 [] Hgb stable - check in am 
 [x] + Voiding - anuric [x] Wound intact, drainage minimal  
 [x] Tolerating PO intake   
 [] Cleared by PT (OT if applicable) [] Stair training completed (if applicable) [] Independent / Contact Guard Assist (household distance) [] Bed mobility [] Car transfers  
  [] ADLs [x] Adequate pain control on oral medication alone Patient from [address] [place] [address]. Plans to return once medically stable.   
 
Veronica Peng, NP

## 2020-04-09 NOTE — ANESTHESIA POSTPROCEDURE EVALUATION
Procedure(s): LEFT HIP HEMIARTHROPLASTY (ESSENTIAL). general 
 
Anesthesia Post Evaluation Patient location during evaluation: PACU Note status: Adequate. Level of consciousness: responsive to verbal stimuli and sleepy but conscious Pain management: satisfactory to patient Airway patency: patent Anesthetic complications: no 
Cardiovascular status: acceptable Respiratory status: acceptable Hydration status: acceptable Comments: +Post-Anesthesia Evaluation and Assessment Patient: Melina Cheema MRN: [id number]  SSN: [id number] YOB: 1940  Age: 78 y.o. Sex: male Cardiovascular Function/Vital Signs /61   Pulse 99   Temp 36.4 °C (97.6 °F)   Resp 19   Wt 77.7 kg (171 lb 4.8 oz)   SpO2 97%   BMI 27.65 kg/m² Patient is status post Procedure(s): LEFT HIP HEMIARTHROPLASTY (ESSENTIAL). Nausea/Vomiting: Controlled. Postoperative hydration reviewed and adequate. Pain: 
Pain Scale 1: FLACC (04/09/20 1100) Pain Intensity 1: 0 (04/09/20 1100) Managed. Neurological Status:  
Neuro (WDL): Exceptions to WDL (04/09/20 0914) At baseline. Mental Status and Level of Consciousness: Arousable. Pulmonary Status:  
O2 Device: Nasal cannula (04/09/20 0914) Adequate oxygenation and airway patent. Complications related to anesthesia: None Post-anesthesia assessment completed. No concerns. Signed By: Lucas Tai MD  
 4/9/2020 Post anesthesia nausea and vomiting:  controlled Vitals Value Taken Time /61 4/9/2020 11:00 AM  
Temp 36.4 °C (97.6 °F) 4/9/2020  9:14 AM  
Pulse 99 4/9/2020 11:05 AM  
Resp 21 4/9/2020 11:05 AM  
SpO2 99 % 4/9/2020 11:05 AM  
Vitals shown include unvalidated device data.

## 2020-04-09 NOTE — DISCHARGE INSTRUCTIONS
Discharge Instructions: How to [facility]  Surgery: Partial Hip Replacement  Surgeon:   Bianca Anthony MD  Surgery Date:  4/9/2020     To relieve pain:   Use ice/gel packs.  Put the ice pack directly over the wound, or anywhere you are hurting or swollen.  To control pain and swelling, keep ice on regularly, especially after physical activity.  The packs should stay cold for 3-4 hours. When it is not cold anymore, rotate with the packs in the freezer.  Elevate your leg. This will also keep swelling down.  Rest for at least 20 minutes between activity or exercises.  To keep track of your pain medications, write down what you take and when you take it.  The last dose of pain medication you got in the hospital was:       Medication    Dose    Date & Time           Choose your medications based on the pain scale below:     To keep your pain under control, take Tylenol every 6 hours for 14 days - even if you feel like you dont need it.  For mild to moderate pain (4-6 on pain scale), take one pain pill every 3-4 hours as needed.  For severe pain (7-10 on pain scale), take two pain pills every 3-4 hours as needed.  To prevent nausea, take your pain medications with food. Pain Scale              As your pain lessens:     Slowly start taking less pain medication. You may do this by waiting longer between doses or by taking smaller doses.  Stop using the pain medications as soon as you no longer need it, usually in 2-3 weeks.  Aspirin   To prevent blood clots, you will need to take Aspirin 81 mg twice a day for 30 days.  To prevent stomach upset or bleeding:   Do not take non-steroidal anti-inflammatory medications (Ibuprofen, Advil, Motrin, Naproxen, etc.)    Take Pepcid 20 mg twice a day, or a similar home medication, while you are taking a blood thinner. DRESSING: OPSITE (Honeycomb dressing)     Keep your clear, waterproof dressing in place for 5-7 days after your leave the hospital.     If you are still having drainage, you will need to change your dressing in 5-7 days. You will be given one extra dressing to use at home.  If there is no more drainage from the wound, you may leave it open to the air.  You will have some swelling, warmth, and bruising around the knee and up and down the leg after surgery. This will may get worse in the first few days you are home and will slowly get better over the next few weeks. OPSITE DRESSING INSTRUCTIONS                                     DO NOTs:   Do not rub your surgical wound   Do not put lotion or oils on your wound.  Do not take a tub bath or go swimming until your doctor says it is ok.  You may shower with this dressing over your wound.  After showering pat the dressing dry.  If you have staples a home health nurse will remove them in about 10 days.  To increase and promote healing:     Stop Smoking (or at least cut back on       Smoking).  Eat a well-balanced diet (high in protein       and vitamin C).  If you have a poor appetite, drink Ensure, Glucerna, or [place] Instant Breakfast for the next 30 days.  If you are diabetic, you should control your blood                                                sugars to prevent infection and help your wound                                                to heal.     Prevent Infection    1. Wash your hands.  This is the most important thing you or your caregiver can do.  Wash your hands with soap and water (or use the hand  we gave you) before you touch any wounds. 2. Shower.  Use the antibacterial soap we gave you when you take a shower.  Shower with this soap until your wounds are healed. 3.  Use clean sheets.      Put freshly cleaned sheets on your bed after surgery.  To keep the surgery site clean, do not allow pets to sleep with you while your wound is still healing.  To prevent constipation, stay active and drink plenty of fluid.  While using pain medications, you should also take stool softeners and laxatives, such as Pericolace       and Miralax.  If you are having too many bowel       Movements, then you may need       to stop taking the laxatives.  You should have a bowel movement 3-4 days        after surgery and then at least every other day while       on pain medication.  To improve your recovery, you must be active!  Use your walker and take short walks         (in your home). about every 2 hours during the day.  Try to increase how far you walk each day.  You can put as much weight on your leg as you can tolerate while walking.  Home health physical therapy will come to your       home the day after you leave the hospital.  The       therapist will visit about 4 times over the next couple of       weeks to teach you how to get out of bed, to safely walk       in your home, and to do your exercises.  NO DRIVING until your surgeon tells you it is ok.  You can return to work when cleared by a physician.  Please call your physician immediately if you have:     Constant bleeding from your wound.  Increasing redness or swelling around your wound (Some warmth, bruising and swelling is normal).  Change in wound drainage (increase in amount, color, or bad smell).  Change in mental status (unusual behavior   Temperature over 101.5 degrees Fahrenheit      Pain or redness in the calf (back of your lower leg - see picture)     Increased swelling of the thigh, ankle, calf, or foot.      Emergency: CALL 911 if you have:     Shortness of breath     Chest pain when you cough or taking a deep breath     Please call your surgeons office at [phone] for a follow up appointment. _   You should call as soon as you get home or the next day after discharge. Ask to make an appointment in 2 weeks.  If you have questions or concerns during normal business hours, you may reach Dr. Charlie Urbina' Team at [phone].

## 2020-04-09 NOTE — PERIOP NOTES
Handoff Report from Operating Room to PACU Report received from rosa Roy RN and [place] CRNA regarding Martha Fajardo. Surgeon(s): 
Bianca Anthony MD  And Procedure(s) (LRB): LEFT HIP HEMIARTHROPLASTY (ESSENTIAL) (Left)  confirmed  
with dressings discussed. Anesthesia type, drugs, patient history, complications, estimated blood loss, vital signs, intake and output, and last pain medication, lines, reversal medications and temperature were reviewed.

## 2020-04-09 NOTE — PROGRESS NOTES
Bedside shift change report given to Jeri (oncoming nurse) by Clara Nuñez (offgoing nurse). Report included the following information SBAR and Kardex.

## 2020-04-09 NOTE — PROGRESS NOTES
TRANSFER - IN REPORT: 
 
Verbal report received from Nneka(name) on Rondi Blocker  being received from [facility]) for routine progression of care Report consisted of patients Situation, Background, Assessment and  
Recommendations(SBAR). Information from the following report(s) SBAR, Kardex, OR Summary, Procedure Summary, Intake/Output and MAR was reviewed with the receiving nurse. Opportunity for questions and clarification was provided. Assessment completed upon patients arrival to unit and care assumed.

## 2020-04-09 NOTE — PROGRESS NOTES
NAME: Len Chahal :  1940 MRN:  [id number] Assessment :    Plan: 
--eskd Mild hyponatremia Acute left femoral neck fracture PAD s/p bilateral bka Hypotension, borderline --TTS HD at [address] [place] [address]; HD MWF for now 
  
On Midodrine 
  
hgb 11.9 (hold ebony) Subjective: Chief Complaint:  Sleepy, but arrousalbe. no complaint. Review of Systems: 
 
Symptom Y/N Comments  Symptom Y/N Comments Fever/Chills    Chest Pain Poor Appetite    Edema Cough    Abdominal Pain Sputum    Joint Pain SOB/SOLANO    Pruritis/Rash Nausea/vomit    Tolerating PT/OT Diarrhea    Tolerating Diet Constipation    Other Could not obtain due to:   
 
Objective: VITALS:  
Last 24hrs VS reviewed since prior progress note. Most recent are: 
Visit Vitals /80 (BP 1 Location: Right arm, BP Patient Position: At rest) Pulse 88 Temp 98 °F (36.7 °C) Resp 16 Wt 77.7 kg (171 lb 4.8 oz) SpO2 97% BMI 27.65 kg/m² Intake/Output Summary (Last 24 hours) at 2020 1515 Last data filed at 2020 6069 Gross per 24 hour Intake 350 ml Output 1250 ml Net -900 ml Telemetry Reviewed: PHYSICAL EXAM: 
General: NAD Lab Data Reviewed: (see below) Medications Reviewed: (see below) PMH/SH reviewed - no change compared to H&P 
________________________________________________________________________ Care Plan discussed with: 
Patient y Family RN Care Manager Consultant:     
 
  Comments >50% of visit spent in counseling and coordination of care    
 
________________________________________________________________________ Mamie Yao MD  
 
Procedures: see electronic medical records for all procedures/Xrays and details which 
were not copied into this note but were reviewed prior to creation of Plan. LABS: 
Recent Labs 04/09/20 0425 04/08/20 
0825 WBC 4.6 4.7 HGB 11.9* 11.9*  
HCT 37.0 37.2 * 140* Recent Labs 04/09/20 
0425 04/08/20 
0825 * 133* K 4.5 4.7 CL 98 97 CO2 29 29 BUN 24* 35* CREA 4.48* 5.53* * 167* CA 8.5 8.4* MG  --  1.9 PHOS  --  3.5 Recent Labs 04/08/20 
0825 SGOT 17  
* TP 7.5 ALB 2.8*  
GLOB 4.7* Recent Labs 04/08/20 
4144 INR 1.1 PTP 11.7* No results for input(s): FE, TIBC, PSAT, FERR in the last 72 hours. Lab Results Component Value Date/Time Folate 12.7 04/13/2011 03:00 AM  
  
No results for input(s): PH, PCO2, PO2 in the last 72 hours. No results for input(s): CPK, CKMB in the last 72 hours. No lab exists for component: TROPONINI No components found for: [place] Lab Results Component Value Date/Time Color DARK YELLOW 09/27/2017 12:21 PM  
 Appearance TURBID (A) 09/27/2017 12:21 PM  
 Specific gravity 1.018 09/27/2017 12:21 PM  
 Specific gravity 1.010 05/27/2017 04:33 PM  
 pH (UA) 5.5 09/27/2017 12:21 PM  
 Protein 100 (A) 09/27/2017 12:21 PM  
 Glucose 250 (A) 09/27/2017 12:21 PM  
 Ketone TRACE (A) 09/27/2017 12:21 PM  
 Bilirubin NEGATIVE  05/27/2017 04:33 PM  
 Urobilinogen 1.0 09/27/2017 12:21 PM  
 Nitrites NEGATIVE  09/27/2017 12:21 PM  
 Leukocyte Esterase LARGE (A) 09/27/2017 12:21 PM  
 Epithelial cells MODERATE (A) 09/27/2017 12:21 PM  
 Bacteria 3+ (A) 09/27/2017 12:21 PM  
 WBC >100 (H) 09/27/2017 12:21 PM  
 RBC >100 (H) 09/27/2017 12:21 PM  
 
 
MEDICATIONS: 
Current Facility-Administered Medications Medication Dose Route Frequency  0.9% sodium chloride infusion  75 mL/hr IntraVENous CONTINUOUS  
 sodium chloride 0.9 % bolus infusion 250 mL  250 mL IntraVENous ONCE PRN  
 sodium chloride (NS) flush 5-40 mL  5-40 mL IntraVENous Q8H  
 sodium chloride (NS) flush 5-40 mL  5-40 mL IntraVENous PRN  
 oxyCODONE IR (ROXICODONE) tablet 2.5 mg  2.5 mg Oral Q3H PRN  
  oxyCODONE IR (ROXICODONE) tablet 5 mg  5 mg Oral Q3H PRN  
 naloxone (NARCAN) injection 0.4 mg  0.4 mg IntraVENous PRN  
 ondansetron (ZOFRAN) injection 4 mg  4 mg IntraVENous Q4H PRN  
 [START ON 4/10/2020] dexamethasone (DECADRON) 4 mg/mL injection 10 mg  10 mg IntraVENous ONCE  
 [START ON 4/11/2020] ondansetron (ZOFRAN ODT) tablet 4 mg  4 mg Oral Q6H PRN  
 senna-docusate (PERICOLACE) 8.6-50 mg per tablet 1 Tab  1 Tab Oral BID  [START ON 4/10/2020] polyethylene glycol (MIRALAX) packet 17 g  17 g Oral DAILY  [START ON 4/11/2020] bisacodyL (DULCOLAX) suppository 10 mg  10 mg Rectal DAILY PRN  
 morphine injection 2 mg  2 mg IntraVENous Q3H PRN  
 ceFAZolin (ANCEF) 2 g in sterile water (preservative free) 20 mL IV syringe  2 g IntraVENous Q8H  
 aspirin delayed-release tablet 81 mg  81 mg Oral BID  alcohol 62% (NOZIN) nasal  1 Ampule  1 Ampule Topical Q12H  diphenhydrAMINE (BENADRYL) 12.5 mg/5 mL syrup 12.5 mg  12.5 mg Oral Q6H PRN  cholecalciferol (VITAMIN D3) (1000 Units /25 mcg) tablet 5 Tab  5,000 Units Oral DAILY  levothyroxine (SYNTHROID) tablet 200 mcg  200 mcg Oral ACB  pravastatin (PRAVACHOL) tablet 20 mg  20 mg Oral QHS  insulin lispro (HUMALOG) injection   SubCUTAneous AC&HS  
 glucose chewable tablet 16 g  4 Tab Oral PRN  
 dextrose (D50W) injection syrg 12.5-25 g  12.5-25 g IntraVENous PRN  
 glucagon (GLUCAGEN) injection 1 mg  1 mg IntraMUSCular PRN  
 acetaminophen (TYLENOL) tablet 650 mg  650 mg Oral Q6H  
 naloxone (NARCAN) injection 0.4 mg  0.4 mg IntraVENous PRN  
 HYDROmorphone (PF) (DILAUDID) injection 0.5-1 mg  0.5-1 mg IntraVENous Q2H PRN  
 midodrine (PROAMITINE) tablet 10 mg  10 mg Oral Q TU, TH & SAT

## 2020-04-09 NOTE — PERIOP NOTES
TRANSFER - IN REPORT: 
 
Verbal report received from [facility] on Pj Segal  being received from [phone] for ordered procedure Report consisted of patients Situation, Background, Assessment and  
Recommendations(SBAR). Information from the following report(s) SBAR, ED Summary, Procedure Summary, Intake/Output and MAR was reviewed with the receiving nurse. Opportunity for questions and clarification was provided. Assessment completed upon patients arrival to unit and care assumed.

## 2020-04-09 NOTE — ROUTINE PROCESS
Patient: Lizzeth Laureano MRN: [id number]  SSN: [id number] YOB: 1940  Age: 78 y.o. Sex: male Patient is status post Procedure(s): LEFT HIP HEMIARTHROPLASTY (ESSENTIAL). Surgeon(s) and Role: 
   Kem Knott MD - Primary Local/Dose/Irrigation:  50mL of 0.5% marcaine with Epi Peripheral IV 04/08/20 Right Antecubital (Active) Site Assessment Clean, dry, & intact 4/8/2020  7:40 PM  
Phlebitis Assessment 0 4/8/2020  7:40 PM  
Infiltration Assessment 0 4/8/2020  7:40 PM  
Dressing Status Clean, dry, & intact 4/8/2020  7:40 PM  
Dressing Type Tape;Transparent 4/8/2020  7:40 PM  
Hub Color/Line Status Pink;Capped 4/8/2020  7:40 PM  
Action Taken Blood drawn 4/8/2020  9:26 AM  
Alcohol Cap Used Yes 4/8/2020 10:11 AM  
        
Airway - Endotracheal Tube 04/09/20 Oral (Active) Dressing/Packing:  Wound Hip Left 1 hip site with suture Exofin Fusion and Honeycomb. -Dressing Type: Topical skin adhesive/glue; Other (Comment)(exofin fusion and honey comb.) (04/09/20 0843) Splint/Cast:  ]

## 2020-04-09 NOTE — BRIEF OP NOTE
Brief Postoperative Note Patient: Compa Sigala YOB: 1940 MRN: [id number] Date of Procedure: 4/9/2020 Pre-Op Diagnosis: HIP FRACTURE Post-Op Diagnosis: Same as preoperative diagnosis. Procedure(s): LEFT HIP HEMIARTHROPLASTY (ESSENTIAL) Surgeon(s): 
Jazmyne Dickinson MD 
 
Surgical Assistant: Physician Assistant: DRU Kilgore Anesthesia: General  
 
Estimated Blood Loss (mL): 200 Complications: None Specimens: * No specimens in log * Implants:  
Implant Name Type Inv. Item Serial No.  Lot No. LRB No. Used Action STEM [facility] W/HA STD OFFST [id number] -- COLLARED - [id number]  STEM ELMNT W/HA STD OFFST SO98 -- COLLARED [id number] [employer] N/A Left 1 Implanted SLEEVE HIP UNIPOLAR 12/14 -3.5 --  - [id number]  SLEEVE HIP UNIPOLAR 12/14 -3.5 --  [id number] [employer] N/A Left 1 Implanted HEAD UNIPOLAR [facility] STD 49MM --  - [id number]  HEAD UNIPOLAR [facility] STD 49MM --  [id number] [employer] N/A Left 1 Implanted Drains: * No LDAs found * Findings: n/a Electronically Signed by DRU Walker on 4/9/2020 at 8:48 AM

## 2020-04-09 NOTE — PROGRESS NOTES
Hospitalist Progress Note NAME: Cintia Maya :  1940 MRN:  [id number] Assessment / Plan: 
Acute left femoral neck fracture POA patient apparently fell 7 days ago in wheelchair and has had successful surgery resting comfortably in the room. Pain is controlled Hypotension will continue midodrine PAD status post bilateral BKA. End-stage renal disease on hemodialysis TTS Hypothyroidism with history of myxedema coma  with acute encephalopathy on top of dementia. Dementia [place] Diabetes mellitus type 2 with hyperglycemia Anemia of chronic kidney disease stable with hemoglobin of 11 History of coronary artery disease continue current regimen. Last echocardiogram shows LVEF of 51 to 55%. No active chest pain. 
 
 
 / Body mass index is 27.65 kg/m². Code status: full Prophylaxis: SCDs Recommended Disposition: Return home Subjective: Chief Complaint / Reason for Physician Visit Discussed with RN events overnight. Patient seen and examined at bedside comfortable events overnight. Patient just returned from surgery status post left hip surgery. Review of Systems: 
Symptom Y/N Comments  Symptom Y/N Comments Fever/Chills n   Chest Pain n   
Poor Appetite n   Edema n   
Cough n   Abdominal Pain n   
Sputum n   Joint Pain SOB/SOLANO n   Pruritis/Rash Nausea/vomit n   Tolerating PT/OT Diarrhea    Tolerating Diet Constipation    Other Could NOT obtain due to:   
 
Objective: VITALS:  
Last 24hrs VS reviewed since prior progress note. Most recent are: 
Patient Vitals for the past 24 hrs: 
 Temp Pulse Resp BP SpO2  
20 1248 97.8 °F (36.6 °C) 87 16 111/82 100 % 20 1138 97.2 °F (36.2 °C) 84 16 119/75 99 % 20 1115 97.5 °F (36.4 °C) 98 16 106/57 97 % 20 1100  99 19 108/61 97 % 20 1045  96 27 118/61 96 % 20 1015  (!) 101 13 127/65 95 % 20 1000  95 12 131/66 98 % 04/09/20 0945  98 24 137/77 95 % 04/09/20 0930  99 10 122/73 95 % 04/09/20 0925  100 9 110/76 90 % 04/09/20 0920  (!) 103 11 117/65 94 % 04/09/20 0915  (!) 106 11 125/69 95 % 04/09/20 0914 97.6 °F (36.4 °C) (!) 107 10 126/68 95 % 04/09/20 0913    126/68   
04/09/20 0634 97.9 °F (36.6 °C) 91 12 103/67 98 % 04/09/20 0348 97.3 °F (36.3 °C) 83 18 111/64 100 % 04/08/20 1950 97.3 °F (36.3 °C) 84 18 103/54 99 % 04/08/20 1746 97.2 °F (36.2 °C)      
04/08/20 1720  82 18 115/56 100 % 04/08/20 1654  86 18 109/58   
04/08/20 1645 97.4 °F (36.3 °C) 83 18 110/65   
04/08/20 1630  81  109/61   
04/08/20 1615  81  110/64   
04/08/20 1600  82  112/64   
04/08/20 1545  82  105/56   
04/08/20 1530  79  103/64   
04/08/20 1515  79  104/58   
04/08/20 1500  78  106/63   
04/08/20 1446  78  106/50   
04/08/20 1430  80  103/61   
04/08/20 1415  82  109/64   
04/08/20 1352 97.4 °F (36.3 °C) 86 18 91/44  Intake/Output Summary (Last 24 hours) at 4/9/2020 1348 Last data filed at 4/9/2020 5977 Gross per 24 hour Intake 350 ml Output 1250 ml Net -900 ml PHYSICAL EXAM: 
General: WD, WN. Alert, cooperative, no acute distress   
EENT:  EOMI. Anicteric sclerae. MMM Resp:  CTA bilaterally, no wheezing or rales. No accessory muscle use CV:  Regular  rhythm,  No edema GI:  Soft, Non distended, Non tender.  +Bowel sounds Neurologic:  Alert and oriented X 3, normal speech, Psych:   Good insight. Not anxious nor agitated Skin:  Bilateral BKA Reviewed most current lab test results and cultures  YES Reviewed most current radiology test results   YES Review and summation of old records today    NO Reviewed patient's current orders and MAR    YES 
PMH/SH reviewed - no change compared to H&P 
________________________________________________________________________ Care Plan discussed with: 
  Comments Patient Family RN Care Manager Consultant Multidiciplinary team rounds were held today with , nursing, pharmacist and clinical coordinator. Patient's plan of care was discussed; medications were reviewed and discharge planning was addressed. ________________________________________________________________________ Total CRITICAL CARE TIME Spent:   Minutes non procedure based Comments >50% of visit spent in counseling and coordination of care    
________________________________________________________________________ Sybil Long MD  
 
Procedures: see electronic medical records for all procedures/Xrays and details which were not copied into this note but were reviewed prior to creation of Plan. LABS: 
I reviewed today's most current labs and imaging studies. Pertinent labs include: 
Recent Labs 04/09/20 0425 04/08/20 
0825 WBC 4.6 4.7 HGB 11.9* 11.9*  
HCT 37.0 37.2 * 140* Recent Labs 04/09/20 0425 04/08/20 
1710 04/08/20 
0825 *  --  133* K 4.5  --  4.7 CL 98  --  97  
CO2 29  --  29 *  --  167* BUN 24*  --  35* CREA 4.48*  --  5.53* CA 8.5  --  8.4* MG  --   --  1.9 PHOS  --   --  3.5 ALB  --   --  2.8* TBILI  --   --  0.6 SGOT  --   --  17 ALT  --   --  17 INR  --  1.1  --   
 
 
Signed: Sybil Long MD

## 2020-04-09 NOTE — PROGRESS NOTES
OK to proceed with scheduled surgical procedure in am, assuming no acute changes in clinical status or lab derangements overnight and patient remains NPO overnight.

## 2020-04-10 LAB
ANION GAP SERPL CALC-SCNC: 10 MMOL/L (ref 5–15)
BUN SERPL-MCNC: 34 MG/DL (ref 6–20)
BUN/CREAT SERPL: 6 (ref 12–20)
CALCIUM SERPL-MCNC: 7.9 MG/DL (ref 8.5–10.1)
CHLORIDE SERPL-SCNC: 99 MMOL/L (ref 97–108)
CO2 SERPL-SCNC: 24 MMOL/L (ref 21–32)
CREAT SERPL-MCNC: 5.9 MG/DL (ref 0.7–1.3)
ERYTHROCYTE [DISTWIDTH] IN BLOOD BY AUTOMATED COUNT: 13.4 % (ref 11.5–14.5)
GLUCOSE BLD STRIP.AUTO-MCNC: 139 MG/DL (ref 65–100)
GLUCOSE BLD STRIP.AUTO-MCNC: 195 MG/DL (ref 65–100)
GLUCOSE BLD STRIP.AUTO-MCNC: 196 MG/DL (ref 65–100)
GLUCOSE BLD STRIP.AUTO-MCNC: 206 MG/DL (ref 65–100)
GLUCOSE SERPL-MCNC: 128 MG/DL (ref 65–100)
HCT VFR BLD AUTO: 31.4 % (ref 36.6–50.3)
HGB BLD-MCNC: 10.2 G/DL (ref 12.1–17)
MCH RBC QN AUTO: 35.2 PG (ref 26–34)
MCHC RBC AUTO-ENTMCNC: 32.5 G/DL (ref 30–36.5)
MCV RBC AUTO: 108.3 FL (ref 80–99)
NRBC # BLD: 0 K/UL (ref 0–0.01)
NRBC BLD-RTO: 0 PER 100 WBC
PLATELET # BLD AUTO: 132 K/UL (ref 150–400)
PMV BLD AUTO: 9.5 FL (ref 8.9–12.9)
POTASSIUM SERPL-SCNC: 4.6 MMOL/L (ref 3.5–5.1)
RBC # BLD AUTO: 2.9 M/UL (ref 4.1–5.7)
SERVICE CMNT-IMP: ABNORMAL
SODIUM SERPL-SCNC: 133 MMOL/L (ref 136–145)
WBC # BLD AUTO: 8.2 K/UL (ref 4.1–11.1)

## 2020-04-10 PROCEDURE — 65270000015 HC RM PRIVATE ONCOLOGY

## 2020-04-10 PROCEDURE — 74011250637 HC RX REV CODE- 250/637: Performed by: PHYSICIAN ASSISTANT

## 2020-04-10 PROCEDURE — 74011250637 HC RX REV CODE- 250/637: Performed by: ORTHOPAEDIC SURGERY

## 2020-04-10 PROCEDURE — 74011250637 HC RX REV CODE- 250/637: Performed by: HOSPITALIST

## 2020-04-10 PROCEDURE — 85027 COMPLETE CBC AUTOMATED: CPT

## 2020-04-10 PROCEDURE — 74011250636 HC RX REV CODE- 250/636: Performed by: INTERNAL MEDICINE

## 2020-04-10 PROCEDURE — 36415 COLL VENOUS BLD VENIPUNCTURE: CPT

## 2020-04-10 PROCEDURE — 74011250636 HC RX REV CODE- 250/636: Performed by: PHYSICIAN ASSISTANT

## 2020-04-10 PROCEDURE — 82962 GLUCOSE BLOOD TEST: CPT

## 2020-04-10 PROCEDURE — 94760 N-INVAS EAR/PLS OXIMETRY 1: CPT

## 2020-04-10 PROCEDURE — 74011636637 HC RX REV CODE- 636/637: Performed by: PHYSICIAN ASSISTANT

## 2020-04-10 PROCEDURE — 74011250636 HC RX REV CODE- 250/636: Performed by: HOSPITALIST

## 2020-04-10 PROCEDURE — P9045 ALBUMIN (HUMAN), 5%, 250 ML: HCPCS | Performed by: HOSPITALIST

## 2020-04-10 PROCEDURE — 80048 BASIC METABOLIC PNL TOTAL CA: CPT

## 2020-04-10 RX ORDER — DIPHENHYDRAMINE HCL 12.5MG/5ML
12.5 ELIXIR ORAL
Status: DISCONTINUED | OUTPATIENT
Start: 2020-04-10 | End: 2020-04-10 | Stop reason: SDUPTHER

## 2020-04-10 RX ORDER — DIPHENHYDRAMINE HCL 12.5MG/5ML
12.5 LIQUID (ML) ORAL
Status: DISCONTINUED | OUTPATIENT
Start: 2020-04-10 | End: 2020-04-15 | Stop reason: HOSPADM

## 2020-04-10 RX ORDER — ALBUMIN HUMAN 50 G/1000ML
25 SOLUTION INTRAVENOUS ONCE
Status: COMPLETED | OUTPATIENT
Start: 2020-04-10 | End: 2020-04-10

## 2020-04-10 RX ORDER — MIDODRINE HYDROCHLORIDE 5 MG/1
10 TABLET ORAL
Status: DISCONTINUED | OUTPATIENT
Start: 2020-04-10 | End: 2020-04-12

## 2020-04-10 RX ADMIN — DEXAMETHASONE SODIUM PHOSPHATE 10 MG: 4 INJECTION, SOLUTION INTRAMUSCULAR; INTRAVENOUS at 09:11

## 2020-04-10 RX ADMIN — Medication 10 ML: at 22:00

## 2020-04-10 RX ADMIN — ALBUMIN (HUMAN) 25 G: 12.5 INJECTION, SOLUTION INTRAVENOUS at 06:29

## 2020-04-10 RX ADMIN — SODIUM CHLORIDE 250 ML: 900 INJECTION, SOLUTION INTRAVENOUS at 09:05

## 2020-04-10 RX ADMIN — ASPIRIN 81 MG: 81 TABLET ORAL at 09:04

## 2020-04-10 RX ADMIN — Medication 10 ML: at 15:19

## 2020-04-10 RX ADMIN — Medication 1 AMPULE: at 09:09

## 2020-04-10 RX ADMIN — INSULIN LISPRO 2 UNITS: 100 INJECTION, SOLUTION INTRAVENOUS; SUBCUTANEOUS at 12:43

## 2020-04-10 RX ADMIN — ACETAMINOPHEN 650 MG: 325 TABLET, FILM COATED ORAL at 05:02

## 2020-04-10 RX ADMIN — LEVOTHYROXINE SODIUM 200 MCG: 100 TABLET ORAL at 09:03

## 2020-04-10 RX ADMIN — MELATONIN 5 TABLET: at 09:03

## 2020-04-10 RX ADMIN — INSULIN LISPRO 3 UNITS: 100 INJECTION, SOLUTION INTRAVENOUS; SUBCUTANEOUS at 16:53

## 2020-04-10 RX ADMIN — Medication 10 ML: at 05:03

## 2020-04-10 RX ADMIN — ACETAMINOPHEN 650 MG: 325 TABLET, FILM COATED ORAL at 12:44

## 2020-04-10 RX ADMIN — MIDODRINE HYDROCHLORIDE 10 MG: 5 TABLET ORAL at 05:01

## 2020-04-10 RX ADMIN — SENNOSIDES AND DOCUSATE SODIUM 1 TABLET: 8.6; 5 TABLET ORAL at 09:04

## 2020-04-10 NOTE — PROGRESS NOTES
ORTHO - Progress Note Post Op day: 1 Day Post-Op Brittney Bragg     [id number]  male    78 y.o.    1940 Admit date: 2020 Date of Surgery: 2020 Procedures: Procedure(s):LEFT HIP HEMIARTHROPLASTY (ESSENTIAL) Admitting Physician: Yissel Cope MD  
Surgeon:  Yumiko Murillo) and Role:   Aba Rodriguez MD - Primary Consulting Physician(s): Treatment Team: Attending Provider: Sherri Blount MD; Surgeon: Lukasz Robertson MD; Consulting Provider: Reema Castro MD; Care Manager: Krishna Vigil; Utilization Review: Milla Hernandez 
 
SUBJECTIVE: 
  
Brittney Bragg is a 78 y.o. male s/p a LEFT HIP HEMIARTHROPLASTY (ESSENTIAL) resting in the bed. Patient has complaints of appropriate post-op pain, tolerating PO pain medications-- No recent pain meds. Denies F/C, nausea, vomiting, dizziness, lightheadedness, chest pain, or shortness of breath. OBJECTIVE: 
 
  
Physical Exam: 
General: alert, cooperative, no distress. Asking for his breakfast tray Gastrointestinal:  non-distended . Cardiovascular: trace pulses in the upper extremities,  Brisk cap refill in all distal extremities Genitourinary: anuric - dialysis Respiratory: No respiratory distress Musculoskeletal: bilat BKA Dressing/Wound:  Clean, dry and intact. No significant erythema or swelling. Vital Signs:  
  
  
Patient Vitals for the past 8 hrs: 
 BP Temp Pulse Resp SpO2  
04/10/20 0945 (!) 83/54 97.9 °F (36.6 °C) 80 18 99 % 04/10/20 0843 (!) 76/45  90 18 97 % 04/10/20 0758 (!) 80/47 98.3 °F (36.8 °C) 93 18 98 % 04/10/20 0736 (!) 75/39  91  100 % 04/10/20 0721 (!) 61/44 97.6 °F (36.4 °C) 73 20 100 % 04/10/20 0632 (!) 85/47  86    
04/10/20 0527 (!) 68/46  75    
04/10/20 0444 (!) 85/42      
04/10/20 0400 (!) 77/28      
04/10/20 0344 (!) 60/35 98.4 °F (36.9 °C) 77 18 100 % Temp (24hrs), Av.8 °F (36.6 °C), Min:97.2 °F (36.2 °C), Max:98.4 °F (36.9 °C) Labs:  
  
 
Recent Labs 04/10/20 
0424  04/08/20 
6764 HCT 31.4*   < >  --   
HGB 10.2*   < >  --   
INR  --   --  1.1  
 < > = values in this interval not displayed. PT/OT:  
 
  
  
  
ASSESSMENT / PLAN:  
Active Problems: 
  ESRD (end stage renal disease) on dialysis ([facility] (10/8/2014) [facility]) -  Continue PT/OT WBAT 
-  Sig hypotension this morning!!!!- getting bolus and albumin  
-  DVT prophylaxis- SCD w/ ASA 81BID 
-  DC planning - Return to [address] and rehab Signed By: Tino Han PA-C   
 
 
 
 
 
 Skin normal color for race, warm, dry and intact. No evidence of rash.

## 2020-04-10 NOTE — PROGRESS NOTES
Refusing oral meds. Annoyed when awakened VS stable  Dressing dry and intact. O2 100 %on room air. Hourly rounds done

## 2020-04-10 NOTE — PROGRESS NOTES
RAPID RESPONSE TEAM- Follow Up Follow up with patient d/t SBP still in 70s. Increased rate of 250cc NS and 250 cc albumin boluses. Spoke with Yonis Johnston MD at bedside. No new interventions at this time. Patient is in bed;NAD. Please call back if needed. Deneen Sprinkle RN Cosette Dakin

## 2020-04-10 NOTE — PROGRESS NOTES
RAPID RESPONSE TEAM-  
  
Rounded on patient due to call from RN regarding hypotension; SBP 60s. 250cc bolus ordered per protocol; and albumin bolus placed per MD. EF 51-55% on 4/9 Echo. Home midodrine held for procedure 4/9. Restarted this morning at 0500. Patient is in bed, alert, A&0 x3-4; NAD. Spoke with Zain Dominguez MD. No further interventions at this time. Will follow up and reassess. Please call with any further concerns. Mikey Patel

## 2020-04-10 NOTE — PROGRESS NOTES
Albumin IV started   Gave report to Dialysis nurse regarding BP and need to complete albumin when transferred to dialysis. Report of hypotension overnight given to dialysis

## 2020-04-10 NOTE — PROGRESS NOTES
Verbal shift change report given to [address] [facility] [address] (oncoming nurse) by Javi Duncan RN (offgoing nurse). Report included the following information SBAR, Kardex, ED Summary, OR Summary, Procedure Summary and Intake/Output.

## 2020-04-10 NOTE — PROGRESS NOTES
Attempted to see pt for 2nd time today. Pt still hypotensive with highest systolic of the day being 84 per nursing. Will continue to hold and follow up tomorrow for PT eval if appropriate.

## 2020-04-10 NOTE — ROUTINE PROCESS
Occupational Therapy Medical record reviewed and spoke with nursing who reports that pt's highest recent systolic pressure was 84. Pt continues with low BP and will be transferring off the floor due to his medical condition. Will defer again and follow up to initiate OT Evaluation as appropriate

## 2020-04-10 NOTE — PROGRESS NOTES
Chart reviewed, pt is very hypotensive and not appropriate for PT at this time. Will continue to follow.

## 2020-04-10 NOTE — PROGRESS NOTES
Patient is awake and conversing with nurse. Moving all extremities   Bp is low  Manual in right arm is 60/35 Will give 1 dose of midodrine now

## 2020-04-10 NOTE — PROGRESS NOTES
Hospitalist Progress Note NAME: Melina Cheema :  1940 MRN:  [id number] Assessment / Plan: 
Acute left hip fracture in the left neck femoral status post surgery yesterday has been doing well. End-stage renal disease cellulitis. Hypotension likely peripheral.  Patient also midodrine because patient has chronic hypotension. We will continue midodrine saline bolus. Will monitor patient blood pressure asymptomatic PAD s/p b/l BKA 
--has b/l prosthesis and uses wheelchair --continue pravastatin 
  
ESRD on HD TTS Chronic hyponatremia, Na stable 133 
-- seen by Nephrology Team . --On dialysis. 
  
Hypothyroid Hx myxedema coma  with acute encephalopathy on top of dementia 
--continue levothyroxine --check TSH 
--MRI  with severe atrophy 
  
Hx CVA 
--Stable occasional confusion 
-- Continue aspirin. 
  
Hx DM with hypoglycemia 
--no longer on insulin. Has glucagon prn 
--put on low dose SSI. Check A1c 
 Anemia of chronic kidney disease  hgb stable 
 
/ Body mass index is 27.65 kg/m². Code status: full Prophylaxis: heparin sc Recommended Disposition: home Subjective: Chief Complaint / Reason for Physician Visit Discussed with RN events overnight. Patient seen and examined at bedside comfortable . Had episode of hypotension resumed midodrine, iv fluid bolus . Rapid response team came by . Patient asymptomatic awake responding to verbal command. Review of Systems: 
Symptom Y/N Comments  Symptom Y/N Comments Fever/Chills n   Chest Pain n   
Poor Appetite n   Edema Cough n   Abdominal Pain Sputum n   Joint Pain SOB/SOLANO n   Pruritis/Rash Nausea/vomit n   Tolerating PT/OT Diarrhea n   Tolerating Diet Constipation    Other Could NOT obtain due to:   
 
Objective: VITALS:  
Last 24hrs VS reviewed since prior progress note. Most recent are: 
Patient Vitals for the past 24 hrs: 
 Temp Pulse Resp BP SpO2 04/10/20 1137 97.9 °F (36.6 °C) 89 18 (!) 80/52 98 % 04/10/20 0945 97.9 °F (36.6 °C) 80 18 (!) 83/54 99 % 04/10/20 0843  90 18 (!) 76/45 97 % 04/10/20 0758 98.3 °F (36.8 °C) 93 18 (!) 80/47 98 % 04/10/20 0736  91  (!) 75/39 100 % 04/10/20 0721 97.6 °F (36.4 °C) 73 20 (!) 61/44 100 % 04/10/20 0632  86  (!) 85/47   
04/10/20 0527  75  (!) 68/46   
04/10/20 0444    (!) 85/42   
04/10/20 0400    (!) 77/28   
04/10/20 0344 98.4 °F (36.9 °C) 77 18 (!) 60/35 100 % 04/09/20 2315 97.7 °F (36.5 °C) 87 18 90/41 100 % 04/09/20 2140     100 % 04/09/20 1856 97.6 °F (36.4 °C) 88 18 97/57 96 % 04/09/20 1521 98.2 °F (36.8 °C) 87 16 112/66 98 % No intake or output data in the 24 hours ending 04/10/20 1433 PHYSICAL EXAM: 
: WD, WN. Alert, cooperative, no acute distress   
EENT:  EOMI. Anicteric sclerae. MMM Resp:  CTA bilaterally, no wheezing or rales. No accessory muscle use CV:  Regular  rhythm,  No edema GI:  Soft, Non distended, Non tender.  +Bowel sounds Neurologic:  Alert and oriented X 3, normal speech, Psych:   Good insight. Not anxious nor agitated Skin:  B/L LE BKA Reviewed most current lab test results and cultures  YES Reviewed most current radiology test results   YES Review and summation of old records today    NO Reviewed patient's current orders and MAR    YES 
PMH/SH reviewed - no change compared to H&P 
________________________________________________________________________ Care Plan discussed with: 
  Comments Patient Family RN Care Manager Consultant Multidiciplinary team rounds were held today with , nursing, pharmacist and clinical coordinator. Patient's plan of care was discussed; medications were reviewed and discharge planning was addressed. ________________________________________________________________________ Comments >50% of visit spent in counseling and coordination of care    
________________________________________________________________________ Zachary Wade MD  
 
Procedures: see electronic medical records for all procedures/Xrays and details which were not copied into this note but were reviewed prior to creation of Plan. LABS: 
I reviewed today's most current labs and imaging studies. Pertinent labs include: 
Recent Labs 04/10/20 
0424 04/09/20 
0425 04/08/20 
0825 WBC 8.2 4.6 4.7 HGB 10.2* 11.9* 11.9*  
HCT 31.4* 37.0 37.2 * 135* 140* Recent Labs 04/10/20 
0424 04/09/20 
0425 04/08/20 
0852 04/08/20 0825 * 135*  --  133* K 4.6 4.5  --  4.7 CL 99 98  --  97  
CO2 24 29  --  29 * 124*  --  167* BUN 34* 24*  --  35* CREA 5.90* 4.48*  --  5.53* CA 7.9* 8.5  --  8.4* MG  --   --   --  1.9 PHOS  --   --   --  3.5 ALB  --   --   --  2.8* TBILI  --   --   --  0.6 SGOT  --   --   --  17 ALT  --   --   --  17 INR  --   --  1.1  --   
 
 
Signed: Zachary Wade MD

## 2020-04-10 NOTE — PROGRESS NOTES
NAME: Steffanie Flaherty :  1940 MRN:  [id number] Assessment :    Plan: 
--eskd Mild hyponatremia Acute left femoral neck fracture PAD s/p bilateral bka Hypotension, borderline --TTS HD at [facility]; last HD on Wednesday (preop); hold HD for today (very low BP) - HD tomorrow to get back onto TTS schedule  
  
On Midodrine and getting Albumin 
  
hgb 11.9 to 10.2 after hip surgery (retacrit order cancelled b/c it is not per protocol) Subjective: Chief Complaint:  Sleepy, but arousable. Review of Systems: 
 
Symptom Y/N Comments  Symptom Y/N Comments Fever/Chills    Chest Pain Poor Appetite    Edema Cough    Abdominal Pain Sputum    Joint Pain SOB/SOLANO    Pruritis/Rash Nausea/vomit    Tolerating PT/OT Diarrhea    Tolerating Diet Constipation    Other Could not obtain due to:   
 
Objective: VITALS:  
Last 24hrs VS reviewed since prior progress note. Most recent are: 
Visit Vitals BP (!) 61/44 Pulse 73 Temp 97.6 °F (36.4 °C) Resp 20 Wt 77.7 kg (171 lb 4.8 oz) SpO2 100% BMI 27.65 kg/m² Intake/Output Summary (Last 24 hours) at 4/10/2020 7220 Last data filed at 2020 5115 Gross per 24 hour Intake 350 ml Output 250 ml Net 100 ml Telemetry Reviewed: PHYSICAL EXAM: 
General: NAD No edema 
cta adilson Lab Data Reviewed: (see below) Medications Reviewed: (see below) PMH/SH reviewed - no change compared to H&P 
________________________________________________________________________ Care Plan discussed with: 
Patient y Family RN Care Manager Consultant:     
 
  Comments >50% of visit spent in counseling and coordination of care    
 
________________________________________________________________________ Melvin Nascimento MD  
 
 Procedures: see electronic medical records for all procedures/Xrays and details which 
were not copied into this note but were reviewed prior to creation of Plan. LABS: 
Recent Labs 04/10/20 
0424 04/09/20 
0425 WBC 8.2 4.6 HGB 10.2* 11.9*  
HCT 31.4* 37.0 * 135* Recent Labs 04/10/20 
0424 04/09/20 
0425 04/08/20 
0825 * 135* 133* K 4.6 4.5 4.7 CL 99 98 97 CO2 24 29 29 BUN 34* 24* 35* CREA 5.90* 4.48* 5.53* * 124* 167* CA 7.9* 8.5 8.4* MG  --   --  1.9 PHOS  --   --  3.5 Recent Labs 04/08/20 
0825 SGOT 17  
* TP 7.5 ALB 2.8*  
GLOB 4.7* Recent Labs 04/08/20 
9355 INR 1.1 PTP 11.7* No results for input(s): FE, TIBC, PSAT, FERR in the last 72 hours. Lab Results Component Value Date/Time Folate 12.7 04/13/2011 03:00 AM  
  
No results for input(s): PH, PCO2, PO2 in the last 72 hours. No results for input(s): CPK, CKMB in the last 72 hours. No lab exists for component: TROPONINI No components found for: [place] Lab Results Component Value Date/Time Color DARK YELLOW 09/27/2017 12:21 PM  
 Appearance TURBID (A) 09/27/2017 12:21 PM  
 Specific gravity 1.018 09/27/2017 12:21 PM  
 Specific gravity 1.010 05/27/2017 04:33 PM  
 pH (UA) 5.5 09/27/2017 12:21 PM  
 Protein 100 (A) 09/27/2017 12:21 PM  
 Glucose 250 (A) 09/27/2017 12:21 PM  
 Ketone TRACE (A) 09/27/2017 12:21 PM  
 Bilirubin NEGATIVE  05/27/2017 04:33 PM  
 Urobilinogen 1.0 09/27/2017 12:21 PM  
 Nitrites NEGATIVE  09/27/2017 12:21 PM  
 Leukocyte Esterase LARGE (A) 09/27/2017 12:21 PM  
 Epithelial cells MODERATE (A) 09/27/2017 12:21 PM  
 Bacteria 3+ (A) 09/27/2017 12:21 PM  
 WBC >100 (H) 09/27/2017 12:21 PM  
 RBC >100 (H) 09/27/2017 12:21 PM  
 
 
MEDICATIONS: 
Current Facility-Administered Medications Medication Dose Route Frequency  midodrine (PROAMATINE) tablet 10 mg  10 mg Oral Q TU, TH & SAT  
  sodium chloride 0.9 % bolus infusion 250 mL  250 mL IntraVENous ONCE PRN  
 sodium chloride (NS) flush 5-40 mL  5-40 mL IntraVENous Q8H  
 sodium chloride (NS) flush 5-40 mL  5-40 mL IntraVENous PRN  
 oxyCODONE IR (ROXICODONE) tablet 2.5 mg  2.5 mg Oral Q3H PRN  
 oxyCODONE IR (ROXICODONE) tablet 5 mg  5 mg Oral Q3H PRN  
 naloxone (NARCAN) injection 0.4 mg  0.4 mg IntraVENous PRN  
 ondansetron (ZOFRAN) injection 4 mg  4 mg IntraVENous Q4H PRN  
 dexamethasone (DECADRON) 4 mg/mL injection 10 mg  10 mg IntraVENous ONCE  
 [START ON 4/11/2020] ondansetron (ZOFRAN ODT) tablet 4 mg  4 mg Oral Q6H PRN  
 senna-docusate (PERICOLACE) 8.6-50 mg per tablet 1 Tab  1 Tab Oral BID  [START ON 4/11/2020] bisacodyL (DULCOLAX) suppository 10 mg  10 mg Rectal DAILY PRN  
 morphine injection 2 mg  2 mg IntraVENous Q3H PRN  
 aspirin delayed-release tablet 81 mg  81 mg Oral BID  alcohol 62% (NOZIN) nasal  1 Ampule  1 Ampule Topical Q12H  diphenhydrAMINE (BENADRYL) 12.5 mg/5 mL syrup 12.5 mg  12.5 mg Oral Q6H PRN  
 0.9% sodium chloride infusion  25 mL/hr IntraVENous CONTINUOUS  cholecalciferol (VITAMIN D3) (1000 Units /25 mcg) tablet 5 Tab  5,000 Units Oral DAILY  levothyroxine (SYNTHROID) tablet 200 mcg  200 mcg Oral ACB  pravastatin (PRAVACHOL) tablet 20 mg  20 mg Oral QHS  insulin lispro (HUMALOG) injection   SubCUTAneous AC&HS  
 glucose chewable tablet 16 g  4 Tab Oral PRN  
 dextrose (D50W) injection syrg 12.5-25 g  12.5-25 g IntraVENous PRN  
 glucagon (GLUCAGEN) injection 1 mg  1 mg IntraMUSCular PRN  
 acetaminophen (TYLENOL) tablet 650 mg  650 mg Oral Q6H  
 naloxone (NARCAN) injection 0.4 mg  0.4 mg IntraVENous PRN  
 HYDROmorphone (PF) (DILAUDID) injection 0.5-1 mg  0.5-1 mg IntraVENous Q2H PRN

## 2020-04-10 NOTE — PROGRESS NOTES
Bedside and Verbal shift change report given to Wolfgang Pearce (oncoming nurse) by Brandy Alpers (offgoing nurse). Report included the following information SBAR, Kardex, Intake/Output, MAR and Recent Results.

## 2020-04-10 NOTE — PROGRESS NOTES
Occupational Therapy Orders received and medical record reviewed. Pt is not appropriate to initiate OT Evaluation at this time. His recent BP reading is 71/51, nursing requests to hold at this time. Will continue to follow as appropriate.

## 2020-04-10 NOTE — PROGRESS NOTES
Oncology End of Shift Note Bedside shift change report given to Mando Amin RN (incoming nurse) by Juanita Kirkland RN (outgoing nurse) on [facility]. Report included the following information SBAR. Shift Summary: meds,meals, pain control Issues for Physician to Address:    
 
Patient on Cardiac Monitoring? [] Yes 
[x] No 
 
Rhythm:   
 
 
 
Shift Juanita Kirkland RN

## 2020-04-10 NOTE — PROGRESS NOTES
Patient is awake and conversing with nurse. Moving all extremities   Bp is low  Manual in right arm is 60/35

## 2020-04-11 LAB
ALBUMIN SERPL-MCNC: 2.9 G/DL (ref 3.5–5)
ANION GAP SERPL CALC-SCNC: 11 MMOL/L (ref 5–15)
BUN SERPL-MCNC: 45 MG/DL (ref 6–20)
BUN/CREAT SERPL: 6 (ref 12–20)
CALCIUM SERPL-MCNC: 8.1 MG/DL (ref 8.5–10.1)
CHLORIDE SERPL-SCNC: 100 MMOL/L (ref 97–108)
CO2 SERPL-SCNC: 22 MMOL/L (ref 21–32)
CREAT SERPL-MCNC: 7.23 MG/DL (ref 0.7–1.3)
ERYTHROCYTE [DISTWIDTH] IN BLOOD BY AUTOMATED COUNT: 13.8 % (ref 11.5–14.5)
GLUCOSE BLD STRIP.AUTO-MCNC: 148 MG/DL (ref 65–100)
GLUCOSE BLD STRIP.AUTO-MCNC: 200 MG/DL (ref 65–100)
GLUCOSE BLD STRIP.AUTO-MCNC: 332 MG/DL (ref 65–100)
GLUCOSE SERPL-MCNC: 135 MG/DL (ref 65–100)
HCT VFR BLD AUTO: 31.2 % (ref 36.6–50.3)
HGB BLD-MCNC: 10 G/DL (ref 12.1–17)
MCH RBC QN AUTO: 34.5 PG (ref 26–34)
MCHC RBC AUTO-ENTMCNC: 32.1 G/DL (ref 30–36.5)
MCV RBC AUTO: 107.6 FL (ref 80–99)
NRBC # BLD: 0 K/UL (ref 0–0.01)
NRBC BLD-RTO: 0 PER 100 WBC
PHOSPHATE SERPL-MCNC: 5.3 MG/DL (ref 2.6–4.7)
PLATELET # BLD AUTO: 115 K/UL (ref 150–400)
PMV BLD AUTO: 10.2 FL (ref 8.9–12.9)
POTASSIUM SERPL-SCNC: 5 MMOL/L (ref 3.5–5.1)
RBC # BLD AUTO: 2.9 M/UL (ref 4.1–5.7)
SERVICE CMNT-IMP: ABNORMAL
SODIUM SERPL-SCNC: 133 MMOL/L (ref 136–145)
WBC # BLD AUTO: 7.9 K/UL (ref 4.1–11.1)

## 2020-04-11 PROCEDURE — 36415 COLL VENOUS BLD VENIPUNCTURE: CPT

## 2020-04-11 PROCEDURE — P9047 ALBUMIN (HUMAN), 25%, 50ML: HCPCS | Performed by: INTERNAL MEDICINE

## 2020-04-11 PROCEDURE — 97165 OT EVAL LOW COMPLEX 30 MIN: CPT | Performed by: OCCUPATIONAL THERAPIST

## 2020-04-11 PROCEDURE — 74011250637 HC RX REV CODE- 250/637: Performed by: ORTHOPAEDIC SURGERY

## 2020-04-11 PROCEDURE — 74011250636 HC RX REV CODE- 250/636: Performed by: INTERNAL MEDICINE

## 2020-04-11 PROCEDURE — 85027 COMPLETE CBC AUTOMATED: CPT

## 2020-04-11 PROCEDURE — 5A1D70Z PERFORMANCE OF URINARY FILTRATION, INTERMITTENT, LESS THAN 6 HOURS PER DAY: ICD-10-PCS | Performed by: INTERNAL MEDICINE

## 2020-04-11 PROCEDURE — 74011250637 HC RX REV CODE- 250/637: Performed by: PHYSICIAN ASSISTANT

## 2020-04-11 PROCEDURE — 65270000015 HC RM PRIVATE ONCOLOGY

## 2020-04-11 PROCEDURE — 94760 N-INVAS EAR/PLS OXIMETRY 1: CPT

## 2020-04-11 PROCEDURE — 74011250637 HC RX REV CODE- 250/637: Performed by: HOSPITALIST

## 2020-04-11 PROCEDURE — 90935 HEMODIALYSIS ONE EVALUATION: CPT

## 2020-04-11 PROCEDURE — 84100 ASSAY OF PHOSPHORUS: CPT

## 2020-04-11 PROCEDURE — 97161 PT EVAL LOW COMPLEX 20 MIN: CPT

## 2020-04-11 PROCEDURE — 97530 THERAPEUTIC ACTIVITIES: CPT

## 2020-04-11 PROCEDURE — 82962 GLUCOSE BLOOD TEST: CPT

## 2020-04-11 PROCEDURE — 80048 BASIC METABOLIC PNL TOTAL CA: CPT

## 2020-04-11 PROCEDURE — 82040 ASSAY OF SERUM ALBUMIN: CPT

## 2020-04-11 PROCEDURE — 74011636637 HC RX REV CODE- 636/637: Performed by: PHYSICIAN ASSISTANT

## 2020-04-11 RX ORDER — ALBUMIN HUMAN 250 G/1000ML
12.5 SOLUTION INTRAVENOUS ONCE
Status: COMPLETED | OUTPATIENT
Start: 2020-04-11 | End: 2020-04-11

## 2020-04-11 RX ADMIN — Medication 10 ML: at 22:00

## 2020-04-11 RX ADMIN — SENNOSIDES AND DOCUSATE SODIUM 1 TABLET: 8.6; 5 TABLET ORAL at 17:59

## 2020-04-11 RX ADMIN — INSULIN LISPRO 2 UNITS: 100 INJECTION, SOLUTION INTRAVENOUS; SUBCUTANEOUS at 21:33

## 2020-04-11 RX ADMIN — PRAVASTATIN SODIUM 20 MG: 10 TABLET ORAL at 21:32

## 2020-04-11 RX ADMIN — ASPIRIN 81 MG: 81 TABLET ORAL at 17:59

## 2020-04-11 RX ADMIN — MIDODRINE HYDROCHLORIDE 10 MG: 5 TABLET ORAL at 09:30

## 2020-04-11 RX ADMIN — MELATONIN 5 TABLET: at 17:58

## 2020-04-11 RX ADMIN — ALBUMIN (HUMAN) 12.5 G: 0.25 INJECTION, SOLUTION INTRAVENOUS at 13:09

## 2020-04-11 RX ADMIN — INSULIN LISPRO 2 UNITS: 100 INJECTION, SOLUTION INTRAVENOUS; SUBCUTANEOUS at 18:01

## 2020-04-11 RX ADMIN — LEVOTHYROXINE SODIUM 200 MCG: 100 TABLET ORAL at 17:58

## 2020-04-11 RX ADMIN — ALBUMIN (HUMAN) 12.5 G: 0.25 INJECTION, SOLUTION INTRAVENOUS at 11:15

## 2020-04-11 RX ADMIN — ACETAMINOPHEN 650 MG: 325 TABLET, FILM COATED ORAL at 17:59

## 2020-04-11 RX ADMIN — Medication 10 ML: at 06:33

## 2020-04-11 NOTE — PROGRESS NOTES
RAPID RESPONSE TEAM- Follow Up Rounded on patient due to recent transfer. Encouraged RN to notify Hospitalist of low BP as patient is due to dialysis tomorrow Patient is in bed, alert, NAD. No RRT interventions indicated at this time. Please call back if needed. Black Madrigal, RN Salazar Argueta Vitals w/ MEWS Score (last day) Date/Time MEWS Score Pulse Resp Temp BP Level of Consciousness SpO2  
 04/10/20 2007  2  77  18  97.2 °F (36.2 °C)  (!) 84/42  Alert  100 % 04/10/20 1605    81  18  97 °F (36.1 °C)  (!) 85/44    100 % 04/10/20 1300  2  86  18  98 °F (36.7 °C)  (!) 84/54  Alert  97 % 04/10/20 1137  3  89  18  97.9 °F (36.6 °C)  (!) 80/52  Alert  98 % 04/10/20 0945  2  80  18  97.9 °F (36.6 °C)  (!) 83/54  Alert  99 % 04/10/20 0843    90  18    (!) 76/45  Alert  97 % 04/10/20 0758  3  93  18  98.3 °F (36.8 °C)  (!) 80/47  Alert  98 % 04/10/20 0736    91      (!) 75/39  Alert  100 % 04/10/20 0721  4  73  20  97.6 °F (36.4 °C)  (!) 61/44  Alert  100 % 04/10/20 0632    86      (!) 85/47      
 04/10/20 0527    75      (!) 68/46      
 04/10/20 0444          (!) 85/42      
 04/10/20 0400          (!) 77/28      
 04/10/20 0344  4  77  18  98.4 °F (36.9 °C)  (!) 60/35  Alert  100 % 04/09/20 2315  2  87  18  97.7 °F (36.5 °C)  90/41  Alert  100 % 04/09/20 2140              100 % 04/09/20 1856  2  88  18  97.6 °F (36.4 °C)  97/57  Alert  96 % 04/09/20 1521  1  87  16  98.2 °F (36.8 °C)  112/66  Alert  98 % 04/09/20 1405  1  88  16  98 °F (36.7 °C)  112/80  Alert  97 % 04/09/20 1248  1  87  16  97.8 °F (36.6 °C)  111/82  Alert  100 % 04/09/20 1138  1  84  16  97.2 °F (36.2 °C)  119/75  Alert  99 % 04/09/20 1115    98  16  97.5 °F (36.4 °C)  106/57    97 % 04/09/20 1100    99  19    108/61    97 % 04/09/20 1045    96  27    118/61    96 % 04/09/20 1015    (!) 101  13    127/65    95 % 04/09/20 1000    95  12    131/66    98 % 04/09/20 0945    98  24    137/77    95 % 04/09/20 0930    99  10    122/73    95 % 04/09/20 0925    100  9    110/76    90 % 04/09/20 0920    (!) 103  11    117/65    94 % 04/09/20 0915    (!) 106  11    125/69    95 % 04/09/20 0914    (!) 107  10  97.6 °F (36.4 °C)  126/68    95 % 04/09/20 0913          126/68      
 04/09/20 0634    91  12  97.9 °F (36.6 °C)  103/67    98 % 04/09/20 0348  1  83  18  97.3 °F (36.3 °C)  111/64  Alert  100 %

## 2020-04-11 NOTE — ROUTINE PROCESS
Notified by nursing of persistent hypotension, asymptomatic and chronic. Pt has been consistently at/near current BP all day.  
- con't to monitor, mgmt per primary team

## 2020-04-11 NOTE — PROGRESS NOTES
Bedside shift change report given to Jillian Bello (oncoming nurse) by Layla Obrien (offgoing nurse). Report included the following information SBAR, Kardex and MAR.

## 2020-04-11 NOTE — PROGRESS NOTES
NAME: Compa Getting :  1940 MRN:  [id number] Assessment :    Plan: 
--eskd Mild hyponatremia Acute left femoral neck fracture PAD s/p bilateral bka Hypotension, borderline --TTS HD at [facility]; last HD on Wednesday (preop); HD today 
  
On Midodrine and getting Albumin 
  
hgb 11.9 to 10.2 to 10 after hip surgery Am labs Will see again on Monday. Call us if needed tomorrow Subjective: Chief Complaint:  Seen on HD at 12:30pm. BP low. Changed K bath to 2K. Recommended  IV albumin Review of Systems: 
 
Symptom Y/N Comments  Symptom Y/N Comments Fever/Chills    Chest Pain Poor Appetite    Edema Cough    Abdominal Pain Sputum    Joint Pain SOB/SOLANO    Pruritis/Rash Nausea/vomit    Tolerating PT/OT Diarrhea    Tolerating Diet Constipation    Other Could not obtain due to:   
 
Objective: VITALS:  
Last 24hrs VS reviewed since prior progress note. Most recent are: 
Visit Vitals BP (!) 87/50 Pulse 91 Temp 98.4 °F (36.9 °C) (Axillary) Resp 16 Wt 73.5 kg (162 lb 0.6 oz) SpO2 98% BMI 26.15 kg/m² Intake/Output Summary (Last 24 hours) at 2020 1453 Last data filed at 2020 1400 Gross per 24 hour Intake 1970 ml Output 300 ml Net 1670 ml Telemetry Reviewed: PHYSICAL EXAM: 
General: NAD No edema 
cta adilson Lab Data Reviewed: (see below) Medications Reviewed: (see below) PMH/SH reviewed - no change compared to H&P 
________________________________________________________________________ Care Plan discussed with: 
Patient y Family HD RN y   
Care Manager Consultant:     
 
  Comments >50% of visit spent in counseling and coordination of care    
 
________________________________________________________________________ Bruna Knight MD  
 
 Procedures: see electronic medical records for all procedures/Xrays and details which 
were not copied into this note but were reviewed prior to creation of Plan. LABS: 
Recent Labs 04/11/20 
1012 04/10/20 
0424 WBC 7.9 8.2 HGB 10.0* 10.2* HCT 31.2* 31.4*  
* 132* Recent Labs 04/11/20 
0977 04/10/20 
0424 04/09/20 
0425 * 133* 135*  
K 5.0 4.6 4.5  
 99 98  
CO2 22 24 29 BUN 45* 34* 24* CREA 7.23* 5.90* 4.48* * 128* 124* CA 8.1* 7.9* 8.5 PHOS 5.3*  --   --   
 
Recent Labs 04/11/20 
0978 ALB 2.9* No results for input(s): INR, PTP, APTT, INREXT, INREXT in the last 72 hours. No results for input(s): FE, TIBC, PSAT, FERR in the last 72 hours. Lab Results Component Value Date/Time Folate 12.7 04/13/2011 03:00 AM  
  
No results for input(s): PH, PCO2, PO2 in the last 72 hours. No results for input(s): CPK, CKMB in the last 72 hours. No lab exists for component: TROPONINI No components found for: [place] Lab Results Component Value Date/Time Color DARK YELLOW 09/27/2017 12:21 PM  
 Appearance TURBID (A) 09/27/2017 12:21 PM  
 Specific gravity 1.018 09/27/2017 12:21 PM  
 Specific gravity 1.010 05/27/2017 04:33 PM  
 pH (UA) 5.5 09/27/2017 12:21 PM  
 Protein 100 (A) 09/27/2017 12:21 PM  
 Glucose 250 (A) 09/27/2017 12:21 PM  
 Ketone TRACE (A) 09/27/2017 12:21 PM  
 Bilirubin NEGATIVE  05/27/2017 04:33 PM  
 Urobilinogen 1.0 09/27/2017 12:21 PM  
 Nitrites NEGATIVE  09/27/2017 12:21 PM  
 Leukocyte Esterase LARGE (A) 09/27/2017 12:21 PM  
 Epithelial cells MODERATE (A) 09/27/2017 12:21 PM  
 Bacteria 3+ (A) 09/27/2017 12:21 PM  
 WBC >100 (H) 09/27/2017 12:21 PM  
 RBC >100 (H) 09/27/2017 12:21 PM  
 
 
MEDICATIONS: 
Current Facility-Administered Medications Medication Dose Route Frequency  midodrine (PROAMATINE) tablet 10 mg  10 mg Oral Q TU, TH & SAT  diphenhydrAMINE (BENADRYL) 12.5 mg/5 mL syrup 12.5 mg  12.5 mg Oral Q6H PRN  
 sodium chloride (NS) flush 5-40 mL  5-40 mL IntraVENous Q8H  
 sodium chloride (NS) flush 5-40 mL  5-40 mL IntraVENous PRN  
 oxyCODONE IR (ROXICODONE) tablet 2.5 mg  2.5 mg Oral Q3H PRN  
 naloxone (NARCAN) injection 0.4 mg  0.4 mg IntraVENous PRN  
 ondansetron (ZOFRAN ODT) tablet 4 mg  4 mg Oral Q6H PRN  
 senna-docusate (PERICOLACE) 8.6-50 mg per tablet 1 Tab  1 Tab Oral BID  
 bisacodyL (DULCOLAX) suppository 10 mg  10 mg Rectal DAILY PRN  
 aspirin delayed-release tablet 81 mg  81 mg Oral BID  alcohol 62% (NOZIN) nasal  1 Ampule  1 Ampule Topical Q12H  cholecalciferol (VITAMIN D3) (1000 Units /25 mcg) tablet 5 Tab  5,000 Units Oral DAILY  levothyroxine (SYNTHROID) tablet 200 mcg  200 mcg Oral ACB  pravastatin (PRAVACHOL) tablet 20 mg  20 mg Oral QHS  insulin lispro (HUMALOG) injection   SubCUTAneous AC&HS  
 glucose chewable tablet 16 g  4 Tab Oral PRN  
 dextrose (D50W) injection syrg 12.5-25 g  12.5-25 g IntraVENous PRN  
 glucagon (GLUCAGEN) injection 1 mg  1 mg IntraMUSCular PRN  
 acetaminophen (TYLENOL) tablet 650 mg  650 mg Oral Q6H  
 naloxone (NARCAN) injection 0.4 mg  0.4 mg IntraVENous PRN

## 2020-04-11 NOTE — PROGRESS NOTES
Problem: Hip Fracture:Day of Admission Pre-op Goal: *Hemodynamically stable Outcome: Not Progressing Towards Goal

## 2020-04-11 NOTE — PROGRESS NOTES
Hospitalist Progress Note NAME: Lilly Cabral :  1940 MRN:  [id number] I reviewed pertinent labs and imaging, and discussed /agreed on the plan of care with Dr. Lawrence Orellana. Assessment / Plan: 
Acute left Hip Fracture in the left neck femoral status post left hip hemiarthroplasty 1. Denies pain at this time End-stage renal disease cellulitis Hypotension likely peripheral 
      1. Continue midodrine 10 mg as scheduled 2. Continue to monitor 3. Stable at this time PAS s/p Bilateral BKA 1. Has bilateral prosthesis and uses a wheel chair 2. Continue Pravastatin as scheduled ESRD on Hemodialysis 1. Left AV Fistula + Bruit, + Thrill 2. Hemodialysis Tues, Thurs, Sat Chronic Hyponatremia 1. Stable at 133 on 20 2. Per RN refused labs this am/ Lab to retry at a later time Hypothyroidism HX myxedema coma  with acute encephalopathy 1. Continue levothyroxine 200 mcg daily 2. TSH 8.3 on 20 3. MRI 19 with severe atrophy History of CVA 1. Continue Aspirin 2. Stable History of Type 2 Diabetes with hypoglycemia 1. Continue sliding scale coverage 2. HgB A1C on 20 5.8 Chronic Anemia HgB stable at 10.0 20 Monitor AM labs pending at this time 25.0 - 29.9 Overweight / Body mass index is 26.15 kg/m². Code status: Full Prophylaxis: Hep SQ Recommended Disposition: Home w/Family Subjective: Chief Complaint / Reason for Physician Visit Patient resting comfortably at this time. He denies pain. Left AV Fistula +Bruit, +Thrill. Blood pressure improved at this time. Appetite good. Discussed with RN events overnight. Review of Systems: 
Symptom Y/N Comments  Symptom Y/N Comments Fever/Chills n   Chest Pain n   
 Poor Appetite n   Edema n   
Cough n   Abdominal Pain n   
Sputum n   Joint Pain n   
SOB/SOLANO    Pruritis/Rash n   
Nausea/vomit n   Tolerating PT/OT Diarrhea n   Tolerating Diet y Constipation n   Other Could NOT obtain due to:   
 
Objective: VITALS:  
Last 24hrs VS reviewed since prior progress note. Most recent are: 
Patient Vitals for the past 24 hrs: 
 Temp Pulse Resp BP SpO2  
04/11/20 0855  79  104/55   
04/11/20 0752 97.3 °F (36.3 °C) 78 18 93/54 98 % 04/11/20 0441 97.9 °F (36.6 °C) 88 18 95/57 100 % 04/10/20 2349 97.7 °F (36.5 °C) 80 18 (!) 81/44 100 % 04/10/20 2007 97.2 °F (36.2 °C) 77 18 (!) 84/42 100 % 04/10/20 1605 97 °F (36.1 °C) 81 18 (!) 85/44 100 % 04/10/20 1300 98 °F (36.7 °C) 86 18 (!) 84/54 97 % 04/10/20 1137 97.9 °F (36.6 °C) 89 18 (!) 80/52 98 % 04/10/20 0945 97.9 °F (36.6 °C) 80 18 (!) 83/54 99 % Intake/Output Summary (Last 24 hours) at 4/11/2020 6500 Last data filed at 4/10/2020 2055 Gross per 24 hour Intake 1970 ml Output 0 ml Net 1970 ml PHYSICAL EXAM: 
General:  Alert, cooperative, no acute distress, bilateral BKA 
EENT:   Anicteric sclerae. normocephalic, Resp:  CTA bilaterally, no wheezing or rales. No accessory muscle use CV:  Regular  rhythm,  No edema GI:  Soft, Non distended, Non tender.  +Bowel sounds Neurologic:  Alert and oriented X 3, normal speech, Psych:   Good insight. Not anxious nor agitated Skin:  No rashes. No jaundice, Left AV fistula +Bruit, + Thrill Reviewed most current lab test results and cultures  YES Reviewed most current radiology test results   YES Review and summation of old records today    NO Reviewed patient's current orders and MAR    YES 
PMH/SH reviewed - no change compared to H&P 
________________________________________________________________________ Care Plan discussed with: 
  Comments Patient Y Family RN Carolina Moore Care Manager Consultant Multidiciplinary team rounds were held today with , nursing, pharmacist and clinical coordinator. Patient's plan of care was discussed; medications were reviewed and discharge planning was addressed. ________________________________________________________________________ Miki Navarro NP Procedures: see electronic medical records for all procedures/Xrays and details which were not copied into this note but were reviewed prior to creation of Plan. LABS: 
I reviewed today's most current labs and imaging studies. Pertinent labs include: 
Recent Labs 04/11/20 
8735 04/10/20 
0424 04/09/20 
0425 WBC 7.9 8.2 4.6 HGB 10.0* 10.2* 11.9*  
HCT 31.2* 31.4* 37.0 * 132* 135* Recent Labs 04/11/20 
6236 04/10/20 
0424 04/09/20 
0425 * 133* 135*  
K 5.0 4.6 4.5  
 99 98  
CO2 22 24 29 * 128* 124* BUN 45* 34* 24* CREA 7.23* 5.90* 4.48* CA 8.1* 7.9* 8.5 PHOS 5.3*  --   --   
ALB 2.9*  --   --   
 
 
Signed: Miki Navarro NP

## 2020-04-11 NOTE — PROGRESS NOTES
Problem: Mobility Impaired (Adult and Pediatric) Goal: *Acute Goals and Plan of Care (Insert Text) Description: FUNCTIONAL STATUS PRIOR TO ADMISSION: Pt unable to provide accurate or reliable prior level of function. Pt admitted from [address] [place] [address] at [facility]. Do not believe he was ambulatory even with bilateral LE prosthesis. HOME SUPPORT PRIOR TO ADMISSION: Pt admitted from [address] [place] [address] Physical Therapy Goals Initiated 4/11/2020 1. Patient will move from supine to sit and sit to supine , scoot up and down and roll side to side in bed with moderate assistance  within 7 day(s). 2.  Patient will tolerate seated EOB with min A x 10 minutes within 7 days(s). 3. Patient will perform active LE therex with verbal cues within 7 days 4. Patient will tolerate seated in chair x 1 hour (via jamila lift transfer) within 7 days Outcome: Progressing Towards Goal 
 PHYSICAL THERAPY EVALUATION Patient: Compa Sigala (62 y.o. male) Date: 4/11/2020 Primary Diagnosis: Fracture of femoral neck, left, closed ([facility] [address] [facility] ESRD (end stage renal disease) on dialysis ([facility] [address] [N18.6, Z99.2] Procedure(s) (LRB): LEFT HIP HEMIARTHROPLASTY (ESSENTIAL) (Left) 2 Days Post-Op Precautions:   Fall, WBAT(LLE, adilson BKA) ASSESSMENT Based on the objective data described below, the patient presents with confusion, decreased command following, generalized weakness, decreased functional mobility, hard of hearing, vision impaired, pain and resistance to any assistance provided from therapy s/p L hip hemiarthroplasty, POD #2. Pt received supine in bed and oriented x 2. Pt unable to provide reliable prior level of function. Pt required total assistance for bed mobility and resisted and swatted at therapist's hand when assistance was provided. Pt refused to allow therapist to mobilize LLE for PROM. Pt placed in chair position with all needs met. Recommend pt return to LTC facility. Current Level of Function Impacting Discharge (mobility/balance): total assist bed mobility Functional Outcome Measure: The patient scored Total: 10/100 on the Barthel Index which is indicative of 90% impaired ability to care for basic self needs/dependency on others. Patient will benefit from skilled therapy intervention to address the above noted impairments. PLAN : 
Recommendations and Planned Interventions: bed mobility training, transfer training, therapeutic exercises, orthotic/prosthetic training, edema management/control, and therapeutic activities Frequency/Duration: Patient will be followed by physical therapy:  5 times a week to address goals. Recommendation for discharge: (in order for the patient to meet his/her long term goals) Return to LTC 
IF patient discharges home will need the following DME: utilize jamila lift for any OOB mobility SUBJECTIVE:  
Patient stated [facility]. OBJECTIVE DATA SUMMARY:  
HISTORY:   
Past Medical History:  
Diagnosis Date Anemia CAD (coronary artery disease) Chronic kidney disease   
 tues/thurs/sat TREE [address]  
 Cognitive communication deficit Diabetes ([address] Dysarthria and anarthria Dysphagia Endocrine disease   
 hypothyroid Gastrointestinal disorder   
 gallstones Hx of BKA, left ([address] Hx of BKA, right ([address] Hypertension Muscle weakness (generalized) Stroke [facility]) 2011 speech general weakness Thyroid disease Past Surgical History:  
Procedure Laterality Date ABDOMEN SURGERY PROC UNLISTED    
 HX HEENT    
 cataract   removal  rt eye HX ORTHOPAEDIC    
 11/2014 R BK Amputation and L  
 HX OTHER SURGICAL HX VASCULAR ACCESS    
 L arm shunt VASCULAR SURGERY PROCEDURE UNLIST Fistula R arm Personal factors and/or comorbidities impacting plan of care: PMHX, baseline confusion Home Situation Home Environment: Long term care # Steps to Enter: 0 One/Two Story Residence: One story Living Alone: No 
Support Systems: Family member(s)(long term care staff) Patient Expects to be Discharged to[de-identified] (LTC) Current DME Used/Available at Home: Wheelchair EXAMINATION/PRESENTATION/DECISION MAKING:  
Critical Behavior: 
Neurologic State: Alert Orientation Level: Appropriate for age Cognition: Appropriate decision making, Impaired decision making Hearing: Auditory Auditory Impairment: (P) None Skin:   
Edema:  
Range Of Motion: 
AROM: Generally decreased, functional 
  
  
  
  
  
  
  
Strength:   
Strength: Generally decreased, functional 
  
  
  
   
Functional Mobility: 
Bed Mobility: 
  
Supine to Sit: Total assistance;Assist x2 Sit to Supine: Total assistance;Assist x2 Scooting: Total assistance;Assist x2 Transfers: 
Sit to Stand: (NT) 
  
     
  
     
  
  
Balance:  
Sitting: Impaired Standing: (NT) Ambulation/Gait Training: 
  
  
  
  
  
  
  
Left Side Weight Bearing: As tolerated Functional Measure: 
Barthel Index: 
 
Bathin Bladder: 0 Bowels: 0 Groomin Dressin Feedin Mobility: 0 Stairs: 0 Toilet Use: 0 Transfer (Bed to Chair and Back): 0 Total: 10/100 The Barthel ADL Index: Guidelines 1. The index should be used as a record of what a patient does, not as a record of what a patient could do. 2. The main aim is to establish degree of independence from any help, physical or verbal, however minor and for whatever reason. 3. The need for supervision renders the patient not independent. 4. A patient's performance should be established using the best available evidence. Asking the patient, friends/relatives and nurses are the usual sources, but direct observation and common sense are also important. However direct testing is not needed. 5. Usually the patient's performance over the preceding 24-48 hours is important, but occasionally longer periods will be relevant. 6. Middle categories imply that the patient supplies over 50 per cent of the effort. 7. Use of aids to be independent is allowed. Inez Yeager., Barthel, ELIZABETHW. ([id number]). Functional evaluation: the Barthel Index. [address] [facility] (14)2. SALOME Liu, Phuong Jones., Sarah Valladares., Jayant, [address] [facility] (1999). Measuring the change indisability after inpatient rehabilitation; comparison of the responsiveness of the Barthel Index and Functional [place] Measure. Journal of Neurology, Neurosurgery, and Psychiatry, 66(4), 676-985. SINGH MilesA, MCKENZIE Strauss, & Vianey Bradford M.A. (2004.) Assessment of post-stroke quality of life in cost-effectiveness studies: The usefulness of the Barthel Index and the EuroQoL-5D. [facility], 13, 438-12 Based on the above components, the patient evaluation is determined to be of the following complexity level: LOW Pain Rating: 
Pt c/o LLE pain, but did not quantify Activity Tolerance:  
Fair and Poor Please refer to the flowsheet for vital signs taken during this treatment. After treatment patient left in no apparent distress:  
Supine in bed, Call bell within reach, Bed / chair alarm activated, and Side rails x 3 
 
COMMUNICATION/EDUCATION:  
The patients plan of care was discussed with: Occupational therapist and Registered nurse. Patient is unable to participate in goal setting and plan of care. Thank you for this referral. 
Lyndon Clifford, PT, DPT Time Calculation: 14 mins

## 2020-04-11 NOTE — PROGRESS NOTES
OCCUPATIONAL THERAPY EVALUATION/DISCHARGE Patient: Majo Barton (22 y.o. male) Date: 4/11/2020 Primary Diagnosis: Fracture of femoral neck, left, closed ([facility] [address] Darshan Rolle ESRD (end stage renal disease) on dialysis ([facility] [address] [N18.6, Z99.2] Procedure(s) (LRB): LEFT HIP HEMIARTHROPLASTY (ESSENTIAL) (Left) 2 Days Post-Op Precautions:   Fall, WBAT(LLE, adilson BKA, anterior hemiarthroplasty) ASSESSMENT Based on the objective data described below, the patient presents with confusion, rambling speech that did not make sense at times and [facility] He was difficult to re-direct or to engage for command following. Pt stated that he was at \"[facility]\" but then asked where he was. He also thought that he had broken his right hip and stated that he did not want it \"fixed. \"  BUE are functional and pt may has slightly decreased vision. Upon arrival pt was self feeding and only needs SBA to set up due to mental status. Pt was able to manage HOB to upright position and when asked to sit up pt kept pushing the HOB up button. He was able to bring his right LE to EOB and move the LLE slightly to EOB. He was able to shift his shoulders over slightly and then decided that he did not want to sit up due to discomfort. Unable to convince pt to continue to mobilize and pt put RLE back on bed. With attempt to assist with chux for bed mobility but pt was swatting at therapist.  Terminated assisting pt due to this. He did allow therapist to scoot him up in bed supine with use of chux and was reaching up for head board to attempt to assist.   
 
He resides at [facility] for LTC and has been at this facility since last year. Per CM whom spoke with wife pt needs assist with all ADLs at baseline. Pt has a history of dementia as well and has bilateral BKA with prothesis. He indicates that he is wheelchair bound. Regardless pt is at his baseline for ADLs as he requires assist with all tasks. Recommend return to LTC without OT services. Current Level of Function (ADLs/self-care): total assist for attempt at bed mobility, unable to come to EOB (see above) ADL Assessment: 
Feeding: Setup;Stand-by assistance(confusion) Oral Facial Hygiene/Grooming: Setup;Stand-by assistance(confusion) Bathing: Maximum assistance; Total assistance Upper Body Dressing: Maximum assistance Lower Body Dressing: Total assistance Toileting: Total assistance Functional Outcome Measure: The patient scored 5/100 on the barthel outcome measure which is indicative of significant deficits with mobility and ADLS. Other factors to consider for discharge: from LTC and requires assist for all ADLs PLAN : 
Recommend with staff: chair position most of the day, ice to left hip, set up for grooming and feeding Recommendation for discharge: (in order for the patient to meet his/her long term goals) Back to LTC SUBJECTIVE:  
Patient stated Rhoda Leija broke my right hip. I didn't want it fixed. I am at [facility]? Where am I? \" OBJECTIVE DATA SUMMARY:  
HISTORY:  
Past Medical History:  
Diagnosis Date  Anemia  CAD (coronary artery disease)  Chronic kidney disease   
 tues/thurs/sat TREE [address]  
 Cognitive communication deficit  Diabetes ([address]  Dysarthria and anarthria  Dysphagia  Endocrine disease   
 hypothyroid  Gastrointestinal disorder   
 gallstones  Hx of BKA, left ([address]  Hx of BKA, right ([address]  Hypertension  Muscle weakness (generalized)  Stroke ([address] 2011 speech general weakness  Thyroid disease Past Surgical History:  
Procedure Laterality Date [address] [place] UNLISTED  HX HEENT    
 cataract   removal  rt eye  HX ORTHOPAEDIC    
 11/2014 R BK Amputation and L  
 HX OTHER SURGICAL    
 HX VASCULAR ACCESS    
 L arm shunt  VASCULAR SURGERY PROCEDURE UNLIST Fistula R arm Prior Level of Function/Environment/Context: LTC resident of [facility] since April of 2019, bilateral BKA with prosthesis, wheelchair bound, needs assist with all ADLS, uses wheelchair but unsure of transfer status or if pt is able to manage his own wheelchair, history of dementia, goes to dialysis tues/thurs/saturday Expanded or extensive additional review of patient history:  
Home Situation Home Environment: Long term care # Steps to Enter: 0 One/Two Story Residence: One story Living Alone: No 
Support Systems: Family member(s)(long term care staff) Patient Expects to be Discharged to[de-identified] (LTC) Current DME Used/Available at Home: Wheelchair Hand dominance: Right EXAMINATION OF PERFORMANCE DEFICITS: 
Cognitive/Behavioral Status: 
  
Orientation Level: Oriented to person;Disoriented to place; Disoriented to situation;Disoriented to time(\"I am at [facility]\" \"Where am I? \") Cognition: Decreased attention/concentration;Poor safety awareness;Decreased command following; Impaired decision making;Memory loss Perception: Appears intact Perseveration: Perseverates during conversation(Rambling conversation that did not aways make sense) Safety/Judgement: Lack of insight into deficits Hearing: Auditory Auditory Impairment: (P) None Vision/Perceptual:   
    
    
    
  
    
Acuity: (appears somewhat limited but functional) Range of Motion: 
 
AROM: Generally decreased, functional 
  
  
  
  
  
  
  
 
Strength: 
 
Strength: Generally decreased, functional 
  
  
  
  
 
Coordination: 
  
Fine Motor Skills-Upper: Left Intact; Right Intact Gross Motor Skills-Upper: Left Intact; Right Intact Balance: 
Sitting: Impaired Standing: (NT) Functional Mobility and Transfers for ADLs: 
Bed Mobility: 
Supine to Sit: Total assistance;Assist x2 Sit to Supine: Total assistance;Assist x2 Scooting: Total assistance;Assist x2 Transfers: 
Sit to Stand: (NT) 
 
ADL Assessment: Feeding: Setup;Stand-by assistance(confusion) Oral Facial Hygiene/Grooming: Setup;Stand-by assistance(confusion) Bathing: Maximum assistance; Total assistance Upper Body Dressing: Maximum assistance Lower Body Dressing: Total assistance Toileting: Total assistance ADL Intervention and task modifications: 
Able to self feed after set up with spillage at times Cognitive Retraining Safety/Judgement: Lack of insight into deficits Functional Measure: 
Barthel Index: 
 
Bathin Bladder: 0 Bowels: 0 Groomin Dressin Feedin Mobility: 0 Stairs: 0 Toilet Use: 0 Transfer (Bed to Chair and Back): 0 Total: 5/100 The Barthel ADL Index: Guidelines 1. The index should be used as a record of what a patient does, not as a record of what a patient could do. 2. The main aim is to establish degree of independence from any help, physical or verbal, however minor and for whatever reason. 3. The need for supervision renders the patient not independent. 4. A patient's performance should be established using the best available evidence. Asking the patient, friends/relatives and nurses are the usual sources, but direct observation and common sense are also important. However direct testing is not needed. 5. Usually the patient's performance over the preceding 24-48 hours is important, but occasionally longer periods will be relevant. 6. Middle categories imply that the patient supplies over 50 per cent of the effort. 7. Use of aids to be independent is allowed. José Luis Olivares., Barthel, D.W. ([id number]). Functional evaluation: the Barthel Index. [address] [facility] (14)2. SALOME Green, Chun Bose., Isela Rubio, Jayant, [address] [place] [address] Ave (). Measuring the change indisability after inpatient rehabilitation; comparison of the responsiveness of the Barthel Index and Functional [place] Measure. Journal of Neurology, Neurosurgery, and Psychiatry, 66(4), 942-481. VERONICA Hobbs, MCKENZIE Strauss, & Edna Mccord M.A. (2004.) Assessment of post-stroke quality of life in cost-effectiveness studies: The usefulness of the Barthel Index and the EuroQoL-5D. [facility], 13, 134-95 Occupational Therapy Evaluation Charge Determination History Examination Decision-Making MEDIUM Complexity : Expanded review of history including physical, cognitive and psychosocial  history  MEDIUM Complexity : 3-5 performance deficits relating to physical, cognitive , or psychosocial skils that result in activity limitations and / or participation restrictions LOW Complexity : No comorbidities that affect functional and no verbal or physical assistance needed to complete eval tasks Based on the above components, the patient evaluation is determined to be of the following complexity level: LOW Pain Rating: 
Unable to rate Activity Tolerance:  
Poor Please refer to the flowsheet for vital signs taken during this treatment. After treatment patient left in no apparent distress:   
in chair position in bed COMMUNICATION/EDUCATION:  
The patients plan of care was discussed with: Physical therapist.  
 
Thank you for this referral. 
Dia Ford OTR/L Time Calculation: 13 mins

## 2020-04-11 NOTE — PROGRESS NOTES
Oncology End of Shift Note Bedside shift change report given to  [address] [place] [address] (incoming nurse) by Staci Cooper (outgoing nurse) on [facility]. Report included the following information SBAR, Intake/Output and MAR. Shift Summary: contacted telehospitalist after speaking with rapid response who was rounding because of hypotension, instructed to just monitor because he is asymptomatic, refused all medications, bandage intact (no bleeding through), turned by turn team frequently, refused am labs twice (attempt to get in am), attempted to obtain at 0630am when given approval, unsuccessful in getting am labs, will need PICC team in am  
 
 
Issues for Physician to Address:  Continued hypotension Patient on Cardiac Monitoring? [] Yes 
[x] No 
 
Rhythm:   
 
 
 
Shift Events Staci Cooper

## 2020-04-11 NOTE — PROGRESS NOTES
ORTHO - Progress Note Post Op day: 2 Days Post-Op Sara Mosqueda     [id number]  male    78 y.o.    1940 Admit date: 2020 Date of Surgery: 2020 Procedures: Procedure(s):LEFT HIP HEMIARTHROPLASTY (ESSENTIAL) Admitting Physician: Hoa Bryan MD  
Surgeon:  Ahsan Mazariegos) and Role:   Rip Song MD - Primary Consulting Physician(s): Treatment Team: Attending Provider: Willy Ugarte MD; Surgeon: Kyra Sharp MD; Consulting Provider: Dell Hensley MD; Care Manager: Lily Urban; Utilization Review: Mikaela Berger 
 
SUBJECTIVE: 
  
Sara Mosqueda is a 78 y.o. male s/p a LEFT HIP HEMIARTHROPLASTY (ESSENTIAL) resting in the bed. Patient has complaints of minimal post-op pain, tolerating PO pain medications (no recent oxy). Denies F/C, nausea, vomiting, dizziness, lightheadedness, chest pain, or shortness of breath. OBJECTIVE: 
 
  
Physical Exam: 
General: alert, no distress. Gastrointestinal:  non-distended . Cardiovascular: faint palp pulses in the upper extremities bilat,  Brisk cap refill in all distal extremities Genitourinary: anuric - dialysis Respiratory: No respiratory distress  
 Musculoskeletal: bilat BKA Dressing/Wound:  Clean, dry and intact. No significant erythema or swelling. Vital Signs:  
  
  
Patient Vitals for the past 8 hrs: 
 BP Temp Temp src Pulse Resp SpO2  
20 1230 95/40   84 16   
20 1215 (!) 81/46   83 16   
20 1200 (!) 86/40   83 16   
20 1145 (!) 88/48   84 16   
20 1130 96/49   82 16   
20 1115 (!) 88/41   82 16   
20 1100 102/47   86 16   
20 1045 (!) 88/46   83 16   
20 1030 (!) 83/45   83 16   
20 1015 (!) 82/44 98.4 °F (36.9 °C) Axillary 84 16   
20 0855 104/55   79    
20 0752 93/54 97.3 °F (36.3 °C)  78 18 98 % Temp (24hrs), Av.6 °F (36.4 °C), Min:97 °F (36.1 °C), Max:98.4 °F (36.9 °C) Labs:  
  
 
Recent Labs 04/11/20 
3089 HCT 31.2* HGB 10.0*  
 
PT/OT:  
 
  
  
  
ASSESSMENT / PLAN:  
Active Problems: 
  ESRD (end stage renal disease) on dialysis ([facility] [address] (10/8/2014) Fracture of femoral neck, left, closed ([facility] [address] (4/8/2020) -  Dialysis this morning  
-  Continue PT/OT WBAT 
-  hypotension improved, but not ideal- was 100/50s at time of admission   
-  DVT prophylaxis- SCD w/ ASA 81BID 
-  DC planning - Return to [facility] - [facility] and rehab Signed By: Meliza Qureshi PA-C

## 2020-04-11 NOTE — PROGRESS NOTES
HD TRANSFER - OUT REPORT: 
 
Verbal report given to Albertoil Bruce on Reta Virk being transferred toOncology  (Unit) for ordered procedure Report consisted of patient's Situation, Background, Assessment and  
Recommendations(SBAR). Information from the following report(s) Kardex was reviewed with the receiving nurse. Method:  $$ Method: Hemodialysis (04/11/20 1400) Fluid Removed  NET Fluid Removed (mL): 300 ml (04/11/20 1400) Patient response to treatment:  Stable End Time  Hemodialysis End Time: 1400 (04/11/20 1400) If not documented, dialysis nurse to update post-dialysis row in HD/Filtration flowsheet Medications /Volume expansion agents or Fluid boluses administered during treatment? yes Post-dialysis medication administration due?  no 
Remind nurse to administer post-HD medication upon return to unit. Fistula hemostasis? yes Line heparinization? no 
 
Lines: secure Opportunity for questions and clarification was provided. Patient transported with: [employer]

## 2020-04-11 NOTE — PROCEDURES
[facility] Dialysis Team [place]  [phone] Vitals   Pre   Post   Assessment   Pre   Post    
Temp  Temp: 98.4 °F (36.9 °C) (04/11/20 1015)  97.9 LOC  A & O x 3 A & O x3 HR   Pulse (Heart Rate): 84 (04/11/20 1145) 91 Lungs   Clear  clear B/P   BP: (!) 88/48 (04/11/20 1145) 90/48 Cardiac   Reg/Irreg  Reg/Irreg Resp   Resp Rate: 16 (04/11/20 1145) 16 Skin   Warm,dry, intact. Bilat BKA  Warm,dry, intact. Bilat BKA Pain level  Pain Intensity 1: 0 (04/10/20 2055) 0 Edema None noted None noted Orders: Duration:   Start:    1015 End:   1400 Total:   3.5hrs Dialyzer:   Dialyzer/Set Up Inspection: Haroon Dennis (04/11/20 1015) K Bath:   Dialysate K (mEq/L): 3 (04/11/20 1015) Ca Bath:   Dialysate CA (mEq/L): 2.5 (04/11/20 1015) Na/Bicarb:   Dialysate NA (mEq/L): 138 (04/11/20 1015) Target Fluid Removal:   Goal/Amount of Fluid to Remove (mL): 1000 mL (04/11/20 1015) Access  AVG Type & Location:   Left upper AVG Labs Obtained/Reviewed Critical Results Called   Date when labs were drawn- 
Hgb-   
HGB Date Value Ref Range Status 04/11/2020 10.0 (L) 12.1 - 17.0 g/dL Final  
 
K-   
Potassium Date Value Ref Range Status 04/11/2020 5.0 3.5 - 5.1 mmol/L Final  
 
Ca-  
Calcium Date Value Ref Range Status 04/11/2020 8.1 (L) 8.5 - 10.1 MG/DL Final  
 
Bun-  
BUN Date Value Ref Range Status 04/11/2020 45 (H) 6 - 20 MG/DL Final  
 
Creat-  
Creatinine Date Value Ref Range Status 04/11/2020 7.23 (H) 0.70 - 1.30 MG/DL Final  
 
  
Medications/ Blood Products Given Name   Dose   Route and Time Albumin  12.5gm   IV 1115 Albumin  12.5gm IV 1300 Blood Volume Processed (BVP):    87.8 Net Fluid Removed:  300ml Comments RN reviewed LPN assessment and completed RN assessment. RN completed patient assessment. RN reviewed technicians vital signs and procedure note. Tx completed. Reviewed by RN Shery Brittle Time Out Done: 0930 Primary Nurse Rpt Pre: Lavelle Maldonado RN 
Primary Nurse Rpt Post: Mariah Coulter RN 
Pt Education:access care Care Plan: Continue HD Tx Summary: LINA AVG: skin CDI. No s/s of infection. No issues with cannulation or hemostasis. Running well at . Pt. Was given Midodrine 10mg PO prior to HD By primary nurse. Pt arrived to HD suite A&Ox4. Consent signed & on file. SBAR received from Primary RN. 1030: Pt cannulated with 76A needles per policy & without issue. Labs drawn per request/ order. VSS. Dialysis Tx initiated. 1100: Pt resting quietly. Lines secure. 1115: BM on HD. Lines secure and visible. Albumin 12.5gm admin. 1130: Pt. Tsering. Hd well, no problems noted 1145: Pt. Stable, lines secure and visible 
1200: Pt. Resting well, no s/s of distress. 1215: Pt. Resting well, watching TV. Lines secure 1230: Dr. Acacia Ellis rounding. Change bath to 2k  
1245: Pt. Stable, lines secure and visible 
1300: pt. Stable, no s/s of distress 1315: Pt. Stable, lines secure and visible 1330: Pt. Stable, watching tv 
1345: Pt. Stable, lines secure 1400: Tx ended. VSS. All possible blood returned to patient. Hemostasis achieved without issue. Bed locked and in the lowest position, call bell and belongings in reach. SBAR given to Primary, RN. Patient is stable at time of their/ my departure. All Dialysis related medications have been reviewed. Admiting Diagnosis:L Femoral head fracture Pt's previous clinic- [facility] Consent signed - Informed Consent Verified: Yes (04/11/20 1015) Jordanita Consent - Yes Hepatitis Status- Neg (4/2/2020 Machine #- Machine Number: [id number] (04/11/20 1015) Telemetry status- yes Pre-dialysis wt. -

## 2020-04-11 NOTE — PROGRESS NOTES
TRANSFER - IN REPORT: 
 
Verbal report received from PAUL Sanches on Martha Scotty  being received from Med Oncology (unit) for ordered procedure Report consisted of patients Situation, Background, Assessment and  
Recommendations(SBAR). Information from the following report(s) Kardex was reviewed with the receiving nurse. Opportunity for questions and clarification was provided. Assessment completed upon patients arrival to unit and care assumed.

## 2020-04-12 LAB
ANION GAP SERPL CALC-SCNC: 12 MMOL/L (ref 5–15)
BUN SERPL-MCNC: 29 MG/DL (ref 6–20)
BUN/CREAT SERPL: 6 (ref 12–20)
CALCIUM SERPL-MCNC: 8 MG/DL (ref 8.5–10.1)
CHLORIDE SERPL-SCNC: 100 MMOL/L (ref 97–108)
CO2 SERPL-SCNC: 21 MMOL/L (ref 21–32)
CREAT SERPL-MCNC: 4.88 MG/DL (ref 0.7–1.3)
ERYTHROCYTE [DISTWIDTH] IN BLOOD BY AUTOMATED COUNT: 14.2 % (ref 11.5–14.5)
GLUCOSE BLD STRIP.AUTO-MCNC: 173 MG/DL (ref 65–100)
GLUCOSE BLD STRIP.AUTO-MCNC: 182 MG/DL (ref 65–100)
GLUCOSE BLD STRIP.AUTO-MCNC: 194 MG/DL (ref 65–100)
GLUCOSE BLD STRIP.AUTO-MCNC: 218 MG/DL (ref 65–100)
GLUCOSE SERPL-MCNC: 162 MG/DL (ref 65–100)
HCT VFR BLD AUTO: 28.9 % (ref 36.6–50.3)
HGB BLD-MCNC: 9.4 G/DL (ref 12.1–17)
MCH RBC QN AUTO: 35.3 PG (ref 26–34)
MCHC RBC AUTO-ENTMCNC: 32.5 G/DL (ref 30–36.5)
MCV RBC AUTO: 108.6 FL (ref 80–99)
NRBC # BLD: 0 K/UL (ref 0–0.01)
NRBC BLD-RTO: 0 PER 100 WBC
PLATELET # BLD AUTO: 101 K/UL (ref 150–400)
PMV BLD AUTO: 10.3 FL (ref 8.9–12.9)
POTASSIUM SERPL-SCNC: 4.4 MMOL/L (ref 3.5–5.1)
RBC # BLD AUTO: 2.66 M/UL (ref 4.1–5.7)
SERVICE CMNT-IMP: ABNORMAL
SODIUM SERPL-SCNC: 133 MMOL/L (ref 136–145)
WBC # BLD AUTO: 5.9 K/UL (ref 4.1–11.1)

## 2020-04-12 PROCEDURE — 94760 N-INVAS EAR/PLS OXIMETRY 1: CPT

## 2020-04-12 PROCEDURE — 80048 BASIC METABOLIC PNL TOTAL CA: CPT

## 2020-04-12 PROCEDURE — 74011250637 HC RX REV CODE- 250/637: Performed by: ORTHOPAEDIC SURGERY

## 2020-04-12 PROCEDURE — P9047 ALBUMIN (HUMAN), 25%, 50ML: HCPCS | Performed by: NURSE PRACTITIONER

## 2020-04-12 PROCEDURE — 82962 GLUCOSE BLOOD TEST: CPT

## 2020-04-12 PROCEDURE — 74011636637 HC RX REV CODE- 636/637: Performed by: PHYSICIAN ASSISTANT

## 2020-04-12 PROCEDURE — 74011250637 HC RX REV CODE- 250/637: Performed by: PHYSICIAN ASSISTANT

## 2020-04-12 PROCEDURE — 65270000015 HC RM PRIVATE ONCOLOGY

## 2020-04-12 PROCEDURE — 85027 COMPLETE CBC AUTOMATED: CPT

## 2020-04-12 PROCEDURE — 36415 COLL VENOUS BLD VENIPUNCTURE: CPT

## 2020-04-12 PROCEDURE — 74011250637 HC RX REV CODE- 250/637: Performed by: NURSE PRACTITIONER

## 2020-04-12 PROCEDURE — 74011250636 HC RX REV CODE- 250/636: Performed by: NURSE PRACTITIONER

## 2020-04-12 RX ORDER — ALBUMIN HUMAN 250 G/1000ML
12.5 SOLUTION INTRAVENOUS ONCE
Status: COMPLETED | OUTPATIENT
Start: 2020-04-12 | End: 2020-04-12

## 2020-04-12 RX ORDER — MIDODRINE HYDROCHLORIDE 5 MG/1
10 TABLET ORAL
Status: DISCONTINUED | OUTPATIENT
Start: 2020-04-12 | End: 2020-04-15 | Stop reason: HOSPADM

## 2020-04-12 RX ADMIN — Medication 10 ML: at 23:04

## 2020-04-12 RX ADMIN — Medication 1 AMPULE: at 09:02

## 2020-04-12 RX ADMIN — SENNOSIDES AND DOCUSATE SODIUM 1 TABLET: 8.6; 5 TABLET ORAL at 17:34

## 2020-04-12 RX ADMIN — Medication 10 ML: at 06:05

## 2020-04-12 RX ADMIN — ASPIRIN 81 MG: 81 TABLET ORAL at 08:58

## 2020-04-12 RX ADMIN — MELATONIN 5 TABLET: at 08:57

## 2020-04-12 RX ADMIN — ACETAMINOPHEN 650 MG: 325 TABLET, FILM COATED ORAL at 17:34

## 2020-04-12 RX ADMIN — ALBUMIN (HUMAN) 12.5 G: 0.25 INJECTION, SOLUTION INTRAVENOUS at 08:54

## 2020-04-12 RX ADMIN — Medication 10 ML: at 13:31

## 2020-04-12 RX ADMIN — MIDODRINE HYDROCHLORIDE 10 MG: 5 TABLET ORAL at 11:37

## 2020-04-12 RX ADMIN — LEVOTHYROXINE SODIUM 200 MCG: 100 TABLET ORAL at 08:58

## 2020-04-12 RX ADMIN — MIDODRINE HYDROCHLORIDE 10 MG: 5 TABLET ORAL at 17:34

## 2020-04-12 RX ADMIN — INSULIN LISPRO 1 UNITS: 100 INJECTION, SOLUTION INTRAVENOUS; SUBCUTANEOUS at 23:02

## 2020-04-12 RX ADMIN — ACETAMINOPHEN 650 MG: 325 TABLET, FILM COATED ORAL at 11:36

## 2020-04-12 RX ADMIN — ACETAMINOPHEN 650 MG: 325 TABLET, FILM COATED ORAL at 08:57

## 2020-04-12 RX ADMIN — ASPIRIN 81 MG: 81 TABLET ORAL at 17:34

## 2020-04-12 RX ADMIN — SENNOSIDES AND DOCUSATE SODIUM 1 TABLET: 8.6; 5 TABLET ORAL at 08:58

## 2020-04-12 NOTE — PROGRESS NOTES
Problem: Falls - Risk of 
Goal: *Absence of Falls Description: Document Patricio Jean Fall Risk and appropriate interventions in the flowsheet. Outcome: Progressing Towards Goal 
Note: Fall Risk Interventions: 
Mobility Interventions: Bed/chair exit alarm Mentation Interventions: Adequate sleep, hydration, pain control Medication Interventions: Bed/chair exit alarm Elimination Interventions: Call light in reach History of Falls Interventions: Bed/chair exit alarm

## 2020-04-12 NOTE — PROGRESS NOTES
Oncology End of Shift Note Bedside shift change report given to KIRA luz (incoming nurse) by Nessa Bennett (outgoing nurse) on [facility]. Report included the following information SBAR, Kardex, ED Summary, Intake/Output, MAR and Recent Results. Shift Summary:  
No change in patient condition throughout shift. Patient needed some assistance with meals. Albumin given for low BP. No complaints of pain. Patient turned throughout shift. No insulin coverage needed. Issues for Physician to Address:  none Patient on Cardiac Monitoring? [] Yes 
[x] No 
 
Rhythm:   
 
 
 
Shift Events Nessa Bennett

## 2020-04-12 NOTE — PROGRESS NOTES
ORTHO - Progress Note Post Op day: 3 Days Post-Op Esau Dan     [id number]  male    78 y.o.    1940 Admit date: 2020 Date of Surgery: 2020 Procedures: Procedure(s): LEFT HIP HEMIARTHROPLASTY (ESSENTIAL) Admitting Physician: Hever Piedra MD  
Surgeon:  Kitty Zuñiga) and Role:    Amberly Mckeon MD - Primary Consulting Physician(s): Treatment Team: Attending Provider: Enid Urban MD; Surgeon: Zenaida Rey MD; Consulting Provider: Ce Collins MD; Care Manager: Christopher Boykin; Utilization Review: Rose Mary Malik Primary Nurse: Juan J Lino SUBJECTIVE: 
  
Esau Dan is a 78 y.o. male s/p a  
LEFT HIP HEMIARTHROPLASTY (ESSENTIAL) resting in the bed. .  Patient has complaints of appropriate post-op pain, tolerating PO pain medications, using only APAP. Denies F/C, nausea, vomiting, dizziness, lightheadedness, chest pain, or shortness of breath. OBJECTIVE: 
 
  
Physical Exam: 
General: alert, no distress. Gastrointestinal:  non-distended .   
Cardiovascular: faint palp pulses in the upper extremities bilat,  Brisk cap refill in all distal extremities Genitourinary: anuric - dialysis  
Respiratory: No respiratory distress  
 Musculoskeletal: bilat BKA 
 Dressing/Wound:  Clean, dry and intact. No significant erythema or swelling.   
Vital Signs:  
  
  
Patient Vitals for the past 8 hrs: 
 BP Temp Pulse Resp SpO2  
20 1015 (!) 88/43      
20 0800 90/45      
20 0753 (!) 71/40 97.5 °F (36.4 °C) 84 16 100 % 20 0238 (!) 75/41      
20 0231 (!) 77/40 97.5 °F (36.4 °C) 80 18 92 % Temp (24hrs), Av.5 °F (36.4 °C), Min:97.5 °F (36.4 °C), Max:97.6 °F (36.4 °C) Labs:  
  
 
Recent Labs 20 
8357 HCT 28.9* HGB 9.4* PT/OT:  
 
  
  
  
ASSESSMENT / PLAN:  
Active Problems: 
  ESRD (end stage renal disease) on dialysis ([facility] [address] (10/8/2014) Fracture of femoral neck, left, closed ([facility] [address] (4/8/2020) -  Had dialysis sat -  Continue PT/OT WBAT -  hypotension persistant  - was 100/50s at time of admission -  DVT prophylaxis- SCD w/ ASA 81BID -  DC planning - Return to 80 - [facility] rehab Signed By: Maggie Webber PA-C

## 2020-04-12 NOTE — PROGRESS NOTES
Oncology End of Shift Note Bedside shift change report given to  Elizabeth MALONE (incoming nurse) by José Miguel Sheridan (outgoing nurse) on [facility]. Report included the following information SBAR, Kardex, Intake/Output and MAR. Shift Summary: unable to obtain labs, BP starting trending down again, 2300 81/42, 3am 77/40, still asymptomatic, contacted telehospitalist, instructed to monitor, refused most of his meds Issues for Physician to Address:  DC scheduled acetaminophen? Hx of elevated LFT's and denies pain Patient on Cardiac Monitoring? [] Yes 
[x] No 
 
Rhythm:   
 
 
 
Shift Events José Miguel Sheridan

## 2020-04-12 NOTE — PROGRESS NOTES
Hospitalist Progress Note NAME: Doreen Mari :  1940 MRN:  [id number] I reviewed pertinent labs and imaging, and discussed /agreed on the plan of care with Dr. Jacqueline Ochoa. Assessment / Plan: 
Acute left Hip fracture in the left neck femoral status post left hemiarthroplasty 1. Left hip dressing dry and intact 2. Pain well controlled at this time 3. PT/OT following End Stage renal disease cellulitis Hypotension 1. Continue midodrine 10 mg as scheduled 2. Albumin 50 cc one time 3. Recheck blood pressure 4. Monitor Bilateral BKA 1. Bilateral prosthesis he does use a wheel chair ESRD on Hemodialysis 1. Left AV Fistula + Bruit + Thrill 2. HD on Tues, Thurs, Sat 3. Avoid Nephrotoxic medications Hypothyroidism 1. 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 Continue levothyroxine 200 mcg daily 2. TSH 8.3 on 20 3. MRI 19 with severe atrophy History of CVA 1. Continue Aspirin 2. Stable History of Type 2 Diabetes with hypoglycemia 1. Continue sliding scale coverage 2. HgB A1C on 20 5.8 3.Monitor Chronic Anemia HgB 10.0 on 20 AM labs pending at this time Dementia 1. Baseline mentation 25.0 - 29.9 Overweight / Body mass index is 26.15 kg/m². Code status: Full Prophylaxis: Hep SQ Recommended Disposition: SNF/LTC Subjective: Chief Complaint / Reason for Physician Visit Patient is alert to self. No signs of distress noted. His glucose improved this am to 176. He is on sliding scale coverage. Blood pressure 71/40 with retake at 90/40 with smaller cuff per RN. Albumin 50 cc one time. With repeat blood pressure following infusion. RN to notify provider. Discussed with RN events overnight. Review of Systems: Symptom Y/N Comments  Symptom Y/N Comments Fever/Chills    Chest Pain Poor Appetite    Edema Cough    Abdominal Pain Sputum    Joint Pain SOB/SOLANO    Pruritis/Rash Nausea/vomit    Tolerating PT/OT Diarrhea    Tolerating Diet Constipation    Other Could NOT obtain due to: Poor Historian, Dementia Objective: VITALS:  
Last 24hrs VS reviewed since prior progress note. Most recent are: 
Patient Vitals for the past 24 hrs: 
 Temp Pulse Resp BP SpO2  
04/12/20 0238    (!) 75/41   
04/12/20 0231 97.5 °F (36.4 °C) 80 18 (!) 77/40 92 % 04/11/20 2318 97.6 °F (36.4 °C) 80 18 (!) 81/42 99 % 04/11/20 1946 97.6 °F (36.4 °C) 86 18 102/52 99 % 04/11/20 1601 97.5 °F (36.4 °C) 91 17 93/49   
04/11/20 1410  91 16 90/48   
04/11/20 1400  91 16 (!) 87/50   
04/11/20 1345  92 16 (!) 88/55   
04/11/20 1330  92 16 (!) 88/52   
04/11/20 1315  88 16 (!) 82/46   
04/11/20 1300  87 16 (!) 83/47   
04/11/20 1245  84 16 93/51   
04/11/20 1230  84 16 95/40   
04/11/20 1215  83 16 (!) 81/46   
04/11/20 1200  83 16 (!) 86/40   
04/11/20 1145  84 16 (!) 88/48   
04/11/20 1130  82 16 96/49   
04/11/20 1115  82 16 (!) 88/41   
04/11/20 1100  86 16 102/47   
04/11/20 1045  83 16 (!) 88/46   
04/11/20 1030  83 16 (!) 83/45   
04/11/20 1015 98.4 °F (36.9 °C) 84 16 (!) 82/44   
04/11/20 0855  79  104/55   
04/11/20 0752 97.3 °F (36.3 °C) 78 18 93/54 98 % Intake/Output Summary (Last 24 hours) at 4/12/2020 0391 Last data filed at 4/12/2020 0330 Gross per 24 hour Intake 30 ml Output 300 ml Net -270 ml PHYSICAL EXAM: 
General: WD, WN. Alert, cooperative, no acute distress   
EENT:  EOMI. Anicteric sclerae. MMM Resp:  CTA bilaterally, no wheezing or rales. No accessory muscle use CV:  Regular  rhythm,  No edema GI:  Soft, Non distended, Non tender.  +Bowel sounds Neurologic:  Alert and oriented X 3, normal speech,  
 Psych:   Good insight. Not anxious nor agitated Skin:  No rashes. No jaundice Reviewed most current lab test results and cultures  YES Reviewed most current radiology test results   YES Review and summation of old records today    NO Reviewed patient's current orders and MAR    YES 
PMH/SH reviewed - no change compared to H&P 
________________________________________________________________________ Care Plan discussed with: 
  Comments Patient y Family RN y   
Care Manager Consultant Multidiciplinary team rounds were held today with , nursing, pharmacist and clinical coordinator. Patient's plan of care was discussed; medications were reviewed and discharge planning was addressed. ________________________________________________________________________ Total NON critical care TIME:  26   Minutes Total CRITICAL CARE TIME Spent:   Minutes non procedure based Comments >50% of visit spent in counseling and coordination of care    
________________________________________________________________________ Susanna Judge NP Procedures: see electronic medical records for all procedures/Xrays and details which were not copied into this note but were reviewed prior to creation of Plan. LABS: 
I reviewed today's most current labs and imaging studies. Pertinent labs include: 
Recent Labs 04/11/20 
7659 04/10/20 
0424 WBC 7.9 8.2 HGB 10.0* 10.2* HCT 31.2* 31.4*  
* 132* Recent Labs 04/11/20 
9049 04/10/20 
0424 * 133*  
K 5.0 4.6  99 CO2 22 24 * 128* BUN 45* 34* CREA 7.23* 5.90* CA 8.1* 7.9*  
PHOS 5.3*  --   
ALB 2.9*  --   
 
 
Signed: Susanna Judge, NP

## 2020-04-13 LAB
ANION GAP SERPL CALC-SCNC: 14 MMOL/L (ref 5–15)
BUN SERPL-MCNC: 37 MG/DL (ref 6–20)
BUN/CREAT SERPL: 7 (ref 12–20)
CALCIUM SERPL-MCNC: 8.2 MG/DL (ref 8.5–10.1)
CHLORIDE SERPL-SCNC: 99 MMOL/L (ref 97–108)
CO2 SERPL-SCNC: 20 MMOL/L (ref 21–32)
CREAT SERPL-MCNC: 5.62 MG/DL (ref 0.7–1.3)
ERYTHROCYTE [DISTWIDTH] IN BLOOD BY AUTOMATED COUNT: 14.2 % (ref 11.5–14.5)
GLUCOSE BLD STRIP.AUTO-MCNC: 156 MG/DL (ref 65–100)
GLUCOSE BLD STRIP.AUTO-MCNC: 162 MG/DL (ref 65–100)
GLUCOSE BLD STRIP.AUTO-MCNC: 181 MG/DL (ref 65–100)
GLUCOSE BLD STRIP.AUTO-MCNC: 181 MG/DL (ref 65–100)
GLUCOSE SERPL-MCNC: 132 MG/DL (ref 65–100)
HCT VFR BLD AUTO: 27.7 % (ref 36.6–50.3)
HGB BLD-MCNC: 9.1 G/DL (ref 12.1–17)
MAGNESIUM SERPL-MCNC: 2.2 MG/DL (ref 1.6–2.4)
MCH RBC QN AUTO: 35.5 PG (ref 26–34)
MCHC RBC AUTO-ENTMCNC: 32.9 G/DL (ref 30–36.5)
MCV RBC AUTO: 108.2 FL (ref 80–99)
NRBC # BLD: 0 K/UL (ref 0–0.01)
NRBC BLD-RTO: 0 PER 100 WBC
PHOSPHATE SERPL-MCNC: 3.7 MG/DL (ref 2.6–4.7)
PLATELET # BLD AUTO: 104 K/UL (ref 150–400)
PMV BLD AUTO: 10.9 FL (ref 8.9–12.9)
POTASSIUM SERPL-SCNC: 4.1 MMOL/L (ref 3.5–5.1)
RBC # BLD AUTO: 2.56 M/UL (ref 4.1–5.7)
SERVICE CMNT-IMP: ABNORMAL
SODIUM SERPL-SCNC: 133 MMOL/L (ref 136–145)
WBC # BLD AUTO: 8 K/UL (ref 4.1–11.1)

## 2020-04-13 PROCEDURE — 74011250637 HC RX REV CODE- 250/637: Performed by: ORTHOPAEDIC SURGERY

## 2020-04-13 PROCEDURE — 74011250637 HC RX REV CODE- 250/637: Performed by: PHYSICIAN ASSISTANT

## 2020-04-13 PROCEDURE — 65270000015 HC RM PRIVATE ONCOLOGY

## 2020-04-13 PROCEDURE — 82962 GLUCOSE BLOOD TEST: CPT

## 2020-04-13 PROCEDURE — 80048 BASIC METABOLIC PNL TOTAL CA: CPT

## 2020-04-13 PROCEDURE — 84100 ASSAY OF PHOSPHORUS: CPT

## 2020-04-13 PROCEDURE — 74011250637 HC RX REV CODE- 250/637: Performed by: NURSE PRACTITIONER

## 2020-04-13 PROCEDURE — 83735 ASSAY OF MAGNESIUM: CPT

## 2020-04-13 PROCEDURE — 85027 COMPLETE CBC AUTOMATED: CPT

## 2020-04-13 PROCEDURE — 36415 COLL VENOUS BLD VENIPUNCTURE: CPT

## 2020-04-13 PROCEDURE — 94760 N-INVAS EAR/PLS OXIMETRY 1: CPT

## 2020-04-13 RX ADMIN — ACETAMINOPHEN 650 MG: 325 TABLET, FILM COATED ORAL at 18:05

## 2020-04-13 RX ADMIN — ASPIRIN 81 MG: 81 TABLET ORAL at 18:05

## 2020-04-13 RX ADMIN — LEVOTHYROXINE SODIUM 200 MCG: 100 TABLET ORAL at 09:43

## 2020-04-13 RX ADMIN — Medication 10 ML: at 22:00

## 2020-04-13 RX ADMIN — Medication 10 ML: at 14:08

## 2020-04-13 RX ADMIN — ASPIRIN 81 MG: 81 TABLET ORAL at 09:42

## 2020-04-13 RX ADMIN — MIDODRINE HYDROCHLORIDE 10 MG: 5 TABLET ORAL at 18:05

## 2020-04-13 RX ADMIN — SENNOSIDES AND DOCUSATE SODIUM 1 TABLET: 8.6; 5 TABLET ORAL at 09:43

## 2020-04-13 RX ADMIN — MELATONIN 5 TABLET: at 09:43

## 2020-04-13 RX ADMIN — MIDODRINE HYDROCHLORIDE 10 MG: 5 TABLET ORAL at 09:43

## 2020-04-13 RX ADMIN — PRAVASTATIN SODIUM 20 MG: 10 TABLET ORAL at 22:30

## 2020-04-13 RX ADMIN — Medication 10 ML: at 06:16

## 2020-04-13 RX ADMIN — SENNOSIDES AND DOCUSATE SODIUM 1 TABLET: 8.6; 5 TABLET ORAL at 18:05

## 2020-04-13 NOTE — PROGRESS NOTES
Oncology End of Shift Note Bedside shift change report given to  Jerel Phelps (incoming nurse) by Giovanny Zavala (outgoing nurse) on [facility] Goltz. Report included the following information SBAR, Kardex, Intake/Output and MAR. Shift Summary: refused night meds except his insulin,  He refused turning a couple times, did a chg bath, incontinence care and new gown at 0600 Patient on Cardiac Monitoring? [] Yes 
[x] No 
 
Rhythm:   
 
 
 
Shift Events Giovanny Zavala

## 2020-04-13 NOTE — PROGRESS NOTES
Ortho / Neurosurgery NP Note POD# 4  s/p LEFT HIP HEMIARTHROPLASTY (ESSENTIAL) Pt resting in bed, blanket over his head on entering room. No complaints. States he is going home today. Feels well, denies pain. Denies CP, dizziness or sob. Tolerating regular diet VSS Afebrile. Room air. Hypotensive - Midodrine now scheduled TID Visit Vitals BP 92/51 (BP 1 Location: Right arm, BP Patient Position: At rest) Pulse 80 Temp 97.6 °F (36.4 °C) Resp 18 Wt 73.2 kg (161 lb 6 oz) SpO2 99% BMI 26.05 kg/m² Voiding status: anuric Output (mL) Urine Voided: 0 ml (04/10/20 1500) Last Bowel Movement Date: 04/09/20 (04/10/20 3347) Labs Lab Results Component Value Date/Time HGB 9.1 (L) 04/13/2020 03:19 AM  
  
Lab Results Component Value Date/Time INR 1.1 04/08/2020 09:24 AM  
  
 
Recent Glucose Results:  
Lab Results Component Value Date/Time  (H) 04/13/2020 03:19 AM  
 GLUCPOC 156 (H) 04/13/2020 08:13 AM  
 GLUCPOC 218 (H) 04/12/2020 08:53 PM  
 GLUCPOC 194 (H) 04/12/2020 05:04 PM  
 
 
 
Body mass index is 26.05 kg/m². : A BMI > 30 is classified as obesity and > 40 is classified as morbid obesity. Optifoam dressing c.d.i Calves soft and supple; No pain with passive stretch Sensation and motor intact - Bilateral BKA, prosthesetics at bedside PLAN: 
1) PT BID, OT - - WBAT - adilson BKA, prosthetics at bedside. Pt states he is wheelchair bond, wears prosthetics. 2) Aspirin 81 mg PO BID for DVT Prophylaxis 3) GI Prophylaxis - Protonix 4) ESRD - HD ([facility], sat) Nephrology following 5) Readniess for discharge: 
   [x] Vital Signs stable  
 [x] Hgb stable  
 [x] Wound intact, drainage minimal  
 [x] Tolerating PO intake   
 [] Cleared by PT (OT if applicable) [] Stair training completed (if applicable) [] Independent / Contact Guard Assist (household distance) [] Bed mobility [] Car transfers  
  [] ADLs [x] Adequate pain control on oral medication alone Plans to return to [facility] today.   
 
Veronica Peng NP

## 2020-04-13 NOTE — PROGRESS NOTES
Bedside shift change report given to Lola (oncoming nurse) by Williams Santos (offgoing nurse). Report included the following information SBAR, Kardex and [facility] Patient was originally supposed to be going home today, see CM notes. Patient got very upset with RN that he wasn't leaving, refused vital signs and medications. Horace Cid

## 2020-04-13 NOTE — PROGRESS NOTES
Bedside shift change report given to Jonna Velásquez (oncoming nurse) by John Hawk (offgoing nurse). Report included the following information SBAR, Kardex, Intake/Output, MAR, Accordion and Recent Results. Received report from Jessica Lan at 3 pm and resumed care.

## 2020-04-13 NOTE — PROGRESS NOTES
Hospitalist Progress Note NAME: Melina Cheema :  1940 MRN:  [id number] Assessment / Plan: 
Acute left femoral neck fracture status post left hemiarthroplasty Continue physical therapy Pain control PT OT Hypotension  which is chronic we will continue midodrine monitor blood pressure Bilateral BKA stable End-stage renal disease on hemodialysis TTS Hypothyroidism we will continue levothyroxine. History of myxedema stable Anemia of chronic disease monitor H&H Dementia at baseline Plan was to discharge today however facility  did not want to take the patient today. Body mass index is 26.05 kg/m². Code status: full Prophylaxis: Heparin subcu Recommended Disposition:   
 
Subjective: Chief Complaint / Reason for Physician Visit Discussed with RN events overnight. Patient seen and examined at bedside comfortable. No fever no chills no nausea no vomiting blood pressure remains steady which is chronic Review of Systems: 
Symptom Y/N Comments  Symptom Y/N Comments Fever/Chills n   Chest Pain n   
Poor Appetite n   Edema n   
Cough n   Abdominal Pain n   
Sputum n   Joint Pain n   
SOB/SOLANO n   Pruritis/Rash Nausea/vomit n   Tolerating PT/OT Diarrhea    Tolerating Diet Constipation    Other Could NOT obtain due to:   
 
Objective: VITALS:  
Last 24hrs VS reviewed since prior progress note. Most recent are: 
Patient Vitals for the past 24 hrs: 
 Temp Pulse Resp BP SpO2  
20 0800 97.6 °F (36.4 °C) 80 18 92/51 99 % 20 0307    (!) 85/45   
20 2306 98.2 °F (36.8 °C) 75 16 (!) 79/37 98 % 20 1959 98.3 °F (36.8 °C) 70 16 94/45 99 % 20 1532 98 °F (36.7 °C) 73 16 (!) 81/41  Intake/Output Summary (Last 24 hours) at 2020 1058 Last data filed at 2020 6953 Gross per 24 hour Intake 0 ml Output  Net 0 ml PHYSICAL EXAM: 
General: WD, WN.  Alert, cooperative, no acute distress   
 EENT:  EOMI. Anicteric sclerae. MMM Resp:  CTA bilaterally, no wheezing or rales. No accessory muscle use CV:  Regular  rhythm,  No edema GI:  Soft, Non distended, Non tender.  +Bowel sounds Neurologic:  Alert and oriented X 3, normal speech, Psych:   Good insight. Not anxious nor agitated Skin:  Bilateral BKA Reviewed most current lab test results and cultures  YES Reviewed most current radiology test results   YES Review and summation of old records today    NO Reviewed patient's current orders and MAR    YES 
PMH/SH reviewed - no change compared to H&P 
________________________________________________________________________ Care Plan discussed with: 
  Comments Patient Family RN Care Manager Consultant Multidiciplinary team rounds were held today with , nursing, pharmacist and clinical coordinator. Patient's plan of care was discussed; medications were reviewed and discharge planning was addressed. ________________________________________________________________________ Comments >50% of visit spent in counseling and coordination of care    
________________________________________________________________________ Magdy Wilder MD  
 
Procedures: see electronic medical records for all procedures/Xrays and details which were not copied into this note but were reviewed prior to creation of Plan. LABS: 
I reviewed today's most current labs and imaging studies. Pertinent labs include: 
Recent Labs 04/13/20 
3554 04/12/20 
8998 04/11/20 
7575 WBC 8.0 5.9 7.9 HGB 9.1* 9.4* 10.0* HCT 27.7* 28.9* 31.2*  
* 101* 115* Recent Labs 04/13/20 
7057 04/12/20 
7533 04/11/20 
0883 * 133* 133* K 4.1 4.4 5.0  
CL 99 100 100 CO2 20* 21 22 * 162* 135* BUN 37* 29* 45* CREA 5.62* 4.88* 7.23* CA 8.2* 8.0* 8.1*  
MG 2.2  --   --   
PHOS 3.7  --  5.3* ALB  --   --  2.9*  
 
 
 Signed: Sybil Long MD

## 2020-04-13 NOTE — PROGRESS NOTES
BRADY:  
1) LTC at [facility] 2) Pt will need 2ND IM letter at time of discharge 3) Pt will need [facility] transportation at time of discharge 10:21 AM- CM supervisors spoke with Administration at [facility]- they are not able to accept pt's back until Wednesday 4/15/2020 due to COVID-19 clearance at the facility. Delay placed. [facility] transportation arranged for 10:00 AM on 4/15/2020- RN please call report to [phone]. CM updated attending, RN and family. CM will continue to follow and remain available for support as needed. 10:12 AM- AMADEO spoke with Chase Mina at [facility]- he states they gave away pt's LTC bed and do not have any LTC beds available until Wednesday. Chase Sarmientochinson is checking with other [facility] facilities to see if they have beds available. CM supervisors looking into this. 9:47 AM- D/C order noted- CM contacting [place] to make sure they can accept pt back today. AMADEO spoke with pt's wife who was okay with pt discharging today- Medicare pt has received, reviewed, and signed 2nd IM letter informing them of their right to appeal the discharge. Signed copy has been placed on pt bedside chart. KYRA Stewart, [facility] [phone]

## 2020-04-14 LAB
GLUCOSE BLD STRIP.AUTO-MCNC: 109 MG/DL (ref 65–100)
GLUCOSE BLD STRIP.AUTO-MCNC: 154 MG/DL (ref 65–100)
GLUCOSE BLD STRIP.AUTO-MCNC: 156 MG/DL (ref 65–100)
GLUCOSE BLD STRIP.AUTO-MCNC: 157 MG/DL (ref 65–100)
SERVICE CMNT-IMP: ABNORMAL

## 2020-04-14 PROCEDURE — 74011250637 HC RX REV CODE- 250/637: Performed by: NURSE PRACTITIONER

## 2020-04-14 PROCEDURE — 74011250636 HC RX REV CODE- 250/636: Performed by: INTERNAL MEDICINE

## 2020-04-14 PROCEDURE — 74011250637 HC RX REV CODE- 250/637: Performed by: PHYSICIAN ASSISTANT

## 2020-04-14 PROCEDURE — 90935 HEMODIALYSIS ONE EVALUATION: CPT

## 2020-04-14 PROCEDURE — 65270000015 HC RM PRIVATE ONCOLOGY

## 2020-04-14 PROCEDURE — P9047 ALBUMIN (HUMAN), 25%, 50ML: HCPCS | Performed by: INTERNAL MEDICINE

## 2020-04-14 PROCEDURE — 5A1D70Z PERFORMANCE OF URINARY FILTRATION, INTERMITTENT, LESS THAN 6 HOURS PER DAY: ICD-10-PCS | Performed by: INTERNAL MEDICINE

## 2020-04-14 PROCEDURE — 82962 GLUCOSE BLOOD TEST: CPT

## 2020-04-14 RX ORDER — ALBUMIN HUMAN 250 G/1000ML
12.5 SOLUTION INTRAVENOUS ONCE
Status: COMPLETED | OUTPATIENT
Start: 2020-04-14 | End: 2020-04-14

## 2020-04-14 RX ADMIN — ACETAMINOPHEN 650 MG: 325 TABLET, FILM COATED ORAL at 23:20

## 2020-04-14 RX ADMIN — MIDODRINE HYDROCHLORIDE 10 MG: 5 TABLET ORAL at 08:04

## 2020-04-14 RX ADMIN — Medication 10 ML: at 06:42

## 2020-04-14 RX ADMIN — SENNOSIDES AND DOCUSATE SODIUM 1 TABLET: 8.6; 5 TABLET ORAL at 17:28

## 2020-04-14 RX ADMIN — SENNOSIDES AND DOCUSATE SODIUM 1 TABLET: 8.6; 5 TABLET ORAL at 08:04

## 2020-04-14 RX ADMIN — ACETAMINOPHEN 650 MG: 325 TABLET, FILM COATED ORAL at 17:28

## 2020-04-14 RX ADMIN — ACETAMINOPHEN 650 MG: 325 TABLET, FILM COATED ORAL at 00:40

## 2020-04-14 RX ADMIN — ACETAMINOPHEN 650 MG: 325 TABLET, FILM COATED ORAL at 13:47

## 2020-04-14 RX ADMIN — LEVOTHYROXINE SODIUM 200 MCG: 100 TABLET ORAL at 06:40

## 2020-04-14 RX ADMIN — ACETAMINOPHEN 650 MG: 325 TABLET, FILM COATED ORAL at 06:39

## 2020-04-14 RX ADMIN — PRAVASTATIN SODIUM 20 MG: 10 TABLET ORAL at 23:20

## 2020-04-14 RX ADMIN — MIDODRINE HYDROCHLORIDE 10 MG: 5 TABLET ORAL at 13:47

## 2020-04-14 RX ADMIN — ASPIRIN 81 MG: 81 TABLET ORAL at 08:04

## 2020-04-14 RX ADMIN — MELATONIN 5 TABLET: at 08:03

## 2020-04-14 RX ADMIN — MIDODRINE HYDROCHLORIDE 10 MG: 5 TABLET ORAL at 17:29

## 2020-04-14 RX ADMIN — Medication 10 ML: at 23:22

## 2020-04-14 RX ADMIN — ALBUMIN (HUMAN) 12.5 G: 0.25 INJECTION, SOLUTION INTRAVENOUS at 09:15

## 2020-04-14 RX ADMIN — ASPIRIN 81 MG: 81 TABLET ORAL at 17:29

## 2020-04-14 RX ADMIN — Medication 10 ML: at 13:50

## 2020-04-14 NOTE — PROGRESS NOTES
HD TRANSFER - OUT REPORT: 
 
Verbal report given to Rosezetta Najjar, RN on Shane Crespo being transferred to Oncology  (Unit) for ordered procedure Report consisted of patient's Situation, Background, Assessment and  
Recommendations(SBAR). Information from the following report(s) Kardex was reviewed with the receiving nurse. Method:  $$ Method: Hemodialysis (04/11/20 1400) Fluid Removed  NET Fluid Removed (mL): 300 ml (04/11/20 1400) Patient response to treatment:  Stable End Time  Hemodialysis End Time: 1400 (04/11/20 1400) If not documented, dialysis nurse to update post-dialysis row in HD/Filtration flowsheet Medications /Volume expansion agents or Fluid boluses administered during treatment? yes Post-dialysis medication administration due?  no 
Remind nurse to administer post-HD medication upon return to unit. Fistula hemostasis? yes Line heparinization? no 
 
Lines: secure Opportunity for questions and clarification was provided. Patient transported with: [employer]

## 2020-04-14 NOTE — PROGRESS NOTES
Ortho NP Note:  
 
Pt off unit for dialysis. Per RN, no issues overnight. Pain well controlled with tylenol alone. BP stable - midodrine tid. Plans to return [facility] tomorrow; soonest rehab can accept patient.   
 
 
Narendra Griffin, NP

## 2020-04-14 NOTE — PROGRESS NOTES
TRANSFER - IN REPORT: 
 
Verbal report received from Jessika Piper RN on Brandon Gordillo  being received from Oncology(unit) for ordered procedure Report consisted of patients Situation, Background, Assessment and  
Recommendations(SBAR). Information from the following report(s) Kardex was reviewed with the receiving nurse. Opportunity for questions and clarification was provided. Assessment completed upon patients arrival to unit and care assumed.

## 2020-04-14 NOTE — PROGRESS NOTES
Hospitalist Progress Note NAME: Cintia Maya :  1940 MRN:  [id number] Assessment / Plan: 
Acute left femoral neck fracture status post left hemiarthroplasty Continue physical therapy Pain control PT OT 
  
Hypotension  which is chronic we will continue midodrine monitor blood pressure 
  
Bilateral BKA stable 
  
End-stage renal disease on hemodialysis TTS 
  
Hypothyroidism we will continue levothyroxine. 
  
History of myxedema stable 
  
Anemia of chronic disease monitor H&H 
  
Dementia at baseline no issues 
  
Plan was to discharge today however facility  did not want to take the patient today. 
  
 Body mass index is 26.05 kg/m² Code status: Full Prophylaxis: Lovenox Recommended Disposition to return to facility tomorrow Subjective: Chief Complaint / Reason for Physician Visit Discussed with RN events overnight. Patient seen and examined at bedside comfortable no changes. Review of Systems: 
Symptom Y/N Comments  Symptom Y/N Comments Fever/Chills n   Chest Pain Poor Appetite n   Edema Cough n   Abdominal Pain Sputum n   Joint Pain SOB/SOLANO n   Pruritis/Rash Nausea/vomit n   Tolerating PT/OT Diarrhea n   Tolerating Diet Constipation    Other Could NOT obtain due to:   
 
Objective: VITALS:  
Last 24hrs VS reviewed since prior progress note. Most recent are: 
Patient Vitals for the past 24 hrs: 
 Temp Pulse Resp BP SpO2  
20 1215  69 18 107/55   
20 1200  72 16 107/54   
20 1145  70 16 107/53   
20 1130  78 16 109/56   
20 1115  76 16 107/50   
20 1100  76 16 108/56   
20 1045  78 16 109/53   
20 1030  77 16 107/54   
20 1015  78 16 112/54   
20 1000  74 16 105/53   
20 0945  64 16 102/53   
20 0930  65 16 108/54   
20 0915  75 16 106/45   
20 0900 98.4 °F (36.9 °C) 75 18 104/40   
20 0801    92/50  04/14/20 0758 97.4 °F (36.3 °C) 70 18 (!) 75/38 96 % 04/13/20 2332 97.8 °F (36.6 °C) 72 18 105/58   
04/13/20 2058 97.8 °F (36.6 °C) 70 18 106/58 100 % 04/13/20 1514 97.5 °F (36.4 °C) 70 19 (!) 87/39 98 % No intake or output data in the 24 hours ending 04/14/20 1258 PHYSICAL EXAM: 
General: WD, WN. Alert, cooperative, no acute distress   
EENT:  EOMI. Anicteric sclerae. MMM Resp:  CTA bilaterally, no wheezing or rales. No accessory muscle use CV:  Regular  rhythm,  No edema GI:  Soft, Non distended, Non tender.  +Bowel sounds Neurologic:  Patient is awake and alert demented. Skin:  Bilaterally Reviewed most current lab test results and cultures  YES Reviewed most current radiology test results   YES Review and summation of old records today    NO Reviewed patient's current orders and MAR    YES 
PMH/ reviewed - no change compared to H&P 
________________________________________________________________________ Care Plan discussed with: 
  Comments Patient Family RN Care Manager Consultant Multidiciplinary team rounds were held today with , nursing, pharmacist and clinical coordinator. Patient's plan of care was discussed; medications were reviewed and discharge planning was addressed. ________________________________________________________________________ Comments >50% of visit spent in counseling and coordination of care    
________________________________________________________________________ Suzette Billings MD  
 
Procedures: see electronic medical records for all procedures/Xrays and details which were not copied into this note but were reviewed prior to creation of Plan. LABS: 
I reviewed today's most current labs and imaging studies. Pertinent labs include: 
Recent Labs 04/13/20 
0319 04/12/20 
6783 WBC 8.0 5.9 HGB 9.1* 9.4* HCT 27.7* 28.9*  
* 101* Recent Labs 04/13/20 7617 04/12/20 
1541 * 133* K 4.1 4.4 CL 99 100 CO2 20* 21 * 162* BUN 37* 29* CREA 5.62* 4.88* CA 8.2* 8.0*  
MG 2.2  --   
PHOS 3.7  --   
 
 
Signed: Salima Kwan MD

## 2020-04-14 NOTE — INTERDISCIPLINARY ROUNDS
Oncology Interdisciplinary rounds were held today to discuss patient plan of care and outcomes. The following members were present: Nursing, Physician, Case Management, Pharmacy, and PT/OT Actual Length of Stay: 6 DRG GLOS: 4.9 Expected Length of Stay: 4d 21h Plan            Discharge Nephrology following. Discharge 4/15/2020 back to [facility]. Transport [facility]

## 2020-04-14 NOTE — PROGRESS NOTES
Physical Therapy Note Patient has returned from dialysis and is requesting lunch. He is assured his tray is on the way. Patient is encouraged to transfer to bedside chair for lunch. When PT returns with slide board lift team is also in the room to assist patient. Patient yells and adamantly refuses to attempt transfer. PT session is aborted. Thank you, 
Ryley Safer DPT

## 2020-04-14 NOTE — PROGRESS NOTES
Physical Therapy Note Patient is BERNICE for dialysis. Will re-attempt PT tx as able. Thank you, 
Arley SARAHT

## 2020-04-14 NOTE — PROGRESS NOTES
Oncology End of Shift Note Bedside shift change report given to Nilsa Cummins RN (incoming nurse) by Tulio Glynn RN (outgoing nurse) on [facility]. Report included the following information SBAR, Kardex and MAR. Shift Summary:  
Patient tolerated care well through out shift. Patient left for dialysis around 0800 this morning. Raymond Lawrence RN dialysis RN reported pulled 1 liter of off and 1 albumin prior to treatment for BP. Patient came back to floor around 1300. No complaints, just that he was hungry. All scheduled medications given. Had to reorient patient upon waking up later in shift that it was evening and he should be leaving tomorrow morning. Stated he wanted a brief on as well. Hourly rounding completed. Issues for Physician to Address:    
 
Patient on Cardiac Monitoring? [] Yes 
[x] No 
 
Rhythm:   
 
 
 
Shift Events Tulio Glynn RN

## 2020-04-14 NOTE — PROCEDURES
[facility] Dialysis Team [place]  [phone] Vitals   Pre   Post   Assessment   Pre   Post    
Temp  Temp: 98.4 °F (36.9 °C) (04/14/20 0900)  97.81 LOC  A & O x 2, confused to time A & O x 2, confused to time HR   Pulse (Heart Rate): 75 (04/14/20 0915) 71 Lungs   clear  clear B/P   BP: 106/45 (04/14/20 0915) 105/53 Cardiac   Normal sinus rhythm  Normal sinus rhythm Resp   Resp Rate: 16 (04/14/20 0915) 16 Skin   Warm,dry, BI KA  Warm,dry, BI KA Pain level  Pain Intensity 1: 0 (04/14/20 0758) 0 Edema  none 
 
 none Orders: Duration:   Start:    0900 End:    1230 Total:   3.5 Dialyzer:   Dialyzer/Set Up Inspection: Temo Barney (04/14/20 0900) K Bath:   Dialysate K (mEq/L): 2 (04/14/20 0900) Ca Bath:   Dialysate CA (mEq/L): 2.5 (04/14/20 0900) Na/Bicarb:   Dialysate NA (mEq/L): 138 (04/14/20 0900) Target Fluid Removal:   Goal/Amount of Fluid to Remove (mL): 1000 mL (04/14/20 0900) Access  AVG Type & Location:   Left upper AVF Labs Obtained/Reviewed Critical Results Called   Date when labs were drawn- 
Hgb-   
HGB Date Value Ref Range Status 04/13/2020 9.1 (L) 12.1 - 17.0 g/dL Final  
 
K-   
Potassium Date Value Ref Range Status 04/13/2020 4.1 3.5 - 5.1 mmol/L Final  
 
Ca-  
Calcium Date Value Ref Range Status 04/13/2020 8.2 (L) 8.5 - 10.1 MG/DL Final  
 
Bun-  
BUN Date Value Ref Range Status 04/13/2020 37 (H) 6 - 20 MG/DL Final  
 
Creat-  
Creatinine Date Value Ref Range Status 04/13/2020 5.62 (H) 0.70 - 1.30 MG/DL Final  
 
  
Medications/ Blood Products Given Name   Dose   Route and Time Albumin 12.5gm IV 0915 Blood Volume Processed (BVP):    90.4 Net Fluid Removed:  1000ml Comments RN reviewed LPN assessment and completed RN assessment. RN completed patient assessment. RN reviewed technicians vital signs and procedure note. Tx completed. Reviewed by KIRA Gould Time Out Done: 2221 Primary Nurse Rpt Pre:Karissa Fatima RN  
Primary Nurse Rpt Post: Justin Monet RN 
Pt Education:acces care Care Plan:Continue HD Tx Summary:LINA AVG: skin CDI. No s/s of infection. No issues with cannulation or hemostasis. Running well at . Pt arrived to HD suite A&Ox2, confused to time Consent signed & on file. SBAR received from Primary RN. 
0900: Pt cannulated with 42L needles per policy & without issue. Labs drawn per request/ order. VSS. Dialysis Tx initiated. 0915: Pt resting quietly. Lines secure and visible 0930: pt. Tolerating HD well. Lines secure 0945: Pt. Resting well, lines secure and visible 
1000: Dr. Park ley. Lines secure and visible 1015: Pt. Stable, no s/s of distress. Lines secure and visible 1030: Pt. Tsering. Hd without distress. Lines secure 1045: Pt. Resting well, lines secure and visible 
1100: Pt. Stable, resting well. 1115: Pt. Resting well, lines secure 1130: Pt. Stable, lines secure and visible 1145: Pt. Tsering. Hd well. 1200: Pt. Stable. Resting well. 1215: Pt. Tsering. Hd without issues lines secure [id number] Tx ended. VSS. All possible blood returned to patient. Hemostasis achieved without issue. Bed locked and in the lowest position, call bell and belongings in reach. SBAR given to Primary, RN. Patient is stable at time of their/ my departure. All Dialysis related medications have been reviewed. Admiting Diagnosis: Left hip Fx 
Pt's previous clinic- [facility] Consent signed - Informed Consent Verified: Yes (04/14/20 0900) Melisa Consent - Yes Hepatitis Status- Neg/[facility] (4/2/2020 Machine #- Machine Number: [id number] (04/14/20 0900) Telemetry status- YEs Pre-dialysis wt. -

## 2020-04-14 NOTE — PROGRESS NOTES
NAME: Doreen Mari :  1940 MRN:  [id number] Assessment :    Plan: 
--eskd Mild hyponatremia Acute left femoral neck fracture PAD s/p bilateral bka Hypotension, borderline --TTS HD at [facility]; last HD on Wednesday (preop); Seen on HD, bp was stable 
  
On Midodrine and getting Albumin 
  
hgb 11.9 to 10.2 to 10 after hip surgery DC planning Subjective: Chief Complaint:  Asleep on HD this am on rounds Review of Systems: 
 
Symptom Y/N Comments  Symptom Y/N Comments Fever/Chills    Chest Pain Poor Appetite    Edema Cough    Abdominal Pain Sputum    Joint Pain SOB/SOLANO    Pruritis/Rash Nausea/vomit    Tolerating PT/OT Diarrhea    Tolerating Diet Constipation    Other Could not obtain due to:   
 
Objective: VITALS:  
Last 24hrs VS reviewed since prior progress note. Most recent are: 
Visit Vitals /53 Pulse 71 Temp 97.8 °F (36.6 °C) Resp 18 Wt 73.2 kg (161 lb 6 oz) SpO2 96% BMI 26.05 kg/m² Intake/Output Summary (Last 24 hours) at 2020 1305 Last data filed at 2020 1230 Gross per 24 hour Intake  Output 1000 ml Net -1000 ml Telemetry Reviewed: PHYSICAL EXAM: 
General: NAD No edema 
cta adilson Lab Data Reviewed: (see below) Medications Reviewed: (see below) PMH/SH reviewed - no change compared to H&P 
________________________________________________________________________ Care Plan discussed with: 
Patient y Family HD RN y   
Care Manager Consultant:     
 
  Comments >50% of visit spent in counseling and coordination of care    
 
________________________________________________________________________ Akiko Chamberlain MD  
 
Procedures: see electronic medical records for all procedures/Xrays and details which 
 were not copied into this note but were reviewed prior to creation of Plan. LABS: 
Recent Labs 04/13/20 
0319 04/12/20 
5248 WBC 8.0 5.9 HGB 9.1* 9.4* HCT 27.7* 28.9*  
* 101* Recent Labs 04/13/20 
0319 04/12/20 
6303 * 133* K 4.1 4.4 CL 99 100 CO2 20* 21 BUN 37* 29* CREA 5.62* 4.88* * 162* CA 8.2* 8.0*  
MG 2.2  --   
PHOS 3.7  -- No results for input(s): SGOT, GPT, AP, TBIL, TP, ALB, GLOB, GGT, AML, LPSE in the last 72 hours. No lab exists for component: AMYP, HLPSE No results for input(s): INR, PTP, APTT, INREXT, INREXT in the last 72 hours. No results for input(s): FE, TIBC, PSAT, FERR in the last 72 hours. Lab Results Component Value Date/Time Folate 12.7 04/13/2011 03:00 AM  
  
No results for input(s): PH, PCO2, PO2 in the last 72 hours. No results for input(s): CPK, CKMB in the last 72 hours. No lab exists for component: TROPONINI No components found for: [place] Lab Results Component Value Date/Time Color DARK YELLOW 09/27/2017 12:21 PM  
 Appearance TURBID (A) 09/27/2017 12:21 PM  
 Specific gravity 1.018 09/27/2017 12:21 PM  
 Specific gravity 1.010 05/27/2017 04:33 PM  
 pH (UA) 5.5 09/27/2017 12:21 PM  
 Protein 100 (A) 09/27/2017 12:21 PM  
 Glucose 250 (A) 09/27/2017 12:21 PM  
 Ketone TRACE (A) 09/27/2017 12:21 PM  
 Bilirubin NEGATIVE  05/27/2017 04:33 PM  
 Urobilinogen 1.0 09/27/2017 12:21 PM  
 Nitrites NEGATIVE  09/27/2017 12:21 PM  
 Leukocyte Esterase LARGE (A) 09/27/2017 12:21 PM  
 Epithelial cells MODERATE (A) 09/27/2017 12:21 PM  
 Bacteria 3+ (A) 09/27/2017 12:21 PM  
 WBC >100 (H) 09/27/2017 12:21 PM  
 RBC >100 (H) 09/27/2017 12:21 PM  
 
 
MEDICATIONS: 
Current Facility-Administered Medications Medication Dose Route Frequency  midodrine (PROAMATINE) tablet 10 mg  10 mg Oral TID WITH MEALS  diphenhydrAMINE (BENADRYL) 12.5 mg/5 mL syrup 12.5 mg  12.5 mg Oral Q6H PRN  
  sodium chloride (NS) flush 5-40 mL  5-40 mL IntraVENous Q8H  
 sodium chloride (NS) flush 5-40 mL  5-40 mL IntraVENous PRN  
 oxyCODONE IR (ROXICODONE) tablet 2.5 mg  2.5 mg Oral Q3H PRN  
 naloxone (NARCAN) injection 0.4 mg  0.4 mg IntraVENous PRN  
 ondansetron (ZOFRAN ODT) tablet 4 mg  4 mg Oral Q6H PRN  
 senna-docusate (PERICOLACE) 8.6-50 mg per tablet 1 Tab  1 Tab Oral BID  
 bisacodyL (DULCOLAX) suppository 10 mg  10 mg Rectal DAILY PRN  
 aspirin delayed-release tablet 81 mg  81 mg Oral BID  alcohol 62% (NOZIN) nasal  1 Ampule  1 Ampule Topical Q12H  cholecalciferol (VITAMIN D3) (1000 Units /25 mcg) tablet 5 Tab  5,000 Units Oral DAILY  levothyroxine (SYNTHROID) tablet 200 mcg  200 mcg Oral ACB  pravastatin (PRAVACHOL) tablet 20 mg  20 mg Oral QHS  insulin lispro (HUMALOG) injection   SubCUTAneous AC&HS  
 glucose chewable tablet 16 g  4 Tab Oral PRN  
 dextrose (D50W) injection syrg 12.5-25 g  12.5-25 g IntraVENous PRN  
 glucagon (GLUCAGEN) injection 1 mg  1 mg IntraMUSCular PRN  
 acetaminophen (TYLENOL) tablet 650 mg  650 mg Oral Q6H  
 naloxone (NARCAN) injection 0.4 mg  0.4 mg IntraVENous PRN

## 2020-04-15 VITALS
HEART RATE: 72 BPM | RESPIRATION RATE: 16 BRPM | BODY MASS INDEX: 26.05 KG/M2 | OXYGEN SATURATION: 94 % | WEIGHT: 161.38 LBS | DIASTOLIC BLOOD PRESSURE: 48 MMHG | SYSTOLIC BLOOD PRESSURE: 100 MMHG | TEMPERATURE: 96.9 F

## 2020-04-15 LAB
GLUCOSE BLD STRIP.AUTO-MCNC: 139 MG/DL (ref 65–100)
SERVICE CMNT-IMP: ABNORMAL

## 2020-04-15 PROCEDURE — 74011250637 HC RX REV CODE- 250/637: Performed by: PHYSICIAN ASSISTANT

## 2020-04-15 PROCEDURE — 74011250637 HC RX REV CODE- 250/637: Performed by: ORTHOPAEDIC SURGERY

## 2020-04-15 PROCEDURE — 74011250637 HC RX REV CODE- 250/637: Performed by: NURSE PRACTITIONER

## 2020-04-15 PROCEDURE — 82962 GLUCOSE BLOOD TEST: CPT

## 2020-04-15 PROCEDURE — 94760 N-INVAS EAR/PLS OXIMETRY 1: CPT

## 2020-04-15 RX ADMIN — MIDODRINE HYDROCHLORIDE 10 MG: 5 TABLET ORAL at 09:02

## 2020-04-15 RX ADMIN — LEVOTHYROXINE SODIUM 200 MCG: 100 TABLET ORAL at 09:04

## 2020-04-15 RX ADMIN — Medication 10 ML: at 06:25

## 2020-04-15 RX ADMIN — Medication 1 AMPULE: at 09:05

## 2020-04-15 RX ADMIN — ASPIRIN 81 MG: 81 TABLET ORAL at 09:03

## 2020-04-15 RX ADMIN — MELATONIN 5 TABLET: at 09:02

## 2020-04-15 RX ADMIN — ACETAMINOPHEN 650 MG: 325 TABLET, FILM COATED ORAL at 09:04

## 2020-04-15 NOTE — PROGRESS NOTES
Oncology End of Shift Note Bedside shift change report given to Anuradha Baird RN (incoming nurse) by Abdoulaye Masterson (outgoing nurse) on [facility]. Report included the following information SBAR, Kardex, Intake/Output and MAR. Shift Summary: Pt remained stable through night. BP were mildly low through night, pt did have HD yesterday with 1L removed. Pt is on schedule Midodrine. Dressing to L hip intact. See orders for when dressing should be removed. No lab work ordered. Pt to d/c back to [facility] rehab today. Pt did not want to take am Tylenol. Issues for Physician to Address:  D/C Patient on Cardiac Monitoring? [] Yes 
[x] No 
 
Rhythm:   
 
 
 
Abdoulaye Masterson

## 2020-04-15 NOTE — PROGRESS NOTES
Report given to [facility], she was well familiar with pt and condition. Pt well motivated to DC.

## 2020-04-15 NOTE — PROGRESS NOTES
BRADY:  
1) LTC at [place] 9:52 AM- D/C order noted. [facility] paperwork on the chart. RN please call report to [phone]. Pt's family is aware. Medicare pt has received, reviewed, and signed 2nd IM letter informing them of their right to appeal the discharge. Signed copy has been placed on pt bedside chart. Pt no further CM needs identified. Care Management Interventions PCP Verified by CM: No(being seen by doctor at [facility] and [place]) Mode of Transport at Discharge: S Transition of Care Consult (CM Consult): [address] Confirm Follow Up Transport: Other (see comment) The Plan for Transition of Care is Related to the Following Treatment Goals : LTC - return to [place] The Patient and/or Patient Representative was Provided with a Choice of Provider and Agrees with the Discharge Plan?: Yes Name of the Patient Representative Who was Provided with a Choice of Provider and Agrees with the Discharge Plan: Wife/daughter Freedom of Choice List was Provided with Basic Dialogue that Supports the Patient's Individualized Plan of Care/Goals, Treatment Preferences and Shares the Quality Data Associated with the Providers?: Yes Discharge Location Discharge Placement: Long Term Care([facility]) KYRA Miramontes, [facility] [phone]

## 2020-04-15 NOTE — DISCHARGE SUMMARY
Hospitalist Discharge Summary Patient ID: 
Lilly Cabral [id number] 
11 y.o. 
1940 4/8/2020 PCP on record: Rick Collins MD 
 
Admit date: 4/8/2020 Discharge date and time: 4/15/2020 DISCHARGE DIAGNOSIS: 
Acute left femoral neck fracture POA. Status post left hip surgery End-stage renal disease on hemodialysis. Chronic hypotension. 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 History of CVA Diabetes with hyperglycemia Dementia Anemia of chronic kidney disease stable Coronary artery disease. CONSULTATIONS: 
IP CONSULT TO NEPHROLOGY Excerpted HPI from H&P of Latoya Foster MD: 
Lilly Cabral is a 78 y.o. male with dementia, ESRD on HD, HTN, hx DM, hypothyroid with hx myxedema coma 4/19 due to noncompliance, PAD s/p b/l BKA who is sent from [facility] for left femoral neck fracture. Hx from patient but limited by patient's manner of speech that is somewhat difficult to understand. He is oriented to self, situation, month, not to year. Uses wheelchair primarily for mobility. Has b/l prosthesis for transfer. He fell a week ago from wheelchair when trying to reach something over a cabinet and been having hip pain since. Pain increasing worse and had xray done at nursing home yesterday showing left hip fracture 
 
______________________________________________________________________ DISCHARGE SUMMARY/HOSPITAL COURSE:  for full details see H&P, daily progress notes, labs, consult notes. Patient is 68-year-old -American gentleman with advanced dementia who lives at the nursing home was brought here because of fall and left hip fracture. Patient was admitted to medical service orthopedics was consulted patient had a surgery of the left hip. Patient has been doing fairly well patient also has end-stage renal disease on hemodialysis. Patient also has chronic hypotension on midodrine. Patient has incidence of blood pressure dependent low received fluid boluses and resumed patient's midodrine. Advanced nausea confused. He is otherwise very comfortable no agitation. Patient is at baseline tolerating oral intake. Patient also has been having dialysis. No fever no chills no nausea no vomiting. 
 
 
 
 
_______________________________________________________________________ Patient seen and examined by me on discharge day. Pertinent Findings: 
Gen:    Not in distress Chest: Clear lungs CVS:   Regular rhythm. No edema Abd:  Soft, not distended, not tender Neuro: Demented Musculoskeletal bilateral lower extremity BKA Ms. I think the plan is to discharge her today normal the hemoglobin also to go home _______________________________________________________________________ DISCHARGE MEDICATIONS:  
Current Discharge Medication List  
  
START taking these medications Details  
!! aspirin delayed-release 81 mg tablet Take 1 Tab by mouth two (2) times a day for 30 days. Qty: 60 Tab, Refills: 0  
  
acetaminophen (TYLENOL) 325 mg tablet Take 2 Tabs by mouth every six (6) hours for 14 days. Qty: 112 Tab, Refills: 0  
  
senna-docusate (PERICOLACE) 8.6-50 mg per tablet Take 1 Tab by mouth two (2) times a day for 30 days. Qty: 60 Tab, Refills: 0  
  
 !! - Potential duplicate medications found. Please discuss with provider. CONTINUE these medications which have NOT CHANGED Details  
cholecalciferol, vitamin D3, (VITAMIN D3 PO) Take 5,000 Units by mouth daily. midodrine (PROAMITINE) 10 mg tablet Take 10 mg by mouth Q TU, TH & SAT. With dialysis  
  
levothyroxine (SYNTHROID) 200 mcg tablet Take 200 mcg by mouth Daily (before breakfast). pravastatin (PRAVACHOL) 20 mg tablet Take 20 mg by mouth nightly. !! OTHER,NON-FORMULARY, GLUCOSE NA/F CAFFIENE FREE TAB CHEW, TAKE 3 TABS BY MOUTH PRN FOR HYPOGLYCEMIA !! OTHER,NON-FORMULARY, GLUCOST 15 40% GEL TAKEN 1GM BY MOUTH, PRN FOR BS<60 AND ALERT MD  
  
glucagon (GLUCAGEN) 1 mg injection 1 mg by IntraMUSCular route once. FOR BS<60 AND UNRESPONSIVE  
  
polyethylene glycol (MIRALAX) 17 gram/dose powder Take 17 g by mouth daily. Qty: 238 g, Refills: 0  
  
ondansetron (ZOFRAN ODT) 4 mg disintegrating tablet Take 1 Tab by mouth every eight (8) hours as needed for Nausea. Qty: 10 Tab, Refills: 0  
  
!! aspirin delayed-release 81 mg tablet Take 1 Tab by mouth daily. Qty: 30 Tab, Refills: 11  
  
 !! - Potential duplicate medications found. Please discuss with provider. Patient Follow Up Instructions: Activity: as tolerated Diet general 
 
 
 
Follow-up Information Follow up With Specialties Details Why Contact Amy Lindsey MD Orthopedic Surgery Schedule an appointment as soon as possible for a visit in 2 weeks  . Jabari Lambert [address] [place] [address] [address] [phone] Alfred Langley MD Family Practice   [address] [place] [address] 
[phone] 
  
  
 
________________________________________________________________ Risk of deterioration: low Condition at Discharge:  Stable 
__________________________________________________________________ Disposition Nursing home 
 
____________________________________________________________________ Code Status: full 
___________________________________________________________________ Total time in minutes spent coordinating this discharge (includes going over instructions, follow-up, prescriptions, and preparing report for sign off to her PCP) :  36  minutes Signed: 
Bobby Butts MD

## 2021-01-01 ENCOUNTER — APPOINTMENT (OUTPATIENT)
Dept: CT IMAGING | Age: 81
DRG: 252 | End: 2021-01-01
Attending: FAMILY MEDICINE
Payer: MEDICARE

## 2021-01-01 ENCOUNTER — APPOINTMENT (OUTPATIENT)
Dept: GENERAL RADIOLOGY | Age: 81
DRG: 252 | End: 2021-01-01
Attending: EMERGENCY MEDICINE
Payer: MEDICARE

## 2021-01-01 ENCOUNTER — APPOINTMENT (OUTPATIENT)
Dept: NON INVASIVE DIAGNOSTICS | Age: 81
DRG: 252 | End: 2021-01-01
Attending: FAMILY MEDICINE
Payer: MEDICARE

## 2021-01-01 ENCOUNTER — HOSPITAL ENCOUNTER (INPATIENT)
Age: 81
LOS: 4 days | Discharge: NURSING, REHAB OR LONG-TERM CARE | DRG: 252 | End: 2021-07-23
Attending: EMERGENCY MEDICINE | Admitting: FAMILY MEDICINE
Payer: MEDICARE

## 2021-01-01 ENCOUNTER — PATIENT OUTREACH (OUTPATIENT)
Dept: CASE MANAGEMENT | Age: 81
End: 2021-01-01

## 2021-01-01 ENCOUNTER — ANESTHESIA EVENT (OUTPATIENT)
Dept: SURGERY | Age: 81
DRG: 252 | End: 2021-01-01
Payer: MEDICARE

## 2021-01-01 ENCOUNTER — APPOINTMENT (OUTPATIENT)
Dept: GENERAL RADIOLOGY | Age: 81
DRG: 252 | End: 2021-01-01
Payer: MEDICARE

## 2021-01-01 ENCOUNTER — APPOINTMENT (OUTPATIENT)
Dept: GENERAL RADIOLOGY | Age: 81
DRG: 252 | End: 2021-01-01
Attending: ANESTHESIOLOGY
Payer: MEDICARE

## 2021-01-01 ENCOUNTER — ANESTHESIA (OUTPATIENT)
Dept: SURGERY | Age: 81
DRG: 252 | End: 2021-01-01
Payer: MEDICARE

## 2021-01-01 ENCOUNTER — HOSPITAL ENCOUNTER (EMERGENCY)
Age: 81
End: 2021-11-06
Attending: STUDENT IN AN ORGANIZED HEALTH CARE EDUCATION/TRAINING PROGRAM
Payer: MEDICARE

## 2021-01-01 ENCOUNTER — APPOINTMENT (OUTPATIENT)
Dept: CT IMAGING | Age: 81
DRG: 252 | End: 2021-01-01
Attending: EMERGENCY MEDICINE
Payer: MEDICARE

## 2021-01-01 VITALS
SYSTOLIC BLOOD PRESSURE: 103 MMHG | HEIGHT: 66 IN | DIASTOLIC BLOOD PRESSURE: 60 MMHG | OXYGEN SATURATION: 97 % | HEART RATE: 78 BPM | BODY MASS INDEX: 21.07 KG/M2 | RESPIRATION RATE: 14 BRPM | TEMPERATURE: 98 F | WEIGHT: 131.1 LBS

## 2021-01-01 DIAGNOSIS — J81.0 ACUTE PULMONARY EDEMA (HCC): ICD-10-CM

## 2021-01-01 DIAGNOSIS — R09.2 RESPIRATORY ARREST (HCC): Primary | ICD-10-CM

## 2021-01-01 DIAGNOSIS — R53.81 DEBILITY: ICD-10-CM

## 2021-01-01 DIAGNOSIS — N18.6 ESRD ON HEMODIALYSIS (HCC): ICD-10-CM

## 2021-01-01 DIAGNOSIS — F03.90 DEMENTIA WITHOUT BEHAVIORAL DISTURBANCE, UNSPECIFIED DEMENTIA TYPE: ICD-10-CM

## 2021-01-01 DIAGNOSIS — I46.9 CARDIAC ARREST (HCC): Primary | ICD-10-CM

## 2021-01-01 DIAGNOSIS — Z99.2 ESRD ON HEMODIALYSIS (HCC): ICD-10-CM

## 2021-01-01 DIAGNOSIS — Z71.89 COUNSELING REGARDING ADVANCE CARE PLANNING AND GOALS OF CARE: ICD-10-CM

## 2021-01-01 DIAGNOSIS — Z86.73 HISTORY OF CVA (CEREBROVASCULAR ACCIDENT): ICD-10-CM

## 2021-01-01 DIAGNOSIS — I46.9 CARDIAC ARREST (HCC): ICD-10-CM

## 2021-01-01 DIAGNOSIS — I10 ESSENTIAL HYPERTENSION: Chronic | ICD-10-CM

## 2021-01-01 DIAGNOSIS — I42.0 DILATED CARDIOMYOPATHY (HCC): ICD-10-CM

## 2021-01-01 LAB
ABO + RH BLD: NORMAL
ALBUMIN SERPL-MCNC: 2.9 G/DL (ref 3.5–5)
ALBUMIN/GLOB SERPL: 0.5 {RATIO} (ref 1.1–2.2)
ALP SERPL-CCNC: 144 U/L (ref 45–117)
ALT SERPL-CCNC: 13 U/L (ref 12–78)
ANION GAP SERPL CALC-SCNC: 11 MMOL/L (ref 5–15)
ANION GAP SERPL CALC-SCNC: 13 MMOL/L (ref 5–15)
ANION GAP SERPL CALC-SCNC: 14 MMOL/L (ref 5–15)
ANION GAP SERPL CALC-SCNC: 16 MMOL/L (ref 5–15)
AST SERPL-CCNC: 18 U/L (ref 15–37)
ATRIAL RATE: 104 BPM
BASOPHILS # BLD: 0 K/UL (ref 0–0.1)
BASOPHILS # BLD: 0 K/UL (ref 0–0.1)
BASOPHILS NFR BLD: 0 % (ref 0–1)
BASOPHILS NFR BLD: 0 % (ref 0–1)
BILIRUB SERPL-MCNC: 0.7 MG/DL (ref 0.2–1)
BLOOD GROUP ANTIBODIES SERPL: NORMAL
BNP SERPL-MCNC: ABNORMAL PG/ML
BUN SERPL-MCNC: 27 MG/DL (ref 6–20)
BUN SERPL-MCNC: 39 MG/DL (ref 6–20)
BUN SERPL-MCNC: 51 MG/DL (ref 6–20)
BUN SERPL-MCNC: 56 MG/DL (ref 6–20)
BUN/CREAT SERPL: 6 (ref 12–20)
BUN/CREAT SERPL: 7 (ref 12–20)
BUN/CREAT SERPL: 8 (ref 12–20)
BUN/CREAT SERPL: 8 (ref 12–20)
CALCIUM SERPL-MCNC: 7.6 MG/DL (ref 8.5–10.1)
CALCIUM SERPL-MCNC: 7.8 MG/DL (ref 8.5–10.1)
CALCIUM SERPL-MCNC: 8.3 MG/DL (ref 8.5–10.1)
CALCIUM SERPL-MCNC: 9 MG/DL (ref 8.5–10.1)
CALCULATED R AXIS, ECG10: 26 DEGREES
CALCULATED T AXIS, ECG11: -110 DEGREES
CHLORIDE SERPL-SCNC: 100 MMOL/L (ref 97–108)
CHLORIDE SERPL-SCNC: 101 MMOL/L (ref 97–108)
CHLORIDE SERPL-SCNC: 98 MMOL/L (ref 97–108)
CHLORIDE SERPL-SCNC: 98 MMOL/L (ref 97–108)
CHOLEST SERPL-MCNC: 140 MG/DL
CO2 SERPL-SCNC: 20 MMOL/L (ref 21–32)
CO2 SERPL-SCNC: 22 MMOL/L (ref 21–32)
CO2 SERPL-SCNC: 23 MMOL/L (ref 21–32)
CO2 SERPL-SCNC: 23 MMOL/L (ref 21–32)
COMMENT, HOLDF: NORMAL
COMMENT, HOLDF: NORMAL
COVID-19 RAPID TEST, COVR: NOT DETECTED
CREAT SERPL-MCNC: 3.76 MG/DL (ref 0.7–1.3)
CREAT SERPL-MCNC: 6.21 MG/DL (ref 0.7–1.3)
CREAT SERPL-MCNC: 6.66 MG/DL (ref 0.7–1.3)
CREAT SERPL-MCNC: 7.24 MG/DL (ref 0.7–1.3)
DIAGNOSIS, 93000: NORMAL
DIFFERENTIAL METHOD BLD: ABNORMAL
DIFFERENTIAL METHOD BLD: ABNORMAL
ECHO AO ROOT DIAM: 2.96 CM
ECHO AV AREA PEAK VELOCITY: 2.83 CM2
ECHO AV AREA/BSA PEAK VELOCITY: 1.7 CM2/M2
ECHO AV PEAK GRADIENT: 14.73 MMHG
ECHO AV PEAK VELOCITY: 191.93 CM/S
ECHO EST RA PRESSURE: 10 MMHG
ECHO LA AREA 4C: 14.53 CM2
ECHO LA VOL 2C: 69.69 ML (ref 18–58)
ECHO LA VOL 4C: 37.21 ML (ref 18–58)
ECHO LA VOL BP: 56.99 ML (ref 18–58)
ECHO LA VOL/BSA BIPLANE: 33.92 ML/M2 (ref 16–28)
ECHO LA VOLUME INDEX A2C: 41.48 ML/M2 (ref 16–28)
ECHO LA VOLUME INDEX A4C: 22.15 ML/M2 (ref 16–28)
ECHO LV EDV A2C: 155.36 ML
ECHO LV EDV A4C: 148 ML
ECHO LV EDV BP: 151.59 ML (ref 67–155)
ECHO LV EDV INDEX A4C: 88.1 ML/M2
ECHO LV EDV INDEX BP: 90.2 ML/M2
ECHO LV EDV NDEX A2C: 92.5 ML/M2
ECHO LV EJECTION FRACTION A2C: 31 PERCENT
ECHO LV EJECTION FRACTION A4C: 13 PERCENT
ECHO LV EJECTION FRACTION BIPLANE: 20.9 PERCENT (ref 55–100)
ECHO LV ESV A2C: 107.84 ML
ECHO LV ESV A4C: 128.67 ML
ECHO LV ESV BP: 119.93 ML (ref 22–58)
ECHO LV ESV INDEX A2C: 64.2 ML/M2
ECHO LV ESV INDEX A4C: 76.6 ML/M2
ECHO LV ESV INDEX BP: 71.4 ML/M2
ECHO LV INTERNAL DIMENSION DIASTOLIC: 5.08 CM (ref 4.2–5.9)
ECHO LV INTERNAL DIMENSION SYSTOLIC: 4.68 CM
ECHO LV IVSD: 0.83 CM (ref 0.6–1)
ECHO LV MASS 2D: 169.6 G (ref 88–224)
ECHO LV MASS INDEX 2D: 101 G/M2 (ref 49–115)
ECHO LV POSTERIOR WALL DIASTOLIC: 1.03 CM (ref 0.6–1)
ECHO LVOT DIAM: 2.16 CM
ECHO LVOT PEAK GRADIENT: 8.78 MMHG
ECHO LVOT PEAK VELOCITY: 148.18 CM/S
ECHO MV A VELOCITY: 43.15 CM/S
ECHO MV AREA PHT: 4.63 CM2
ECHO MV E DECELERATION TIME (DT): 163.79 MS
ECHO MV E VELOCITY: 125.7 CM/S
ECHO MV E/A RATIO: 2.91
ECHO MV MAX VELOCITY: 123.61 CM/S
ECHO MV MEAN GRADIENT: 1.96 MMHG
ECHO MV PEAK GRADIENT: 6.11 MMHG
ECHO MV PRESSURE HALF TIME (PHT): 47.5 MS
ECHO MV VTI: 27.08 CM
ECHO PV PEAK INSTANTANEOUS GRADIENT SYSTOLIC: 3.57 MMHG
ECHO RIGHT VENTRICULAR SYSTOLIC PRESSURE (RVSP): 52.99 MMHG
ECHO RV INTERNAL DIMENSION: 5.02 CM
ECHO RV TAPSE: 1.17 CM (ref 1.5–2)
ECHO TV REGURGITANT MAX VELOCITY: 327.82 CM/S
ECHO TV REGURGITANT PEAK GRADIENT: 42.99 MMHG
EOSINOPHIL # BLD: 0 K/UL (ref 0–0.4)
EOSINOPHIL # BLD: 0.1 K/UL (ref 0–0.4)
EOSINOPHIL NFR BLD: 0 % (ref 0–7)
EOSINOPHIL NFR BLD: 1 % (ref 0–7)
ERYTHROCYTE [DISTWIDTH] IN BLOOD BY AUTOMATED COUNT: 13.9 % (ref 11.5–14.5)
ERYTHROCYTE [DISTWIDTH] IN BLOOD BY AUTOMATED COUNT: 14.2 % (ref 11.5–14.5)
EST. AVERAGE GLUCOSE BLD GHB EST-MCNC: 140 MG/DL
GLOBULIN SER CALC-MCNC: 5.5 G/DL (ref 2–4)
GLUCOSE BLD STRIP.AUTO-MCNC: 103 MG/DL (ref 65–117)
GLUCOSE BLD STRIP.AUTO-MCNC: 122 MG/DL (ref 65–117)
GLUCOSE BLD STRIP.AUTO-MCNC: 140 MG/DL (ref 65–117)
GLUCOSE BLD STRIP.AUTO-MCNC: 141 MG/DL (ref 65–117)
GLUCOSE BLD STRIP.AUTO-MCNC: 154 MG/DL (ref 65–117)
GLUCOSE BLD STRIP.AUTO-MCNC: 164 MG/DL (ref 65–117)
GLUCOSE BLD STRIP.AUTO-MCNC: 171 MG/DL (ref 65–117)
GLUCOSE BLD STRIP.AUTO-MCNC: 186 MG/DL (ref 65–117)
GLUCOSE BLD STRIP.AUTO-MCNC: 236 MG/DL (ref 65–117)
GLUCOSE BLD STRIP.AUTO-MCNC: 84 MG/DL (ref 65–117)
GLUCOSE BLD STRIP.AUTO-MCNC: 89 MG/DL (ref 65–117)
GLUCOSE BLD STRIP.AUTO-MCNC: 96 MG/DL (ref 65–117)
GLUCOSE BLD STRIP.AUTO-MCNC: 97 MG/DL (ref 65–117)
GLUCOSE SERPL-MCNC: 143 MG/DL (ref 65–100)
GLUCOSE SERPL-MCNC: 155 MG/DL (ref 65–100)
GLUCOSE SERPL-MCNC: 176 MG/DL (ref 65–100)
GLUCOSE SERPL-MCNC: 241 MG/DL (ref 65–100)
HBA1C MFR BLD: 6.5 % (ref 4–5.6)
HBV SURFACE AB SER QL: REACTIVE
HBV SURFACE AB SER-ACNC: 17.54 MIU/ML
HBV SURFACE AG SER QL: <0.1 INDEX
HBV SURFACE AG SER QL: NEGATIVE
HCT VFR BLD AUTO: 28.1 % (ref 36.6–50.3)
HCT VFR BLD AUTO: 35.8 % (ref 36.6–50.3)
HDLC SERPL-MCNC: 37 MG/DL
HDLC SERPL: 3.8 {RATIO} (ref 0–5)
HGB BLD-MCNC: 10.8 G/DL (ref 12.1–17)
HGB BLD-MCNC: 9.1 G/DL (ref 12.1–17)
IMM GRANULOCYTES # BLD AUTO: 0 K/UL (ref 0–0.04)
IMM GRANULOCYTES # BLD AUTO: 0.1 K/UL (ref 0–0.04)
IMM GRANULOCYTES NFR BLD AUTO: 0 % (ref 0–0.5)
IMM GRANULOCYTES NFR BLD AUTO: 1 % (ref 0–0.5)
INR PPP: 1.4 (ref 0.9–1.1)
LDLC SERPL CALC-MCNC: 85.8 MG/DL (ref 0–100)
LYMPHOCYTES # BLD: 0.6 K/UL (ref 0.8–3.5)
LYMPHOCYTES # BLD: 2.9 K/UL (ref 0.8–3.5)
LYMPHOCYTES NFR BLD: 10 % (ref 12–49)
LYMPHOCYTES NFR BLD: 39 % (ref 12–49)
MAGNESIUM SERPL-MCNC: 2.2 MG/DL (ref 1.6–2.4)
MCH RBC QN AUTO: 35.1 PG (ref 26–34)
MCH RBC QN AUTO: 35.8 PG (ref 26–34)
MCHC RBC AUTO-ENTMCNC: 30.2 G/DL (ref 30–36.5)
MCHC RBC AUTO-ENTMCNC: 32.4 G/DL (ref 30–36.5)
MCV RBC AUTO: 110.6 FL (ref 80–99)
MCV RBC AUTO: 116.2 FL (ref 80–99)
MONOCYTES # BLD: 0.4 K/UL (ref 0–1)
MONOCYTES # BLD: 0.8 K/UL (ref 0–1)
MONOCYTES NFR BLD: 11 % (ref 5–13)
MONOCYTES NFR BLD: 7 % (ref 5–13)
NEUTS SEG # BLD: 3.5 K/UL (ref 1.8–8)
NEUTS SEG # BLD: 5.4 K/UL (ref 1.8–8)
NEUTS SEG NFR BLD: 48 % (ref 32–75)
NEUTS SEG NFR BLD: 83 % (ref 32–75)
NRBC # BLD: 0 K/UL (ref 0–0.01)
NRBC # BLD: 0 K/UL (ref 0–0.01)
NRBC BLD-RTO: 0 PER 100 WBC
NRBC BLD-RTO: 0 PER 100 WBC
PHOSPHATE SERPL-MCNC: 6.8 MG/DL (ref 2.6–4.7)
PLATELET # BLD AUTO: 119 K/UL (ref 150–400)
PLATELET # BLD AUTO: 148 K/UL (ref 150–400)
PLATELET COMMENTS,PCOM: ABNORMAL
PMV BLD AUTO: 10.7 FL (ref 8.9–12.9)
PMV BLD AUTO: 11 FL (ref 8.9–12.9)
POTASSIUM SERPL-SCNC: 3.6 MMOL/L (ref 3.5–5.1)
POTASSIUM SERPL-SCNC: 4.6 MMOL/L (ref 3.5–5.1)
POTASSIUM SERPL-SCNC: 4.8 MMOL/L (ref 3.5–5.1)
POTASSIUM SERPL-SCNC: 5.4 MMOL/L (ref 3.5–5.1)
PROT SERPL-MCNC: 8.4 G/DL (ref 6.4–8.2)
PROTHROMBIN TIME: 14.6 SEC (ref 9–11.1)
Q-T INTERVAL, ECG07: 340 MS
QRS DURATION, ECG06: 88 MS
QTC CALCULATION (BEZET), ECG08: 457 MS
RBC # BLD AUTO: 2.54 M/UL (ref 4.1–5.7)
RBC # BLD AUTO: 3.08 M/UL (ref 4.1–5.7)
RBC MORPH BLD: ABNORMAL
RBC MORPH BLD: ABNORMAL
SAMPLES BEING HELD,HOLD: NORMAL
SAMPLES BEING HELD,HOLD: NORMAL
SERVICE CMNT-IMP: ABNORMAL
SERVICE CMNT-IMP: NORMAL
SODIUM SERPL-SCNC: 133 MMOL/L (ref 136–145)
SODIUM SERPL-SCNC: 134 MMOL/L (ref 136–145)
SODIUM SERPL-SCNC: 134 MMOL/L (ref 136–145)
SODIUM SERPL-SCNC: 138 MMOL/L (ref 136–145)
SOURCE, COVRS: NORMAL
SPECIMEN EXP DATE BLD: NORMAL
T3FREE SERPL-MCNC: 0.9 PG/ML (ref 2.2–4)
TRIGL SERPL-MCNC: 86 MG/DL (ref ?–150)
TROPONIN I SERPL-MCNC: 0.05 NG/ML
TROPONIN I SERPL-MCNC: <0.05 NG/ML
TSH SERPL DL<=0.05 MIU/L-ACNC: 16.6 UIU/ML (ref 0.36–3.74)
VENTRICULAR RATE, ECG03: 109 BPM
VLDLC SERPL CALC-MCNC: 17.2 MG/DL
WBC # BLD AUTO: 6.4 K/UL (ref 4.1–11.1)
WBC # BLD AUTO: 7.4 K/UL (ref 4.1–11.1)

## 2021-01-01 PROCEDURE — 74011250636 HC RX REV CODE- 250/636: Performed by: STUDENT IN AN ORGANIZED HEALTH CARE EDUCATION/TRAINING PROGRAM

## 2021-01-01 PROCEDURE — 74011250636 HC RX REV CODE- 250/636: Performed by: ANESTHESIOLOGY

## 2021-01-01 PROCEDURE — 76010000149 HC OR TIME 1 TO 1.5 HR

## 2021-01-01 PROCEDURE — 84132 ASSAY OF SERUM POTASSIUM: CPT

## 2021-01-01 PROCEDURE — 80048 BASIC METABOLIC PNL TOTAL CA: CPT

## 2021-01-01 PROCEDURE — 77030003704 HC NDL VASC ACC ARMD -A

## 2021-01-01 PROCEDURE — 85610 PROTHROMBIN TIME: CPT

## 2021-01-01 PROCEDURE — 74011250637 HC RX REV CODE- 250/637: Performed by: FAMILY MEDICINE

## 2021-01-01 PROCEDURE — 86706 HEP B SURFACE ANTIBODY: CPT

## 2021-01-01 PROCEDURE — 74011636637 HC RX REV CODE- 636/637: Performed by: FAMILY MEDICINE

## 2021-01-01 PROCEDURE — 77030002916 HC SUT ETHLN J&J -A

## 2021-01-01 PROCEDURE — 74011000636 HC RX REV CODE- 636

## 2021-01-01 PROCEDURE — 99232 SBSQ HOSP IP/OBS MODERATE 35: CPT | Performed by: INTERNAL MEDICINE

## 2021-01-01 PROCEDURE — 82962 GLUCOSE BLOOD TEST: CPT

## 2021-01-01 PROCEDURE — 76060000033 HC ANESTHESIA 1 TO 1.5 HR

## 2021-01-01 PROCEDURE — 85025 COMPLETE CBC W/AUTO DIFF WBC: CPT

## 2021-01-01 PROCEDURE — 83036 HEMOGLOBIN GLYCOSYLATED A1C: CPT

## 2021-01-01 PROCEDURE — 36415 COLL VENOUS BLD VENIPUNCTURE: CPT

## 2021-01-01 PROCEDURE — 74011000250 HC RX REV CODE- 250: Performed by: STUDENT IN AN ORGANIZED HEALTH CARE EDUCATION/TRAINING PROGRAM

## 2021-01-01 PROCEDURE — 84481 FREE ASSAY (FT-3): CPT

## 2021-01-01 PROCEDURE — 99356 PR PROLONGED SVC I/P OR OBS SETTING 1ST HOUR: CPT | Performed by: INTERNAL MEDICINE

## 2021-01-01 PROCEDURE — 87635 SARS-COV-2 COVID-19 AMP PRB: CPT

## 2021-01-01 PROCEDURE — 94760 N-INVAS EAR/PLS OXIMETRY 1: CPT

## 2021-01-01 PROCEDURE — 70450 CT HEAD/BRAIN W/O DYE: CPT

## 2021-01-01 PROCEDURE — 86901 BLOOD TYPING SEROLOGIC RH(D): CPT

## 2021-01-01 PROCEDURE — 80061 LIPID PANEL: CPT

## 2021-01-01 PROCEDURE — 99222 1ST HOSP IP/OBS MODERATE 55: CPT | Performed by: INTERNAL MEDICINE

## 2021-01-01 PROCEDURE — C1757 CATH, THROMBECTOMY/EMBOLECT: HCPCS

## 2021-01-01 PROCEDURE — 84484 ASSAY OF TROPONIN QUANT: CPT

## 2021-01-01 PROCEDURE — 2709999900 HC NON-CHARGEABLE SUPPLY

## 2021-01-01 PROCEDURE — 77030013060 HC DEV INFL PRSS MRTM -B

## 2021-01-01 PROCEDURE — 65660000000 HC RM CCU STEPDOWN

## 2021-01-01 PROCEDURE — 74011000250 HC RX REV CODE- 250

## 2021-01-01 PROCEDURE — 84100 ASSAY OF PHOSPHORUS: CPT

## 2021-01-01 PROCEDURE — 99233 SBSQ HOSP IP/OBS HIGH 50: CPT | Performed by: INTERNAL MEDICINE

## 2021-01-01 PROCEDURE — 71045 X-RAY EXAM CHEST 1 VIEW: CPT

## 2021-01-01 PROCEDURE — 90935 HEMODIALYSIS ONE EVALUATION: CPT

## 2021-01-01 PROCEDURE — 80053 COMPREHEN METABOLIC PANEL: CPT

## 2021-01-01 PROCEDURE — 74011636637 HC RX REV CODE- 636/637: Performed by: INTERNAL MEDICINE

## 2021-01-01 PROCEDURE — 76210000006 HC OR PH I REC 0.5 TO 1 HR

## 2021-01-01 PROCEDURE — C1769 GUIDE WIRE: HCPCS

## 2021-01-01 PROCEDURE — 93306 TTE W/DOPPLER COMPLETE: CPT | Performed by: INTERNAL MEDICINE

## 2021-01-01 PROCEDURE — 74011250636 HC RX REV CODE- 250/636

## 2021-01-01 PROCEDURE — 77030031139 HC SUT VCRL2 J&J -A

## 2021-01-01 PROCEDURE — C1725 CATH, TRANSLUMIN NON-LASER: HCPCS

## 2021-01-01 PROCEDURE — 74011250636 HC RX REV CODE- 250/636: Performed by: FAMILY MEDICINE

## 2021-01-01 PROCEDURE — 83880 ASSAY OF NATRIURETIC PEPTIDE: CPT

## 2021-01-01 PROCEDURE — 31500 INSERT EMERGENCY AIRWAY: CPT

## 2021-01-01 PROCEDURE — 92950 HEART/LUNG RESUSCITATION CPR: CPT

## 2021-01-01 PROCEDURE — 84443 ASSAY THYROID STIM HORMONE: CPT

## 2021-01-01 PROCEDURE — 05HM33Z INSERTION OF INFUSION DEVICE INTO RIGHT INTERNAL JUGULAR VEIN, PERCUTANEOUS APPROACH: ICD-10-PCS | Performed by: ANESTHESIOLOGY

## 2021-01-01 PROCEDURE — 74011000250 HC RX REV CODE- 250: Performed by: INTERNAL MEDICINE

## 2021-01-01 PROCEDURE — 99221 1ST HOSP IP/OBS SF/LOW 40: CPT | Performed by: INTERNAL MEDICINE

## 2021-01-01 PROCEDURE — 74011250637 HC RX REV CODE- 250/637: Performed by: INTERNAL MEDICINE

## 2021-01-01 PROCEDURE — 037Y3DZ DILATION OF UPPER ARTERY WITH INTRALUMINAL DEVICE, PERCUTANEOUS APPROACH: ICD-10-PCS

## 2021-01-01 PROCEDURE — 93306 TTE W/DOPPLER COMPLETE: CPT

## 2021-01-01 PROCEDURE — 77030002924 HC SUT GORTX WLGO -B

## 2021-01-01 PROCEDURE — 83735 ASSAY OF MAGNESIUM: CPT

## 2021-01-01 PROCEDURE — 99283 EMERGENCY DEPT VISIT LOW MDM: CPT

## 2021-01-01 PROCEDURE — 99285 EMERGENCY DEPT VISIT HI MDM: CPT

## 2021-01-01 PROCEDURE — 03CY3ZZ EXTIRPATION OF MATTER FROM UPPER ARTERY, PERCUTANEOUS APPROACH: ICD-10-PCS

## 2021-01-01 PROCEDURE — 5A1D70Z PERFORMANCE OF URINARY FILTRATION, INTERMITTENT, LESS THAN 6 HOURS PER DAY: ICD-10-PCS | Performed by: INTERNAL MEDICINE

## 2021-01-01 PROCEDURE — C1894 INTRO/SHEATH, NON-LASER: HCPCS

## 2021-01-01 PROCEDURE — 74011250636 HC RX REV CODE- 250/636: Performed by: INTERNAL MEDICINE

## 2021-01-01 PROCEDURE — 36556 INSERT NON-TUNNEL CV CATH: CPT

## 2021-01-01 PROCEDURE — 65660000001 HC RM ICU INTERMED STEPDOWN

## 2021-01-01 PROCEDURE — 93005 ELECTROCARDIOGRAM TRACING: CPT

## 2021-01-01 PROCEDURE — C1876 STENT, NON-COA/NON-COV W/DEL: HCPCS

## 2021-01-01 PROCEDURE — 87340 HEPATITIS B SURFACE AG IA: CPT

## 2021-01-01 DEVICE — STENT PRB35-08-040-080 PROTEGE EF V07
Type: IMPLANTABLE DEVICE | Site: ARM | Status: FUNCTIONAL
Brand: EVERFLEX™ PROTÉGÉ™ EVERFLEX™

## 2021-01-01 RX ORDER — SODIUM CHLORIDE 0.9 % (FLUSH) 0.9 %
5-40 SYRINGE (ML) INJECTION AS NEEDED
Status: DISCONTINUED | OUTPATIENT
Start: 2021-01-01 | End: 2021-01-01 | Stop reason: HOSPADM

## 2021-01-01 RX ORDER — HEPARIN SODIUM 5000 [USP'U]/ML
5000 INJECTION, SOLUTION INTRAVENOUS; SUBCUTANEOUS EVERY 8 HOURS
Status: DISCONTINUED | OUTPATIENT
Start: 2021-01-01 | End: 2021-01-01 | Stop reason: HOSPADM

## 2021-01-01 RX ORDER — INSULIN LISPRO 100 [IU]/ML
INJECTION, SOLUTION INTRAVENOUS; SUBCUTANEOUS
Status: DISCONTINUED | OUTPATIENT
Start: 2021-01-01 | End: 2021-01-01 | Stop reason: HOSPADM

## 2021-01-01 RX ORDER — MIDODRINE HYDROCHLORIDE 5 MG/1
10 TABLET ORAL
Status: DISCONTINUED | OUTPATIENT
Start: 2021-01-01 | End: 2021-01-01 | Stop reason: HOSPADM

## 2021-01-01 RX ORDER — HEPARIN SODIUM 1000 [USP'U]/ML
INJECTION, SOLUTION INTRAVENOUS; SUBCUTANEOUS AS NEEDED
Status: DISCONTINUED | OUTPATIENT
Start: 2021-01-01 | End: 2021-01-01 | Stop reason: HOSPADM

## 2021-01-01 RX ORDER — CALCIUM CHLORIDE INJECTION 100 MG/ML
INJECTION, SOLUTION INTRAVENOUS
Status: COMPLETED | OUTPATIENT
Start: 2021-01-01 | End: 2021-01-01

## 2021-01-01 RX ORDER — PHENYLEPHRINE HCL IN 0.9% NACL 0.4MG/10ML
SYRINGE (ML) INTRAVENOUS
Status: DISCONTINUED | OUTPATIENT
Start: 2021-01-01 | End: 2021-01-01 | Stop reason: HOSPADM

## 2021-01-01 RX ORDER — LIDOCAINE HYDROCHLORIDE AND EPINEPHRINE 10; 10 MG/ML; UG/ML
1.5 INJECTION, SOLUTION INFILTRATION; PERINEURAL
Status: DISPENSED | OUTPATIENT
Start: 2021-01-01 | End: 2021-01-01

## 2021-01-01 RX ORDER — SODIUM CHLORIDE, SODIUM LACTATE, POTASSIUM CHLORIDE, CALCIUM CHLORIDE 600; 310; 30; 20 MG/100ML; MG/100ML; MG/100ML; MG/100ML
125 INJECTION, SOLUTION INTRAVENOUS CONTINUOUS
Status: DISCONTINUED | OUTPATIENT
Start: 2021-01-01 | End: 2021-01-01 | Stop reason: HOSPADM

## 2021-01-01 RX ORDER — DEXTROSE 50 % IN WATER (D50W) INTRAVENOUS SYRINGE
25-50 AS NEEDED
Status: DISCONTINUED | OUTPATIENT
Start: 2021-01-01 | End: 2021-01-01 | Stop reason: HOSPADM

## 2021-01-01 RX ORDER — LIDOCAINE HYDROCHLORIDE 10 MG/ML
0.1 INJECTION, SOLUTION EPIDURAL; INFILTRATION; INTRACAUDAL; PERINEURAL AS NEEDED
Status: DISCONTINUED | OUTPATIENT
Start: 2021-01-01 | End: 2021-01-01 | Stop reason: HOSPADM

## 2021-01-01 RX ORDER — BACITRACIN 500 UNIT/G
1 PACKET (EA) TOPICAL 3 TIMES DAILY
Status: DISCONTINUED | OUTPATIENT
Start: 2021-01-01 | End: 2021-01-01 | Stop reason: HOSPADM

## 2021-01-01 RX ORDER — HYDROMORPHONE HYDROCHLORIDE 1 MG/ML
0.2 INJECTION, SOLUTION INTRAMUSCULAR; INTRAVENOUS; SUBCUTANEOUS
Status: DISCONTINUED | OUTPATIENT
Start: 2021-01-01 | End: 2021-01-01 | Stop reason: HOSPADM

## 2021-01-01 RX ORDER — SODIUM BICARBONATE 1 MEQ/ML
SYRINGE (ML) INTRAVENOUS
Status: COMPLETED | OUTPATIENT
Start: 2021-01-01 | End: 2021-01-01

## 2021-01-01 RX ORDER — LIDOCAINE HYDROCHLORIDE 10 MG/ML
INJECTION INFILTRATION; PERINEURAL AS NEEDED
Status: DISCONTINUED | OUTPATIENT
Start: 2021-01-01 | End: 2021-01-01 | Stop reason: HOSPADM

## 2021-01-01 RX ORDER — SODIUM CHLORIDE 0.9 % (FLUSH) 0.9 %
5-40 SYRINGE (ML) INJECTION EVERY 8 HOURS
Status: DISCONTINUED | OUTPATIENT
Start: 2021-01-01 | End: 2021-01-01 | Stop reason: HOSPADM

## 2021-01-01 RX ORDER — DIPHENHYDRAMINE HYDROCHLORIDE 50 MG/ML
12.5 INJECTION, SOLUTION INTRAMUSCULAR; INTRAVENOUS AS NEEDED
Status: DISCONTINUED | OUTPATIENT
Start: 2021-01-01 | End: 2021-01-01 | Stop reason: HOSPADM

## 2021-01-01 RX ORDER — NITROGLYCERIN 0.4 MG/1
0.4 TABLET SUBLINGUAL
Status: DISCONTINUED | OUTPATIENT
Start: 2021-01-01 | End: 2021-01-01 | Stop reason: HOSPADM

## 2021-01-01 RX ORDER — OXYCODONE AND ACETAMINOPHEN 5; 325 MG/1; MG/1
1 TABLET ORAL AS NEEDED
Status: DISCONTINUED | OUTPATIENT
Start: 2021-01-01 | End: 2021-01-01 | Stop reason: HOSPADM

## 2021-01-01 RX ORDER — NITROGLYCERIN 0.4 MG/1
0.4 TABLET SUBLINGUAL
Qty: 100 TABLET | Refills: 0 | Status: SHIPPED
Start: 2021-01-01 | End: 2021-01-01

## 2021-01-01 RX ORDER — MIDAZOLAM HYDROCHLORIDE 1 MG/ML
1 INJECTION, SOLUTION INTRAMUSCULAR; INTRAVENOUS AS NEEDED
Status: DISCONTINUED | OUTPATIENT
Start: 2021-01-01 | End: 2021-01-01 | Stop reason: HOSPADM

## 2021-01-01 RX ORDER — MORPHINE SULFATE 2 MG/ML
2 INJECTION, SOLUTION INTRAMUSCULAR; INTRAVENOUS
Status: DISCONTINUED | OUTPATIENT
Start: 2021-01-01 | End: 2021-01-01 | Stop reason: HOSPADM

## 2021-01-01 RX ORDER — INSULIN LISPRO 100 [IU]/ML
INJECTION, SOLUTION INTRAVENOUS; SUBCUTANEOUS
Status: DISPENSED | OUTPATIENT
Start: 2021-01-01 | End: 2021-01-01

## 2021-01-01 RX ORDER — LEVOTHYROXINE SODIUM 100 UG/1
200 TABLET ORAL
Status: DISCONTINUED | OUTPATIENT
Start: 2021-01-01 | End: 2021-01-01

## 2021-01-01 RX ORDER — MAGNESIUM SULFATE 100 %
4 CRYSTALS MISCELLANEOUS AS NEEDED
Status: DISCONTINUED | OUTPATIENT
Start: 2021-01-01 | End: 2021-01-01 | Stop reason: HOSPADM

## 2021-01-01 RX ORDER — FENTANYL CITRATE 50 UG/ML
50 INJECTION, SOLUTION INTRAMUSCULAR; INTRAVENOUS AS NEEDED
Status: DISCONTINUED | OUTPATIENT
Start: 2021-01-01 | End: 2021-01-01 | Stop reason: HOSPADM

## 2021-01-01 RX ORDER — SODIUM CHLORIDE 9 MG/ML
25 INJECTION, SOLUTION INTRAVENOUS CONTINUOUS
Status: DISCONTINUED | OUTPATIENT
Start: 2021-01-01 | End: 2021-01-01 | Stop reason: HOSPADM

## 2021-01-01 RX ORDER — MIDAZOLAM HYDROCHLORIDE 1 MG/ML
0.5 INJECTION, SOLUTION INTRAMUSCULAR; INTRAVENOUS
Status: DISCONTINUED | OUTPATIENT
Start: 2021-01-01 | End: 2021-01-01 | Stop reason: HOSPADM

## 2021-01-01 RX ORDER — ASPIRIN 81 MG/1
81 TABLET ORAL DAILY
Status: DISCONTINUED | OUTPATIENT
Start: 2021-01-01 | End: 2021-01-01 | Stop reason: HOSPADM

## 2021-01-01 RX ORDER — PRAVASTATIN SODIUM 20 MG/1
20 TABLET ORAL
Status: DISCONTINUED | OUTPATIENT
Start: 2021-01-01 | End: 2021-01-01 | Stop reason: HOSPADM

## 2021-01-01 RX ORDER — PROPOFOL 10 MG/ML
INJECTION, EMULSION INTRAVENOUS
Status: DISCONTINUED | OUTPATIENT
Start: 2021-01-01 | End: 2021-01-01 | Stop reason: HOSPADM

## 2021-01-01 RX ORDER — LEVOTHYROXINE SODIUM 75 UG/1
225 TABLET ORAL
Qty: 90 TABLET | Refills: 0 | Status: SHIPPED
Start: 2021-01-01 | End: 2021-01-01

## 2021-01-01 RX ORDER — SODIUM CHLORIDE 0.9 % (FLUSH) 0.9 %
20 SYRINGE (ML) INJECTION
Status: DISCONTINUED | OUTPATIENT
Start: 2021-01-01 | End: 2021-01-01

## 2021-01-01 RX ORDER — SODIUM BICARBONATE 1 MEQ/ML
SYRINGE (ML) INTRAVENOUS AS NEEDED
Status: DISCONTINUED | OUTPATIENT
Start: 2021-01-01 | End: 2021-01-01 | Stop reason: HOSPADM

## 2021-01-01 RX ORDER — PROPOFOL 10 MG/ML
INJECTION, EMULSION INTRAVENOUS AS NEEDED
Status: DISCONTINUED | OUTPATIENT
Start: 2021-01-01 | End: 2021-01-01 | Stop reason: HOSPADM

## 2021-01-01 RX ORDER — EPINEPHRINE 0.1 MG/ML
INJECTION INTRACARDIAC; INTRAVENOUS
Status: COMPLETED | OUTPATIENT
Start: 2021-01-01 | End: 2021-01-01

## 2021-01-01 RX ORDER — ACETAMINOPHEN 325 MG/1
650 TABLET ORAL ONCE
Status: DISCONTINUED | OUTPATIENT
Start: 2021-01-01 | End: 2021-01-01 | Stop reason: HOSPADM

## 2021-01-01 RX ORDER — ONDANSETRON 2 MG/ML
4 INJECTION INTRAMUSCULAR; INTRAVENOUS AS NEEDED
Status: DISCONTINUED | OUTPATIENT
Start: 2021-01-01 | End: 2021-01-01 | Stop reason: HOSPADM

## 2021-01-01 RX ORDER — SODIUM CHLORIDE 9 MG/ML
INJECTION, SOLUTION INTRAVENOUS
Status: DISCONTINUED | OUTPATIENT
Start: 2021-01-01 | End: 2021-01-01 | Stop reason: HOSPADM

## 2021-01-01 RX ORDER — FENTANYL CITRATE 50 UG/ML
25 INJECTION, SOLUTION INTRAMUSCULAR; INTRAVENOUS
Status: DISCONTINUED | OUTPATIENT
Start: 2021-01-01 | End: 2021-01-01 | Stop reason: HOSPADM

## 2021-01-01 RX ADMIN — HEPARIN SODIUM 5000 UNITS: 5000 INJECTION INTRAVENOUS; SUBCUTANEOUS at 22:23

## 2021-01-01 RX ADMIN — Medication 10 ML: at 07:53

## 2021-01-01 RX ADMIN — CALCIUM CHLORIDE 2 G: 100 INJECTION, SOLUTION INTRAVENOUS at 13:14

## 2021-01-01 RX ADMIN — HEPARIN SODIUM 3000 UNITS: 1000 INJECTION INTRAVENOUS; SUBCUTANEOUS at 09:03

## 2021-01-01 RX ADMIN — HEPARIN SODIUM 5000 UNITS: 5000 INJECTION INTRAVENOUS; SUBCUTANEOUS at 04:49

## 2021-01-01 RX ADMIN — PROPOFOL 20 MG: 10 INJECTION, EMULSION INTRAVENOUS at 09:04

## 2021-01-01 RX ADMIN — HEPARIN SODIUM 5000 UNITS: 5000 INJECTION INTRAVENOUS; SUBCUTANEOUS at 11:15

## 2021-01-01 RX ADMIN — MIDODRINE HYDROCHLORIDE 10 MG: 5 TABLET ORAL at 16:47

## 2021-01-01 RX ADMIN — LEVOTHYROXINE SODIUM 225 MCG: 0.15 TABLET ORAL at 06:39

## 2021-01-01 RX ADMIN — Medication 10 ML: at 17:11

## 2021-01-01 RX ADMIN — INSULIN LISPRO 2 UNITS: 100 INJECTION, SOLUTION INTRAVENOUS; SUBCUTANEOUS at 11:38

## 2021-01-01 RX ADMIN — INSULIN LISPRO 2 UNITS: 100 INJECTION, SOLUTION INTRAVENOUS; SUBCUTANEOUS at 16:45

## 2021-01-01 RX ADMIN — INSULIN LISPRO 1 UNITS: 100 INJECTION, SOLUTION INTRAVENOUS; SUBCUTANEOUS at 21:25

## 2021-01-01 RX ADMIN — HEPARIN SODIUM 5000 UNITS: 5000 INJECTION INTRAVENOUS; SUBCUTANEOUS at 12:20

## 2021-01-01 RX ADMIN — HEPARIN SODIUM 5000 UNITS: 5000 INJECTION INTRAVENOUS; SUBCUTANEOUS at 21:37

## 2021-01-01 RX ADMIN — PRAVASTATIN SODIUM 20 MG: 20 TABLET ORAL at 22:23

## 2021-01-01 RX ADMIN — Medication 10 ML: at 21:38

## 2021-01-01 RX ADMIN — PRAVASTATIN SODIUM 20 MG: 20 TABLET ORAL at 21:24

## 2021-01-01 RX ADMIN — HEPARIN SODIUM 5000 UNITS: 5000 INJECTION INTRAVENOUS; SUBCUTANEOUS at 04:00

## 2021-01-01 RX ADMIN — PROPOFOL 20 MCG/KG/MIN: 10 INJECTION, EMULSION INTRAVENOUS at 09:02

## 2021-01-01 RX ADMIN — EPOETIN ALFA-EPBX 10000 UNITS: 10000 INJECTION, SOLUTION INTRAVENOUS; SUBCUTANEOUS at 21:37

## 2021-01-01 RX ADMIN — MIDODRINE HYDROCHLORIDE 10 MG: 5 TABLET ORAL at 16:34

## 2021-01-01 RX ADMIN — BACITRACIN 1 PACKET: 500 OINTMENT TOPICAL at 15:16

## 2021-01-01 RX ADMIN — Medication 10 ML: at 22:23

## 2021-01-01 RX ADMIN — SODIUM BICARBONATE 100 MEQ: 84 INJECTION, SOLUTION INTRAVENOUS at 13:23

## 2021-01-01 RX ADMIN — EPINEPHRINE 1 MG: 0.1 INJECTION, SOLUTION ENDOTRACHEAL; INTRACARDIAC; INTRAVENOUS at 13:23

## 2021-01-01 RX ADMIN — SODIUM CHLORIDE: 900 INJECTION, SOLUTION INTRAVENOUS at 08:55

## 2021-01-01 RX ADMIN — Medication 20 MCG/MIN: at 09:36

## 2021-01-01 RX ADMIN — EPINEPHRINE 1 MG: 0.1 INJECTION, SOLUTION ENDOTRACHEAL; INTRACARDIAC; INTRAVENOUS at 13:27

## 2021-01-01 RX ADMIN — EPINEPHRINE 1 MG: 0.1 INJECTION, SOLUTION ENDOTRACHEAL; INTRACARDIAC; INTRAVENOUS at 13:10

## 2021-01-01 RX ADMIN — HEPARIN SODIUM 5000 UNITS: 5000 INJECTION INTRAVENOUS; SUBCUTANEOUS at 21:04

## 2021-01-01 RX ADMIN — Medication 10 ML: at 13:22

## 2021-01-01 RX ADMIN — EPINEPHRINE 1 MG: 0.1 INJECTION, SOLUTION ENDOTRACHEAL; INTRACARDIAC; INTRAVENOUS at 13:16

## 2021-01-01 RX ADMIN — Medication 10 ML: at 21:27

## 2021-01-01 RX ADMIN — PRAVASTATIN SODIUM 20 MG: 20 TABLET ORAL at 21:05

## 2021-01-01 RX ADMIN — HEPARIN SODIUM 5000 UNITS: 5000 INJECTION INTRAVENOUS; SUBCUTANEOUS at 21:25

## 2021-01-01 RX ADMIN — LEVOTHYROXINE SODIUM 225 MCG: 0.15 TABLET ORAL at 07:50

## 2021-01-01 RX ADMIN — Medication 10 ML: at 21:04

## 2021-01-01 RX ADMIN — EPINEPHRINE 1 MG: 0.1 INJECTION, SOLUTION ENDOTRACHEAL; INTRACARDIAC; INTRAVENOUS at 13:19

## 2021-01-01 RX ADMIN — ASPIRIN 81 MG: 81 TABLET, COATED ORAL at 11:15

## 2021-05-26 NOTE — WOUND CARE
Wound care nurse attempted to see this patient today but he was not in the room (empty). Chart reviewed. Wounds on the buttocks were assessed by nursing as being stage 2, pink and open. Wound care orders written for this patient based on this assessment until he can be seen tomorrow.   
Hailee Cano, RN, BSN, [employer] 
 
 5

## 2021-07-19 PROBLEM — I46.9 CARDIAC ARREST (HCC): Status: ACTIVE | Noted: 2021-01-01

## 2021-07-19 NOTE — H&P
History & Physical    Primary Care Provider: Fabi Rodriguez MD  Source of Information: Patient     History of Presenting Illness:   Alfonso Garcia is a 78 y.o. male who presents with heparin bradycardia/asystole    History is limited, patient with dementia, poor historian, apparently patient was at the vascular center today for fistula evaluation, was given fentanyl and Versed, apparently had bradycardia and possible. Of cardiac arrest, patient was given CPR, was given Narcan, EMS was called, when EMS arrived patient had a good pulse and good blood pressure, and patient was brought to the hospital.  Patient was put on a nonrebreather, but currently maintaining sats on nasal cannula. Patient denies any complaints or problems, is confused, patient received dialysis Tuesday Thursday and Saturday. Patient was found to have pulmonary edema and was requested to be admitted to the hospital service. Patient again has dementia and is a poor historian    The patient denies any Headache, blurry vision, sore throat, trouble swallowing, trouble with speech, chest pain, SOB, cough, fever, chills, N/V/D, abd pain, urinary symptoms, constipation, recent travels, sick contacts, focal or generalized neurological symptoms,  falls, injuries, rashes, contact with COVID-19 diagnosed patients, hematemesis, melena, hemoptysis, hematuria, rashes, denies starting any new medications and denies any other concerns or problems besides as mentioned above. Review of Systems:  A comprehensive review of systems was negative except for that written in the History of Present Illness.   Suboptimal as patient has dementia  All other systems reviewed, pertinent positives and negatives noted in HPI    Past Medical History:   Diagnosis Date    Anemia     CAD (coronary artery disease)     Chronic kidney disease     tues/thurs/sat TREE [address] and [address]    Cognitive communication deficit     Diabetes ([facility] [address]  Dysarthria and anarthria     Dysphagia     Endocrine disease     hypothyroid    Gastrointestinal disorder     gallstones    Hx of BKA, left (HCC)     Hx of [address], right ([address]     Hypertension     Muscle weakness (generalized)     Stroke ([facility] [address]     2011 speech general weakness    Thyroid disease       Past Surgical History:   Procedure Laterality Date    HX HEENT      cataract   removal  rt eye    HX ORTHOPAEDIC      11/2014 R BK Amputation and L    HX OTHER SURGICAL      HX VASCULAR ACCESS      L arm shunt    SD ABDOMEN SURGERY PROC UNLISTED      VASCULAR SURGERY PROCEDURE UNLIST      Fistula R arm     Prior to Admission medications    Medication Sig Start Date End Date Taking? Authorizing Provider   cholecalciferol, vitamin D3, (VITAMIN D3 PO) Take 5,000 Units by mouth daily. Provider, Historical   midodrine (PROAMITINE) 10 mg tablet Take 10 mg by mouth Q TU, TH & SAT. With dialysis    Provider, Historical   levothyroxine (SYNTHROID) 200 mcg tablet Take 200 mcg by mouth Daily (before breakfast). Provider, Historical   pravastatin (PRAVACHOL) 20 mg tablet Take 20 mg by mouth nightly. Provider, Historical   OTHER,NON-FORMULARY, GLUCOSE NA/F CAFFIENE FREE TAB CHEW, TAKE 3 TABS BY MOUTH PRN FOR HYPOGLYCEMIA    Provider, Historical   OTHER,NON-FORMULARY, GLUCOST 15 40% GEL TAKEN 1GM BY MOUTH, PRN FOR BS<60 AND ALERT MD    Provider, Historical   glucagon (GLUCAGEN) 1 mg injection 1 mg by IntraMUSCular route once. FOR BS<60 AND UNRESPONSIVE    Provider, Historical   polyethylene glycol (MIRALAX) 17 gram/dose powder Take 17 g by mouth daily. 9/27/17   Lisa Naqvi MD   ondansetron (ZOFRAN ODT) 4 mg disintegrating tablet Take 1 Tab by mouth every eight (8) hours as needed for Nausea. 8/2/17   Soledad Richardson MD   aspirin delayed-release 81 mg tablet Take 1 Tab by mouth daily.  5/6/15   Aniket Le MD     No Known Allergies   Family History   Problem Relation Age of Onset  Hypertension Mother     Diabetes Father       Family history was discussed with the patient, all pertinent and relevant details are mentioned as above, no other pertinent and relevant family history was noted on my discussion with the patient. Patient specifically denies any history of Gaucher disease in the family  SOCIAL HISTORY:  Patient resides:  Independently x   Assisted Living    SNF    With family care       Smoking history:   None x   Former    Chronic      Alcohol history:   None    Social x   Chronic      Ambulates:   Independently x   w/cane    w/walker    w/wc    CODE STATUS:  DNR    Full x   Other      Objective:     Physical Exam:     Visit Vitals  /74   Pulse 97   Temp 97 °F (36.1 °C)   Resp 19   SpO2 96%      O2 Device: None (Room air)    General : alert x 3, awake, no acute distress, resting in bed, pleasant male, appears to be stated age  [de-identified]: PEERL, EOMI, moist mucus membrane, TM clear  Neck: supple, no JVD, no meningeal signs  Chest: Clear to auscultation bilaterally   CVS: S1 S2 heard, Capillary refill less than 2 seconds  Abd: soft/ Non tender, non distended, BS physiological,   Ext: Bilateral BKA, dried blood around the dialysis fistula in the left upper extremity  Neuro/Psych: pleasant mood and affect, limited exam, patient not cooperative with exam, DTR 1+x2, moving all 4 but strength could not be tested  Skin: warm     EKG: Sinus rhythm with nonspecific ST changes      Data Review:     Recent Days:  Recent Labs     07/19/21  1430   WBC 7.4   HGB 10.8*   HCT 35.8*   *     Recent Labs     07/19/21  1430      K 4.6      CO2 23   *   BUN 39*   CREA 6.21*   CA 9.0   MG 2.2   PHOS 6.8*   ALB 2.9*   ALT 13   INR 1.4*     No results for input(s): PH, PCO2, PO2, HCO3, FIO2 in the last 72 hours.     24 Hour Results:  Recent Results (from the past 24 hour(s))   EKG, 12 LEAD, INITIAL    Collection Time: 07/19/21  2:26 PM   Result Value Ref Range    Ventricular Rate 109 BPM    Atrial Rate 104 BPM    QRS Duration 88 ms    Q-T Interval 340 ms    QTC Calculation (Bezet) 457 ms    Calculated R Axis 26 degrees    Calculated T Axis -110 degrees    Diagnosis       Accelerated Junctional rhythm  Low voltage QRS  T wave abnormality, consider lateral ischemia  When compared with ECG of 08-APR-2020 09:10,  Junctional rhythm has replaced Sinus rhythm     CBC WITH AUTOMATED DIFF    Collection Time: 07/19/21  2:30 PM   Result Value Ref Range    WBC 7.4 4.1 - 11.1 K/uL    RBC 3.08 (L) 4.10 - 5.70 M/uL    HGB 10.8 (L) 12.1 - 17.0 g/dL    HCT 35.8 (L) 36.6 - 50.3 %    .2 (H) 80.0 - 99.0 FL    MCH 35.1 (H) 26.0 - 34.0 PG    MCHC 30.2 30.0 - 36.5 g/dL    RDW 13.9 11.5 - 14.5 %    PLATELET 835 (L) 186 - 400 K/uL    MPV 10.7 8.9 - 12.9 FL    NRBC 0.0 0  WBC    ABSOLUTE NRBC 0.00 0.00 - 0.01 K/uL    NEUTROPHILS 48 32 - 75 %    LYMPHOCYTES 39 12 - 49 %    MONOCYTES 11 5 - 13 %    EOSINOPHILS 1 0 - 7 %    BASOPHILS 0 0 - 1 %    IMMATURE GRANULOCYTES 1 (H) 0.0 - 0.5 %    ABS. NEUTROPHILS 3.5 1.8 - 8.0 K/UL    ABS. LYMPHOCYTES 2.9 0.8 - 3.5 K/UL    ABS. MONOCYTES 0.8 0.0 - 1.0 K/UL    ABS. EOSINOPHILS 0.1 0.0 - 0.4 K/UL    ABS. BASOPHILS 0.0 0.0 - 0.1 K/UL    ABS. IMM.  GRANS. 0.1 (H) 0.00 - 0.04 K/UL    DF SMEAR SCANNED      PLATELET COMMENTS Large Platelets      RBC COMMENTS MACROCYTOSIS  2+       PROTHROMBIN TIME + INR    Collection Time: 07/19/21  2:30 PM   Result Value Ref Range    INR 1.4 (H) 0.9 - 1.1      Prothrombin time 14.6 (H) 9.0 - 11.1 sec   TYPE & SCREEN    Collection Time: 07/19/21  2:30 PM   Result Value Ref Range    Crossmatch Expiration 07/22/2021,2359     ABO/Rh(D) B POSITIVE     Antibody screen NEG    METABOLIC PANEL, COMPREHENSIVE    Collection Time: 07/19/21  2:30 PM   Result Value Ref Range    Sodium 138 136 - 145 mmol/L    Potassium 4.6 3.5 - 5.1 mmol/L    Chloride 101 97 - 108 mmol/L    CO2 23 21 - 32 mmol/L    Anion gap 14 5 - 15 mmol/L    Glucose 241 (H) 65 - 100 mg/dL    BUN 39 (H) 6 - 20 MG/DL    Creatinine 6.21 (H) 0.70 - 1.30 MG/DL    BUN/Creatinine ratio 6 (L) 12 - 20      GFR est AA 11 (L) >60 ml/min/1.73m2    GFR est non-AA 9 (L) >60 ml/min/1.73m2    Calcium 9.0 8.5 - 10.1 MG/DL    Bilirubin, total 0.7 0.2 - 1.0 MG/DL    ALT (SGPT) 13 12 - 78 U/L    AST (SGOT) 18 15 - 37 U/L    Alk. phosphatase 144 (H) 45 - 117 U/L    Protein, total 8.4 (H) 6.4 - 8.2 g/dL    Albumin 2.9 (L) 3.5 - 5.0 g/dL    Globulin 5.5 (H) 2.0 - 4.0 g/dL    A-G Ratio 0.5 (L) 1.1 - 2.2     NT-PRO BNP    Collection Time: 07/19/21  2:30 PM   Result Value Ref Range    NT pro-BNP >35,000 (H) <450 PG/ML   TROPONIN I    Collection Time: 07/19/21  2:30 PM   Result Value Ref Range    Troponin-I, Qt. <0.05 <0.05 ng/mL   MAGNESIUM    Collection Time: 07/19/21  2:30 PM   Result Value Ref Range    Magnesium 2.2 1.6 - 2.4 mg/dL   PHOSPHORUS    Collection Time: 07/19/21  2:30 PM   Result Value Ref Range    Phosphorus 6.8 (H) 2.6 - 4.7 MG/DL   SAMPLES BEING HELD    Collection Time: 07/19/21  2:30 PM   Result Value Ref Range    SAMPLES BEING HELD 1RED     COMMENT        Add-on orders for these samples will be processed based on acceptable specimen integrity and analyte stability, which may vary by analyte. Imaging:   CT HEAD WO CONT    Result Date: 7/19/2021  No acute intracranial abnormality. XR CHEST PORT    Result Date: 7/19/2021  Mild pulmonary edema and moderate left pleural effusion.      Assessment/Plan     Bradycardia/asystole  Pulmonary edema  ESRD  Diabetes mellitus type 2 poorly controlled  A OCD  Hypertension  Dementia with cognitive communication deficit and dysarthria        Patient will be admitted on telemetry bed  Unclear course of event, currently appears stable from cardiopulmonary point, cycle troponins, telemetry monitoring, cardiology consult, echocardiogram, supportive care and close monitoring, further intervention per hospital course, await cardiology input  Patient with pulmonary edema, likely needs dialysis, nephrology will be consulted and further intervention per hospital course  Sliding scale insulin, Accu-Cheks, diet control, close monitoring, further intervention per hospital  Optimize blood pressure control and continue monitor  Monitor H&H  Fall precautions and continue home medication        GI DVT prophylaxis: Patient will be on heparin             Please note that this dictation was completed with [employer], the computer voice recognition software. Quite often unanticipated grammatical, syntax, homophones, and other interpretive errors are inadvertently transcribed by the computer software. Please disregard these errors. Please excuse any errors that have escaped final proofreading.          Signed By: Brianna Rodriguez MD     July 19, 2021

## 2021-07-19 NOTE — ED PROVIDER NOTES
58-year-old male arrives from outpatient vascular access center via EMS with reports of a cardiac arrest.  Patient received fentanyl and Versed for procedure on his dialysis fistula when he reportedly had slowing respirations and developed bradycardia. He momentarily lost the pulse and the patient received 3 minutes of CPR in addition to Narcan. No other medications given. On arrival by EMS, the patient was on a nonrebreather awake and alert but not following commands at the time. He has stable heart rate and blood pressure during transportation to the hospital.  He expresses no complaints in the emergency department.   His past medical history is significant for diabetes, stroke, end-stage renal disease on dialysis Tuesday Thursday Saturday, coronary artery disease,           Past Medical History:   Diagnosis Date    Anemia     CAD (coronary artery disease)     Chronic kidney disease     tues/thurs/sat TREE [address] and [address]    Cognitive communication deficit     Diabetes ([address]     Dysarthria and anarthria     Dysphagia     Endocrine disease     hypothyroid    Gastrointestinal disorder     gallstones    Hx of BKA, left (HCC)     Hx of BKA, right ([address]     Hypertension     Muscle weakness (generalized)     Stroke ([address]     2011 speech general weakness    Thyroid disease        Past Surgical History:   Procedure Laterality Date    HX HEENT      cataract   removal  rt eye    HX ORTHOPAEDIC      11/2014 R BK Amputation and L    HX OTHER SURGICAL      HX VASCULAR ACCESS      L arm shunt    ME ABDOMEN SURGERY PROC UNLISTED      VASCULAR SURGERY PROCEDURE UNLIST      Fistula R arm         Family History:   Problem Relation Age of Onset    Hypertension Mother     Diabetes Father        Social History     Socioeconomic History    Marital status:      Spouse name: Not on file    Number of children: Not on file    Years of education: Not on file    Highest education level: Not on file Occupational History    Not on file   Tobacco Use    Smoking status: Never Smoker    Smokeless tobacco: Never Used   Substance and Sexual Activity    Alcohol use: No    Drug use: No     Types: Prescription    Sexual activity: Not on file   Other Topics Concern    Not on file   Social History Narrative    Not on file     Social Determinants of Health     Financial Resource Strain:     Difficulty of Paying Living Expenses:    Food Insecurity:     Worried About Running Out of Food in the Last Year:     [address] in the Last Year:    Transportation Needs:     Lack of Transportation (Medical):  Lack of Transportation (Non-Medical):    Physical Activity:     Days of Exercise per Week:     Minutes of Exercise per Session:    Stress:     Feeling of Stress :    Social Connections:     Frequency of Communication with Friends and Family:     Frequency of Social Gatherings with Friends and Family:     Attends Taoism Services:     Active Member of Clubs or Organizations:     Attends Club or Organization Meetings:     Marital Status:    Intimate Partner Violence:     Fear of Current or Ex-Partner:     Emotionally Abused:     Physically Abused:     Sexually Abused: ALLERGIES: Patient has no known allergies. Review of Systems   Constitutional: Negative for diaphoresis and fever. HENT: Negative for facial swelling. Eyes: Negative for visual disturbance. Respiratory: Negative for cough. Cardiovascular: Negative for chest pain. Gastrointestinal: Negative for abdominal pain. Genitourinary: Negative for dysuria. Musculoskeletal: Negative for joint swelling. Skin: Negative for rash. Neurological: Negative for headaches. Hematological: Negative for adenopathy. Psychiatric/Behavioral: Negative for suicidal ideas.        Vitals:    07/19/21 1427 07/19/21 1500   BP: (!) 131/98 (!) 146/94   Pulse: (!) 107 (!) 105   Resp: 22 21   Temp: 97 °F (36.1 °C)    SpO2: 98% 99% Physical Exam  Vitals and nursing note reviewed. Constitutional:       General: He is not in acute distress. Appearance: He is well-developed. HENT:      Head: Normocephalic and atraumatic. Eyes:      Pupils: Pupils are equal, round, and reactive to light. Cardiovascular:      Rate and Rhythm: Tachycardia present. Pulmonary:      Effort: Pulmonary effort is normal. No respiratory distress. Abdominal:      General: There is no distension. Musculoskeletal:         General: Normal range of motion. Cervical back: Normal range of motion and neck supple. Comments: Bilateral BKA. Bleeding dialysis fistula was left upper extremity. Skin:     General: Skin is warm and dry. Neurological:      Mental Status: He is alert and oriented to person, place, and time. MDM  Number of Diagnoses or Management Options     Amount and/or Complexity of Data Reviewed  Tests in the medicine section of CPT®: reviewed      ED Course as of Jul 19 1548   Mon Jul 19, 2021   1427 ED EKG interpretation:  Rhythm: junctional tachycardia. Rate (approx.): 109. Axis: normal.  ST segment:  No concerning ST elevations or depressions. This EKG was interpreted by Maia Mandujano MD,ED Provider. EKG, 12 LEAD, INITIAL [JM]      ED Course User Index  [JM] Bridget Joy MD     Perfect Serve Consult for Admission  3:50 PM    ED Room Number: ER15/15  Patient Name and age:  Steph Adhikari 78 y.o.  male  Working Diagnosis:   1. Respiratory arrest ([address]    2. Acute pulmonary edema (HCC)    3. ESRD on hemodialysis ([facility] [address]        COVID-19 Suspicion:  no  Sepsis present:  no  Reassessment needed: no  Code Status:  Full Code  Readmission: no  Isolation Requirements:  no  Recommended Level of Care:  step down  Department:  [facility] Adult ED - [phone]    Other:  Reported respiratory/cardiac arrest from outpatient vascular center. CPR x3 minutes + narcan given. Arrives awake and alert. VS stable.   Pulmonary edema on CXR. HCT unremarakble. Trop neg. Total critical care time spent exclusive of procedures:  35 minutes.     Procedures

## 2021-07-19 NOTE — CONSULTS
Seen and examined  Thanks for the consult  A/P:  ESRD TTS at [facility]  Left upper ext AVF,covered  Anemia  Sp cardiac arrest at vascular center  BKA    No acute indication for HD tonight  HD in AM  Will assess his AVF when it is uncovered tomorrow  Discussed with him,?  Hx of dementia

## 2021-07-19 NOTE — ED TRIAGE NOTES
Pt arrives via EMS from [facility] post cardiac arrest. The patient was there for a procedure on his dialysis shunt. He received versed and fentanyl, his HR then went into the 30s and patient became unresponsive and they did not feel a pulse. Narcan was given and 3 minutes of CPR. Pt arrives responsive and sinus tach. History of diabetes, dementia and a previous stroke. B.S. for EMS is 309.

## 2021-07-19 NOTE — PROGRESS NOTES
Patient refusing ABG at this time, pt states \"no more blood, no. No\" I explained what Results the ABG will show pt states \"oh no\"    RN Notified at this time. Will continue to monitor patient.

## 2021-07-20 NOTE — PERIOP NOTES
TRANSFER - OUT REPORT:    Verbal report given to Kali Modi RN on Ramos Bright  being transferred to [place] [address], Room 460 for routine post - op       Report consisted of patients Situation, Background, Assessment and   Recommendations(SBAR). Time Pre op antibiotic given:N/A  Anesthesia Stop time: 10:15  Leary Present on Transfer to floor:No    Information from the following report(s) SBAR, Procedure Summary, Intake/Output, Cardiac Rhythm NSR and Alarm Parameters  was reviewed with the receiving nurse. Opportunity for questions and clarification was provided. Is the patient on 02? NO    Is the patient on a monitor? YES    Is the nurse transporting with the patient? YES    Surgical Waiting Area notified of patient's transfer from PACU?  NO      The following personal items collected during your admission accompanied patient upon transfer:   Dental Appliance: Dental Appliances: None  Vision:    Hearing Aid:    Jewelry:    Clothing:    Other Valuables:    Valuables sent to safe:

## 2021-07-20 NOTE — PROGRESS NOTES
1115 Pt returned to 460 from PACU. Pt mental status is at his baselinre; awake, alert, oriented to self, he is agitated and refusing all care including vital signs, blood sugar check, and assessment. Writer attempted to explain rationale for tasks but pt became more agitated and began swinging right arm at [address]. MD notified. 1230 Pt pulled telemetry leads off and peripheral IV out. Writer was able to get telemetry leads placed on pt's back but pt refused IV insertion. MD notified.

## 2021-07-20 NOTE — PROGRESS NOTES
Cardiology Care Note      Patient Name: Radha Worrell  : 1940 MRN: [id number]  Date: 2021  Time: 2:29 PM    Admit Diagnosis: Cardiac arrest [facility]) [I46.9]    Primary Cardiologist: none. Inpatient Cardiologist: Maisha Calle. Carolin Lynn M.D.     Eun Macario for cardiac evaluation:   Cardiac arrest     Requesting MD: Dr. Aaron Meredith MD    HPI:  Radha Worrell is a 78 y.o. male admitted on 2021  for Cardiac arrest ([facility] [address] [I46.9]. Pt is a poor historian but denies any cardiac history. Chart indicates that pt Has PMH of CVA, diabetes, HLD. Reportedly pt was Vascular center yesterday for procedure on fistula. He was given fentanyl and Versed. He reportedly had bradycardia and lost pulse. CPR was started and he was given narcan. When EMS arrived, he had good pulse and BP. He was brought to [facility].   His troponin was negative. CXR showed mild pulmonary edema with left pleural effusion. He was found to have clotted left upper arm dialysis graft and underwent thormbectomhy of graft with balloon angioplasty and stent placement of graft. Pt denies any history of chest pain. Denies SOB, dizziness, lightheadedness. No history of syncope. He is unable to state why he came to hospital. Has no recollection of bradycardia event. Assessment and Plan     1. Bradycardia, ?cardiac arrest   -No chest pain, Troponin negative on presentation   -EKG NSR with nonspecific T wave abnormality V5, V6   -Suspect incident was sedation induced, may have been vasovagal    -No further episodes   -Will follow up echocardiogram    2. History of hypotension on midodrine   -BP low- low NL (SBP 90's-110). Asymptomatic   -Continue midodrine. 3. Hypothyroidsim   -TSH 16.6   -Recommend Check Free T4 but will defer to primary team.    4. Systolic murmur   -follow up echo    5. HLD   -continue statin    Saw and evaluated pt and agree with above assessment and plan.  No active cardiac symptoms and no recurrent bradycardia/arrhythmia. ESRD on HD for volume. Echo pending and will review this when completed. Kat Dumont MD        CArdiac testing history:  · Echo 4/2019: Left ventricular low normal systolic function. Estimated left ventricular ejection fraction is 51 - 55%. Left ventricular mild concentric hypertrophy. Age-appropriate left ventricular diastolic function. · Aortic valve focal thickening present. Aortic valve leaflet calcification present. Mitral valve thickening. There is mild anterior leaflet thickening at the tip. Leaflet calcification. Mild mitral annular calcification. Trace regurgitation. Review of Symptoms:  Limited by pt factors  Cardiovascular ROS: no chest pain  Respiratory ROS: no cough, shortness of breath, or wheezing  GI: no blood in stool/black stools  Musculoskelatal: no pain.        Past Medical History:   Diagnosis Date    Anemia     CAD (coronary artery disease)     Chronic kidney disease     tues/thurs/sat [facility] [address]    Cognitive communication deficit     Diabetes ([facility] [address]     Dysarthria and anarthria     Dysphagia     Endocrine disease     hypothyroid    Gastrointestinal disorder     gallstones    Hx of BKA, left (HCC)     Hx of BKA, right ([address]     Hypertension     Muscle weakness (generalized)     Stroke ([address]     2011 speech general weakness    Thyroid disease      Past Surgical History:   Procedure Laterality Date    HX HEENT      cataract   removal  rt eye    HX ORTHOPAEDIC      11/2014 R BK Amputation and L    HX OTHER SURGICAL      HX VASCULAR ACCESS      L arm shunt    ME ABDOMEN SURGERY PROC UNLISTED      VASCULAR SURGERY PROCEDURE UNLIST      Fistula R arm     Current Facility-Administered Medications   Medication Dose Route Frequency    aspirin delayed-release tablet 81 mg  81 mg Oral DAILY    levothyroxine (SYNTHROID) tablet 200 mcg  200 mcg Oral ACB    midodrine (PROAMATINE) tablet 10 mg  10 mg Oral Q TU, TH & SAT  pravastatin (PRAVACHOL) tablet 20 mg  20 mg Oral QHS    sodium chloride (NS) flush 5-40 mL  5-40 mL IntraVENous Q8H    sodium chloride (NS) flush 5-40 mL  5-40 mL IntraVENous PRN    nitroglycerin (NITROSTAT) tablet 0.4 mg  0.4 mg SubLINGual Q5MIN PRN    heparin (porcine) injection 5,000 Units  5,000 Units SubCUTAneous Q8H    glucose chewable tablet 16 g  4 Tablet Oral PRN    dextrose (D50W) injection syrg 12.5-25 g  25-50 mL IntraVENous PRN    glucagon (GLUCAGEN) injection 1 mg  1 mg IntraMUSCular PRN       No Known Allergies   Family History   Problem Relation Age of Onset    Hypertension Mother     Diabetes Father       Social History     Socioeconomic History    Marital status:      Spouse name: Not on file    Number of children: Not on file    Years of education: Not on file    Highest education level: Not on file   Tobacco Use    Smoking status: Never Smoker    Smokeless tobacco: Never Used   Substance and Sexual Activity    Alcohol use: No    Drug use: No     Types: Prescription     Social Determinants of Health     Financial Resource Strain:     Difficulty of Paying Living Expenses:    Food Insecurity:     Worried About Running Out of Food in the Last Year:     Ran Out of Food in the Last Year:    Transportation Needs:     Lack of Transportation (Medical):      Lack of Transportation (Non-Medical):    Physical Activity:     Days of Exercise per Week:     Minutes of Exercise per Session:    Stress:     Feeling of Stress :    Social Connections:     Frequency of Communication with Friends and Family:     Frequency of Social Gatherings with Friends and Family:     Attends Gnosticism Services:     Active Member of Clubs or Organizations:     Attends Club or Organization Meetings:     Marital Status:        Objective:    Physical Exam    Vitals:   Vitals:    07/20/21 1030 07/20/21 1045 07/20/21 1147 07/20/21 1318   BP: 105/64 99/65 (!) 97/46 (!) 97/46   Pulse: 73 68 75 Resp: 12 15 16    Temp: 97.1 °F (36.2 °C)  98 °F (36.7 °C)    SpO2: 100% 97%     Weight:    133 lb (60.3 kg)   Height:    5' 6\" (1.676 m)       General:    Alert, cooperative,    Neck:   Supple,    Back:     Symmetric,    Lungs:     Diminished bases. to auscultation bilaterally. Heart[de-identified]    Regular rate and rhythm, S1, S2 normal, no murmur, click, rub or gallop. Abdomen:     Soft, non-tender. Bowel sounds normal.   Extremities: BLE BKA, no edema   Vascular:   Extremities warm   Skin:   Skin color normal. No rashes or lesions   Neurologic:   MONSIVAIS, Normal strength throughout. Telemetry: NSR, occasional episodes of junctional beats noted    Data Review:     Radiology:   XR Results (most recent):  Results from Hospital Encounter encounter on 07/19/21    XR CHEST PORT    Narrative  EXAM:  XR CHEST PORT    INDICATION: Cardiac arrest    COMPARISON: 4/8/2020    TECHNIQUE: Portable AP upright chest view at 1455 hours    FINDINGS: There is stable cardiac silhouette enlargement. There are mild diffuse interstitial opacities and moderate left pleural  effusion. The right pleural space is clear. There is no pneumothorax. The bones  and upper abdomen are stable. Impression  Mild pulmonary edema and moderate left pleural effusion. CT Results (most recent):  Results from Hospital Encounter encounter on 07/19/21    CT HEAD WO CONT    Narrative  EXAM:  CT head without contrast    INDICATION: Altered mental status. Cardiac arrest.    COMPARISON: MRI 4/11/2019. CT 4/8/2019. TECHNIQUE: Axial noncontrast head CT from foramen magnum to vertex. Coronal and  sagittal reformatted images were obtained. CT dose reduction was achieved  through use of a standardized protocol tailored for this examination and  automatic exposure control for dose modulation. Adaptive statistical iterative  reconstruction (ASIR) was utilized. FINDINGS:  There is diffuse age-related parenchymal volume loss.  The ventricles  and sulci are age-appropriate without hydrocephalus. There is no mass effect or  midline shift. There is no intracranial hemorrhage or extra-axial fluid  collection. Areas of low attenuation in the periventricular white matter  represent stable chronic microvascular ischemic changes. The gray-white matter  differentiation is maintained. The basal cisterns are patent. The osseous structures are intact. There is a mucus retention cyst versus polyp  in the posterior right maxillary sinus. The remaining paranasal sinuses and  mastoid air cells are clear. Impression  No acute intracranial abnormality. Recent Labs     07/19/21  1430   TROIQ <0.05     Recent Labs     07/19/21  1430      K 4.6      CO2 23   BUN 39*   CREA 6.21*   *   PHOS 6.8*   CA 9.0     Recent Labs     07/19/21  1430   WBC 7.4   HGB 10.8*   HCT 35.8*   *     Recent Labs     07/19/21  1430   PTP 14.6*   INR 1.4*   *     No results for input(s): CHOL, LDLC in the last 72 hours. No lab exists for component: TGL, HDLC,  HBA1C  Recent Labs     07/19/21  1430   TSH 16.60*       Thank you very much for this referral. I appreciate the opportunity to participate in this patient's care. I will follow along with above stated plan. Ning Bello.  MONSERRAT Sanchez         [facility] [address] [place] [address] Rd[address] [place] [address]     [facility]     [phone]    Cortes Stanley MD

## 2021-07-20 NOTE — PROGRESS NOTES
Hospitalist Progress Note      Hospital summary: Paige Esteban is a 78 y.o. male who presents with heparin bradycardia/asystole.   History is limited, patient with dementia, poor historian, apparently patient was at the vascular center today for fistula evaluation, was given fentanyl and Versed, apparently had bradycardia and possible. Of cardiac arrest, patient was given CPR, was given Narcan, EMS was called, when EMS arrived patient had a good pulse and good blood pressure, and patient was brought to the hospital.  Patient was put on a nonrebreather, but currently maintaining sats on nasal cannula. Patient denies any complaints or problems, is confused, patient received dialysis Tuesday Thursday and Saturday. Patient was found to have pulmonary edema and was requested to be admitted to the hospital service. Patient again has dementia and is a poor historian 7/19/2021      Assessment/Plan:  Bradycardia/asystole with cardiac arrest   - Unclear course of event: [facility] center for fistula evaluation, was given fentanyl and Versed, apparently had bradycardia- patient was given CPR,and Narcan  - CTA chest ordered  -  Appreciate cardiology input  - echocardiogram: LVEF is 10-15%. Last echo in 2019- normal EF  - c/w aspirin and stain  - Pt is not good candidate for invasive cardiac testing to evaluate for CAD. - plan for Nuclear stress test tomorrow     ESRD on HD  Pulmonary edema  - CXR: Mild pulmonary edema and moderate left pleural effusion  - nephrology on board for HD management     Rethrombosis left upper arm dialysis graft s/p thrombectomy 7/20  - appreciate vascular surgery input     Diabetes mellitus type 2 poorly controlled  - A1c 6.5. c/w Sliding scale insulin    Hypotension  - on midodrine    Dementia with cognitive communication deficit and dysarthria  - supportive care     Hypothyroidism  - TSH elevated 16. Increased synthroid to 225mcg.  Recommend rpt TSH in 3-4 weeks Palliative care consulted     Code status: Full  DVT prophylaxis:  Heparin  Disposition: TBD  ----------------------------------------------    CC: Cardiac arrest     S: Patient is seen and examined at bedside this noon. He just had thrombectomy of his fistula. Pt is alert but confused, did not communicate to me - had a blanket on his head. Discussed with nursing - refusing care and meds . Pulled IV line     Spoke with wife on phone and updated patient's status - s/p thrombectomy of fistula, HD today echo showed EF 10%, cardiology on board, planning for stress test, refused his meds     Review of Systems:  Review of systems not obtained due to patient factors. O:  Visit Vitals  BP (!) 97/46   Pulse 75   Temp 98 °F (36.7 °C)   Resp 16   Ht 5' 6\" (1.676 m)   Wt 60.3 kg (133 lb)   SpO2 97%   BMI 21.47 kg/m²       PHYSICAL EXAM:  Gen: NAD, non cooperative   HEENT: anicteric sclerae, normal conjunctiva, oropharynx clear, MM moist  Neck: supple, trachea midline, no adenopathy  Heart: RRR, no MRG, no JVD, no peripheral edema  Lungs: decreased in left side   Abd: soft, NT, ND, BS+, no organomegaly  Extr: warm.  B/l BKA  Skin: dry, no rash  Neuro: normal speech, moves all extremities        Intake/Output Summary (Last 24 hours) at 7/20/2021 1521  Last data filed at 7/20/2021 0955  Gross per 24 hour   Intake 250 ml   Output 0 ml   Net 250 ml        Recent labs & imaging reviewed:  Recent Results (from the past 24 hour(s))   COVID-19 RAPID TEST    Collection Time: 07/19/21  4:35 PM   Result Value Ref Range    Specimen source Nasopharyngeal      COVID-19 rapid test Not detected NOTD     GLUCOSE, POC    Collection Time: 07/19/21  9:08 PM   Result Value Ref Range    Glucose (POC) 236 (H) 65 - 117 mg/dL    Performed by REGIS HOGUE    GLUCOSE, POC    Collection Time: 07/20/21  6:34 AM   Result Value Ref Range    Glucose (POC) 186 (H) 65 - 117 mg/dL    Performed by Lakeshia Red 124, POC    Collection Time: 07/20/21  8:50 AM   Result Value Ref Range    Glucose (POC) 164 (H) 65 - 117 mg/dL    Performed by Novel    ECHO ADULT COMPLETE    Collection Time: 07/20/21  1:46 PM   Result Value Ref Range    IVSd 0.83 0.60 - 1.00 cm    LVIDd 5.08 4.20 - 5.90 cm    LVIDs 4.68 cm    LVOT d 2.16 cm    LVPWd 1.03 (A) 0.60 - 1.00 cm    BP EF 20.9 (A) 55.0 - 100.0 percent    LV Ejection Fraction MOD 2C 31 percent    LV Ejection Fraction MOD 4C 13 percent    LV ED Vol A2C 155.36 mL    LV ED Vol A4C 148.00 mL    LV ED Vol .59 67.0 - 155.0 mL    LV ES Vol A2C 107.84 mL    LV ES Vol A4C 128. 67 mL    LV ES Vol .93 (A) 22.0 - 58.0 mL    LVOT Peak Gradient 8.78 mmHg    LVOT Peak Velocity 148. 18 cm/s    RVIDd 5.02 cm    RVSP 52.99 mmHg    LA Volume 56.99 18.0 - 58.0 mL    LA Area 4C 14.53 cm2    LA Vol 2C 69.69 (A) 18.00 - 58.00 mL    LA Vol 4C 37.21 18.00 - 58.00 mL    Est. RA Pressure 10.00 mmHg    Aortic Valve Area by Continuity of Peak Velocity 2.83 cm2    AoV PG 14.73 mmHg    Aortic Valve Systolic Peak Velocity 759.09 cm/s    MV A Isaias 43.15 cm/s    Mitral Valve E Wave Deceleration Time 163.79 ms    MV E Isaias 125.70 cm/s    Mitral Valve Pressure Half-time 47.50 ms    MVA (PHT) 4.63 cm2    MV Peak Gradient 6.11 mmHg    MV Mean Gradient 1.96 mmHg    Mitral Valve Max Velocity 123.61 cm/s    Mitral Valve Annulus Velocity Time Integral 27.08 cm    Pulmonic Valve Systolic Peak Instantaneous Gradient 3.57 mmHg    Tapse 1.17 (A) 1.50 - 2.00 cm    Triscuspid Valve Regurgitation Peak Gradient 42.99 mmHg    TR Max Velocity 327.82 cm/s    Ao Root D 2.96 cm    MV E/A 2.91     LV Mass .6 88.0 - 224.0 g    LV Mass AL Index 101.0 49.0 - 115.0 g/m2    LVES Vol Index BP 71.4 mL/m2    LVED Vol Index BP 90.2 mL/m2    LA Vol Index 33.92 16.00 - 28.00 ml/m2    LA Vol Index 41.48 16.00 - 28.00 ml/m2    LA Vol Index 22.15 16.00 - 28.00 ml/m2    LVED Vol Index A4C 88.1 mL/m2    LVED Vol Index A2C 92.5 mL/m2    LVES Vol Index A4C 76.6 mL/m2    LVES Vol Index A2C 64.2 mL/m2    HEATHER/BSA Pk Isaias 1.7 cm2/m2     Recent Labs     07/19/21  1430   WBC 7.4   HGB 10.8*   HCT 35.8*   *     Recent Labs     07/19/21  1430      K 4.6      CO2 23   BUN 39*   CREA 6.21*   *   CA 9.0   MG 2.2   PHOS 6.8*     Recent Labs     07/19/21  1430   ALT 13   *   TBILI 0.7   TP 8.4*   ALB 2.9*   GLOB 5.5*     Recent Labs     07/19/21  1430   INR 1.4*   PTP 14.6*      No results for input(s): FE, TIBC, PSAT, FERR in the last 72 hours. Lab Results   Component Value Date/Time    Folate 12.7 04/13/2011 03:00 AM      No results for input(s): PH, PCO2, PO2 in the last 72 hours.   Recent Labs     07/19/21  1430   TROIQ <0.05     Lab Results   Component Value Date/Time    Cholesterol, total 336 (H) 04/10/2019 04:21 AM    HDL Cholesterol 44 04/10/2019 04:21 AM    LDL, calculated 279.2 (H) 04/10/2019 04:21 AM    Triglyceride 64 04/10/2019 04:21 AM    CHOL/HDL Ratio 7.6 (H) 04/10/2019 04:21 AM     Lab Results   Component Value Date/Time    Glucose (POC) 164 (H) 07/20/2021 08:50 AM    Glucose (POC) 186 (H) 07/20/2021 06:34 AM    Glucose (POC) 236 (H) 07/19/2021 09:08 PM    Glucose (POC) 139 (H) 04/15/2020 08:02 AM    Glucose (POC) 156 (H) 04/14/2020 09:23 PM     Lab Results   Component Value Date/Time    Color DARK YELLOW 09/27/2017 12:21 PM    Appearance TURBID (A) 09/27/2017 12:21 PM    Specific gravity 1.018 09/27/2017 12:21 PM    Specific gravity 1.010 05/27/2017 04:33 PM    pH (UA) 5.5 09/27/2017 12:21 PM    Protein 100 (A) 09/27/2017 12:21 PM    Glucose 250 (A) 09/27/2017 12:21 PM    Ketone TRACE (A) 09/27/2017 12:21 PM    Bilirubin NEGATIVE  05/27/2017 04:33 PM    Urobilinogen 1.0 09/27/2017 12:21 PM    Nitrites NEGATIVE  09/27/2017 12:21 PM    Leukocyte Esterase LARGE (A) 09/27/2017 12:21 PM    Epithelial cells MODERATE (A) 09/27/2017 12:21 PM    Bacteria 3+ (A) 09/27/2017 12:21 PM    WBC >100 (H) 09/27/2017 12:21 PM    RBC >100 (H) 09/27/2017 12:21 PM       Med list reviewed  Current Facility-Administered Medications   Medication Dose Route Frequency    aspirin delayed-release tablet 81 mg  81 mg Oral DAILY    levothyroxine (SYNTHROID) tablet 200 mcg  200 mcg Oral ACB    midodrine (PROAMATINE) tablet 10 mg  10 mg Oral Q TU, TH & SAT    pravastatin (PRAVACHOL) tablet 20 mg  20 mg Oral QHS    sodium chloride (NS) flush 5-40 mL  5-40 mL IntraVENous Q8H    sodium chloride (NS) flush 5-40 mL  5-40 mL IntraVENous PRN    nitroglycerin (NITROSTAT) tablet 0.4 mg  0.4 mg SubLINGual Q5MIN PRN    heparin (porcine) injection 5,000 Units  5,000 Units SubCUTAneous Q8H    glucose chewable tablet 16 g  4 Tablet Oral PRN    dextrose (D50W) injection syrg 12.5-25 g  25-50 mL IntraVENous PRN    glucagon (GLUCAGEN) injection 1 mg  1 mg IntraMUSCular PRN       Care Plan discussed with:  Patient/Family, Nurse and     Sara Hardwick MD  Internal Medicine  Date of Service: 7/20/2021

## 2021-07-20 NOTE — ROUTINE PROCESS
Bedside shift change report given to Darin Best RN (oncoming nurse) by Stefany Seaman RN (offgoing nurse). Report included the following information SBAR, Kardex, ED Summary, OR Summary, Procedure Summary, Intake/Output, MAR, Accordion, Recent Results, Med Rec Status, Cardiac Rhythm NSR, Alarm Parameters  and Quality Measures. TRANSFER - IN REPORT:    Verbal report received from [facility] KIRA GREEN(name) on Ramos Bright  being received from ED(unit) for routine progression of care      Report consisted of patients Situation, Background, Assessment and   Recommendations(SBAR). Information from the following report(s) SBAR, Kardex, ED Summary, OR Summary, Procedure Summary, Intake/Output, MAR, Accordion, Recent Results, Med Rec Status, Cardiac Rhythm NSR and Quality Measures was reviewed with the receiving nurse. Opportunity for questions and clarification was provided. Assessment completed upon patients arrival to unit and care assumed.      Primary Nurse Kay Morris RN and Kyle Nichols RN performed a dual skin assessment on this patient Impairment noted- see wound doc flow sheet  Hunter score is 16

## 2021-07-20 NOTE — PROGRESS NOTES
Unable to find in toom;in [facility] s/p THROMBECTOMY LEFT UPPER ARM DIALYSIS GRAFT, ANGIOPLASTY AND STENT PLACEMENT;  PLAN FOR DIALYSIS TODAY;CHANA PABLO

## 2021-07-20 NOTE — PROGRESS NOTES
Vascular:    Patient with rethrombosis left upper arm dialysis graft. Plan thrombectomy today. He agrees.

## 2021-07-20 NOTE — PROGRESS NOTES
Echo report indicates severely reduced LV function with EF 10-15%. Mild-mod MR, mild AS. Discussed results with Dr. Jeffy Kurtz.  Pt is not good candidate for invasive cardiac testing to evaluate for CAD. Will proceed with Nuclear stress test in a.m. to evaluate for any possible reversible ischemia. GDMT for cardiomyopathy is limited due to hypotension on midodrine. Will continue ASA and pravastatin for now. Fluid management per nephrology. TSH elevated. Added on Free T4.     07/19/21    ECHO ADULT COMPLETE 07/20/2021 7/20/2021    Interpretation Summary  · LV: Severely and globally reduced systolic function. Estimated LVEF is 10-15%. · MV: Mild mitral annular calcification. Mild to moderate mitral valve regurgitation is present. · PA: Mild to moderate pulmonary hypertension. Pulmonary arterial systolic pressure is 45 mmHg. · AV: Moderate aortic valve sclerosis. Mild aortic valve stenosis is present. · Pericardium: There is left pleural effusion.     Signed by: Una Kumar MD on 7/20/2021  3:39 PM

## 2021-07-20 NOTE — ANESTHESIA PREPROCEDURE EVALUATION
Relevant Problems   No relevant active problems       Anesthetic History   No history of anesthetic complications            Review of Systems / Medical History  Patient summary reviewed, nursing notes reviewed and pertinent labs reviewed    Pulmonary  Within defined limits                 Neuro/Psych   Within defined limits    CVA       Cardiovascular  Within defined limits  Hypertension          CAD    Exercise tolerance: <4 METS     GI/Hepatic/Renal  Within defined limits       Renal disease: dialysis       Endo/Other  Within defined limits  Diabetes: type 2  Hypothyroidism       Other Findings   Comments: S/p bradycardiac cardiac arrest            Physical Exam    Airway  Mallampati: II  TM Distance: > 6 cm  Neck ROM: normal range of motion   Mouth opening: Normal     Cardiovascular          Murmur: Grade 2, Mitral area     Dental    Dentition: Edentulous     Pulmonary  Breath sounds clear to auscultation               Abdominal  GI exam deferred       Other Findings            Anesthetic Plan    ASA: 3  Anesthesia type: MAC          Induction: Intravenous  Anesthetic plan and risks discussed with: Patient

## 2021-07-20 NOTE — WOUND CARE
WOCN Note:     New consult placed for assessment of buttocks and sacrum. Assessed in room 460 with Paz MALONE. Chart reviewed. Admitted DX:  Cardiac arrest  Past Medical History:   Diagnosis Date    Anemia     CAD (coronary artery disease)     Chronic kidney disease     tues/thurs/sat TREE [address] and [address]    Cognitive communication deficit     Diabetes ([facility] [address]     Dysarthria and anarthria     Dysphagia     Endocrine disease     hypothyroid    Gastrointestinal disorder     gallstones    Hx of BKA, left (HCC)     Hx of BKA, right ([address] [address]     Hypertension     Muscle weakness (generalized)     Stroke ([facility] [address]     2011 speech general weakness    Thyroid disease      Assessment:   Patient is alert, confused, communicative and turns with assistance of 1. He refuses care at times. Bed: foam mattress  Patient wearing briefs for incontinence. Patient reports no pain. 1. POA Sacrum, recently resurfaced injury with maceration from moisture. Cleansed and applied Zinc cream.    Wound, Pressure Prevention & Skin Care Recommendations:    1. Minimize layers of linen/pads under patient to optimize support surface. 2.  Turn/reposition approximately every 2 hours and offload heels. 3.  Manage moisture/ Keep skin folds clean and dry. 4.  Sacrum and buttocks:  Every 8 hours and with incontinence care cleanse and apply Zinc cream.    Discussed above plan with patient and Paz MALONE.     Transition of Care: Plan to follow as needed while admitted to hospital.    LAILA RodriguezN RN [facility] Inpatient Wound Care  Available on Perfect Serve  Pager [phone]  Office [phone]

## 2021-07-20 NOTE — OP NOTES
[address]   OPERATIVE REPORT    Name:  Mariya Buckley  MR#:  [id number]  :  1940  ACCOUNT #:  [de-identified]  DATE OF SERVICE:  2021      PREOPERATIVE DIAGNOSIS:  Clotted left upper arm dialysis graft. POSTOPERATIVE DIAGNOSIS:  Clotted left upper arm dialysis graft. PROCEDURE PERFORMED:  Thrombectomy, left upper arm dialysis graft with balloon angioplasty and stent placement. SURGEON:  MD Lucas Segundo. ANESTHESIA:  Local with sedation. COMPLICATIONS:  None. SPECIMENS REMOVED:  None. IMPLANTS:  8 x 4 self-expanding stent. ESTIMATED BLOOD LOSS:  25 mL. INDICATIONS:  The patient is an 49-year-old male with end-stage renal disease on hemodialysis. He has a left upper arm dialysis graft in place, which was found to be thrombosed today. He will undergo thrombectomy. PROCEDURE:  The patient's left arm was prepped and draped. A transverse incision was made over the venous or medial side of the graft. The graft was identified and opened transversely. A #4 Vivien catheter was passed through the venous outflow tract with return of clot and some backbleeding. The body of the graft in the arterial end was then declotted with return of good inflow. The graftotomy was closed with running CV5 [place]-Mando suture. A 6-Hebrew sheath was then inserted, directed towards the venous outflow. Angiography showed a significant stenosis at the venous anastomosis. This was dilated with an 8-mm balloon. There was residual stenosis of the area, it was stented with an 8 x 4 self-expanding stent and then post dilated. Following this, there was a good angiographic result. Further angiographic views of the graft were performed with findings of the graft stenosis on the lateral side as well as tapering graft at the arterial end. Because of repeated thrombosis, it was felt prudent to dilate the arterial end.   A second 6-Hebrew sheath was inserted, directed towards the arterial side. The graft stenosis was dilated with an 8-mm balloon and the arterial end of the graft dilated with a 6-mm balloon. Following this, there was a good angiographic result. The sheaths were removed and the puncture sites closed with nylon sutures. The incision was closed with Vicryl subcutaneous suture and Vicryl subcuticular skin closure. Dressings were applied and the patient was returned to the recovery room in stable condition.         Michael Vela MD      [id number]  D:  07/20/2021 10:09  T:  07/20/2021 13:24  JOB #:  [id number]

## 2021-07-20 NOTE — BRIEF OP NOTE
Brief Postoperative Note    Patient: Anyi Wu  YOB: 1940  MRN: [id number]    Date of Procedure: 7/20/2021     Pre-Op Diagnosis: CLOTTED LEFT UPPER ARM DIALYSIS GRAFT    Post-Op Diagnosis: Same as preoperative diagnosis. Procedure(s):  THROMBECTOMY LEFT UPPER ARM DIALYSIS GRAFT, ANGIOPLASTY AND STENT PLACEMENT    Surgeon(s):  Xin Santos MD    Surgical Assistant: Surg Asst-1: Casa Plascencia    Anesthesia: General     Estimated Blood Loss (mL): 25 ml    Complications: None    Specimens: * No specimens in log *     Implants:   Implant Name Type Inv.  Item Serial No.  Lot No. LRB No. Used Action   Protege- everflex    [employer] [id number] Left 1 Implanted       Drains: * No LDAs found *    Findings: venous anastomotic stenosis stented, graft stenosis dilated    Electronically Signed by Erick Mclaughlin MD on 7/20/2021 at 10:02 AM

## 2021-07-20 NOTE — CONSULTS
[address]    Name:  Julius Cuevas  MR#:  [id number]  :  1940  ACCOUNT #:  [de-identified]  DATE OF SERVICE:  2021      REASON FOR CONSULTATION:  Seen for end-stage renal disease. HISTORY OF PRESENT ILLNESS:  As per record, the patient dialyzes supposedly TTS at [facility]. Came to the hospital.  As per record, he was [facility] for fistula evaluation. He was given fentanyl and Versed. He tanner'd down. He had a cardiac arrest.  CPR was given as well as Narcan. EMS called. He had a good blood pressure and called here to see him. His potassium on arrival was 4.6 and looking a little bit confused, but stable. PAST MEDICAL HISTORY:  End-stage renal disease, TTS at [facility]; history of BKA, bilateral; CAD; dyslipidemia; hypertension; left upper extremity AV fistula; and dementia. MEDICATIONS AS INPATIENT:  Not ordered yet. ALLERGIES:  NO KNOWN ALLERGIES. OUTPATIENT MEDICATIONS:  Reviewed. SOCIAL HISTORY:  Reviewed. FAMILY HISTORY:  Reviewed. REVIEW OF SYSTEMS:  Difficult to obtain in view of the patient's condition. PHYSICAL EXAMINATION:  GENERAL:  Alert. VITAL SIGNS:  Blood pressure 131/74 and saturating 96%. HEENT:  JVD is difficult to assess. Left upper extremity AV fistula wrapped. We can feel a little bit of thrill in the bottom parts of it. ABDOMEN:  Soft. LABORATORY DATA:  As follows:  Hemoglobin 10.8, platelets 775, and WBCs 7.4. Sodium 138, potassium 4.6, BUN 39, creatinine 6.3, bicarb 23, calcium is 9, and phosphorus is  6.8. CTA was ordered, not done yet. IMPRESSION:  1. End-stage renal disease, hemodialysis on Tuesday, Thursday, and Saturday at [facility]. Very poor historian now. 2.  Arteriovenous fistula, left upper extremity, covered, difficult to assess. 3.  Status post cardiac arrest during his vascular center for the fistula evaluation. 4.  Anemia.   5.  Bilateral below-knee amputation. RECOMMENDATIONS:  As follow:  1. We will dialyze tomorrow, no acute indication for dialysis today. 2.  It will be easier to assess his AV fistula in the morning. We will leave it covered now. 3.  I discussed with him but not sure if the patient is aware of what is going on.     Sincerely,      Floridalma Muller MD      [id number]  D:  07/19/2021 17:35  T:  07/19/2021 19:59  JOB #:  [id number]

## 2021-07-21 NOTE — PROGRESS NOTES
Cardiology Progress Note            Admit Date: 7/19/2021  Admit Diagnosis: Cardiac arrest [facility]) [I46.9]  Date: 7/21/2021     Time: 12:16 PM    Subjective: Pt denies chest pain, SOB, dizziness, lightheadedness. I spoke with pt's wife yesterday and pt has no known history of cardiac disease including CAD. Assessment and Plan   1. Bradycardia, ?cardiac arrest              -No chest pain, Troponin negative on presentation, 0.05 today. -EKG NSR with nonspecific T wave abnormality V5, V6              -No further episodes   -Uncertain etiology.              -Echo with reduced markedly reduced LV function. Stress test as below. 2. Cardiomyopathy   -EF 10-15%, mild-mod MR, mild AS, mild- mod pulmonary HTN   -difficult to determine NYHA class due to pt is nonambulatory. -HD last night, fluid management per renal.    -Appears compensated currently. -Unable to start GDMT I.e. BB, ACE-I, spironolactone due to low BP, pt requiring midodrine 3 xs weekly for HD   -Uncertain etiology of CM but certainly possible CAD of concern. Pt is not an ideal candidate for invasive cardiac testing. Was planning to proceed with Lexiscan stress test today but pt has no IV access and is refusing re-attempt at access. 3. History of hypotension on midodrine              -BP low- low NL (SBP 90's-110). Asymptomatic              -Continue midodrine. 4. Hypothyroidsim              -TSH 16.6, free T3 low              -Synthroid dose adjusted by primary team.     5. Mild AS, mild-mod MR     6. HLD   -LDL 85.8, HDL 37              -continue statin     78 y.o. male with new diagnosis of cardiomyopathy. Unfortunately GDMT options are limited given his low BP. Uncertain etiology of Cardiomyopathy and unablet to obtain stress test without IV access.  Will d/w Dr. Sean Eubanks.  Agree with palliative care consult for care decisions, code status etc. Saw and evaluated pt and agree with above assessment and plan. Pt with severe cardiomyopathy and likely underlying multivessel CAD given his PAD and co-morbidities. Recommend stress test to r/o large area of ischemia. Otherwise optimize medical therapy. Agree with involving palliative care and long term decision making. Tamar Estrada MD    Subjective:   Mazin Balbuena denies chest pain, SOB, dizziness. He is agreeable to stress test today. PMH  Past Medical History:   Diagnosis Date    Anemia     CAD (coronary artery disease)     Chronic kidney disease     tues/thurs/sat TREE [address] and [address]    Cognitive communication deficit     Diabetes ([facility] [address]     Dysarthria and anarthria     Dysphagia     Endocrine disease     hypothyroid    Gastrointestinal disorder     gallstones    Hx of BKA, left (HCC)     Hx of B[place], right ([address]     Hypertension     Muscle weakness (generalized)     Stroke ([facility] [address]     2011 speech general weakness    Thyroid disease       Social Hx  Social History     Socioeconomic History    Marital status:      Spouse name: Not on file    Number of children: Not on file    Years of education: Not on file    Highest education level: Not on file   Occupational History    Not on file   Tobacco Use    Smoking status: Never Smoker    Smokeless tobacco: Never Used   Substance and Sexual Activity    Alcohol use: No    Drug use: No     Types: Prescription    Sexual activity: Not on file   Other Topics Concern    Not on file   Social History Narrative    Not on file     Social Determinants of Health     Financial Resource Strain:     Difficulty of Paying Living Expenses:    Food Insecurity:     Worried About Running Out of Food in the Last Year:     Ran Out of Food in the Last Year:    Transportation Needs:     Lack of Transportation (Medical):      Lack of Transportation (Non-Medical):    Physical Activity:     Days of Exercise per Week:     Minutes of Exercise per Session: Stress:     Feeling of Stress :    Social Connections:     Frequency of Communication with Friends and Family:     Frequency of Social Gatherings with Friends and Family:     Attends Denominational Services:     Active Member of Clubs or Organizations:     Attends Club or Organization Meetings:     Marital Status:    Intimate Partner Violence:     Fear of Current or Ex-Partner:     Emotionally Abused:     Physically Abused:     Sexually Abused:      Objective:      Physical Exam:                Visit Vitals  BP (!) 116/50 (BP 1 Location: Right upper arm, BP Patient Position: At rest)   Pulse 66   Temp 97.7 °F (36.5 °C)   Resp 16   Ht 5' 6\" (1.676 m)   Wt 131 lb 6.3 oz (59.6 kg)   SpO2 99%   BMI 21.21 kg/m²          General Appearance:   Well developed, thin male, In no acute distress. Ears/Nose/Mouth/Throat:    Hearing grossly normal.         Neck:  Supple. Chest:    Lungs clear  to auscultation bilaterally. Cardiovascular:    Regular rate and rhythm, S1, S2 normal, no murmur. Abdomen:    Soft, non-tender, bowel sounds are active. Extremities:  BLE BKA   Skin:  Warm and dry. Telemetry: NSR          Data Review:    Labs:    Recent Results (from the past 24 hour(s))   ECHO ADULT COMPLETE    Collection Time: 07/20/21  1:46 PM   Result Value Ref Range    IVSd 0.83 0.60 - 1.00 cm    LVIDd 5.08 4.20 - 5.90 cm    LVIDs 4.68 cm    LVOT d 2.16 cm    LVPWd 1.03 (A) 0.60 - 1.00 cm    BP EF 20.9 (A) 55.0 - 100.0 percent    LV Ejection Fraction MOD 2C 31 percent    LV Ejection Fraction MOD 4C 13 percent    LV ED Vol A2C 155.36 mL    LV ED Vol A4C 148.00 mL    LV ED Vol .59 67.0 - 155.0 mL    LV ES Vol A2C 107.84 mL    LV ES Vol A4C 128. 67 mL    LV ES Vol .93 (A) 22.0 - 58.0 mL    LVOT Peak Gradient 8.78 mmHg    LVOT Peak Velocity 148. 18 cm/s    RVIDd 5.02 cm    RVSP 52.99 mmHg    LA Volume 56.99 18.0 - 58.0 mL    LA Area 4C 14.53 cm2    LA Vol 2C 69.69 (A) 18.00 - 58.00 mL    LA Vol 4C 37.21 18.00 - 58.00 mL    Est. RA Pressure 10.00 mmHg    Aortic Valve Area by Continuity of Peak Velocity 2.83 cm2    AoV PG 14.73 mmHg    Aortic Valve Systolic Peak Velocity 330.00 cm/s    MV A Isaias 43.15 cm/s    Mitral Valve E Wave Deceleration Time 163.79 ms    MV E Isaias 125.70 cm/s    Mitral Valve Pressure Half-time 47.50 ms    MVA (PHT) 4.63 cm2    MV Peak Gradient 6.11 mmHg    MV Mean Gradient 1.96 mmHg    Mitral Valve Max Velocity 123.61 cm/s    Mitral Valve Annulus Velocity Time Integral 27.08 cm    Pulmonic Valve Systolic Peak Instantaneous Gradient 3.57 mmHg    Tapse 1.17 (A) 1.50 - 2.00 cm    Triscuspid Valve Regurgitation Peak Gradient 42.99 mmHg    TR Max Velocity 327.82 cm/s    Ao Root D 2.96 cm    MV E/A 2.91     LV Mass .6 88.0 - 224.0 g    LV Mass AL Index 101.0 49.0 - 115.0 g/m2    LVES Vol Index BP 71.4 mL/m2    LVED Vol Index BP 90.2 mL/m2    LA Vol Index 33.92 16.00 - 28.00 ml/m2    LA Vol Index 41.48 16.00 - 28.00 ml/m2    LA Vol Index 22.15 16.00 - 28.00 ml/m2    LVED Vol Index A4C 88.1 mL/m2    LVED Vol Index A2C 92.5 mL/m2    LVES Vol Index A4C 76.6 mL/m2    LVES Vol Index A2C 64.2 mL/m2    HEATHER/BSA Pk Isaias 1.7 cm2/m2   GLUCOSE, POC    Collection Time: 07/20/21  5:12 PM   Result Value Ref Range    Glucose (POC) 171 (H) 65 - 117 mg/dL    Performed by Maximus calderón RN    HEP B SURFACE AG    Collection Time: 07/20/21  9:06 PM   Result Value Ref Range    Hepatitis B surface Ag <0.10 Index    Hep B surface Ag Interp. Negative NEG     HEP B SURFACE AB    Collection Time: 07/20/21  9:06 PM   Result Value Ref Range    Hepatitis B surface Ab 17.54 mIU/mL    Hep B surface Ab Interp. REACTIVE (A) NR     SAMPLES BEING HELD    Collection Time: 07/20/21  9:09 PM   Result Value Ref Range    SAMPLES BEING HELD 1lav,1pst     COMMENT        Add-on orders for these samples will be processed based on acceptable specimen integrity and analyte stability, which may vary by analyte.    CBC WITH AUTOMATED DIFF Collection Time: 07/20/21  9:09 PM   Result Value Ref Range    WBC 6.4 4.1 - 11.1 K/uL    RBC 2.54 (L) 4.10 - 5.70 M/uL    HGB 9.1 (L) 12.1 - 17.0 g/dL    HCT 28.1 (L) 36.6 - 50.3 %    .6 (H) 80.0 - 99.0 FL    MCH 35.8 (H) 26.0 - 34.0 PG    MCHC 32.4 30.0 - 36.5 g/dL    RDW 14.2 11.5 - 14.5 %    PLATELET 659 (L) 232 - 400 K/uL    MPV 11.0 8.9 - 12.9 FL    NRBC 0.0 0  WBC    ABSOLUTE NRBC 0.00 0.00 - 0.01 K/uL    NEUTROPHILS 83 (H) 32 - 75 %    LYMPHOCYTES 10 (L) 12 - 49 %    MONOCYTES 7 5 - 13 %    EOSINOPHILS 0 0 - 7 %    BASOPHILS 0 0 - 1 %    IMMATURE GRANULOCYTES 0 0.0 - 0.5 %    ABS. NEUTROPHILS 5.4 1.8 - 8.0 K/UL    ABS. LYMPHOCYTES 0.6 (L) 0.8 - 3.5 K/UL    ABS. MONOCYTES 0.4 0.0 - 1.0 K/UL    ABS. EOSINOPHILS 0.0 0.0 - 0.4 K/UL    ABS. BASOPHILS 0.0 0.0 - 0.1 K/UL    ABS. IMM.  GRANS. 0.0 0.00 - 0.04 K/UL    DF SMEAR SCANNED      RBC COMMENTS MACROCYTOSIS  2+       TROPONIN I    Collection Time: 07/20/21  9:09 PM   Result Value Ref Range    Troponin-I, Qt. 0.05 (H) <0.05 ng/mL   LIPID PANEL    Collection Time: 07/20/21  9:09 PM   Result Value Ref Range    Cholesterol, total 140 <200 MG/DL    Triglyceride 86 <150 MG/DL    HDL Cholesterol 37 MG/DL    LDL, calculated 85.8 0 - 100 MG/DL    VLDL, calculated 17.2 MG/DL    CHOL/HDL Ratio 3.8 0.0 - 5.0     METABOLIC PANEL, BASIC    Collection Time: 07/20/21  9:09 PM   Result Value Ref Range    Sodium 134 (L) 136 - 145 mmol/L    Potassium 3.6 3.5 - 5.1 mmol/L    Chloride 100 97 - 108 mmol/L    CO2 23 21 - 32 mmol/L    Anion gap 11 5 - 15 mmol/L    Glucose 155 (H) 65 - 100 mg/dL    BUN 27 (H) 6 - 20 MG/DL    Creatinine 3.76 (H) 0.70 - 1.30 MG/DL    BUN/Creatinine ratio 7 (L) 12 - 20      GFR est AA 19 (L) >60 ml/min/1.73m2    GFR est non-AA 15 (L) >60 ml/min/1.73m2    Calcium 8.3 (L) 8.5 - 10.1 MG/DL   GLUCOSE, POC    Collection Time: 07/20/21  9:20 PM   Result Value Ref Range    Glucose (POC) 154 (H) 65 - 117 mg/dL    Performed by Zaid Pop GLUCOSE, POC    Collection Time: 07/21/21  7:49 AM   Result Value Ref Range    Glucose (POC) 122 (H) 65 - 117 mg/dL    Performed by Devin Koch    GLUCOSE, POC    Collection Time: 07/21/21 11:14 AM   Result Value Ref Range    Glucose (POC) 140 (H) 65 - 117 mg/dL    Performed by Yariel calderón RN           Radiology:        Current Facility-Administered Medications   Medication Dose Route Frequency    [START ON 7/22/2021] epoetin aram-epbx (RETACRIT) injection 10,000 Units  10,000 Units SubCUTAneous Q TUE, THU & SAT    levothyroxine (SYNTHROID) tablet 225 mcg  225 mcg Oral ACB    insulin lispro (HUMALOG) injection   SubCUTAneous AC&HS    aspirin delayed-release tablet 81 mg  81 mg Oral DAILY    midodrine (PROAMATINE) tablet 10 mg  10 mg Oral Q TU, TH & SAT    pravastatin (PRAVACHOL) tablet 20 mg  20 mg Oral QHS    sodium chloride (NS) flush 5-40 mL  5-40 mL IntraVENous Q8H    sodium chloride (NS) flush 5-40 mL  5-40 mL IntraVENous PRN    nitroglycerin (NITROSTAT) tablet 0.4 mg  0.4 mg SubLINGual Q5MIN PRN    heparin (porcine) injection 5,000 Units  5,000 Units SubCUTAneous Q8H    glucose chewable tablet 16 g  4 Tablet Oral PRN    dextrose (D50W) injection syrg 12.5-25 g  25-50 mL IntraVENous PRN    glucagon (GLUCAGEN) injection 1 mg  1 mg IntraMUSCular PRN          [place] [place].  MONSERRAT Sanchez     [facility] [address] [place] [address], [address] [place] [address] [address]   Alejandro Hebert   [phone]

## 2021-07-21 NOTE — PROGRESS NOTES
Per nurse at [facility], pt has had both covid vaccienes, the first on 6/29/21 and the second on 7/7/21.

## 2021-07-21 NOTE — PROGRESS NOTES
Hospitalist Progress Note      Hospital summary: Darwin Reis is a 78 y.o. male who presents with heparin bradycardia/asystole.   History is limited, patient with dementia, poor historian, apparently patient was at the vascular center today for fistula evaluation, was given fentanyl and Versed, apparently had bradycardia and possible. Of cardiac arrest, patient was given CPR, was given Narcan, EMS was called, when EMS arrived patient had a good pulse and good blood pressure, and patient was brought to the hospital.  Patient was put on a nonrebreather, but currently maintaining sats on nasal cannula. Patient denies any complaints or problems, is confused, patient received dialysis Tuesday Thursday and Saturday. Patient was found to have pulmonary edema and was requested to be admitted to the hospital service. Patient again has dementia and is a poor historian 7/19/2021      Assessment/Plan:  Bradycardia/asystole with cardiac arrest   - Unclear course of event: [facility] center for fistula evaluation, was given fentanyl and Versed, apparently had bradycardia- patient was given CPR,and Narcan  - CTA chest pending   -  Appreciate cardiology input  - echocardiogram: LVEF is 10-15%. Last echo in 2019- normal EF  - c/w aspirin and stain  - Pt is not good candidate for invasive cardiac testing to evaluate for CAD. - plan for Nuclear stress test postponed tomorrow as pt has no IV access     ESRD on HD  Pulmonary edema  - CXR: Mild pulmonary edema and moderate left pleural effusion  - nephrology on board for HD management     Rethrombosis left upper arm dialysis graft s/p thrombectomy 7/20  - appreciate vascular surgery input     Diabetes mellitus type 2 poorly controlled  - A1c 6.5. c/w Sliding scale insulin    Hypotension  - on midodrine    Dementia with cognitive communication deficit and dysarthria  - supportive care     Hypothyroidism  - TSH elevated 16. Increased synthroid to 225mcg. Recommend rpt TSH in 3-4 weeks     Palliative care on board     Code status: Full  DVT prophylaxis:  Heparin  Disposition: TBD. [facility] - [facility]   ----------------------------------------------    CC: Cardiac arrest     S: Patient is seen and examined at bedside this AM. Hx dementia. He is alert and much more conversant. Denied any pain. Plan for stress test today   Discussed with nursing - need IV line     Addendum: 12.30pm  Met with family at bedside - wife, daughter and grand daughter- explained pt lost IV access as he pulled it , hard stick and pt not cooperative, will proceed with stress test tomorrow, will consult anesthesiology for Central line-  They agreed. Long discussion on goals of care in case of cardiac arrest - pt high risk due to EF 10% - very poor outcome and meaningful recovery even with full measures - family understood and agreed. Wife agreed to DNR. Review of Systems:  Review of systems not obtained due to patient factors. O:  Visit Vitals  BP (!) 95/58 (BP 1 Location: Right arm, BP Patient Position: At rest)   Pulse 69   Temp 96.9 °F (36.1 °C)   Resp 16   Ht 5' 6\" (1.676 m)   Wt 59.6 kg (131 lb 6.3 oz)   SpO2 100%   BMI 21.21 kg/m²       PHYSICAL EXAM:  Gen: NAD  HEENT: anicteric sclerae, normal conjunctiva, oropharynx clear, MM moist  Neck: supple, trachea midline, no adenopathy  Heart: RRR, no MRG, no JVD, no peripheral edema  Lungs: decreased in left side   Abd: soft, NT, ND, BS+, no organomegaly  Extr: warm.  B/l BKA  Skin: dry, no rash  Neuro: normal speech, moves all extremities        Intake/Output Summary (Last 24 hours) at 7/21/2021 1105  Last data filed at 7/21/2021 0400  Gross per 24 hour   Intake 220 ml   Output 700 ml   Net -480 ml        Recent labs & imaging reviewed:  Recent Results (from the past 24 hour(s))   ECHO ADULT COMPLETE    Collection Time: 07/20/21  1:46 PM   Result Value Ref Range    IVSd 0.83 0.60 - 1.00 cm    LVIDd 5.08 4.20 - 5.90 cm LVIDs 4.68 cm    LVOT d 2.16 cm    LVPWd 1.03 (A) 0.60 - 1.00 cm    BP EF 20.9 (A) 55.0 - 100.0 percent    LV Ejection Fraction MOD 2C 31 percent    LV Ejection Fraction MOD 4C 13 percent    LV ED Vol A2C 155.36 mL    LV ED Vol A4C 148.00 mL    LV ED Vol .59 67.0 - 155.0 mL    LV ES Vol A2C 107.84 mL    LV ES Vol A4C 128. 67 mL    LV ES Vol .93 (A) 22.0 - 58.0 mL    LVOT Peak Gradient 8.78 mmHg    LVOT Peak Velocity 148. 18 cm/s    RVIDd 5.02 cm    RVSP 52.99 mmHg    LA Volume 56.99 18.0 - 58.0 mL    LA Area 4C 14.53 cm2    LA Vol 2C 69.69 (A) 18.00 - 58.00 mL    LA Vol 4C 37.21 18.00 - 58.00 mL    Est. RA Pressure 10.00 mmHg    Aortic Valve Area by Continuity of Peak Velocity 2.83 cm2    AoV PG 14.73 mmHg    Aortic Valve Systolic Peak Velocity 050.58 cm/s    MV A Isaias 43.15 cm/s    Mitral Valve E Wave Deceleration Time 163.79 ms    MV E Isaias 125.70 cm/s    Mitral Valve Pressure Half-time 47.50 ms    MVA (PHT) 4.63 cm2    MV Peak Gradient 6.11 mmHg    MV Mean Gradient 1.96 mmHg    Mitral Valve Max Velocity 123.61 cm/s    Mitral Valve Annulus Velocity Time Integral 27.08 cm    Pulmonic Valve Systolic Peak Instantaneous Gradient 3.57 mmHg    Tapse 1.17 (A) 1.50 - 2.00 cm    Triscuspid Valve Regurgitation Peak Gradient 42.99 mmHg    TR Max Velocity 327.82 cm/s    Ao Root D 2.96 cm    MV E/A 2.91     LV Mass .6 88.0 - 224.0 g    LV Mass AL Index 101.0 49.0 - 115.0 g/m2    LVES Vol Index BP 71.4 mL/m2    LVED Vol Index BP 90.2 mL/m2    LA Vol Index 33.92 16.00 - 28.00 ml/m2    LA Vol Index 41.48 16.00 - 28.00 ml/m2    LA Vol Index 22.15 16.00 - 28.00 ml/m2    LVED Vol Index A4C 88.1 mL/m2    LVED Vol Index A2C 92.5 mL/m2    LVES Vol Index A4C 76.6 mL/m2    LVES Vol Index A2C 64.2 mL/m2    HEATHER/BSA Pk Isaias 1.7 cm2/m2   GLUCOSE, POC    Collection Time: 07/20/21  5:12 PM   Result Value Ref Range    Glucose (POC) 171 (H) 65 - 117 mg/dL    Performed by June Head   travel [facility]    Collection Time: 07/20/21  9:06 PM   Result Value Ref Range    Hepatitis B surface Ag <0.10 Index    Hep B surface Ag Interp. Negative NEG     HEP B SURFACE AB    Collection Time: 07/20/21  9:06 PM   Result Value Ref Range    Hepatitis B surface Ab 17.54 mIU/mL    Hep B surface Ab Interp. REACTIVE (A) NR     SAMPLES BEING HELD    Collection Time: 07/20/21  9:09 PM   Result Value Ref Range    SAMPLES BEING HELD 1lav,1pst     COMMENT        Add-on orders for these samples will be processed based on acceptable specimen integrity and analyte stability, which may vary by analyte. CBC WITH AUTOMATED DIFF    Collection Time: 07/20/21  9:09 PM   Result Value Ref Range    WBC 6.4 4.1 - 11.1 K/uL    RBC 2.54 (L) 4.10 - 5.70 M/uL    HGB 9.1 (L) 12.1 - 17.0 g/dL    HCT 28.1 (L) 36.6 - 50.3 %    .6 (H) 80.0 - 99.0 FL    MCH 35.8 (H) 26.0 - 34.0 PG    MCHC 32.4 30.0 - 36.5 g/dL    RDW 14.2 11.5 - 14.5 %    PLATELET 341 (L) 483 - 400 K/uL    MPV 11.0 8.9 - 12.9 FL    NRBC 0.0 0  WBC    ABSOLUTE NRBC 0.00 0.00 - 0.01 K/uL    NEUTROPHILS 83 (H) 32 - 75 %    LYMPHOCYTES 10 (L) 12 - 49 %    MONOCYTES 7 5 - 13 %    EOSINOPHILS 0 0 - 7 %    BASOPHILS 0 0 - 1 %    IMMATURE GRANULOCYTES 0 0.0 - 0.5 %    ABS. NEUTROPHILS 5.4 1.8 - 8.0 K/UL    ABS. LYMPHOCYTES 0.6 (L) 0.8 - 3.5 K/UL    ABS. MONOCYTES 0.4 0.0 - 1.0 K/UL    ABS. EOSINOPHILS 0.0 0.0 - 0.4 K/UL    ABS. BASOPHILS 0.0 0.0 - 0.1 K/UL    ABS. IMM.  GRANS. 0.0 0.00 - 0.04 K/UL    DF SMEAR SCANNED      RBC COMMENTS MACROCYTOSIS  2+       TROPONIN I    Collection Time: 07/20/21  9:09 PM   Result Value Ref Range    Troponin-I, Qt. 0.05 (H) <0.05 ng/mL   LIPID PANEL    Collection Time: 07/20/21  9:09 PM   Result Value Ref Range    Cholesterol, total 140 <200 MG/DL    Triglyceride 86 <150 MG/DL    HDL Cholesterol 37 MG/DL    LDL, calculated 85.8 0 - 100 MG/DL    VLDL, calculated 17.2 MG/DL    CHOL/HDL Ratio 3.8 0.0 - 5.0     METABOLIC PANEL, BASIC    Collection Time: 07/20/21  9:09 PM   Result Value Ref Range    Sodium 134 (L) 136 - 145 mmol/L    Potassium 3.6 3.5 - 5.1 mmol/L    Chloride 100 97 - 108 mmol/L    CO2 23 21 - 32 mmol/L    Anion gap 11 5 - 15 mmol/L    Glucose 155 (H) 65 - 100 mg/dL    BUN 27 (H) 6 - 20 MG/DL    Creatinine 3.76 (H) 0.70 - 1.30 MG/DL    BUN/Creatinine ratio 7 (L) 12 - 20      GFR est AA 19 (L) >60 ml/min/1.73m2    GFR est non-AA 15 (L) >60 ml/min/1.73m2    Calcium 8.3 (L) 8.5 - 10.1 MG/DL   GLUCOSE, POC    Collection Time: 07/20/21  9:20 PM   Result Value Ref Range    Glucose (POC) 154 (H) 65 - 117 mg/dL    Performed by [facility]    GLUCOSE, POC    Collection Time: 07/21/21  7:49 AM   Result Value Ref Range    Glucose (POC) 122 (H) 65 - 117 mg/dL    Performed by [facility]      Recent Labs     07/20/21 2109 07/19/21  1430   WBC 6.4 7.4   HGB 9.1* 10.8*   HCT 28.1* 35.8*   * 148*     Recent Labs     07/20/21 2109 07/19/21  1430   * 138   K 3.6 4.6    101   CO2 23 23   BUN 27* 39*   CREA 3.76* 6.21*   * 241*   CA 8.3* 9.0   MG  --  2.2   PHOS  --  6.8*     Recent Labs     07/19/21  1430   ALT 13   *   TBILI 0.7   TP 8.4*   ALB 2.9*   GLOB 5.5*     Recent Labs     07/19/21  1430   INR 1.4*   PTP 14.6*      No results for input(s): FE, TIBC, PSAT, FERR in the last 72 hours. Lab Results   Component Value Date/Time    Folate 12.7 04/13/2011 03:00 AM      No results for input(s): PH, PCO2, PO2 in the last 72 hours.   Recent Labs     07/20/21 2109 07/19/21  1430   TROIQ 0.05* <0.05     Lab Results   Component Value Date/Time    Cholesterol, total 140 07/20/2021 09:09 PM    HDL Cholesterol 37 07/20/2021 09:09 PM    LDL, calculated 85.8 07/20/2021 09:09 PM    Triglyceride 86 07/20/2021 09:09 PM    CHOL/HDL Ratio 3.8 07/20/2021 09:09 PM     Lab Results   Component Value Date/Time    Glucose (POC) 122 (H) 07/21/2021 07:49 AM    Glucose (POC) 154 (H) 07/20/2021 09:20 PM    Glucose (POC) 171 (H) 07/20/2021 05:12 PM    Glucose (POC) 164 (H) 07/20/2021 08:50 AM    Glucose (POC) 186 (H) 07/20/2021 06:34 AM     Lab Results   Component Value Date/Time    Color DARK YELLOW 09/27/2017 12:21 PM    Appearance TURBID (A) 09/27/2017 12:21 PM    Specific gravity 1.018 09/27/2017 12:21 PM    Specific gravity 1.010 05/27/2017 04:33 PM    pH (UA) 5.5 09/27/2017 12:21 PM    Protein 100 (A) 09/27/2017 12:21 PM    Glucose 250 (A) 09/27/2017 12:21 PM    Ketone TRACE (A) 09/27/2017 12:21 PM    Bilirubin NEGATIVE  05/27/2017 04:33 PM    Urobilinogen 1.0 09/27/2017 12:21 PM    Nitrites NEGATIVE  09/27/2017 12:21 PM    Leukocyte Esterase LARGE (A) 09/27/2017 12:21 PM    Epithelial cells MODERATE (A) 09/27/2017 12:21 PM    Bacteria 3+ (A) 09/27/2017 12:21 PM    WBC >100 (H) 09/27/2017 12:21 PM    RBC >100 (H) 09/27/2017 12:21 PM       Med list reviewed  Current Facility-Administered Medications   Medication Dose Route Frequency    levothyroxine (SYNTHROID) tablet 225 mcg  225 mcg Oral ACB    insulin lispro (HUMALOG) injection   SubCUTAneous AC&HS    aspirin delayed-release tablet 81 mg  81 mg Oral DAILY    midodrine (PROAMATINE) tablet 10 mg  10 mg Oral Q TU, TH & SAT    pravastatin (PRAVACHOL) tablet 20 mg  20 mg Oral QHS    sodium chloride (NS) flush 5-40 mL  5-40 mL IntraVENous Q8H    sodium chloride (NS) flush 5-40 mL  5-40 mL IntraVENous PRN    nitroglycerin (NITROSTAT) tablet 0.4 mg  0.4 mg SubLINGual Q5MIN PRN    heparin (porcine) injection 5,000 Units  5,000 Units SubCUTAneous Q8H    glucose chewable tablet 16 g  4 Tablet Oral PRN    dextrose (D50W) injection syrg 12.5-25 g  25-50 mL IntraVENous PRN    glucagon (GLUCAGEN) injection 1 mg  1 mg IntraMUSCular PRN       Care Plan discussed with:  Patient/Family, Nurse and     Marilynn Umanzor MD  Internal Medicine  Date of Service: 7/21/2021

## 2021-07-21 NOTE — PROGRESS NOTES
Transitions of Care Plan:  RUR: 22% - moderate  Clinical Plan: s/p cardiac arrest; reduce EF; goals of care  Consults: Cardiology; Nephrology; Vascular; Palliative  Baseline: total care patient; advanced dementia; resides at [facility] in long term care; ESRD patient   Disposition: return to LT vs [address]    Reason for Admission:   Cardiac Arrest                  RUR Score:     22% - moderate             PCP: First and Last name:   Amador Fan MD     Name of Practice:    Are you a current patient: Yes/No:    Approximate date of last visit:    Can you participate in a virtual visit if needed:     Do you (patient/family) have any concerns for transition/discharge? Return to LTC in [facility] if patient becomes medically stable; family has poor insight into patient's poor prognosis              Plan for utilizing home health:   No    Current Advanced Directive/Advance Care Plan:  Full Code      Healthcare Decision Maker:   Click here to complete [address] including selection of the Healthcare Decision Maker Relationship (ie \"Primary\")          Wife states she makes healthcare decisions; appears AMD on file was revoked by daughter (named on AMD as decision maker) in 2019. Transition of Care Plan:          CM spoke with patient's wife re initial assessment and discharge plan:    Baseline Assessment:  1) ADLs, self-care, orientation: total care; dependent on assistance; dementia - oriented to self  2) Social:  Resides at [facility] for past 3 years  3) Pharmacy: through [address] [place] [address]  4) PCP and Insurance: Confirmed    Disposition Plan:  Patient is at high risk of decline and deterioration - unable to tolerate HD since cardiac arrest with reduced EF (10%); goals of care plan is pertinent. Should patient become medically stable - wife is agreeable for him to return to [facility]. Palliative Care has been consulted. CM will continue to follow.     Carlos Atulntmindy Bro, [address]  Available via [facility] or      Care Management Interventions  PCP Verified by CM:  Yes  Palliative Care Criteria Met (RRAT>21 & CHF Dx)?: Yes  Palliative Consult Recommended?: Yes  Mode of Transport at Discharge: [facility]  Transition of Care Consult (CM Consult): [address]: No  Discharge Durable Medical Equipment: No  Health Maintenance Reviewed: Yes  Physical Therapy Consult: Yes  Occupational Therapy Consult: Yes  Speech Therapy Consult: No  Current Support Network: Nursing Facility  Confirm Follow Up Transport: Other (see comment) (S)  The Plan for Transition of Care is Related to the Following Treatment Goals : [address]  Discharge Location  Discharge Placement: [address] [facility]

## 2021-07-21 NOTE — PROCEDURES
Central Line Placement    Start time: 7/21/2021 4:25 PM  End time: 7/21/2021 4:35 PM  Performed by: Zoey Wild MD  Authorized by: Zoey Wild MD     Indications: vascular access  Preanesthetic Checklist: patient identified, risks and benefits discussed, anesthesia consent, site marked, patient being monitored and timeout performed    Timeout Time: 16:25 EDT       Pre-procedure: All elements of maximal sterile barrier technique followed? Yes    2% Chlorhexidine for cutaneous antisepsis, Hand hygiene performed prior to catheter insertion and Ultrasound guidance    Sterile Ultrasound Technique followed?: No            Procedure:   Prep:  Chlorhexidine    Orientation:  Right  Patient position:  Trendelenburg  Catheter type:  Quad lumen  Catheter size:  8.5 Fr  Catheter length:  16 cm  Number of attempts:  1  Successful placement: Yes      Assessment:   Post-procedure:  Catheter secured and sterile dressing applied  Assessment:  Blood return through all ports  Insertion:  Uncomplicated  Patient tolerance:  Patient tolerated the procedure well with no immediate complications  Line is EXTERNAL JUGULAR, RIGHT.   Line sutured X 5

## 2021-07-21 NOTE — PROGRESS NOTES
Spiritual Care Assessment/Progress Note  [facility]      NAME: Michele Blanco      MRN: [id number]  AGE: 78 y.o. SEX: male  Denominational Affiliation: [employer]   Language: English     7/21/2021     Total Time (in minutes): 14     Spiritual Assessment begun in [facility] through conversation with:         []Patient        [] Family    [] Friend(s)        Reason for Consult: Palliative Care, Initial/Spiritual Assessment     Spiritual beliefs: (Please include comment if needed)     [] Identifies with a jagjit tradition:         [] Supported by a jagjit community:            [] Claims no spiritual orientation:           [] Seeking spiritual identity:                [] Adheres to an individual form of spirituality:           [x] Not able to assess:                           Identified resources for coping:      [] Prayer                               [] Music                  [] Guided Imagery     [] Family/friends                 [] Pet visits     [] Devotional reading                         [x] Unknown     [] Other:                                             Interventions offered during this visit: (See comments for more details)    Patient Interventions: Initial visit           Plan of Care:     [] Support spiritual and/or cultural needs    [] Support AMD and/or advance care planning process      [] Support grieving process   [] Coordinate Rites and/or Rituals    [] Coordination with community clergy   [] No spiritual needs identified at this time   [] Detailed Plan of Care below (See Comments)  [] Make referral to Music Therapy  [] Make referral to Pet Therapy     [] Make referral to Addiction services  [] Make referral to [facility]  [] Make referral to Spiritual Care Partner  [] No future visits requested        [] Follow up upon further referrals     Attempted visit for palliative initial spiritual assessment. Unable to visit patient at this time. Pt was sleeping and did not awake.  No family present. Pt's chart was consulted.   Chaplain Hood MDiv, MS, [facility]  [phone])

## 2021-07-21 NOTE — PROGRESS NOTES
1930  Bedside shift change report given to Garima Coffey (oncoming nurse) by Luz Maria Ramires (offgoing nurse). Report included the following information SBAR, Kardex, Intake/Output, MAR, Accordion, Recent Results and Cardiac Rhythm NSR. Pt continues to refuse IV insertion and occasionally refuses vitals depending on mood. 0730  Bedside shift change report given to Paz (oncoming nurse) by Garima Coffey (offgoing nurse). Report included the following information SBAR, Kardex, Intake/Output, MAR, Accordion, Recent Results and Cardiac Rhythm NSR.

## 2021-07-21 NOTE — ACP (ADVANCE CARE PLANNING)
Advance Care Planning     Advance Care Planning (ACP) Physician/NP/PA Conversation      Date of Conversation: 7/19/2021  Conducted with: Healthcare Decision Maker: Next of Kin by law (only applies in absence of a Healthcare Power of  or Legal Guardian)    Healthcare Decision Maker:     Click here to [address] including selection of the Healthcare Decision Maker Relationship (ie \"Primary\")  Today we documented Decision Maker(s) consistent with Legal Next of Kin hierarchy. Care Preferences:    Hospitalization: \"If your health worsens and it becomes clear that your chance of recovery is unlikely, what would be your preference regarding hospitalization? \"  The patient would prefer hospitalization. Ventilation: \"If you were unable to breathe on your own and your chance of recovery was unlikely, what would be your preference about the use of a ventilator (breathing machine) if it was available to you? \"   The patient would NOT desire the use of a ventilator. Resuscitation: \"In the event your heart stopped as a result of an underlying serious health condition, would you want attempts to be made to restart your heart, or would you prefer a natural death? \"   No, do NOT attempt to resuscitate. Additional topics discussed: treatment goals, benefit/burden of treatment options and resuscitation preferences   Met with family at bedside - wife, daughter and grand daughter- explained pt lost IV access as he pulled it , hard stick and pt not cooperative, will proceed with stress test tomorrow, will consult anesthesiology for Central line-  They agreed. Long discussion on goals of care in case of cardiac arrest - pt high risk due to EF 10% - very poor outcome and meaningful recovery even with full measures - family understood and agreed. Wife agreed to DNR.      Conversation Outcomes / Follow-Up Plan:   ACP complete - no further action today  Reviewed DNR/DNI and patient elects DNR order - DNR form needs to be completed     Length of Voluntary ACP Conversation in minutes:  16 minutes    Raoul Montague MD

## 2021-07-21 NOTE — DIALYSIS
[facility] Team [facility]  [phone]    Vitals   Pre   Post   Assessment   Pre   Post     Temp  Temp: 97.9 °F (36.6 °C) (07/20/21 2030)  97.7 LOC  A+Ox1, baseline dementia same   HR   Pulse (Heart Rate): 77 (07/20/21 2129) 78 Lungs   Diminished  same   B/P   BP: (!) 109/59 (07/20/21 2129) 110/44 Cardiac   NSR  same   Resp   Resp Rate: 16 (07/20/21 2129) 16 Skin   Skin dry, cool  same   Pain level  Pain Intensity 1: 0 (07/20/21 1230) 0 Edema    None noted   same   Orders:    Duration:   Start:    2100 End:     2315   Total:   [id number]   Dialyzer:   Dialyzer/Set Up Inspection: Isadore People (07/20/21 2030)   K Bath:   Dialysate K (mEq/L): 2 (07/20/21 2030)   Ca Bath:   Dialysate CA (mEq/L): 2.5 (07/20/21 2030)   Na/Bicarb:   Dialysate NA (mEq/L): 138 (07/20/21 2030)   Target Fluid Removal:   Goal/Amount of Fluid to Remove (mL): 1500 mL (07/20/21 2030)   Access     Type & Location:   L-UA-AVG-Dane wrap in place. W/ (+) bruit/thrill. Cannulated w/ 2x 15g, BFR @ 300. Unable to reposition or recannulate due to patient being agitated and non-cooperative.    Labs     Obtained/Reviewed   Critical Results Called   Date when labs were drawn-  Hgb-    HGB   Date Value Ref Range Status   07/19/2021 10.8 (L) 12.1 - 17.0 g/dL Final     K-    Potassium   Date Value Ref Range Status   07/19/2021 4.6 3.5 - 5.1 mmol/L Final     Ca-   Calcium   Date Value Ref Range Status   07/19/2021 9.0 8.5 - 10.1 MG/DL Final     Bun-   BUN   Date Value Ref Range Status   07/19/2021 39 (H) 6 - 20 MG/DL Final     Creat-   Creatinine   Date Value Ref Range Status   07/19/2021 6.21 (H) 0.70 - 1.30 MG/DL Final        Medications/ Blood Products Given     Name   Dose   Route and Time                     Blood Volume Processed (BVP):    36.8 Net Fluid   Removed:  700ml   Comments   Time Out Done: 1925  Primary Nurse Rpt Pre:  Primary Nurse Rpt Post:  Pt Education: Fluid restrictions  Care Plan: Continue Tx as ordered  Tx Summary: Pt extremely agitated and non-cooperative. Took 10min just for him to allow this nurse to stick him. Refusing to let this nurse re-adjust needles. Pt returned 7zw63mon early due to increasing arterial pressure alarms, arterial line unable to flush/aspirate upon inspection. Pt still refusing further sticks. .  All possible blood returned via NS rinseback. Needles pulled, sites secured, no excessive bleeding. Dr. Turner [employer] contact, nothing further tonight, will evaluate in the morning. Admiting Diagnosis:  [facility]  Consent signed - Informed Consent Verified: Yes (07/20/21 2030)  Jordanita Consent - Signed  Hepatitis Status- Neg/Immune 1/7/21  Machine #- Machine Number: [id number] (07/20/21 2030)  Telemetry status-Bedside  Pre-dialysis wt. - Pre-Dialysis Weight: 60.3 kg (132 lb 15 oz) (07/20/21 2030)

## 2021-07-21 NOTE — PROGRESS NOTES
Occupational Therapy  7/21/2021    Chart reviewed. Per chart, pt is total care and bed bound at LTC. Will sign off as pt is not appropriate for acute OT.  Mindy Pfeiffer MS, OTR/L

## 2021-07-21 NOTE — PROGRESS NOTES
RENAL  PROGRESS NOTE        Subjective:   Doing better  Had HD yesterday            Objective:   VITALS SIGNS:    Visit Vitals  BP (!) 95/58 (BP 1 Location: Right arm, BP Patient Position: At rest)   Pulse 69   Temp 96.9 °F (36.1 °C)   Resp 16   Ht 5' 6\" (1.676 m)   Wt 59.6 kg (131 lb 6.3 oz)   SpO2 100%   BMI 21.21 kg/m²       O2 Device: None (Room air)       Temp (24hrs), Av.6 °F (36.4 °C), Min:96.9 °F (36.1 °C), Max:98 °F (36.7 °C)         PHYSICAL EXAM:  NAD    DATA REVIEW:     INTAKE / OUTPUT:   Last shift:      No intake/output data recorded. Last 3 shifts: 1901 -  0700  In: 470 [P.O.:320; I.V.:150]  Out: 700     Intake/Output Summary (Last 24 hours) at 2021 1105  Last data filed at 2021 0400  Gross per 24 hour   Intake 220 ml   Output 700 ml   Net -480 ml         LABS:   Recent Labs     21  1430   WBC 6.4 7.4   HGB 9.1* 10.8*   HCT 28.1* 35.8*   * 148*     Recent Labs     21  1430   * 138   K 3.6 4.6    101   CO2 23 23   * 241*   BUN 27* 39*   CREA 3.76* 6.21*   CA 8.3* 9.0   MG  --  2.2   PHOS  --  6.8*   ALB  --  2.9*   TBILI  --  0.7   ALT  --  13   INR  --  1.4*           Assessment:   ESRD TTS at [facility]  Left upper ext AVF,DECLOTTED  Anemia  Sp cardiac arrest at vascular center  BKA    Plan:   NEXT HD tomorrow  Dc planning?   DINORA  Discussed with him  Troy Cerna MD

## 2021-07-21 NOTE — PROGRESS NOTES
Physical Therapy - defer w/ DC  Order received, chart reviewed, met with family (dtr's and wife). Family endorsed patient is total care assist and bed bound at [facility]. Acute therapy is not indicated at this time. Will sign off. Thank you.

## 2021-07-21 NOTE — CONSULTS
Palliative Medicine Consult  [place]: [phone])    Patient Name: Ansatacia Mora  YOB: 1940    Date of Initial Consult: 7/21/2021  Reason for Consult: end stage disease  Requesting Provider: Dr. Dom Saeed  Primary Care Physician: Elizabeth Barr MD     SUMMARY:   Anastacia Mora is a 78 y.o. with a past history of moderate dementia, ESRD on HD (on midrodrine for hypotension) , DM2, hx CVA (speech deficits/ weakness) , peripheral vascular disease s/p Bilateral BKA, who was admitted on 7/19/2021 from vascular center with a diagnosis of episode of bradycardia/ cardiac arrest after sedation for procedure. Current medical issues leading to Palliative Medicine involvement include: New diagnosis of cardiomyopathy (EF 10-15%) (last echo w normal EF in 2019), L upper arm dialysis graft s/p thrombectomy 7/20, now getting central line as no peripheral access for stress test.     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 Patient is resident of [facility] for past 3 years. He is total care for ADLs, bed bound  Patient is  to wife Carmelita Ma [phone]  Dtrs Oma Calle and Doug Burr. PALLIATIVE DIAGNOSES:   1. 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 ESRD on HD, requiring midodrine for hypotension  3. Peripheral vascular disease s/p bilateral BKA,  4. Debility  5. Hypoalbuminemia  6. Anemia of chronic disease  7. Moderate Dementia, mixed. . Patient does not demonstrate decision making capacity for complex decisions (use NOK)  8. Weight loss       PLAN:   1. Discussed with attending MD, bedside nurse. Family left bedside for today  2. Call placed to wife, Carmelita Ma, no answer and \"voice mail not set up\"  3. Will check back tomorrow- attempt to catch up with family at bedside.    4. Initial consult note routed to primary continuity provider and/or primary health care team members  5. Communicated plan of care with: Palliative IDTSumit 192 Team     GOALS OF CARE / TREATMENT PREFERENCES:     GOALS OF CARE:  Patient/Health Care Proxy Stated Goals: Other (comment)    TREATMENT PREFERENCES:   Code Status: DNR    Advance Care Planning:  [] The [facility] Interdisciplinary Team has updated the ACP Navigator with [address] and Patient Capacity      Advance Care Planning 4/12/2019   Patient's Healthcare Decision Maker is: Legal Next of Kin   Primary Decision Maker Name -   Primary Decision Maker Relationship to Patient -   Confirm Advance Directive None       Medical Interventions: Other (comment)     Other Instructions: Other:    As far as possible, the palliative care team has discussed with patient / health care proxy about goals of care / treatment preferences for patient. HISTORY:     History obtained from: chart    CHIEF COMPLAINT: code at vascular center    HPI/SUBJECTIVE:    The patient is:   [] Verbal and participatory  [x] Non-participatory due to: dementia  78year old male admitted from vascular center.   Had bradycardia/ cardiac arrest after sedation for procedure      Clinical Pain Assessment (nonverbal scale for severity on nonverbal patients):   Clinical Pain Assessment  Severity: 0          Duration: for how long has pt been experiencing pain (e.g., 2 days, 1 month, years)  Frequency: how often pain is an issue (e.g., several times per day, once every few days, constant)     FUNCTIONAL ASSESSMENT:     Palliative Performance Scale (PPS):  PPS: 30       PSYCHOSOCIAL/SPIRITUAL SCREENING:     Palliative IDT has assessed this patient for cultural preferences / practices and a referral made as appropriate to needs (Cultural Services, Patient Advocacy, Ethics, etc.)    Any spiritual / Samaritan concerns:  [] Yes /  [x] No    Caregiver Burnout:  [] Yes /  [x] No /  [] No Caregiver Present Anticipatory grief assessment:   [x] Normal  / [] Maladaptive       ESAS Anxiety:      ESAS Depression:          REVIEW OF SYSTEMS:     Positive and pertinent negative findings in ROS are noted above in HPI. The following systems were [x] reviewed / [] unable to be reviewed as noted in HPI  Other findings are noted below. Systems: constitutional, ears/nose/mouth/throat, respiratory, gastrointestinal, genitourinary, musculoskeletal, integumentary, neurologic, psychiatric, endocrine. Positive findings noted below. Modified ESAS Completed by: provider           Pain: 0           Dyspnea: 0           Stool Occurrence(s): 1        PHYSICAL EXAM:     From RN flowsheet:  Wt Readings from Last 3 Encounters:   07/21/21 59.6 kg (131 lb 6.3 oz)   04/12/20 73.2 kg (161 lb 6 oz)   05/13/19 82.6 kg (182 lb 1.6 oz)     Blood pressure (!) 116/50, pulse (P) 77, temperature 97.7 °F (36.5 °C), resp. rate 16, height 5' 6\" (1.676 m), weight 59.6 kg (131 lb 6.3 oz), SpO2 99 %.     Pain Scale 1: Numeric (0 - 10)  Pain Intensity 1: 0                 Last bowel movement, if known:     Constitutional: pt sleeping NAD     HISTORY:     Active Problems:    Cardiac arrest ([facility] [address] (7/19/2021)      Past Medical History:   Diagnosis Date    Anemia     CAD (coronary artery disease)     Chronic kidney disease     tues/thurs/sat TREE [address]    Cognitive communication deficit     Diabetes ([address]     Dysarthria and anarthria     Dysphagia     Endocrine disease     hypothyroid    Gastrointestinal disorder     gallstones    Hx of BKA, left (HCC)     Hx of BKA, right ([address]     Hypertension     Muscle weakness (generalized)     Stroke ([address]     2011 speech general weakness    Thyroid disease       Past Surgical History:   Procedure Laterality Date    HX HEENT      cataract   removal  rt eye    HX ORTHOPAEDIC      11/2014 R BK Amputation and L    HX OTHER SURGICAL      HX VASCULAR ACCESS      L arm shunt    IA ABDOMEN SURGERY PROC UNLISTED      VASCULAR SURGERY PROCEDURE UNLIST      Fistula R arm      Family History   Problem Relation Age of Onset    Hypertension Mother     Diabetes Father       History reviewed, no pertinent family history.   Social History     Tobacco Use    Smoking status: Never Smoker    Smokeless tobacco: Never Used   Substance Use Topics    Alcohol use: No     No Known Allergies   Current Facility-Administered Medications   Medication Dose Route Frequency    [START ON 7/22/2021] epoetin aram-epbx (RETACRIT) injection 10,000 Units  10,000 Units SubCUTAneous Q TUE, THU & SAT    levothyroxine (SYNTHROID) tablet 225 mcg  225 mcg Oral ACB    insulin lispro (HUMALOG) injection   SubCUTAneous AC&HS    aspirin delayed-release tablet 81 mg  81 mg Oral DAILY    midodrine (PROAMATINE) tablet 10 mg  10 mg Oral Q TU, TH & SAT    pravastatin (PRAVACHOL) tablet 20 mg  20 mg Oral QHS    sodium chloride (NS) flush 5-40 mL  5-40 mL IntraVENous Q8H    sodium chloride (NS) flush 5-40 mL  5-40 mL IntraVENous PRN    nitroglycerin (NITROSTAT) tablet 0.4 mg  0.4 mg SubLINGual Q5MIN PRN    heparin (porcine) injection 5,000 Units  5,000 Units SubCUTAneous Q8H    glucose chewable tablet 16 g  4 Tablet Oral PRN    dextrose (D50W) injection syrg 12.5-25 g  25-50 mL IntraVENous PRN    glucagon (GLUCAGEN) injection 1 mg  1 mg IntraMUSCular PRN          LAB AND IMAGING FINDINGS:     Lab Results   Component Value Date/Time    WBC 6.4 07/20/2021 09:09 PM    HGB 9.1 (L) 07/20/2021 09:09 PM    PLATELET 180 (L) 60/00/0906 09:09 PM     Lab Results   Component Value Date/Time    Sodium 134 (L) 07/20/2021 09:09 PM    Potassium 3.6 07/20/2021 09:09 PM    Chloride 100 07/20/2021 09:09 PM    CO2 23 07/20/2021 09:09 PM    BUN 27 (H) 07/20/2021 09:09 PM    Creatinine 3.76 (H) 07/20/2021 09:09 PM    Calcium 8.3 (L) 07/20/2021 09:09 PM    Magnesium 2.2 07/19/2021 02:30 PM    Phosphorus 6.8 (H) 07/19/2021 02:30 PM      Lab Results   Component Value Date/Time    Alk. phosphatase 144 (H) 07/19/2021 02:30 PM    Protein, total 8.4 (H) 07/19/2021 02:30 PM    Albumin 2.9 (L) 07/19/2021 02:30 PM    Globulin 5.5 (H) 07/19/2021 02:30 PM     Lab Results   Component Value Date/Time    INR 1.4 (H) 07/19/2021 02:30 PM    Prothrombin time 14.6 (H) 07/19/2021 02:30 PM    aPTT 25.0 04/08/2019 10:07 PM      Lab Results   Component Value Date/Time    Iron 21 (L) 08/15/2015 04:50 AM    TIBC 114 (L) 08/15/2015 04:50 AM    Iron % saturation 18 (L) 08/15/2015 04:50 AM    Ferritin 1,525 (H) 08/15/2015 04:50 AM      No results found for: PH, PCO2, PO2  No components found for: [place]   Lab Results   Component Value Date/Time     (H) 12/13/2017 03:28 PM    CK - MB 3.7 (H) 12/13/2017 03:28 PM                Total time:   Counseling / coordination time, spent as noted above:   > 50% counseling / coordination?:     Prolonged service was provided for  []30 min   []75 min in face to face time in the presence of the patient, spent as noted above. Time Start:   Time End:   Note: this can only be billed with 69730 (initial) or 98273 (follow up). If multiple start / stop times, list each separately.

## 2021-07-22 NOTE — PROGRESS NOTES
1930:Bedside and Verbal shift change report given to Lola (oncoming nurse) by Raiford Goldmann (offgoing nurse). Report included the following information SBAR, Kardex, Intake/Output, MAR, Accordion and Recent Results. 2240:TRANSFER - OUT REPORT:    Verbal report given to Maggie(name) on Radha Worrell  being transferred to (unit) for routine progression of care       Report consisted of patients Situation, Background, Assessment and   Recommendations(SBAR). Information from the following report(s) SBAR, Kardex, Intake/Output, MAR, Accordion and Recent Results was reviewed with the receiving nurse. Lines:   Quad Lumen 07/21/21 Right (Active)       Quad Lumen 07/21/21 Anterior;Right Internal jugular (Active)   Central Line Being Utilized No 07/21/21 1600   Criteria for Appropriate Use Limited/no vessel suitable for conventional peripheral access 07/21/21 1600   Site Assessment Clean, dry, & intact 07/21/21 1600   Infiltration Assessment 0 07/21/21 1600   Dressing Status New 07/21/21 1600   Dressing Type Transparent 07/21/21 1600   Proximal Hub Color/Line Status White;Flushed 07/21/21 1600   Positive Blood Return (Medial Site) Yes 07/21/21 1600   Medial 1 Hub Color/Line Status Gray;Flushed 07/21/21 1600   Positive Blood Return (Lateral Site) Yes 07/21/21 1600   Medial 2 Hub Color/Line Status Blue;Flushed 07/21/21 1600   Positive Blood Return (Site #3) Yes 07/21/21 1600   Distal Hub Color/Line Status Brown;Flushed 07/21/21 1600   Positive Blood Return (Site #4) Yes 07/21/21 1600   Alcohol Cap Used Yes 07/21/21 1600        Opportunity for questions and clarification was provided.       Patient transported with:   Monitor  Registered Nurse  Tech

## 2021-07-22 NOTE — PROGRESS NOTES
Cardiology Progress Note            Admit Date: 7/19/2021  Admit Diagnosis: Cardiac arrest [facility]) [I46.9]  Date: 7/22/2021     Time: 12:16 PM    Subjective: Pt denies chest pain, SOB, dizziness, lightheadedness. He is refusing nuclear stress test and labs today as well as dialysis today. He did have Rt jugular CVL inserted yesterday. Assessment and Plan   1. Bradycardia, ?cardiac arrest              -No chest pain, Troponin negative on presentation, 0.05 on 7/20.              -EKG NSR with nonspecific T wave abnormality V5, V6              -No further episodes   -Uncertain etiology.              -Echo with reduced markedly reduced LV function. Stress test as below. 2. Cardiomyopathy   -EF 10-15%, mild-mod MR, mild AS, mild- mod pulmonary HTN   -difficult to determine NYHA class due to pt is nonambulatory.   -Fluid management per renal.    -Unable to start GDMT I.e. BB, ACE-I, spironolactone due to low BP, pt requiring midodrine 3 xs weekly for HD   -Uncertain etiology of CM but certainly possible CAD of concern. Pt is not an ideal candidate for invasive cardiac testing. Was planning to proceed with Lexiscan stress test today but pt is refusing stress testing despite explanation for testing. 3. History of hypotension on midodrine              -BP low- low NL (SBP 90's-110). Asymptomatic              -Continue midodrine. 4. Hypothyroidsim              -TSH 16.6, free T3 low              -Synthroid dose adjusted by primary team 7/20/21.     5. Mild AS, mild-mod MR     6. HLD   -LDL 85.8, HDL 37              -continue statin     78 y.o. male with new diagnosis of severe cardiomyopathy. Unfortunately GDMT options are limited given his low BP. Suspect likely multivessel CAD given PAD and risk factors. Stress test recommended but pt is refusing testing. Noted pt DNR status.  Appreciate palliative care assist with family for long term decision making. Pt was seen by Dr. Carrol Prescott. MONSERRAT Sanchez     Saw and evaluated pt and agree with above assessment and plan. Pt with severe cardiomyopathy and likely underlying multivessel CAD given his PAD and co-morbidities. Recommended stress test to r/o large area of ischemia. He refused again today and at this point, no additional cardiac work up. Pt is DNR and agree. Hospice seems most reasonable. Agree with involving palliative care and long term decision making. Will be available prn. Jesus Jett MD    [place]  Past Medical History:   Diagnosis Date    Anemia     CAD (coronary artery disease)     Chronic kidney disease     tues/thurs/sat TREE [address] and [address]    Cognitive communication deficit     Diabetes ([facility] [address]     Dysarthria and anarthria     Dysphagia     Endocrine disease     hypothyroid    Gastrointestinal disorder     gallstones    Hx of BKA, left (HCC)     Hx of BKA, right ([address]     Hypertension     Muscle weakness (generalized)     Stroke ([facility] [address]     2011 speech general weakness    Thyroid disease       Social Hx  Social History     Socioeconomic History    Marital status:      Spouse name: Not on file    Number of children: Not on file    Years of education: Not on file    Highest education level: Not on file   Occupational History    Not on file   Tobacco Use    Smoking status: Never Smoker    Smokeless tobacco: Never Used   Substance and Sexual Activity    Alcohol use: No    Drug use: No     Types: Prescription    Sexual activity: Not on file   Other Topics Concern    Not on file   Social History Narrative    Not on file     Social Determinants of Health     Financial Resource Strain:     Difficulty of Paying Living Expenses:    Food Insecurity:     Worried About Running Out of Food in the Last Year:     Ran Out of Food in the Last Year:    Transportation Needs:     Lack of Transportation (Medical):      Lack of Transportation (Non-Medical):    Physical Activity:     Days of Exercise per Week:     Minutes of Exercise per Session:    Stress:     Feeling of Stress :    Social Connections:     Frequency of Communication with Friends and Family:     Frequency of Social Gatherings with Friends and Family:     Attends Church Services:     Active Member of Clubs or Organizations:     Attends Club or Organization Meetings:     Marital Status:    Intimate Partner Violence:     Fear of Current or Ex-Partner:     Emotionally Abused:     Physically Abused:     Sexually Abused:      Objective:      Physical Exam:                Visit Vitals  BP (!) 97/53 (BP 1 Location: Right upper arm, BP Patient Position: At rest)   Pulse 67   Temp 98 °F (36.7 °C)   Resp 16   Ht 5' 6\" (1.676 m)   Wt 131 lb 6.3 oz (59.6 kg)   SpO2 99%   BMI 21.21 kg/m²          General Appearance:   Well developed, thin male, In no acute distress. Ears/Nose/Mouth/Throat:    Hearing grossly normal.         Neck:  Supple. Chest:    Lungs clear  to auscultation bilaterally. Cardiovascular:    Regular rate and rhythm, S1, S2 normal. Grade II/VIsystolic murmur best at  LLSB   Abdomen:    Soft, non-tender, bowel sounds are active. Extremities:  BLE BKA   Skin:  Warm and dry.      Telemetry: NSR          Data Review:    Labs:    Recent Results (from the past 24 hour(s))   GLUCOSE, POC    Collection Time: 07/21/21 11:14 AM   Result Value Ref Range    Glucose (POC) 140 (H) 65 - 117 mg/dL    Performed by Divya calderón RN    GLUCOSE, POC    Collection Time: 07/21/21  5:02 PM   Result Value Ref Range    Glucose (POC) 103 65 - 117 mg/dL    Performed by Juna 36, POC    Collection Time: 07/21/21  9:09 PM   Result Value Ref Range    Glucose (POC) 97 65 - 117 mg/dL    Performed by Brenna Alba (CON)           Radiology:        Current Facility-Administered Medications   Medication Dose Route Frequency    epoetin aram-epbx (RETACRIT) injection 10,000 Units  10,000 Units SubCUTAneous Q TUE, THU & SAT    iopamidoL (ISOVUE-370) 76 % injection 100 mL  100 mL IntraVENous RAD ONCE    levothyroxine (SYNTHROID) tablet 225 mcg  225 mcg Oral ACB    insulin lispro (HUMALOG) injection   SubCUTAneous AC&HS    aspirin delayed-release tablet 81 mg  81 mg Oral DAILY    midodrine (PROAMATINE) tablet 10 mg  10 mg Oral Q TU, TH & SAT    pravastatin (PRAVACHOL) tablet 20 mg  20 mg Oral QHS    sodium chloride (NS) flush 5-40 mL  5-40 mL IntraVENous Q8H    sodium chloride (NS) flush 5-40 mL  5-40 mL IntraVENous PRN    nitroglycerin (NITROSTAT) tablet 0.4 mg  0.4 mg SubLINGual Q5MIN PRN    heparin (porcine) injection 5,000 Units  5,000 Units SubCUTAneous Q8H    glucose chewable tablet 16 g  4 Tablet Oral PRN    dextrose (D50W) injection syrg 12.5-25 g  25-50 mL IntraVENous PRN    glucagon (GLUCAGEN) injection 1 mg  1 mg IntraMUSCular PRN          Rocio Sims.  MONSERRAT Sanchez     [facility] [address] [place] [address], [address] [place] [address] [address]   [address]   [phone]

## 2021-07-22 NOTE — DIALYSIS
Patient is refusing dialysis at this time. Primary RN Andres Scott and Dr.Abou Calhoun notified. If agreeable dialysis will be rescheduled for tomorrow per Dr.Abou Calhoun. [facility] scheduling office notified.

## 2021-07-22 NOTE — PROGRESS NOTES
Hospitalist Progress Note      Hospital summary: Dulce Tilley is a 78 y.o. male who presents with heparin bradycardia/asystole.   History is limited, patient with dementia, poor historian, apparently patient was at the vascular center today for fistula evaluation, was given fentanyl and Versed, apparently had bradycardia and possible. Of cardiac arrest, patient was given CPR, was given Narcan, EMS was called, when EMS arrived patient had a good pulse and good blood pressure, and patient was brought to the hospital.  Patient was put on a nonrebreather, but currently maintaining sats on nasal cannula. Patient denies any complaints or problems, is confused, patient received dialysis Tuesday Thursday and Saturday. Patient was found to have pulmonary edema and was requested to be admitted to the hospital service. Patient again has dementia and is a poor historian 7/19/2021      Assessment/Plan:  Bradycardia/asystole with cardiac arrest   - Unclear course of event: Vascular center for fistula evaluation, was given fentanyl and Versed, apparently had bradycardia- patient was given CPR,and Narcan  - echocardiogram: LVEF is 10-15%. Last echo in 2019- normal EF  - c/w aspirin and stain  -Patient refused for stress test today. Started on p.o. diet. ESRD on HD  Pulmonary edema  - CXR: Mild pulmonary edema and moderate left pleural effusion  - Nephrology on board for HD management, patient is refusing for hemodialysis. This was discussed with nephrology, as per nephrology we will try to obtain  hemodialysis tomorrow, if patient continues to refuse hemodialysis then he can be transitioned to hospice.     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 c/w Sliding scale insulin    Hypotension  - on midodrine    Dementia with cognitive communication deficit and dysarthria  - supportive care Hypothyroidism  - TSH elevated 16. Increased synthroid to 225mcg. Recommend rpt TSH in 3-4 weeks     Palliative care on board    Code status: DNR  DVT prophylaxis:  Heparin  Disposition: TBD. From [facility] - longterm resident         Case discussed with palliative care team, nephrology, nephrology will try to hemodialyze him tomorrow, if he continues to refuse done can be transition to hospice next few days. ----------------------------------------------    CC: Cardiac arrest     S: No events overnight, patient was seen and examined today, he refused for stress test and hemodialysis this morning, he remains disoriented, just knows that he is in some kind of hospital.  On room air, breathing comfortably, denies any chest pain/palpitations. Review of Systems:  Review of systems not obtained due to patient factors. O:  Visit Vitals  BP (!) 88/53 (BP 1 Location: Right upper arm, BP Patient Position: At rest)   Pulse 75   Temp 97.7 °F (36.5 °C)   Resp 16   Ht 5' 6\" (1.676 m)   Wt 59.6 kg (131 lb 6.3 oz)   SpO2 99%   BMI 21.21 kg/m²       PHYSICAL EXAM:  Gen: NAD  HEENT: anicteric sclerae, normal conjunctiva, oropharynx clear, MM moist  Neck: supple, trachea midline, no adenopathy  Heart: RRR, no MRG, no JVD, no peripheral edema  Lungs: decreased in left side   Abd: soft, NT, ND, BS+, no organomegaly  Extr: warm.  B/l BKA  Skin: dry, no rash  Neuro: normal speech, moves all extremities        Intake/Output Summary (Last 24 hours) at 7/22/2021 1401  Last data filed at 7/22/2021 1200  Gross per 24 hour   Intake 350 ml   Output 0 ml   Net 350 ml        Recent labs & imaging reviewed:  Recent Results (from the past 24 hour(s))   GLUCOSE, POC    Collection Time: 07/21/21  5:02 PM   Result Value Ref Range    Glucose (POC) 103 65 - 117 mg/dL    Performed by Metsa 36, POC    Collection Time: 07/21/21  9:09 PM   Result Value Ref Range    Glucose (POC) 97 65 - 117 mg/dL    Performed by Lindsay Langford Shelly JARAMILLO (CON)    GLUCOSE, POC    Collection Time: 07/22/21 11:45 AM   Result Value Ref Range    Glucose (POC) 141 (H) 65 - 117 mg/dL    Performed by [employer] [employer]     07/20/21 2109 07/19/21  1430   WBC 6.4 7.4   HGB 9.1* 10.8*   HCT 28.1* 35.8*   * 148*     Recent Labs     07/20/21 2109 07/19/21  1430   * 138   K 3.6 4.6    101   CO2 23 23   BUN 27* 39*   CREA 3.76* 6.21*   * 241*   CA 8.3* 9.0   MG  --  2.2   PHOS  --  6.8*     Recent Labs     07/19/21  1430   ALT 13   *   TBILI 0.7   TP 8.4*   ALB 2.9*   GLOB 5.5*     Recent Labs     07/19/21  1430   INR 1.4*   PTP 14.6*      No results for input(s): FE, TIBC, PSAT, FERR in the last 72 hours. Lab Results   Component Value Date/Time    Folate 12.7 04/13/2011 03:00 AM      No results for input(s): PH, PCO2, PO2 in the last 72 hours.   Recent Labs     07/20/21 2109 07/19/21  1430   TROIQ 0.05* <0.05     Lab Results   Component Value Date/Time    Cholesterol, total 140 07/20/2021 09:09 PM    HDL Cholesterol 37 07/20/2021 09:09 PM    LDL, calculated 85.8 07/20/2021 09:09 PM    Triglyceride 86 07/20/2021 09:09 PM    CHOL/HDL Ratio 3.8 07/20/2021 09:09 PM     Lab Results   Component Value Date/Time    Glucose (POC) 141 (H) 07/22/2021 11:45 AM    Glucose (POC) 97 07/21/2021 09:09 PM    Glucose (POC) 103 07/21/2021 05:02 PM    Glucose (POC) 140 (H) 07/21/2021 11:14 AM    Glucose (POC) 122 (H) 07/21/2021 07:49 AM     Lab Results   Component Value Date/Time    Color DARK YELLOW 09/27/2017 12:21 PM    Appearance TURBID (A) 09/27/2017 12:21 PM    Specific gravity 1.018 09/27/2017 12:21 PM    Specific gravity 1.010 05/27/2017 04:33 PM    pH (UA) 5.5 09/27/2017 12:21 PM    Protein 100 (A) 09/27/2017 12:21 PM    Glucose 250 (A) 09/27/2017 12:21 PM    Ketone TRACE (A) 09/27/2017 12:21 PM    Bilirubin NEGATIVE  05/27/2017 04:33 PM    Urobilinogen 1.0 09/27/2017 12:21 PM    Nitrites NEGATIVE  09/27/2017 12:21 PM    Leukocyte Esterase LARGE (A) 09/27/2017 12:21 PM    Epithelial cells MODERATE (A) 09/27/2017 12:21 PM    Bacteria 3+ (A) 09/27/2017 12:21 PM    WBC >100 (H) 09/27/2017 12:21 PM    RBC >100 (H) 09/27/2017 12:21 PM       Med list reviewed  Current Facility-Administered Medications   Medication Dose Route Frequency    epoetin aram-epbx (RETACRIT) injection 10,000 Units  10,000 Units SubCUTAneous Q TUE, THU & SAT    levothyroxine (SYNTHROID) tablet 225 mcg  225 mcg Oral ACB    insulin lispro (HUMALOG) injection   SubCUTAneous AC&HS    aspirin delayed-release tablet 81 mg  81 mg Oral DAILY    midodrine (PROAMATINE) tablet 10 mg  10 mg Oral Q TU, TH & SAT    pravastatin (PRAVACHOL) tablet 20 mg  20 mg Oral QHS    sodium chloride (NS) flush 5-40 mL  5-40 mL IntraVENous Q8H    sodium chloride (NS) flush 5-40 mL  5-40 mL IntraVENous PRN    nitroglycerin (NITROSTAT) tablet 0.4 mg  0.4 mg SubLINGual Q5MIN PRN    heparin (porcine) injection 5,000 Units  5,000 Units SubCUTAneous Q8H    glucose chewable tablet 16 g  4 Tablet Oral PRN    dextrose (D50W) injection syrg 12.5-25 g  25-50 mL IntraVENous PRN    glucagon (GLUCAGEN) injection 1 mg  1 mg IntraMUSCular PRN       Care Plan discussed with: Nurses, .     Favian Regalado MD  Internal Medicine  Date of Service: 7/22/2021

## 2021-07-22 NOTE — PROGRESS NOTES
0000: Pt refused to allow this RN to assess him. 0030: Pt refusing CT at this time and states he wants to be left alone, will try again later. 0500: Pt refusing this RN to collect morning labs and refusing heparin. Cecilia NP notified. 12: Spoke with nuclear medicine via phone regarding pt stress test today, someone from there team will come to pt bedside to provide further education on importance of test and receiving care. Will treat with Lamisil x 2-4 weeks. If no improvement may consider other etiology including eczema or candidal infection.

## 2021-07-22 NOTE — PROGRESS NOTES
Hospitalist Progress Note      Hospital summary: Sreedhar Abreu is a 78 y.o. male who presents with heparin bradycardia/asystole.   History is limited, patient with dementia, poor historian, apparently patient was at the vascular center today for fistula evaluation, was given fentanyl and Versed, apparently had bradycardia and possible. Of cardiac arrest, patient was given CPR, was given Narcan, EMS was called, when EMS arrived patient had a good pulse and good blood pressure, and patient was brought to the hospital.  Patient was put on a nonrebreather, but currently maintaining sats on nasal cannula. Patient denies any complaints or problems, is confused, patient received dialysis Tuesday Thursday and Saturday. Patient was found to have pulmonary edema and was requested to be admitted to the hospital service. Patient again has dementia and is a poor historian 7/19/2021      Assessment/Plan:  Bradycardia/asystole with cardiac arrest   - Unclear course of event: Vascular center for fistula evaluation, was given fentanyl and Versed, apparently had bradycardia- patient was given CPR,and Narcan  - echocardiogram: LVEF is 10-15%. Last echo in 2019- normal EF  - c/w aspirin and stain  -Patient refused for stress test today. Started on p.o. diet. ESRD on HD  Pulmonary edema  - CXR: Mild pulmonary edema and moderate left pleural effusion  - Nephrology on board for HD management, patient is refusing for hemodialysis. This was discussed with nephrology, as per nephrology we will try to obtain  hemodialysis tomorrow, if patient continues to refuse hemodialysis then he can be transitioned to hospice.     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 c/w Sliding scale insulin    Hypotension  - on midodrine    Dementia with cognitive communication deficit and dysarthria  - supportive care Hypothyroidism  - TSH elevated 16. Increased synthroid to 225mcg. Recommend rpt TSH in 3-4 weeks     Palliative care on board    Code status: DNR  DVT prophylaxis:  Heparin  Disposition: TBD. From [facility] - longterm resident         Case discussed with palliative care team, nephrology, nephrology will try to hemodialyze him tomorrow, if he continues to refuse done can be transition to hospice next few days. ----------------------------------------------    CC: Cardiac arrest     S: No events overnight, patient was seen and examined today, he refused for stress test and hemodialysis this morning, he remains disoriented, just knows that he is in some kind of hospital.  On room air, breathing comfortably, denies any chest pain/palpitations. Review of Systems:  Review of systems not obtained due to patient factors. O:  Visit Vitals  BP (!) 88/53 (BP 1 Location: Right upper arm, BP Patient Position: At rest)   Pulse 75   Temp 97.7 °F (36.5 °C)   Resp 16   Ht 5' 6\" (1.676 m)   Wt 59.6 kg (131 lb 6.3 oz)   SpO2 99%   BMI 21.21 kg/m²       PHYSICAL EXAM:  Gen: NAD  HEENT: anicteric sclerae, normal conjunctiva, oropharynx clear, MM moist  Neck: supple, trachea midline, no adenopathy  Heart: RRR, no MRG, no JVD, no peripheral edema  Lungs: decreased in left side   Abd: soft, NT, ND, BS+, no organomegaly  Extr: warm.  B/l BKA  Skin: dry, no rash  Neuro: normal speech, moves all extremities        Intake/Output Summary (Last 24 hours) at 7/22/2021 1408  Last data filed at 7/22/2021 1200  Gross per 24 hour   Intake 350 ml   Output 0 ml   Net 350 ml        Recent labs & imaging reviewed:  Recent Results (from the past 24 hour(s))   GLUCOSE, POC    Collection Time: 07/21/21  5:02 PM   Result Value Ref Range    Glucose (POC) 103 65 - 117 mg/dL    Performed by Metsa 36, POC    Collection Time: 07/21/21  9:09 PM   Result Value Ref Range    Glucose (POC) 97 65 - 117 mg/dL    Performed by Naresh Cormier Shelly JARAMILLO (CON)    GLUCOSE, POC    Collection Time: 07/22/21 11:45 AM   Result Value Ref Range    Glucose (POC) 141 (H) 65 - 117 mg/dL    Performed by [facility] [employer]     07/20/21 2109 07/19/21  1430   WBC 6.4 7.4   HGB 9.1* 10.8*   HCT 28.1* 35.8*   * 148*     Recent Labs     07/20/21 2109 07/19/21  1430   * 138   K 3.6 4.6    101   CO2 23 23   BUN 27* 39*   CREA 3.76* 6.21*   * 241*   CA 8.3* 9.0   MG  --  2.2   PHOS  --  6.8*     Recent Labs     07/19/21  1430   ALT 13   *   TBILI 0.7   TP 8.4*   ALB 2.9*   GLOB 5.5*     Recent Labs     07/19/21  1430   INR 1.4*   PTP 14.6*      No results for input(s): FE, TIBC, PSAT, FERR in the last 72 hours. Lab Results   Component Value Date/Time    Folate 12.7 04/13/2011 03:00 AM      No results for input(s): PH, PCO2, PO2 in the last 72 hours.   Recent Labs     07/20/21 2109 07/19/21  1430   TROIQ 0.05* <0.05     Lab Results   Component Value Date/Time    Cholesterol, total 140 07/20/2021 09:09 PM    HDL Cholesterol 37 07/20/2021 09:09 PM    LDL, calculated 85.8 07/20/2021 09:09 PM    Triglyceride 86 07/20/2021 09:09 PM    CHOL/HDL Ratio 3.8 07/20/2021 09:09 PM     Lab Results   Component Value Date/Time    Glucose (POC) 141 (H) 07/22/2021 11:45 AM    Glucose (POC) 97 07/21/2021 09:09 PM    Glucose (POC) 103 07/21/2021 05:02 PM    Glucose (POC) 140 (H) 07/21/2021 11:14 AM    Glucose (POC) 122 (H) 07/21/2021 07:49 AM     Lab Results   Component Value Date/Time    Color DARK YELLOW 09/27/2017 12:21 PM    Appearance TURBID (A) 09/27/2017 12:21 PM    Specific gravity 1.018 09/27/2017 12:21 PM    Specific gravity 1.010 05/27/2017 04:33 PM    pH (UA) 5.5 09/27/2017 12:21 PM    Protein 100 (A) 09/27/2017 12:21 PM    Glucose 250 (A) 09/27/2017 12:21 PM    Ketone TRACE (A) 09/27/2017 12:21 PM    Bilirubin NEGATIVE  05/27/2017 04:33 PM    Urobilinogen 1.0 09/27/2017 12:21 PM    Nitrites NEGATIVE  09/27/2017 12:21 PM    Leukocyte Esterase LARGE (A) 09/27/2017 12:21 PM    Epithelial cells MODERATE (A) 09/27/2017 12:21 PM    Bacteria 3+ (A) 09/27/2017 12:21 PM    WBC >100 (H) 09/27/2017 12:21 PM    RBC >100 (H) 09/27/2017 12:21 PM       Med list reviewed  Current Facility-Administered Medications   Medication Dose Route Frequency    epoetin aram-epbx (RETACRIT) injection 10,000 Units  10,000 Units SubCUTAneous Q TUE, THU & SAT    levothyroxine (SYNTHROID) tablet 225 mcg  225 mcg Oral ACB    insulin lispro (HUMALOG) injection   SubCUTAneous AC&HS    aspirin delayed-release tablet 81 mg  81 mg Oral DAILY    midodrine (PROAMATINE) tablet 10 mg  10 mg Oral Q TU, TH & SAT    pravastatin (PRAVACHOL) tablet 20 mg  20 mg Oral QHS    sodium chloride (NS) flush 5-40 mL  5-40 mL IntraVENous Q8H    sodium chloride (NS) flush 5-40 mL  5-40 mL IntraVENous PRN    nitroglycerin (NITROSTAT) tablet 0.4 mg  0.4 mg SubLINGual Q5MIN PRN    heparin (porcine) injection 5,000 Units  5,000 Units SubCUTAneous Q8H    glucose chewable tablet 16 g  4 Tablet Oral PRN    dextrose (D50W) injection syrg 12.5-25 g  25-50 mL IntraVENous PRN    glucagon (GLUCAGEN) injection 1 mg  1 mg IntraMUSCular PRN       Care Plan discussed with: Nurses, .     Marian Guerin MD  Internal Medicine  Date of Service: 7/22/2021

## 2021-07-22 NOTE — CONSULTS
Palliative Medicine Consult  [place]: [phone])    Patient Name: Giuliana Jimenez  YOB: 1940    Date of Initial Consult: 7/21/2021  Reason for Consult: end stage disease  Requesting Provider: Dr. Sahil Maravilla  Primary Care Physician: Darrius Quijano MD     SUMMARY:   Giuliana Jimenez is a 78 y.o. with a past history of moderate dementia, ESRD on HD (on midrodrine for hypotension)X  8 years , DM2, hx CVA (speech deficits/ weakness) , peripheral vascular disease s/p Bilateral BKA, who was admitted on 7/19/2021 from vascular center with a diagnosis of episode of bradycardia/ cardiac arrest after sedation for procedure. Current medical issues leading to Palliative Medicine involvement include: New diagnosis of cardiomyopathy (EF 10-15%) (last echo w normal EF in 2019), L upper arm dialysis graft s/p thrombectomy 7/20, now getting central line as no peripheral access for stress test.     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 Patient is resident of [facility] LTC for past 3 years. He is total care for ADLs, bed bound  Patient is  to wife Claudia Pacheco [phone]  Dtrs Christal Barajas and Maura Wright. PALLIATIVE DIAGNOSES:   1. 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 ESRD on HD, requiring midodrine for hypotension  3. Peripheral vascular disease s/p bilateral BKA,  4. Debility  5. Hypoalbuminemia  6. Anemia of chronic disease  7. Moderate Dementia, mixed. . Patient does not demonstrate decision making capacity for complex decisions (use NOK)  8. Weight loss  9. Patient often refusing care, procedures, labs       PLAN:   1. Discussed with attending MD, bedside nurse. 2. Met with family, introduced palliative medicine services.   1. Reviewed update (they have already heard this admission) about very weak heart, all the complications of diabetes that are now causing his body to wind down. 2. He's tolerated HD for 8 years, but things are changing for him, and he may not tolerate much longer due to weak heart. 3. They are well aware he often acts out/ refuses thing/ very forgetful. He refused HD today, but doesn't remember dialysis team coming to his room. 4. Explained that we keep dialysis going if someone can live well with it and be still and cooperate with the procedure. (high risk of bleeding out if agitated). 5. We can try again to offer HD (family will try to be present) but they understand that dialysis may end soon due to his weak heart and/ or lack of cooperation. 6. They acknowledge that \"there is nothing they can do\" that he is very sick, nearing end of life. 7. Explain how hospice would care for him if he can't do dialysis anymore. He could stay at [facility] and have comfort care. 8. I described in detail what comfort care looks like, and they do not have any questions or concerns, seem resigned to this information. Defer timing of hospice referral to primary and renal team.   I think he won't accept or tolerate dialysis much longer and family understands that when that time comes, hospice consult will be placed to care for him. DDNR completed     GOALS OF CARE / TREATMENT PREFERENCES:     GOALS OF CARE:  Patient/Health Care Proxy Stated Goals: Other (comment)    TREATMENT PREFERENCES:   Code Status: DNR    Advance Care Planning:  [] The [facility] Interdisciplinary Team has updated the ACP Navigator with [address] and Patient Capacity      Advance Care Planning 4/12/2019   Patient's Healthcare Decision Maker is: Legal Next of Kin   Primary Decision Maker Name -   Primary Decision Maker Relationship to Patient -   Confirm Advance Directive None       Medical Interventions: Other (comment)     Other Instructions:          Other:    As far as possible, the palliative care team has discussed with patient / health care proxy about goals of care / treatment preferences for patient. HISTORY:     History obtained from: chart    CHIEF COMPLAINT: code at vascular center    HPI/SUBJECTIVE:    The patient is:   [] Verbal and participatory  [x] Non-participatory due to: dementia  78year old male admitted from vascular center. Had bradycardia/ cardiac arrest after sedation for procedure      Clinical Pain Assessment (nonverbal scale for severity on nonverbal patients):   Clinical Pain Assessment  Severity: 0          Duration: for how long has pt been experiencing pain (e.g., 2 days, 1 month, years)  Frequency: how often pain is an issue (e.g., several times per day, once every few days, constant)     FUNCTIONAL ASSESSMENT:     Palliative Performance Scale (PPS):  PPS: 30       PSYCHOSOCIAL/SPIRITUAL SCREENING:     Palliative IDT has assessed this patient for cultural preferences / practices and a referral made as appropriate to needs (Cultural Services, Patient Advocacy, Ethics, etc.)    Any spiritual / Caodaism concerns:  [] Yes /  [x] No    Caregiver Burnout:  [] Yes /  [x] No /  [] No Caregiver Present      Anticipatory grief assessment:   [x] Normal  / [] Maladaptive       ESAS Anxiety:      ESAS Depression:          REVIEW OF SYSTEMS:     Positive and pertinent negative findings in ROS are noted above in HPI. The following systems were [x] reviewed / [] unable to be reviewed as noted in HPI  Other findings are noted below. Systems: constitutional, ears/nose/mouth/throat, respiratory, gastrointestinal, genitourinary, musculoskeletal, integumentary, neurologic, psychiatric, endocrine. Positive findings noted below.   Modified ESAS Completed by: provider           Pain: 0           Dyspnea: 0           Stool Occurrence(s): 1        PHYSICAL EXAM:     From RN flowsheet:  Wt Readings from Last 3 Encounters:   07/21/21 59.6 kg (131 lb 6.3 oz)   04/12/20 73.2 kg (161 lb 6 oz)   05/13/19 82.6 kg (182 lb 1.6 oz)     Blood pressure (!) 88/53, pulse 75, temperature 97.7 °F (36.5 °C), resp. rate 16, height 5' 6\" (1.676 m), weight 59.6 kg (131 lb 6.3 oz), SpO2 99 %. Pain Scale 1: Numeric (0 - 10)  Pain Intensity 1: 0                 Last bowel movement, if known:     Constitutional: awake, alert, very upset about not having food brought for breakfast  No respiratory distress. Poor insight  Not able to state how many children he has, \"six with my wife, but also someothers\"  Does not demonstrate understanding of medical situation. Tells me he does fine at dialysis, has been on it for 8 or 9 years. HISTORY:     Active Problems:    Cardiac arrest [facility]) (7/19/2021)      Past Medical History:   Diagnosis Date    Anemia     CAD (coronary artery disease)     Chronic kidney disease     tues/thurs/sat TREE [address] and [address]    Cognitive communication deficit     Diabetes ([facility]     Dysarthria and anarthria     Dysphagia     Endocrine disease     hypothyroid    Gastrointestinal disorder     gallstones    Hx of BKA, left (HCC)     Hx of BKA, right ([address]     Hypertension     Muscle weakness (generalized)     Stroke ([address]     2011 speech general weakness    Thyroid disease       Past Surgical History:   Procedure Laterality Date    HX HEENT      cataract   removal  rt eye    HX ORTHOPAEDIC      11/2014 R BK Amputation and L    HX OTHER SURGICAL      HX VASCULAR ACCESS      L arm shunt    IN ABDOMEN SURGERY PROC UNLISTED      VASCULAR SURGERY PROCEDURE UNLIST      Fistula R arm      Family History   Problem Relation Age of Onset    Hypertension Mother     Diabetes Father       History reviewed, no pertinent family history.   Social History     Tobacco Use    Smoking status: Never Smoker    Smokeless tobacco: Never Used   Substance Use Topics    Alcohol use: No     No Known Allergies   Current Facility-Administered Medications   Medication Dose Route Frequency    epoetin aram-epbx (RETACRIT) injection 10,000 Units 10,000 Units SubCUTAneous Q TUE, THU & SAT    levothyroxine (SYNTHROID) tablet 225 mcg  225 mcg Oral ACB    insulin lispro (HUMALOG) injection   SubCUTAneous AC&HS    aspirin delayed-release tablet 81 mg  81 mg Oral DAILY    midodrine (PROAMATINE) tablet 10 mg  10 mg Oral Q TU, TH & SAT    pravastatin (PRAVACHOL) tablet 20 mg  20 mg Oral QHS    sodium chloride (NS) flush 5-40 mL  5-40 mL IntraVENous Q8H    sodium chloride (NS) flush 5-40 mL  5-40 mL IntraVENous PRN    nitroglycerin (NITROSTAT) tablet 0.4 mg  0.4 mg SubLINGual Q5MIN PRN    heparin (porcine) injection 5,000 Units  5,000 Units SubCUTAneous Q8H    glucose chewable tablet 16 g  4 Tablet Oral PRN    dextrose (D50W) injection syrg 12.5-25 g  25-50 mL IntraVENous PRN    glucagon (GLUCAGEN) injection 1 mg  1 mg IntraMUSCular PRN          LAB AND IMAGING FINDINGS:     Lab Results   Component Value Date/Time    WBC 6.4 07/20/2021 09:09 PM    HGB 9.1 (L) 07/20/2021 09:09 PM    PLATELET 313 (L) 77/88/4826 09:09 PM     Lab Results   Component Value Date/Time    Sodium 134 (L) 07/20/2021 09:09 PM    Potassium 3.6 07/20/2021 09:09 PM    Chloride 100 07/20/2021 09:09 PM    CO2 23 07/20/2021 09:09 PM    BUN 27 (H) 07/20/2021 09:09 PM    Creatinine 3.76 (H) 07/20/2021 09:09 PM    Calcium 8.3 (L) 07/20/2021 09:09 PM    Magnesium 2.2 07/19/2021 02:30 PM    Phosphorus 6.8 (H) 07/19/2021 02:30 PM      Lab Results   Component Value Date/Time    Alk.  phosphatase 144 (H) 07/19/2021 02:30 PM    Protein, total 8.4 (H) 07/19/2021 02:30 PM    Albumin 2.9 (L) 07/19/2021 02:30 PM    Globulin 5.5 (H) 07/19/2021 02:30 PM     Lab Results   Component Value Date/Time    INR 1.4 (H) 07/19/2021 02:30 PM    Prothrombin time 14.6 (H) 07/19/2021 02:30 PM    aPTT 25.0 04/08/2019 10:07 PM      Lab Results   Component Value Date/Time    Iron 21 (L) 08/15/2015 04:50 AM    TIBC 114 (L) 08/15/2015 04:50 AM    Iron % saturation 18 (L) 08/15/2015 04:50 AM    Ferritin 1,525 (H) 08/15/2015 04:50 AM      No results found for: PH, PCO2, PO2  No components found for: [place]   Lab Results   Component Value Date/Time     (H) 12/13/2017 03:28 PM    CK - MB 3.7 (H) 12/13/2017 03:28 PM                Total time: 65min  Counseling / coordination time, spent as noted above: 45  Yes > 50% counseling / coordination?:     Prolonged service was provided for  []30 min   []75 min in face to face time in the presence of the patient, spent as noted above. Time Start:   Time End:   Note: this can only be billed with 53638 (initial) or 23389 (follow up). If multiple start / stop times, list each separately.

## 2021-07-22 NOTE — PROGRESS NOTES
Bedside shift change report given to  oncoming nurse by Eleonora Chiu RN  (offgoing nurse). Report included the following information SBAR, Procedure Summary, Intake/Output, MAR, Accordion, Recent Results, Med Rec Status, Cardiac Rhythm NSR, Alarm Parameters , Pre Procedure Checklist, Procedure Verification, Quality Measures and Dual Neuro Assessment.

## 2021-07-22 NOTE — PROGRESS NOTES
RENAL  PROGRESS NOTE        Subjective:   Refused dialysis today;covering his head in bed          Objective:   VITALS SIGNS:    Visit Vitals  BP (!) 97/53 (BP 1 Location: Right upper arm, BP Patient Position: At rest)   Pulse 67   Temp 98 °F (36.7 °C)   Resp 16   Ht 5' 6\" (1.676 m)   Wt 59.6 kg (131 lb 6.3 oz)   SpO2 99%   BMI 21.21 kg/m²       O2 Device: None (Room air)       Temp (24hrs), Av.8 °F (36.6 °C), Min:97.5 °F (36.4 °C), Max:98 °F (36.7 °C)         PHYSICAL EXAM:  NAD    DATA REVIEW:     INTAKE / OUTPUT:   Last shift:      No intake/output data recorded.   Last 3 shifts: 1901 -  07  In: 370 [P.O.:370]  Out: 700     Intake/Output Summary (Last 24 hours) at 2021 1048  Last data filed at 2021 0800  Gross per 24 hour   Intake 150 ml   Output 0 ml   Net 150 ml         LABS:   Recent Labs     21  1430   WBC 6.4 7.4   HGB 9.1* 10.8*   HCT 28.1* 35.8*   * 148*     Recent Labs     21  1430   * 138   K 3.6 4.6    101   CO2 23 23   * 241*   BUN 27* 39*   CREA 3.76* 6.21*   CA 8.3* 9.0   MG  --  2.2   PHOS  --  6.8*   ALB  --  2.9*   TBILI  --  0.7   ALT  --  13   INR  --  1.4*           Assessment:   ESRD TTS at [facility]  Left upper ext AVF,DECLOTTED  Anemia  Sp cardiac arrest at vascular center  BKA    Plan:   Refused HD today  Check labs  Plan for HD in AM     Discussed with him  Columba Hernandez MD

## 2021-07-22 NOTE — PROGRESS NOTES
7990: Patient arrived to 3N. RN went in to great patient. Patient had his head under blanket and refuses to let me take it off to assess him. He will not answer my orientation questions. He just says to leave him alone.

## 2021-07-22 NOTE — PROGRESS NOTES
Palliative Medicine  [facility]: [phone])  [facility]: [phone])      The SW met with the patient and Palliative Physician Dr. Alejandro Moise, the patient is alert, not able to participate in complex medical discussions. The patient is agitated that he hasn't gotten any food yet- discussed the upcoming testing, patient wants to eat breakfast. Notes indicate patient is refusing labs, IV access, etc.     The patient was admitted from [facility], he is  and endorses he has six children with his current spouse and others out there- could not specifically name the amount of children he has. The patient has multiple grandchildren, patient endorses that family lives close-by. The Palliative Medicine team will meet with family to further introduce role.      Compa Roblero LCSW

## 2021-07-22 NOTE — PROGRESS NOTES
[place] [phone] (COPE)  [facility] [phone] (COPE)          GOALS OF CARE: DDNR, continue current level-of-care, family aware that if patient is unable to tolerate HD, hospice will need to be considered in the future        TREATMENT PREFERENCES:   Code Status: DDNR    Advance Care Planning:  [x] The [place] [id number] Team has updated the ACP Navigator with [address] and Patient Capacity    Primary Decision Maker ([address]):   Relationship to patient:  [] Named in a scanned document   [x] Legal Next of Kin  [] Guardian    The patient has an AMD from 2019, however, this was revoked by daughter during that hospitalization per [facility] Escob[facility] 173 team notes from that admission. The patient's legal NOK is the patient's spouse. Family Meeting Documentation    Participants: Dr. Amelie Virgen, spouse Trish Rodriguez, daughter Sugar Leung, son Doris Rivera, grandson Eric Yanes. Psychosocial: The patient is  to his wife Heather Chavez, has numerous grandchildren. The patient has been living [facility] three years. The family notes that over the years the patient has become for forgetful, confused, etc.     Discussion: The  and Palliative Medicine provider Dr. Amelie Virgen met with the patient's family, discussed overall the patient's condition- he refused dialysis this morning, and family is hopeful that tomorrow he will accept HD when a family member is present to encourage him. The patient has been on dialysis for 8 years now, family verbalized understanding that there will likely come a time where the patient either continues to refuse dialysis, or his body no longer tolerates dialysis. Discussed that at that time hospice would be considered, focusing on comfort. The family verbalized understanding and appeared to be accepting that the patient's time on [place] may be limited.  The patient's family would like to continue current level-of-care, monitor how patient continues to do with HD. Code status was discussed, and DDNR signed by the patient's spouse. SW made copy and gave to family, original yellow DDNR placed on chart to be sent to SNF with patient. Outcome / Plan: DDNR signed by patient's spouse, continue current level-of-care. Family aware that if patient cannot tolerate HD, hospice/comfort will need to be considered in the future. Follow Up: Palliative Medicine will follow, goal clear currently to see if patient can tolerate Dialysis, DDNR signed by spouse. Patient will return to [facility] in [address] [facility].        Santiago Carson LCSW

## 2021-07-22 NOTE — PROGRESS NOTES
Problem: Diabetes Self-Management  Goal: *Disease process and treatment process  Description: Define diabetes and identify own type of diabetes; list 3 options for treating diabetes.   Outcome: Progressing Towards Goal     Problem: Patient Education: Go to Patient Education Activity  Goal: Patient/Family Education  Outcome: Progressing Towards Goal     Problem: Patient Education: Go to Patient Education Activity  Goal: Patient/Family Education  Outcome: Progressing Towards Goal

## 2021-07-22 NOTE — ANESTHESIA POSTPROCEDURE EVALUATION
Procedure(s):  THROMBECTOMY LEFT UPPER ARM, ANGIOPLASTY AND STENT PLACEMENT. MAC    Anesthesia Post Evaluation        Level of consciousness: awake  Pain management: adequate  Airway patency: patent  Anesthetic complications: no  Cardiovascular status: hemodynamically stable  Respiratory status: acceptable  Hydration status: acceptable  Comments: The patient is ready for PACU discharge. Marycarmen Giron DO                   Post anesthesia nausea and vomiting:  controlled      INITIAL Post-op Vital signs:   Vitals Value Taken Time   BP 99/65 07/20/21 1045   Temp 36.2 °C (97.1 °F) 07/20/21 1030   Pulse 65 07/20/21 1049   Resp 15 07/20/21 1049   SpO2 96 % 07/20/21 1049   Vitals shown include unvalidated device data.

## 2021-07-23 NOTE — PROGRESS NOTES
RENAL  PROGRESS NOTE        Subjective:   Seen and examined on HD ;doing ok  Objective:   VITALS SIGNS:    Visit Vitals  BP (!) 149/60   Pulse 82   Temp 97.9 °F (36.6 °C)   Resp 16   Ht 5' 6\" (1.676 m)   Wt 59.5 kg (131 lb 1.6 oz)   SpO2 94%   BMI 21.16 kg/m²       O2 Device: None (Room air)       Temp (24hrs), Av.7 °F (36.5 °C), Min:97.1 °F (36.2 °C), Max:97.9 °F (36.6 °C)         PHYSICAL EXAM:  NAD    DATA REVIEW:     INTAKE / OUTPUT:   Last shift:      No intake/output data recorded. Last 3 shifts: 1901 -  0700  In: 650 [P.O.:650]  Out: 0     Intake/Output Summary (Last 24 hours) at 2021 1140  Last data filed at 2021 1600  Gross per 24 hour   Intake 500 ml   Output 0 ml   Net 500 ml         LABS:   Recent Labs     21  2109   WBC 6.4   HGB 9.1*   HCT 28.1*   *     Recent Labs     21  0826 21  1510 21  2109   * 133* 134*   K 4.8 5.4* 3.6   CL 98 98 100   CO2 *    * 176* 155*   BUN 56* 51* 27*   CREA 7.24* 6.66* 3.76*   CA 7.8* 7.6* 8.3*           Assessment:   ESRD TTS at [facility]  Left upper ext AVF,DECLOTTED  Anemia  Sp cardiac arrest at vascular center  BKA    Plan:    On HD now  Can dc from renal standpoint after HD     Discussed with him  Trish Tinajero MD

## 2021-07-23 NOTE — PROCEDURES
[facility]                         [phone]  Vitals Pre Post Assessment Pre Post   /57 140/60 LOC A&Ox1/confused A&Ox1/confused   HR 72 82 Lungs clear clear   Resp 16 16 Cardiac NSR NSR   Temp 97.9 97.9 Skin Warm/dry Warm/dry   Weight 59.6kg 57.6kg Edema trace none   Tele status no no Pain denies denies     Orders   Duration: Start: 0830 End: 1130 Total: 3hrs   Dialyzer: Dialyzer/Set Up Inspection: Shoshana Esposito (07/23/21 0815)   K Bath: Dialysate K (mEq/L): 2 (07/23/21 0830)   Ca Bath: Dialysate CA (mEq/L): 2.5 (07/23/21 0830)   Na: Dialysate NA (mEq/L): 140 (07/23/21 0830)   Bicarb: 35   Target Fluid Removal: Goal/Amount of Fluid to Remove (mL): 2000 mL (07/23/21 0815)     Access   Type & Location: LINA AV   Comments: +B/T. Prepped per policy. Cannulated with 15g needles x 2 without difficulty. Needles removed post tx. Pressure held. Sites secured with gauze/tape. +B/T post tx.                 Labs   HBsAg (Antigen) / date: Neg 7/20/21                                                   HBsAb (Antibody) / date: Immune 7/20/21   Source: Epic   Obtained/Reviewed  Critical Results Called HGB   Date Value Ref Range Status   07/20/2021 9.1 (L) 12.1 - 17.0 g/dL Final     Potassium   Date Value Ref Range Status   07/23/2021 4.8 3.5 - 5.1 mmol/L Final     Calcium   Date Value Ref Range Status   07/23/2021 7.8 (L) 8.5 - 10.1 MG/DL Final     BUN   Date Value Ref Range Status   07/23/2021 56 (H) 6 - 20 MG/DL Final     Creatinine   Date Value Ref Range Status   07/23/2021 7.24 (H) 0.70 - 1.30 MG/DL Final        Meds Given   Name Dose Route          Adequacy / Fluid    Total Liters Process: 80 liters   Net Fluid Removed: 2 kgs      Comments   Time Out Done: 0963   Admitting Diagnosis: Cardiac arrest / ESRD   Consent obtained/signed: Informed Consent Verified: Yes (07/23/21 0815)   Machine / RO # Machine Number: [id number] / Ramona Sanjuana (07/23/21 0815)   Primary Nurse Rpt Pre: Shorty Choudhary RN Primary Nurse Rpt Post: Tomasz Dunn RN   Pt Education: Procedural   Care Plan: Continue current tx plan   Pts outpatient clinic: TTS TOO [facility]     Tx Summary   Comments: Pt refused tx yesterday. Refused am labs and meds per primary nurse. Was agreeable to having dialysis tx today. Pt tolerated tx well. Report given to primary nurse. Pt returned to room 354 in stable condition.

## 2021-07-23 NOTE — PROGRESS NOTES
Physician Progress Note      PATIENTCarl Jack  CSN #:                  [id number]  :                       1940  ADMIT DATE:       2021 2:19 PM  [address] DATE:        2021 5:20 PM  RESPONDING  PROVIDER #:        Mari Lopez MD          QUERY TEXT:    Dear attending,    Pt admitted with documented pulmonary edema. Pt noted to have a pro-NT-BNP->02013, and an echo which indicated severely reduced LV function with EF 10-15%. If possible, please document in the progress notes and discharge summary if you are evaluating and/or treating any of the following: The medical record reflects the following:  Risk Factors: Hx. ESRD oon HD, CAD  Clinical Indicators: pro-NT-BNP->33158, CXR-IMPRESSION   Mild pulmonary edema and moderate left pleural effusion  Per H&P- Patient with pulmonary edema, likely needs dialysis,  -Per cardiology- Echo report indicates severely reduced LV function with EF 10-15%. Mild-mod MR, mild AS. Treatment: I&O, nephrology consult, monitor weight, labwork, imaging, pulse oximetry    Thank you    Mingo Vick RN, BSN  Clinical documentation Improvement  [phone]  Options provided:  -- Acute pulmonary edema due to heart disease  -- Acute pulmonary edema due to systolic heart failure  -- Chronic pulmonary edema due to heart disease  -- Chronic pulmonary edema due to systolic heart failure  -- Noncardiogenic acute pulmonary edema due to fluid overload  -- Other - I will add my own diagnosis  -- Disagree - Not applicable / Not valid  -- Disagree - Clinically unable to determine / Unknown  -- Refer to Clinical Documentation Reviewer    PROVIDER RESPONSE TEXT:    This patient has acute pulmonary edema due to heart disease.     Query created by: Rishi Mart on 2021 7:34 AM      Electronically signed by:  Mari Lopez MD 2021 5:58 PM

## 2021-07-23 NOTE — PROGRESS NOTES
Transition of Care  1. RUR 19% Moderate  2. Disposition Return to [facility] at [facility]  3. DME NA  4. Transportation BLS [facility] phone [phone] @ [phone]. Follow Up [address] informed in IDRs patient is clear for DC. CM attempted phone call to [facility] [phone], left voice message with admissions. CM also completed SNF return referral via [employer]. Medicare pt has received, reviewed, and signed 2nd IM letter informing them of their right to appeal the discharge. Signed copy has been placed on pt bedside chart. Marilynn Gordon MS    2:20 [facility] has confirmed transport for 3:15. Nurse to call report to [phone]. CM to fax medicals to [phone]. CM updated nurse, attending and notified patient's wife. DC envelope will be placed on chart.      Nestor Keith MS

## 2021-07-23 NOTE — PROGRESS NOTES
Patient refused morning labs/heparin and is insisting I leave his room. Patient covered his face with blankets and is refusing to answer me at this time. Hospitalist notified.

## 2021-07-23 NOTE — PROGRESS NOTES
Call and Report given to [facility] on [facility] and rehab facility. All question are answer. Report included the following information SBAR, Kardex, ED Summary, OR Summary, Procedure Summary, Intake/Output, MAR, Recent Results and Cardiac Rhythm Sinus Arrthymia, a fib.

## 2021-07-23 NOTE — PROGRESS NOTES
The Palliative Medicine team was consulted as part of your / your loved one's care in the hospital. Our team is a supportive service; we strive to relieve suffering and improve quality of life. You identified the following goal(s) as your main focus for healthcare: You will return to [address], continue HD. We discussed the possible transition to comfort care in the future if you can no longer tolerate HD. We reviewed advance care planning information, which includes the following: We reviewed / discussed your code status as: DNR     Full Code means perform CPR in the event of cardiac arrest     [facility] means do NOT perform CPR in the event of cardiac arrest     Partial Code means you have specific preferences, please discuss with your health care team     No Order means this issue was not addressed / resolved during your stay    You have a Durable Do Not Resuscitate Order in place, which should travel with you. When you are in a facility, this form should be placed on your chart. Once you are home, it is recommended that the [facility] form be placed in a visible location such as on the refrigerator or bedroom door. The DDNR will be sent with you to [facility] and Rehab. Because of the importance of this information, we are providing you with a printed copy to share with other healthcare providers after this hospitalization is complete. If any of the above information is incomplete or incorrect, please contact the Palliative Medicine team at [phone].

## 2021-07-23 NOTE — DISCHARGE INSTRUCTIONS
Discharge Instructions       PATIENT ID: Saima Elizabeth  MRN: [id number]   YOB: 1940    DATE OF ADMISSION: 7/19/2021  2:19 PM    DATE OF DISCHARGE: 7/23/2021    PRIMARY CARE PROVIDER: Nedra Rose MD     ATTENDING PHYSICIAN: Percy Celis MD  DISCHARGING PROVIDER: Kinsey Ellis MD    To contact this individual call [phone] and ask the  to page. If unavailable ask to be transferred the Adult Hospitalist Department. CONSULTATIONS: IP CONSULT TO VASCULAR SURGERY  IP CONSULT TO HOSPITALIST  IP CONSULT TO CARDIOLOGY  IP CONSULT TO NEPHROLOGY  IP CONSULT TO PALLIATIVE CARE - PROVIDER    PROCEDURES/SURGERIES: Procedure(s): INFUSION CATHETER INSERTION        FOLLOW UP APPOINTMENTS:   Follow-up Information     Follow up With Specialties Details Why Contact Info    Nedra Rose MD Family Medicine In 1 week  [address]  [phone]                 DIET: Cardiac Diet, Fluid Restriction(<1.5L/day)       ACTIVITY: As tolerated           DISCHARGE MEDICATIONS:   See Medication Reconciliation Form    · It is important that you take the medication exactly as they are prescribed. · Keep your medication in the bottles provided by the pharmacist and keep a list of the medication names, dosages, and times to be taken in your wallet. · Do not take other medications without consulting your doctor. NOTIFY YOUR PHYSICIAN FOR ANY OF THE FOLLOWING:   Fever over 101 degrees for 24 hours. Chest pain, shortness of breath, fever, chills, nausea, vomiting, diarrhea, change in mentation, falling, weakness, bleeding. Severe pain or pain not relieved by medications. Or, any other signs or symptoms that you may have questions about.       DISPOSITION:    Home With:   OT  PT  HH  RN      x SNF/Inpatient Rehab/LTAC    Independent/assisted living    Hospice    Other:     CDMP Checked:   Yes x     PROBLEM LIST Updated:  Yes x       Signed: Noemi Espino MD  7/23/2021  2:05 PM

## 2021-07-23 NOTE — DISCHARGE SUMMARY
Discharge Summary       PATIENT ID: Saima Elizabeth  MRN: [id number]   YOB: 1940    DATE OF ADMISSION: 7/19/2021  2:19 PM    DATE OF DISCHARGE: 7/23/2021  PRIMARY CARE PROVIDER: Nedra Rose MD       DISCHARGING PROVIDER: Kinsey Ellis MD    To contact this individual call [phone] and ask the  to page. If unavailable ask to be transferred the Adult Hospitalist Department. CONSULTATIONS: IP CONSULT TO VASCULAR SURGERY  IP CONSULT TO HOSPITALIST  IP CONSULT TO CARDIOLOGY  IP CONSULT TO NEPHROLOGY  IP CONSULT TO [address] Course and Discharge Diagnoses      This is 78-year-old gentleman with past medical history of ESRD, dementia, hypothyroidism, diabetes, long-term resident of SNF, admitted with cardiac arrest.  Apparently patient was in vascular surgery center for fistula evaluation, became bradycardiac and went into asystole with cardiac arrest after fentanyl and Versed. CPR was started and ROSC was achieved and patient was transferred to hospital.  Echocardiogram showed EF of 10 to 15%, his last echo in 2019 showed normal EF. Cardiology was consulted, cardiology recommended a stress test.  Patient refused stress test and further testing. He also refused for further hemodialysis. Palliative care team was consulted, palliative care team discussed the clinical station with patient's family, DDNR was signed. Now patient is agreeable for hemodialysis, he underwent session of hemodialysis today. He will be discharged back to SNF today and will continue hemodialysis on TTS schedule.     Discharge diagnoses  Bradycardia/asystole with cardiac arrest  ESRD  Pulmonary edema  Rethrombosis left upper arm dialysis graft s/p thrombectomy 7/20  Diabetes  Hypertension  Dementia  Hypothyroidism        PHYSICAL EXAMINATION:   General: Not in any distress  Resp: CTA Bilaterally   CV: S1+S2, No MGR  Abdomen: Soft, non tender  Neuro: Awake, alert, following commands        FOLLOW UP APPOINTMENTS:    Follow-up Information     Follow up With Specialties Details Why Contact Info    Antelmo Ledesma MD Family Medicine In 1 week  [address]  [phone]                 DISCHARGE MEDICATIONS:  Current Discharge Medication List      START taking these medications    Details   nitroglycerin (NITROSTAT) 0.4 mg SL tablet 1 Tablet by SubLINGual route every five (5) minutes as needed for Chest Pain for up to 30 days. Up to 3 doses. Qty: 100 Tablet, Refills: 0  Start date: 7/23/2021, End date: 8/22/2021         CONTINUE these medications which have CHANGED    Details   levothyroxine (SYNTHROID) 75 mcg tablet Take 3 Tablets by mouth Daily (before breakfast) for 30 days. Qty: 90 Tablet, Refills: 0  Start date: 7/24/2021, End date: 8/23/2021         CONTINUE these medications which have NOT CHANGED    Details   cholecalciferol, vitamin D3, (VITAMIN D3 PO) Take 5,000 Units by mouth daily. midodrine (PROAMITINE) 10 mg tablet Take 10 mg by mouth Q TU, TH & SAT. With dialysis      pravastatin (PRAVACHOL) 20 mg tablet Take 20 mg by mouth nightly. OTHER,NON-FORMULARY, GLUCOSE NA/F CAFFIENE FREE TAB CHEW, TAKE 3 TABS BY MOUTH PRN FOR HYPOGLYCEMIA      glucagon (GLUCAGEN) 1 mg injection 1 mg by IntraMUSCular route once. FOR BS<60 AND UNRESPONSIVE      polyethylene glycol (MIRALAX) 17 gram/dose powder Take 17 g by mouth daily. Qty: 238 g, Refills: 0      ondansetron (ZOFRAN ODT) 4 mg disintegrating tablet Take 1 Tab by mouth every eight (8) hours as needed for Nausea. Qty: 10 Tab, Refills: 0      aspirin delayed-release 81 mg tablet Take 1 Tab by mouth daily. Qty: 30 Tab, Refills: 11               NOTIFY YOUR PHYSICIAN FOR ANY OF THE FOLLOWING:   Fever over 101 degrees for 24 hours. Chest pain, shortness of breath, fever, chills, nausea, vomiting, diarrhea, change in mentation, falling, weakness, bleeding.  Severe pain or pain not relieved by medications. Or, any other signs or symptoms that you may have questions about.             CHRONIC MEDICAL DIAGNOSES:  Problem List as of 7/23/2021 Date Reviewed: 4/9/2020        Codes Class Noted - Resolved    Elevated LFTs ICD-10-CM: R79.89  ICD-9-CM: 790.6 Present on Admission 11/13/2014 - Present        Sepsis [facility]) ICD-10-CM: A41.9  ICD-9-CM: 038.9, 995.91 Present on Admission 11/13/2014 - Present        Gangrene of foot ([facility] ICD-10-CM: H07  ICD-9-CM: 785.4 Present on Admission 11/13/2014 - Present        Anemia in chronic kidney disease (Chronic) ICD-10-CM: N18.9, D63.1  ICD-9-CM: 285.21 Chronic 11/13/2014 - Present        Cardiac arrest ([facility] ICD-10-CM: I46.9  ICD-9-CM: 427.5  7/19/2021 - Present        Fracture of femoral neck, left, closed ([facility] ICD-10-CM: S72.002A  ICD-9-CM: 820.8  4/8/2020 - Present        Dementia ([facility] ICD-10-CM: F03.90  ICD-9-CM: 294.20  4/12/2019 - Present        Severe hypothyroidism ICD-10-CM: E03.8  ICD-9-CM: 244.8  4/9/2019 - Present        Hypoglycemia ICD-10-CM: E16.2  ICD-9-CM: 251.2  4/9/2019 - Present        Bilateral carotid artery stenosis ICD-10-CM: I65.23  ICD-9-CM: 433.10, 433.30  4/9/2019 - Present        DM type 2 (diabetes mellitus, type 2) (HCC) (Chronic) ICD-10-CM: E11.9  ICD-9-CM: 250.00  8/14/2015 - Present        PAD (peripheral artery disease) (HCC) (Chronic) ICD-10-CM: I73.9  ICD-9-CM: 443.9  8/14/2015 - Present        ESRD (end stage renal disease) on dialysis [facility]) (Chronic) ICD-10-CM: N18.6, Z99.2  ICD-9-CM: 585.6, V45.11 Chronic 10/8/2014 - Present        History of CVA (cerebrovascular accident) ICD-10-CM: Z86.73  ICD-9-CM: V12.54 Present on Admission 10/8/2014 - Present        Hypothyroid ICD-10-CM: E03.9  ICD-9-CM: 244.9  10/8/2014 - Present        HTN (hypertension) (Chronic) ICD-10-CM: I10  ICD-9-CM: 401.9  4/13/2011 - Present        DM neuro manif type II, uncontrolled ICD-10-CM: E11.49  ICD-9-CM: 250.62  4/13/2011 - Present Other and unspecified hyperlipidemia ICD-10-CM: E78.5  ICD-9-CM: 272.4  4/13/2011 - Present              Greater than 30  minutes were spent with the patient on counseling and coordination of care    Signed:   Pia Leon MD  7/23/2021  2:14 PM

## 2021-07-23 NOTE — PROGRESS NOTES
Bedside and Verbal shift change report given to [address] Victorino (oncoming nurse) by Carmen Cantu (offgoing nurse). Report included the following information SBAR, Kardex, ED Summary, Intake/Output, MAR, Accordion, Recent Results and Cardiac Rhythm sinus arrhythmia/afib.

## 2021-07-26 NOTE — PROGRESS NOTES
No transition of care outreach indicated due to patient discharge to a [address] [place] [address].   maira [facility] 7/19-7/23/21 d/c [facility]

## 2021-08-05 NOTE — PROGRESS NOTES
Physician Progress Note      Linette Wong  CSN #:                  [id number]  :                       1940  ADMIT DATE:       2021 2:19 PM  [address] [place] DATE:        2021 5:20 PM  RESPONDING  PROVIDER #:        aRmesh Leyva MD          QUERY TEXT:    Dear attending,  Pt admitted with pulmonary edema. Pt noted to have had bradycardia/cardiac arrest after he was given Fentanyl and Versed for a fistula evaluation. Narcan was given post procedure. If possible, please document in progress notes and discharge summary:      The medical record reflects the following:  Risk Factors: Episode of bradycardia was after fentanyl and Versed was given pre-procedure at [facility]  - Per ED triage- Pt arrives via EMS from [facility] post cardiac arrest. The patient was there for a procedure on his dialysis shunt. He received versed and fentanyl, his HR then went into the 30s and patient became unresponsive and they did not feel a pulse. Narcan was given and 3 minutes of CPR. Clinical Indicators: Per dc summary- This is 41-year-old gentleman with past medical history of ESRD, dementia, hypothyroidism, diabetes, long-term resident of SNF, admitted with cardiac arrest.  Apparently patient was in vascular surgery center for fistula evaluation, became bradycardiac and went into asystole with cardiac arrest after fentanyl and Versed.   CPR was started and ROSC was achieved and patient was transferred to hospital.  Echocardiogram showed EF of 10 to 15%, his last echo in 2019 showed normal EF  Treatment: Narcan, Monitor labwork, vital signs, imaging, pulse oximetry  Thank you    Asael Hernandez RN, BSN  Clinical documentation Improvement  [phone]  Options provided:  -- bradycardia/cardiac arrest as a postoperative complication due to anesthesia  -- bradycardia/cardiac arrest was due to an adverse effect of anesthesia  -- Other - I will add my own diagnosis  -- Disagree - Not applicable / Not valid  -- Disagree - Clinically unable to determine / Unknown  -- Refer to Clinical Documentation Reviewer    PROVIDER RESPONSE TEXT:    Provider is clinically unable to determine a response to this query.     Query created by: Newell Ahumada on 8/2/2021 2:23 PM      Electronically signed by:  Pauline Ledesma MD 8/4/2021 11:29 PM

## 2021-11-06 NOTE — ED PROVIDER NOTES
EMERGENCY DEPARTMENT HISTORY AND PHYSICAL EXAM      Date: 11/6/2021  Patient Name: Sara Mosqueda    History of Presenting Illness     Chief Complaint   Patient presents with    Cardiac arrest     arrives with EMS after witnessed arrest at dialysis center; compressions atarted at apporximately at 1300 and are ongoing upon arrival to ED; pt has torniquet on upper Left arm above AV fistula d/t uncontrolled bleeding. arrives with IO access in RUE by EMS. History Provided By: EMS    HPI: Sara Mosqueda, 78 y.o. male with past medical history of dementia, HTN, ESRD on HD, bilateral lower extremity amputations, CAD, CVA, diabetes, brought to the ED by EMS with cc of cardiac arrest.  Patient was at dialysis when he reportedly had a witnessed arrest. Unclear how much or if any dialysis he received prior to this occurring. Per EMS, patient had a dialysis needle still inserted into his left upper extremity fistula when they began to code him. Due to excessive arterial bleeding from the fistula site, EMS had to temporarily tourniquet the left upper extremity while ACLS was started. Dialysis needle was reportedly removed prior to transport. Per EMS, patient's rhythm en route on each pulse check was PEA. Reports total downtime for them of approximately 10 minutes without ROSC. EMS had no information on patient's code status. No family available to discuss code status. No further history able to be obtained. PCP: Marianela Arredondo MD    No current facility-administered medications on file prior to encounter. Current Outpatient Medications on File Prior to Encounter   Medication Sig Dispense Refill    cholecalciferol, vitamin D3, (VITAMIN D3 PO) Take 5,000 Units by mouth daily.  midodrine (PROAMITINE) 10 mg tablet Take 10 mg by mouth Q TU, TH & SAT. With dialysis      pravastatin (PRAVACHOL) 20 mg tablet Take 20 mg by mouth nightly.       OTHER,NON-FORMULARY, GLUCOSE NA/F CAFFIENE FREE TAB CHEW, TAKE 3 TABS BY MOUTH PRN FOR HYPOGLYCEMIA      glucagon (GLUCAGEN) 1 mg injection 1 mg by IntraMUSCular route once. FOR BS<60 AND UNRESPONSIVE      polyethylene glycol (MIRALAX) 17 gram/dose powder Take 17 g by mouth daily. 238 g 0    ondansetron (ZOFRAN ODT) 4 mg disintegrating tablet Take 1 Tab by mouth every eight (8) hours as needed for Nausea. 10 Tab 0    aspirin delayed-release 81 mg tablet Take 1 Tab by mouth daily. 27 Tab 11       Past History     Past Medical History:  Past Medical History:   Diagnosis Date    Anemia     CAD (coronary artery disease)     Chronic kidney disease     tues/thurs/sat [facility] [address]    Cognitive communication deficit     Diabetes [facility]     Dysarthria and anarthria     Dysphagia     Endocrine disease     hypothyroid    Gastrointestinal disorder     gallstones    Hx of BKA, left (HCC)     Hx of BKA, right (HCC)     Hypertension     Muscle weakness (generalized)     Stroke ([facility]     2011 speech general weakness    Thyroid disease        Past Surgical History:  Past Surgical History:   Procedure Laterality Date    HX HEENT      cataract   removal  rt eye    HX ORTHOPAEDIC      11/2014 R BK Amputation and L    HX OTHER SURGICAL      HX VASCULAR ACCESS      L arm shunt    SC ABDOMEN SURGERY PROC UNLISTED      VASCULAR SURGERY PROCEDURE UNLIST      Fistula R arm       Family History:  Family History   Problem Relation Age of Onset    Hypertension Mother     Diabetes Father        Social History:  Social History     Tobacco Use    Smoking status: Never Smoker    Smokeless tobacco: Never Used   Substance Use Topics    Alcohol use: No    Drug use: No     Types: Prescription       Allergies:  No Known Allergies      Review of Systems   Review of Systems   Unable to perform ROS: Acuity of condition       Physical Exam   Physical Exam  Vitals and nursing note reviewed. Constitutional:       Appearance: He is ill-appearing.    HENT:      Head: Normocephalic and atraumatic. Mouth/Throat:      Mouth: Mucous membranes are dry. Eyes:      Pupils: Pupils are equal, round, and reactive to light. Cardiovascular:      Arteriovenous access: left arteriovenous access is present. Comments: CPR in process. No palpable central pulse with CPR briefly stopped for pulse check. Recently accessed left upper extremity fistula, with bleeding largely controlled, proximal tourniquet in place. Right upper extremity IO in place  Pulmonary:      Breath sounds: Decreased air movement present. Decreased breath sounds and rales present. No wheezing or rhonchi. Comments: Actively being bagged, no spontaneous respirations  Abdominal:      General: Abdomen is protuberant. There is no distension. Musculoskeletal:      Comments: Bilateral BKA   Skin:     General: Skin is cool and dry. Coloration: Skin is ashen. Neurological:      Mental Status: He is unresponsive. Comments: No response to noxious stimuli in all extremities         Diagnostic Study Results     Labs -   No results found for this or any previous visit (from the past 24 hour(s)). Radiologic Studies -   No orders to display     CT Results  (Last 48 hours)    None        CXR Results  (Last 48 hours)    None          Medical Decision Making   IVadim MD am the first provider for this patient, and I am the attending of record for this patient encounter. I reviewed the vital signs, available nursing notes, past medical history, past surgical history, family history and social history. Vital Signs-Reviewed the patient's vital signs. No data found. Provider Notes (Medical Decision Making):   Patient arrives in cardiac arrest. He was actively being bagged with BVM, CPR was in process. When CPR was paused briefly for pulse check, patient noted to have no palpable central pulse, no spontaneous respirations.  No meaningful response to noxious stimuli all extremities despite not being on sedation meds. Patient was placed on [place] device, and CPR was resumed. He was also transitioned from EMS cardiac monitor to ED monitor with pads placed. He has a working right upper extremity IO placed by EMS prior to arrival. Left upper extremity with fistula, not amenable for additional access. Numerous rounds of CPR with pulse check every 2 minutes, epi given every 3 to 5 minutes, and initially patient was bagged with BVM while preparing for definitive airway placement. Higher up on the list of differentials for cardiac arrest include hyperkalemia with history of dialysis, and 1 g of calcium chloride was given shortly after patient arrival. Point-of-care glucose was checked to ensure no hypoglycemia, blood sugar was reported to be 150. Point-of-care labs including Chem-8, lactic acid, and VBG were emergently run at bedside. This showed lactic acidosis of greater than 9, pH of 7.2. 2 amps of bicarb was immediately given. Despite all of the above interventions, patient had no response and continued to have no palpable central pulse with asystole on monitor on each pulse and rhythm check. Patient was then intubated easily in 1 attempt during one of the pulse checks by ED medic under my direct supervision. Patient had copious yellow secretions seen in ET tube, suspicious for possible aspiration event. ACLS was in process for a total of 20 minutes in the ED without ROSC ever achieved. No shockable rhythm throughout. In total, patient has been down for 30 minutes or greater. At this point, I feel it would be futile to continue with ACLS efforts as meaningful neurologic outcome is likely nonexistent. Discussed with all staff present, all were in agreement with ceasing resuscitation at this time. No additional suggestions elicited. Time of death was called at 1:28 PM. A minute of silence was observed.     ED Course as of 11/06/21 1334   Sat Nov 06, 2021   1333 Attempted to call Eleonora Munguia, spouse, listed in patient chart to notify of ED course and death. No answer. [JM]      ED Course User Index  [JM] Jacek Roach MD     I was notified by ED personnel that  patient's son had arrived to the hospital shortly after his passing. I met the son in the family consult room. His father's ED course and death was discussed with him. Son shared with me that he has been prepared for this news as the family has been told by other physicians that his father Latasha Ponds a weak heart. \" He tells me that his father was seen several months ago at [facility], where doctors there had told them \"not much can be done for him anymore. \" He states \"we honestly didn't expect him to live this long. \" Patient's son states he will go  his mother at this time, so that they can come back and see the patient together.  notified, and is on standby for when family comes back to help provide emotional and spiritual support during grieving process. Procedure Note - CPR:  1:08PM  Performed by: Suma Rodriguez MD  CPR was indicated for cardiopulmonary arrest.  This included supervising or directly performing chest compressions, as well as bag valve mask ventilation. The total time spent performing CPR was 20 minutes. Procedure Note - Orotracheal Intubation:   1:15 PM  Performed by: ED Tech  Supervised by: Suma Rodriguez MD  Indication for procedure: respiratory failure and airway compromise  RSI performed. The patient did not require sedation or paralytic medication. He was orotracheally intubated with a 7.5 cuffed [place] endotracheal tube using a Mac 4 blade with direct visualization. Tube was advanced to 23 cm at the lip. ETT location confirmed by bilateral, symmetric breath sounds, good end-tidal CO2 detector color change , no breath sounds over stomach and bulb aspirator expands promptly. Number of attempts: 1  Complications: none  The procedure took 1-15 minutes, and pt tolerated well. ED Course:   Initial assessment performed.  The patient's presenting problems have been discussed, and they are in agreement with the care plan formulated and outlined with them. I have encouraged them to ask questions as they arise throughout their visit. Toyin Valles MD      Disposition:      Diagnosis     Clinical Impression:   1. Cardiac arrest [facility])      Attestations:    Toyin Valles MD    Please note that this dictation was completed with [employer], the computer voice recognition software. Quite often unanticipated grammatical, syntax, homophones, and other interpretive errors are inadvertently transcribed by the computer software. Please disregard these errors. Please excuse any errors that have escaped final proofreading. Thank you.

## 2021-11-06 NOTE — PROGRESS NOTES
Spiritual Care Assessment/Progress Note  [facility]      NAME: Melina Cheema      MRN: [id number]  AGE: 78 y.o. SEX: male  Hindu Affiliation: Shinto   Language: English     2021     Total Time (in minutes): 11     Spiritual Assessment begun in [facility] EMERGENCY DEPT through conversation with:         []Patient        [] Family    [] Friend(s)        Reason for Consult: Death, ER     Spiritual beliefs: (Please include comment if needed)     [] Identifies with a jagjit tradition:         [] Supported by a jagjit community:            [] Claims no spiritual orientation:           [] Seeking spiritual identity:                [] Adheres to an individual form of spirituality:           [x] Not able to assess:                           Identified resources for coping:      [] Prayer                               [] Music                  [] Guided Imagery     [x] Family/friends                 [] Pet visits     [] Devotional reading                         [] Unknown     [] Other:                                            Interventions offered during this visit: (See comments for more details)    Patient Interventions: Death, Outpatient           Plan of Care:     [] Support spiritual and/or cultural needs    [] Support AMD and/or advance care planning process      [x] Support grieving process   [] Coordinate Rites and/or Rituals    [] Coordination with community clergy   [] No spiritual needs identified at this time   [] Detailed Plan of Care below (See Comments)  [] Make referral to Music Therapy  [] Make referral to Pet Therapy     [] Make referral to Addiction services  [] Make referral to [facility]  [] Make referral to Spiritual Care Partner  [] No future visits requested        [x] Contact Spiritual Care for further referrals     Comments:  Responded to page to ER room 12. Spoke with KATHARINE Knott; patient coded and  in ER. Son was in family consult room.   Dr. Baron Fatima had spoken to son and he immediately left hospital to get his mother. Unable to offer any support at this time.    can be paged when/if family returns to hospital.     KYLE Hoang, [facility], Staff   ADELINA [facility] Paging Service  [phone])

## 2021-11-08 LAB
BASE DEFICIT BLD-SCNC: 10.1 MMOL/L
CA-I BLD-MCNC: 1.17 MMOL/L (ref 1.12–1.32)
CHLORIDE BLD-SCNC: 101 MMOL/L (ref 100–108)
CO2 BLD-SCNC: 20 MMOL/L (ref 19–24)
GLUCOSE BLD STRIP.AUTO-MCNC: 159 MG/DL (ref 74–106)
HCO3 BLDA-SCNC: 18 MMOL/L
LACTATE BLD-SCNC: 12.25 MMOL/L (ref 0.4–2)
PCO2 BLDV: 50.1 MMHG (ref 41–51)
PH BLDV: 7.17 [PH] (ref 7.32–7.42)
PO2 BLDV: <13 MMHG (ref 25–40)
POTASSIUM BLD-SCNC: 4.8 MMOL/L (ref 3.5–5.5)
SERVICE CMNT-IMP: ABNORMAL
SODIUM BLD-SCNC: 138 MMOL/L (ref 136–145)
SPECIMEN SITE: ABNORMAL

## 2021-12-26 NOTE — ED NOTES
Offered to reposition patient for comfort but he declines stating, \"I like my head up. \" Reminded patient to move in bed to prevent ulcers. Walk in

## (undated) DEVICE — STERILE POLYISOPRENE POWDER-FREE SURGICAL GLOVES WITH EMOLLIENT COATING: Brand: PROTEXIS

## (undated) DEVICE — (D)PREP SKN CHLRAPRP APPL 26ML -- CONVERT TO ITEM 371833

## (undated) DEVICE — PINNACLE INTRODUCER SHEATH: Brand: PINNACLE

## (undated) DEVICE — SPONGE GZ W4XL4IN COT 12 PLY TYP VII WVN C FLD DSGN

## (undated) DEVICE — SUTURE ABSORBABLE MONOFILAMENT 2-0 WND CLOSURE GRN V-LOC 180 VLOCL0315

## (undated) DEVICE — NDL SPNE QNCKE 18GX3.5IN LF --

## (undated) DEVICE — Z DISCONTINUED USE 2717541 SUTURE STRATAFIX SZ 3-0 L30CM NONABSORBABLE UD L26MM FS 3/8

## (undated) DEVICE — TRAP FLUID BUFFALO FLTR

## (undated) DEVICE — RADIFOCUS GLIDEWIRE: Brand: GLIDEWIRE

## (undated) DEVICE — DEVICE TRNSF SPIK STL 2008S] MICROTEK MEDICAL INC]

## (undated) DEVICE — STRAP,POSITIONING,KNEE/BODY,FOAM,4X60": Brand: MEDLINE

## (undated) DEVICE — INFECTION CONTROL KIT SYS

## (undated) DEVICE — HANDLE LT SNAP ON ULT DURABLE LENS FOR TRUMPF ALC DISPOSABLE

## (undated) DEVICE — REM POLYHESIVE ADULT PATIENT RETURN ELECTRODE: Brand: VALLEYLAB

## (undated) DEVICE — STERILE POLYISOPRENE POWDER-FREE SURGICAL GLOVES: Brand: PROTEXIS

## (undated) DEVICE — SYR 20ML LL STRL LF --

## (undated) DEVICE — SUTURE VCRL SZ 3-0 L27IN ABSRB UD FS-2 L19MM 1/2 CIR J423H

## (undated) DEVICE — ADHESIVE SKIN CLOSURE 4X22 CM PREMIERPRO EXOFINFUSION DISP

## (undated) DEVICE — SUTURE VCRL SZ 3-0 L27IN ABSRB UD L26MM SH 1/2 CIR J416H

## (undated) DEVICE — SOLUTION IRRIG 1000ML H2O STRL BLT

## (undated) DEVICE — DEVICE INFL 20ML L13IN 30ATM CLR POLYCARB TB PRTBL DGT

## (undated) DEVICE — SUTURE VCRL SZ 0 L36IN ABSRB UD L36MM CT-1 1/2 CIR J946H

## (undated) DEVICE — OPTIFOAM GENTLE SA, POSTOP, 4X8: Brand: MEDLINE

## (undated) DEVICE — KENDALL SCD EXPRESS SLEEVES, KNEE LENGTH, MEDIUM: Brand: KENDALL SCD

## (undated) DEVICE — SUTURE GORTX SZ 4-0 L24IN NONABSORBABLE L13MM PT-13 3/8 CIR 5K08A

## (undated) DEVICE — SYR 50ML LR LCK 1ML GRAD NSAF --

## (undated) DEVICE — SHEET, T, LAPAROTOMY, STERILE: Brand: MEDLINE

## (undated) DEVICE — DRAPE,REIN 53X77,STERILE: Brand: MEDLINE

## (undated) DEVICE — PREP KIT PEEL PTCH POVIDONE IOD

## (undated) DEVICE — SYR 3ML LL TIP 1/10ML GRAD --

## (undated) DEVICE — C-ARM: Brand: UNBRANDED

## (undated) DEVICE — NEEDLE HYPO 25GA L1.5IN BVL ORIENTED ECLIPSE

## (undated) DEVICE — OSCILLATING TIP SAW CARTRIDGE: Brand: PRECISION FALCON

## (undated) DEVICE — STRAP POS KNEE BODY VELC

## (undated) DEVICE — CONQUEST® PTA BALLOON DILATATION CATHETER 8 MM X 40 MM, 75 CM CATHETER: Brand: CONQUEST®

## (undated) DEVICE — GARMENT,MEDLINE,DVT,INT,CALF,MED, GEN2: Brand: MEDLINE

## (undated) DEVICE — CATH BLLN PTA .035 6X40X80 -- EVERCROSS OTW

## (undated) DEVICE — SUTURE STRATAFIX SYMMETRIC SZ 1 L18IN ABSRB VLT CT1 L36CM SXPP1A404

## (undated) DEVICE — DRAPE XR C ARM 41X74IN LF --

## (undated) DEVICE — PREP SKN CHLRAPRP APL 26ML STR --

## (undated) DEVICE — AMC/4 ARTERIAL NEEDLE – 18GA X 2.75” (7CM): Brand: ARTERIAL NEEDLE

## (undated) DEVICE — SUT ETHLN 3-0 18IN PS2 BLK --

## (undated) DEVICE — ELECTRODE BLDE L4IN NONINSULATED EDGE

## (undated) DEVICE — 4-PORT MANIFOLD: Brand: NEPTUNE 2

## (undated) DEVICE — SOLUTION IV 1000ML 0.9% SOD CHL

## (undated) DEVICE — SYR 10ML LUER LOK 1/5ML GRAD --

## (undated) DEVICE — FOGARTY ARTERIAL EMBOLECTOMY CATHETER 4F 80CM: Brand: FOGARTY

## (undated) DEVICE — SOLUTION IV 500ML 0.9% SOD CHL FLX CONT

## (undated) DEVICE — MINOR BASIN -SMH: Brand: MEDLINE INDUSTRIES, INC.

## (undated) DEVICE — DECANTER BAG 9": Brand: MEDLINE INDUSTRIES, INC.

## (undated) DEVICE — SOL INJ SOD CL 0.9% 500ML BG --

## (undated) DEVICE — BANDAGE,GAUZE,CONFORMING,3"X75",STRL,LF: Brand: MEDLINE

## (undated) DEVICE — FLOSEAL HEMOSTATIC MATRIX, 10 ML: Brand: FLOSEAL

## (undated) DEVICE — 3M™ IOBAN™ 2 ANTIMICROBIAL INCISE DRAPE 6651EZ: Brand: IOBAN™ 2

## (undated) DEVICE — Device

## (undated) DEVICE — SMOKE EVACUATION PENCIL: Brand: VALLEYLAB